# Patient Record
Sex: MALE | Race: BLACK OR AFRICAN AMERICAN | NOT HISPANIC OR LATINO | URBAN - METROPOLITAN AREA
[De-identification: names, ages, dates, MRNs, and addresses within clinical notes are randomized per-mention and may not be internally consistent; named-entity substitution may affect disease eponyms.]

---

## 2017-03-28 ENCOUNTER — ON CAMPUS - OUTPATIENT (AMBULATORY)
Dept: URBAN - METROPOLITAN AREA HOSPITAL 2 | Facility: HOSPITAL | Age: 57
End: 2017-03-28
Payer: COMMERCIAL

## 2017-03-28 ENCOUNTER — OFFICE (AMBULATORY)
Dept: URBAN - METROPOLITAN AREA CLINIC 64 | Facility: CLINIC | Age: 57
End: 2017-03-28

## 2017-03-28 VITALS
SYSTOLIC BLOOD PRESSURE: 98 MMHG | DIASTOLIC BLOOD PRESSURE: 65 MMHG | DIASTOLIC BLOOD PRESSURE: 90 MMHG | HEART RATE: 65 BPM | HEART RATE: 61 BPM | TEMPERATURE: 98 F | DIASTOLIC BLOOD PRESSURE: 75 MMHG | SYSTOLIC BLOOD PRESSURE: 114 MMHG | HEART RATE: 73 BPM | HEART RATE: 66 BPM | SYSTOLIC BLOOD PRESSURE: 91 MMHG | SYSTOLIC BLOOD PRESSURE: 117 MMHG | OXYGEN SATURATION: 95 % | HEART RATE: 74 BPM | DIASTOLIC BLOOD PRESSURE: 93 MMHG | SYSTOLIC BLOOD PRESSURE: 123 MMHG | HEART RATE: 67 BPM | DIASTOLIC BLOOD PRESSURE: 52 MMHG | OXYGEN SATURATION: 100 % | SYSTOLIC BLOOD PRESSURE: 96 MMHG | OXYGEN SATURATION: 97 % | DIASTOLIC BLOOD PRESSURE: 66 MMHG | SYSTOLIC BLOOD PRESSURE: 155 MMHG | DIASTOLIC BLOOD PRESSURE: 47 MMHG | DIASTOLIC BLOOD PRESSURE: 60 MMHG | RESPIRATION RATE: 18 BRPM | HEART RATE: 63 BPM | WEIGHT: 235 LBS | HEART RATE: 56 BPM | SYSTOLIC BLOOD PRESSURE: 102 MMHG | DIASTOLIC BLOOD PRESSURE: 55 MMHG | OXYGEN SATURATION: 96 % | DIASTOLIC BLOOD PRESSURE: 77 MMHG | SYSTOLIC BLOOD PRESSURE: 84 MMHG | SYSTOLIC BLOOD PRESSURE: 100 MMHG | HEART RATE: 62 BPM | RESPIRATION RATE: 15 BRPM | OXYGEN SATURATION: 98 % | HEIGHT: 73 IN | RESPIRATION RATE: 16 BRPM

## 2017-03-28 DIAGNOSIS — K57.30 DIVERTICULOSIS OF LARGE INTESTINE WITHOUT PERFORATION OR ABS: ICD-10-CM

## 2017-03-28 DIAGNOSIS — Z12.11 ENCOUNTER FOR SCREENING FOR MALIGNANT NEOPLASM OF COLON: ICD-10-CM

## 2017-03-28 DIAGNOSIS — K63.5 POLYP OF COLON: ICD-10-CM

## 2017-03-28 DIAGNOSIS — K64.8 OTHER HEMORRHOIDS: ICD-10-CM

## 2017-03-28 DIAGNOSIS — D12.2 BENIGN NEOPLASM OF ASCENDING COLON: ICD-10-CM

## 2017-03-28 DIAGNOSIS — K62.89 OTHER SPECIFIED DISEASES OF ANUS AND RECTUM: ICD-10-CM

## 2017-03-28 PROBLEM — D12.5 BENIGN NEOPLASM OF SIGMOID COLON: Status: ACTIVE | Noted: 2017-03-28

## 2017-03-28 LAB
GI HISTOLOGY: A. UNSPECIFIED: (no result)
GI HISTOLOGY: B. UNSPECIFIED: (no result)
GI HISTOLOGY: PDF REPORT: (no result)

## 2017-03-28 PROCEDURE — 45385 COLONOSCOPY W/LESION REMOVAL: CPT | Performed by: INTERNAL MEDICINE

## 2017-03-28 PROCEDURE — 88305 TISSUE EXAM BY PATHOLOGIST: CPT | Performed by: INTERNAL MEDICINE

## 2017-03-28 RX ADMIN — PROPOFOL: 10 INJECTION, EMULSION INTRAVENOUS at 10:06

## 2020-02-12 RX ORDER — LISINOPRIL 40 MG/1
TABLET ORAL
Qty: 90 TABLET | Refills: 3 | Status: SHIPPED | OUTPATIENT
Start: 2020-02-12

## 2020-05-13 RX ORDER — SILDENAFIL 100 MG/1
TABLET, FILM COATED ORAL
Qty: 10 TABLET | Refills: 11 | Status: SHIPPED | OUTPATIENT
Start: 2020-05-13

## 2020-07-14 RX ORDER — AMLODIPINE BESYLATE 2.5 MG/1
TABLET ORAL
Qty: 90 TABLET | Refills: 2 | Status: SHIPPED | OUTPATIENT
Start: 2020-07-14 | End: 2023-02-14

## 2020-08-05 ENCOUNTER — OFFICE (AMBULATORY)
Dept: URBAN - METROPOLITAN AREA PATHOLOGY 4 | Facility: PATHOLOGY | Age: 60
End: 2020-08-05
Payer: COMMERCIAL

## 2020-08-05 ENCOUNTER — ON CAMPUS - OUTPATIENT (AMBULATORY)
Dept: URBAN - METROPOLITAN AREA HOSPITAL 2 | Facility: HOSPITAL | Age: 60
End: 2020-08-05

## 2020-08-05 VITALS
HEART RATE: 54 BPM | OXYGEN SATURATION: 100 % | HEIGHT: 73 IN | DIASTOLIC BLOOD PRESSURE: 79 MMHG | WEIGHT: 238 LBS | HEART RATE: 60 BPM | DIASTOLIC BLOOD PRESSURE: 92 MMHG | DIASTOLIC BLOOD PRESSURE: 78 MMHG | SYSTOLIC BLOOD PRESSURE: 129 MMHG | DIASTOLIC BLOOD PRESSURE: 56 MMHG | OXYGEN SATURATION: 96 % | SYSTOLIC BLOOD PRESSURE: 131 MMHG | DIASTOLIC BLOOD PRESSURE: 64 MMHG | TEMPERATURE: 96.6 F | DIASTOLIC BLOOD PRESSURE: 61 MMHG | OXYGEN SATURATION: 98 % | RESPIRATION RATE: 18 BRPM | OXYGEN SATURATION: 99 % | SYSTOLIC BLOOD PRESSURE: 106 MMHG | SYSTOLIC BLOOD PRESSURE: 109 MMHG | SYSTOLIC BLOOD PRESSURE: 112 MMHG | SYSTOLIC BLOOD PRESSURE: 100 MMHG | DIASTOLIC BLOOD PRESSURE: 67 MMHG | HEART RATE: 63 BPM | SYSTOLIC BLOOD PRESSURE: 101 MMHG | DIASTOLIC BLOOD PRESSURE: 81 MMHG | DIASTOLIC BLOOD PRESSURE: 72 MMHG | HEART RATE: 67 BPM | RESPIRATION RATE: 16 BRPM | SYSTOLIC BLOOD PRESSURE: 132 MMHG | OXYGEN SATURATION: 97 % | HEART RATE: 64 BPM | SYSTOLIC BLOOD PRESSURE: 140 MMHG

## 2020-08-05 DIAGNOSIS — D12.3 BENIGN NEOPLASM OF TRANSVERSE COLON: ICD-10-CM

## 2020-08-05 DIAGNOSIS — K62.1 RECTAL POLYP: ICD-10-CM

## 2020-08-05 DIAGNOSIS — Z86.010 PERSONAL HISTORY OF COLONIC POLYPS: ICD-10-CM

## 2020-08-05 DIAGNOSIS — K57.30 DIVERTICULOSIS OF LARGE INTESTINE WITHOUT PERFORATION OR ABS: ICD-10-CM

## 2020-08-05 DIAGNOSIS — K64.8 OTHER HEMORRHOIDS: ICD-10-CM

## 2020-08-05 PROBLEM — K63.5 POLYP OF COLON: Status: ACTIVE | Noted: 2020-08-05

## 2020-08-05 PROCEDURE — 88305 TISSUE EXAM BY PATHOLOGIST: CPT | Mod: 26 | Performed by: INTERNAL MEDICINE

## 2020-08-05 PROCEDURE — 45385 COLONOSCOPY W/LESION REMOVAL: CPT | Mod: 33 | Performed by: INTERNAL MEDICINE

## 2022-07-14 ENCOUNTER — LAB REQUISITION (OUTPATIENT)
Dept: LAB | Facility: HOSPITAL | Age: 62
End: 2022-07-14

## 2022-07-14 DIAGNOSIS — Z00.00 ENCOUNTER FOR GENERAL ADULT MEDICAL EXAMINATION WITHOUT ABNORMAL FINDINGS: ICD-10-CM

## 2022-07-14 PROCEDURE — 88305 TISSUE EXAM BY PATHOLOGIST: CPT | Performed by: OTOLARYNGOLOGY

## 2022-07-18 LAB — REF LAB TEST METHOD: NORMAL

## 2022-07-22 LAB
LAB AP CASE REPORT: NORMAL
LAB AP DIAGNOSIS COMMENT: NORMAL
LAB AP FLOW CYTOMETRY SUMMARY: NORMAL
LAB AP INTEGRATED ONCOLOGY, ADDENDUM: NORMAL
PATH REPORT.FINAL DX SPEC: NORMAL
PATH REPORT.GROSS SPEC: NORMAL

## 2022-08-04 ENCOUNTER — TELEPHONE (OUTPATIENT)
Dept: ONCOLOGY | Facility: CLINIC | Age: 62
End: 2022-08-04

## 2022-08-08 ENCOUNTER — TELEPHONE (OUTPATIENT)
Dept: ONCOLOGY | Facility: CLINIC | Age: 62
End: 2022-08-08

## 2022-08-10 ENCOUNTER — TELEPHONE (OUTPATIENT)
Dept: ONCOLOGY | Facility: CLINIC | Age: 62
End: 2022-08-10

## 2023-02-08 ENCOUNTER — TELEPHONE (OUTPATIENT)
Dept: FAMILY MEDICINE CLINIC | Facility: CLINIC | Age: 63
End: 2023-02-08

## 2023-02-08 NOTE — TELEPHONE ENCOUNTER
PRACTICE MANAGER    Caller: VILMA    Relationship to patient: [facility] ENDOSCOPY MANAGER       Best call back number:[phone]    Chief complaint: CHRONIC CLL ,     Type of visit: NEW PATIENT     Requested date: NEXT AVAILABLE , EXCEPT 2/15/2023        Additional notes:    DR KEEN WOULD LIKE TO KNOW IF DR BELTRAN WILL ESTABLISH [facility], HE HAS BEEN UNABLE TO SEE CURRENT PROVIDER. HE HAS A LOT OF HEALTH ISSUES.       [phone] [facility] CONTACT INFORMATION

## 2023-02-08 NOTE — TELEPHONE ENCOUNTER
May schedule a new patient appointment as it is requested by his GI doctor.  However it has to be down the road due to a very busy schedule.  Thank you.

## 2023-02-10 ENCOUNTER — TELEPHONE (OUTPATIENT)
Dept: FAMILY MEDICINE CLINIC | Facility: CLINIC | Age: 63
End: 2023-02-10
Payer: COMMERCIAL

## 2023-02-10 NOTE — TELEPHONE ENCOUNTER
I called Dr. Barnett, please schedule a new patient appointment for the patient for next week.  Thank you

## 2023-02-10 NOTE — TELEPHONE ENCOUNTER
VILMA FROM DR MCCALL OFFICE CALLED. SHE STATES DR DOSHI WANTS TO TALK TO DR BELTRAN TO GET THE PATIENT SEEN SOONER. I ADVISED HER THAT DR BELTRAN HAD AGREED TO TAKE HIM ON AS A NEW PATIENT, EVEN WHEN SHE IS NOT ACCEPTING NEW PATIENTS, AND THAT WE GOT HIM SCHEDULED AT THE SOONEST AVAILABLE. VILMA INSISTED ON ASKING THAT DR BELTRAN CALL DR DOSHI TO DISCUSS THE PATIENT BEING SEEN SOONER.    PLEASE ADVISE    DR DOSHI: [phone]

## 2023-02-14 ENCOUNTER — OFFICE VISIT (OUTPATIENT)
Dept: FAMILY MEDICINE CLINIC | Facility: CLINIC | Age: 63
End: 2023-02-14
Payer: COMMERCIAL

## 2023-02-14 ENCOUNTER — LAB (OUTPATIENT)
Dept: FAMILY MEDICINE CLINIC | Facility: CLINIC | Age: 63
End: 2023-02-14
Payer: COMMERCIAL

## 2023-02-14 VITALS
TEMPERATURE: 98.4 F | SYSTOLIC BLOOD PRESSURE: 135 MMHG | RESPIRATION RATE: 16 BRPM | BODY MASS INDEX: 29.36 KG/M2 | DIASTOLIC BLOOD PRESSURE: 70 MMHG | WEIGHT: 228.8 LBS | OXYGEN SATURATION: 98 % | HEIGHT: 74 IN | HEART RATE: 61 BPM

## 2023-02-14 DIAGNOSIS — E11.42 TYPE 2 DIABETES MELLITUS WITH DIABETIC POLYNEUROPATHY, WITHOUT LONG-TERM CURRENT USE OF INSULIN: Primary | ICD-10-CM

## 2023-02-14 DIAGNOSIS — E78.2 MIXED HYPERLIPIDEMIA: ICD-10-CM

## 2023-02-14 DIAGNOSIS — Z11.59 NEED FOR HEPATITIS C SCREENING TEST: ICD-10-CM

## 2023-02-14 DIAGNOSIS — I10 PRIMARY HYPERTENSION: ICD-10-CM

## 2023-02-14 DIAGNOSIS — Z12.5 PROSTATE CANCER SCREENING: ICD-10-CM

## 2023-02-14 PROBLEM — C91.10 CLL (CHRONIC LYMPHOCYTIC LEUKEMIA): Status: ACTIVE | Noted: 2022-09-13

## 2023-02-14 PROBLEM — E11.9 TYPE 2 DIABETES MELLITUS (HCC): Status: ACTIVE | Noted: 2019-02-11

## 2023-02-14 PROBLEM — F52.21 MALE ERECTILE DISORDER (CODE): Status: ACTIVE | Noted: 2019-04-17

## 2023-02-14 PROBLEM — C91.10 CLL (CHRONIC LYMPHOCYTIC LEUKEMIA): Status: RESOLVED | Noted: 2022-09-13 | Resolved: 2023-02-14

## 2023-02-14 LAB
25(OH)D3 SERPL-MCNC: 20.5 NG/ML (ref 30–100)
ALBUMIN SERPL-MCNC: 4.4 G/DL (ref 3.5–5.2)
ALBUMIN UR-MCNC: 23.1 MG/DL
ALBUMIN/GLOB SERPL: 1.8 G/DL
ALP SERPL-CCNC: 60 U/L (ref 39–117)
ALT SERPL W P-5'-P-CCNC: 13 U/L (ref 1–41)
ANION GAP SERPL CALCULATED.3IONS-SCNC: 9.1 MMOL/L (ref 5–15)
AST SERPL-CCNC: 19 U/L (ref 1–40)
BILIRUB SERPL-MCNC: 0.5 MG/DL (ref 0–1.2)
BUN SERPL-MCNC: 15 MG/DL (ref 8–23)
BUN/CREAT SERPL: 22.7 (ref 7–25)
CALCIUM SPEC-SCNC: 9.7 MG/DL (ref 8.6–10.5)
CHLORIDE SERPL-SCNC: 104 MMOL/L (ref 98–107)
CHOLEST SERPL-MCNC: 191 MG/DL (ref 0–200)
CO2 SERPL-SCNC: 27.9 MMOL/L (ref 22–29)
CREAT SERPL-MCNC: 0.66 MG/DL (ref 0.76–1.27)
CREAT UR-MCNC: 102.7 MG/DL
EGFRCR SERPLBLD CKD-EPI 2021: 106 ML/MIN/1.73
GLOBULIN UR ELPH-MCNC: 2.5 GM/DL
GLUCOSE SERPL-MCNC: 172 MG/DL (ref 65–99)
HBA1C MFR BLD: 6.9 % (ref 3.5–5.6)
HCV AB SER DONR QL: NORMAL
HDLC SERPL-MCNC: 85 MG/DL (ref 40–60)
LDLC SERPL CALC-MCNC: 87 MG/DL (ref 0–100)
LDLC/HDLC SERPL: 0.99 {RATIO}
MICROALBUMIN/CREAT UR: 224.9 MG/G
POTASSIUM SERPL-SCNC: 4.2 MMOL/L (ref 3.5–5.2)
PROT SERPL-MCNC: 6.9 G/DL (ref 6–8.5)
PSA SERPL-MCNC: 4.4 NG/ML (ref 0–4)
SODIUM SERPL-SCNC: 141 MMOL/L (ref 136–145)
TRIGL SERPL-MCNC: 111 MG/DL (ref 0–150)
TSH SERPL DL<=0.05 MIU/L-ACNC: 2.41 UIU/ML (ref 0.27–4.2)
VIT B12 BLD-MCNC: 546 PG/ML (ref 211–946)
VLDLC SERPL-MCNC: 19 MG/DL (ref 5–40)

## 2023-02-14 PROCEDURE — 86803 HEPATITIS C AB TEST: CPT | Performed by: FAMILY MEDICINE

## 2023-02-14 PROCEDURE — 85007 BL SMEAR W/DIFF WBC COUNT: CPT | Performed by: FAMILY MEDICINE

## 2023-02-14 PROCEDURE — 82306 VITAMIN D 25 HYDROXY: CPT | Performed by: FAMILY MEDICINE

## 2023-02-14 PROCEDURE — 80050 GENERAL HEALTH PANEL: CPT | Performed by: FAMILY MEDICINE

## 2023-02-14 PROCEDURE — 82570 ASSAY OF URINE CREATININE: CPT | Performed by: FAMILY MEDICINE

## 2023-02-14 PROCEDURE — 82043 UR ALBUMIN QUANTITATIVE: CPT | Performed by: FAMILY MEDICINE

## 2023-02-14 PROCEDURE — 36415 COLL VENOUS BLD VENIPUNCTURE: CPT | Performed by: FAMILY MEDICINE

## 2023-02-14 PROCEDURE — 83036 HEMOGLOBIN GLYCOSYLATED A1C: CPT | Performed by: FAMILY MEDICINE

## 2023-02-14 PROCEDURE — 80061 LIPID PANEL: CPT | Performed by: FAMILY MEDICINE

## 2023-02-14 PROCEDURE — 99204 OFFICE O/P NEW MOD 45 MIN: CPT | Performed by: FAMILY MEDICINE

## 2023-02-14 PROCEDURE — G0103 PSA SCREENING: HCPCS | Performed by: FAMILY MEDICINE

## 2023-02-14 PROCEDURE — 82607 VITAMIN B-12: CPT | Performed by: FAMILY MEDICINE

## 2023-02-14 RX ORDER — LANCETS
1 EACH MISCELLANEOUS DAILY
Qty: 100 EACH | Refills: 0 | Status: SHIPPED | OUTPATIENT
Start: 2023-02-14

## 2023-02-14 RX ORDER — BLOOD-GLUCOSE METER
1 EACH MISCELLANEOUS DAILY
Qty: 1 KIT | Refills: 0 | Status: SHIPPED | OUTPATIENT
Start: 2023-02-14

## 2023-02-14 RX ORDER — AMLODIPINE BESYLATE 10 MG/1
1 TABLET ORAL DAILY
COMMUNITY
Start: 2023-01-30

## 2023-02-14 NOTE — PROGRESS NOTES
Subjective   Chief Complaint   Patient presents with   • Get Established   • Diabetes   • Hypertension   • Hyperlipidemia   • Med Refill     Bharathi Darnell is a 62 y.o. male.     Patient Care Team:  Mahnaz Caldera MD as PCP - General (Family Medicine)  Hever Barnett MD as Consulting Physician (Gastroenterology)  Patrick Fontaine MD as Consulting Physician (Hematology and Oncology)    History of Present Illness  He is coming in today to get established and address his medical problems and his medications.  Patient reports that he was diagnosed with diabetes about 10 years ago.  He is on metformin 500 mg 2 times a day.  He is to check his blood sugar periodically and typically was getting readings in mid 100s.  He has not been checking his blood sugar in a while due to not having functioning glucometer.  He was diagnosed with CLL about 8 months ago and he is followed by local oncologist, but he also sees a specialist in [place].  He is not currently being treated for it and just monitoring is being done.  He is currently also on blood pressure medicine including lisinopril and amlodipine.  He was previously advised that he should be on cholesterol medicine by his medical doctor, but he never really started taking it as he was not sure if this was really needed. Patient does not report any chest pain, shortness of breath, dizziness, nausea, vomiting, or diarrhea, visual issues, headaches, numbness or tingling. No urinary issues reported like urgency, frequency, or discomfort upon urination.  No significant weight changes reported.  No swelling reported.  No rashes or any other skin issues reported. No emotional issues or insomnia.       The following portions of the patient's history were reviewed and updated as appropriate: allergies, current medications, past family history, past medical history, past social history, past surgical history and problem list.  Past Medical History:   Diagnosis  "Date   • CLL (chronic lymphocytic leukemia) ([facility])     2022   • DM (diabetes mellitus), type 2 ([facility])    • Erectile dysfunction    • Hyperlipidemia    • Hypertension      Past Surgical History:   Procedure Laterality Date   • COLONOSCOPY      2019     The patient has a family history of  Family History   Problem Relation Age of Onset   • Diabetes Mother    • Hyperlipidemia Mother      Social History     Socioeconomic History   • Marital status:    Tobacco Use   • Smoking status: Never   • Smokeless tobacco: Never       Review of Systems   Constitutional: Negative for activity change, fatigue and fever.   Respiratory: Negative for shortness of breath and wheezing.    Cardiovascular: Negative for chest pain, palpitations and leg swelling.   Musculoskeletal: Negative for arthralgias and back pain.   Skin: Negative for rash.   Neurological: Negative for tremors and headache.     Visit Vitals  /70 (BP Location: Left arm, Patient Position: Sitting, Cuff Size: Adult)   Pulse 61   Temp 98.4 °F (36.9 °C) (Infrared)   Resp 16   Ht 188 cm (74\")   Wt 104 kg (228 lb 12.8 oz)   SpO2 98%   BMI 29.38 kg/m²       Current Outpatient Medications:   •  lisinopril (PRINIVIL,ZESTRIL) 40 MG tablet, TAKE 1 TABLET BY MOUTH EVERY DAY FOR BLOOD PRESSURE, Disp: 90 tablet, Rfl: 3  •  metFORMIN (GLUCOPHAGE) 500 MG tablet, Take 1 tablet by mouth 2 (Two) Times a Day., Disp: 180 tablet, Rfl: 0  •  sildenafil (VIAGRA) 100 MG tablet, TAKE 1 TABLET BY MOUTH PRE INTERCOURSE, Disp: 10 tablet, Rfl: 11  •  amLODIPine (NORVASC) 10 MG tablet, Take 1 tablet by mouth Daily., Disp: , Rfl:   •  Blood Glucose Monitoring Suppl (Accu-Chek Guide) w/Device kit, 1 Device Daily., Disp: 1 kit, Rfl: 0  •  glucose blood (Accu-Chek Guide) test strip, 1 each by Other route Daily., Disp: 100 each, Rfl: 0  •  Lancets (accu-chek multiclix) lancets, 1 each by Other route Daily., Disp: 100 each, Rfl: 0    Objective   Physical Exam  Vitals and nursing note reviewed. "   Constitutional:       General: He is not in acute distress.     Appearance: Normal appearance. He is well-developed. He is not ill-appearing.   HENT:      Head: Normocephalic and atraumatic.   Cardiovascular:      Rate and Rhythm: Normal rate and regular rhythm.      Heart sounds: Normal heart sounds. No murmur heard.    No gallop.   Pulmonary:      Effort: Pulmonary effort is normal. No respiratory distress.      Breath sounds: Normal breath sounds. No wheezing, rhonchi or rales.   Chest:      Chest wall: No tenderness.   Musculoskeletal:      Cervical back: Normal range of motion and neck supple.   Neurological:      General: No focal deficit present.      Mental Status: He is alert and oriented to person, place, and time. Mental status is at baseline.   Psychiatric:         Mood and Affect: Mood normal.         Assessment & Plan   Diagnoses and all orders for this visit:    1. Type 2 diabetes mellitus with diabetic polyneuropathy, without long-term current use of insulin (HCC) (Primary)  -     Comprehensive Metabolic Panel  -     CBC Auto Differential  -     Hemoglobin A1c  -     Microalbumin / Creatinine Urine Ratio - Urine, Clean Catch  -     Lipid Panel  -     TSH  -     Vitamin B12  -     Vitamin D,25-Hydroxy  -     metFORMIN (GLUCOPHAGE) 500 MG tablet; Take 1 tablet by mouth 2 (Two) Times a Day.  Dispense: 180 tablet; Refill: 0  -     glucose blood (Accu-Chek Guide) test strip; 1 each by Other route Daily.  Dispense: 100 each; Refill: 0  -     Blood Glucose Monitoring Suppl (Accu-Chek Guide) w/Device kit; 1 Device Daily.  Dispense: 1 kit; Refill: 0  -     Lancets (accu-chek multiclix) lancets; 1 each by Other route Daily.  Dispense: 100 each; Refill: 0    2. Primary hypertension  -     Comprehensive Metabolic Panel  -     CBC Auto Differential  -     Hemoglobin A1c  -     Lipid Panel  -     Vitamin B12    3. Mixed hyperlipidemia  -     Comprehensive Metabolic Panel  -     CBC Auto Differential  -      Hemoglobin A1c  -     Microalbumin / Creatinine Urine Ratio - Urine, Clean Catch  -     Lipid Panel  -     Vitamin B12    4. Need for hepatitis C screening test  -     Hepatitis C Antibody    5. Prostate cancer screening  -     PSA Screen      I reviewed his medical problems and his medications.  I will be getting fasting blood work today.  He was advised to continue his current medicines and refills were given.  I also gave him prescription for diabetic supplies so he can monitor his blood sugar at home.  Healthy lifestyle was reinforced.  He was advised to schedule his diabetic eye exams as he is not up to speed with that.  I will be getting a copy of his most recent colonoscopy report.  Patient is a diabetic, but he is not currently on statin.  I discussed with the patient importance of statin in the management of hyperlipidemia in patients with diabetes to decrease the cardiovascular risk.  I will advise further once the results of the blood work are available.        Return in about 3 months (around 2023) for Next scheduled follow up.    Requested Prescriptions     Signed Prescriptions Disp Refills   • metFORMIN (GLUCOPHAGE) 500 MG tablet 180 tablet 0     Sig: Take 1 tablet by mouth 2 (Two) Times a Day.   • glucose blood (Accu-Chek Guide) test strip 100 each 0     Si each by Other route Daily.   • Blood Glucose Monitoring Suppl (Accu-Chek Guide) w/Device kit 1 kit 0     Si Device Daily.   • Lancets (accu-chek multiclix) lancets 100 each 0     Si each by Other route Daily.

## 2023-02-15 DIAGNOSIS — R97.20 ELEVATED PSA: Primary | ICD-10-CM

## 2023-02-15 LAB
DEPRECATED RDW RBC AUTO: 44 FL (ref 37–54)
ERYTHROCYTE [DISTWIDTH] IN BLOOD BY AUTOMATED COUNT: 12.9 % (ref 12.3–15.4)
HCT VFR BLD AUTO: 37.7 % (ref 37.5–51)
HGB BLD-MCNC: 12.6 G/DL (ref 13–17.7)
LAB AP CASE REPORT: NORMAL
LYMPHOCYTES # BLD MANUAL: 51.16 10*3/MM3 (ref 0.7–3.1)
LYMPHOCYTES NFR BLD MANUAL: 21 % (ref 5–12)
MCH RBC QN AUTO: 32.1 PG (ref 26.6–33)
MCHC RBC AUTO-ENTMCNC: 33.4 G/DL (ref 31.5–35.7)
MCV RBC AUTO: 96.2 FL (ref 79–97)
MONOCYTES # BLD: 14.72 10*3/MM3 (ref 0.1–0.9)
NEUTROPHILS # BLD AUTO: 4.2 10*3/MM3 (ref 1.7–7)
NEUTROPHILS NFR BLD MANUAL: 6 % (ref 42.7–76)
PATH REPORT.FINAL DX SPEC: NORMAL
PATH REPORT.GROSS SPEC: NORMAL
PATHOLOGY REVIEW: YES
PLAT MORPH BLD: NORMAL
PLATELET # BLD AUTO: 179 10*3/MM3 (ref 140–450)
PMV BLD AUTO: 10.5 FL (ref 6–12)
RBC # BLD AUTO: 3.92 10*6/MM3 (ref 4.14–5.8)
RBC MORPH BLD: NORMAL
VARIANT LYMPHS NFR BLD MANUAL: 73 % (ref 19.6–45.3)
WBC MORPH BLD: NORMAL
WBC NRBC COR # BLD: 70.08 10*3/MM3 (ref 3.4–10.8)

## 2023-02-15 RX ORDER — ERGOCALCIFEROL 1.25 MG/1
50000 CAPSULE ORAL WEEKLY
Qty: 12 CAPSULE | Refills: 1 | Status: SHIPPED | OUTPATIENT
Start: 2023-02-15

## 2023-02-15 NOTE — PROGRESS NOTES
I called the patient and he was not sure of his baseline. He stated he sees Dr. Fontaine and today he sees Dr. Pino Ornelas @ [facility] for His CLL. I I am faxing labs to both Physicians. He is willing to see Urology.  His Pharmacy is [facility] in [place]

## 2023-04-03 ENCOUNTER — TELEPHONE (OUTPATIENT)
Dept: FAMILY MEDICINE CLINIC | Facility: CLINIC | Age: 63
End: 2023-04-03
Payer: COMMERCIAL

## 2023-04-03 NOTE — TELEPHONE ENCOUNTER
Caller: Bharathi Darnell    Relationship: Self    Best call back number: [phone]    What is the medical concern/diagnosis: RIGHT TOE SWOLLEN    What specialty or service is being requested: PODIATRY     What is the provider, practice or medical service name: WHO EVER IS RECOMENDED

## 2023-04-03 NOTE — TELEPHONE ENCOUNTER
Please call the patient to get more information.  Message says that his right toe is swollen and he wants a referral to podiatrist.  It typically takes a while for patient to be able to get an appointment with the podiatrist and I wonder if his issues are rather acute and need to be addressed promptly and he might need to come and see me first.  He is a fairly new patient for me and this might be possibly recurrent problem for him.  Please call the patient to get more information so I can advise and assist the patient.  Thank you.

## 2023-04-05 ENCOUNTER — OFFICE VISIT (OUTPATIENT)
Dept: FAMILY MEDICINE CLINIC | Facility: CLINIC | Age: 63
End: 2023-04-05
Payer: COMMERCIAL

## 2023-04-05 ENCOUNTER — HOSPITAL ENCOUNTER (OUTPATIENT)
Dept: GENERAL RADIOLOGY | Facility: HOSPITAL | Age: 63
Discharge: HOME | End: 2023-04-05
Admitting: FAMILY MEDICINE
Payer: COMMERCIAL

## 2023-04-05 VITALS
TEMPERATURE: 97.8 F | OXYGEN SATURATION: 95 % | SYSTOLIC BLOOD PRESSURE: 116 MMHG | HEIGHT: 74 IN | HEART RATE: 54 BPM | WEIGHT: 231.2 LBS | RESPIRATION RATE: 16 BRPM | DIASTOLIC BLOOD PRESSURE: 66 MMHG | BODY MASS INDEX: 29.67 KG/M2

## 2023-04-05 DIAGNOSIS — M79.671 RIGHT FOOT PAIN: ICD-10-CM

## 2023-04-05 DIAGNOSIS — M79.671 RIGHT FOOT PAIN: Primary | ICD-10-CM

## 2023-04-05 PROCEDURE — 99213 OFFICE O/P EST LOW 20 MIN: CPT | Performed by: FAMILY MEDICINE

## 2023-04-05 PROCEDURE — 73630 X-RAY EXAM OF FOOT: CPT

## 2023-04-05 NOTE — PROGRESS NOTES
"Subjective   Chief Complaint   Patient presents with   • Toe Pain     Swollen right toe     Bharathi Darnell is a 62 y.o. male.     Patient Care Team:  Mahnaz Caldera MD as PCP - General (Family Medicine)  Patrick Fontaine MD as Consulting Physician (Hematology and Oncology)    History of Present Illness  He is coming in today due to right foot problems.  He reports that for the last 2 months he started experiencing pain in the right foot which is located at the distal part of the foot.  It is still worse with the first few steps and then it gets some better.  He also noted some swelling over the right middle toe.  He is not aware of any injury.  He is still able to walk and do his daily activities.  His job does not involve a lot of walking.  He does not really report any numbness or tingling in his feet.  He is being treated for diabetes       The following portions of the patient's history were reviewed and updated as appropriate: allergies, current medications, past family history, past medical history, past social history, past surgical history and problem list.  Past Medical History:   Diagnosis Date   • CLL (chronic lymphocytic leukemia)     2022   • DM (diabetes mellitus), type 2    • Erectile dysfunction    • Hyperlipidemia    • Hypertension      Past Surgical History:   Procedure Laterality Date   • COLONOSCOPY      2019     The patient has a family history of  Family History   Problem Relation Age of Onset   • Diabetes Mother    • Hyperlipidemia Mother      Social History     Socioeconomic History   • Marital status:    Tobacco Use   • Smoking status: Never   • Smokeless tobacco: Never       Review of Systems   Constitutional: Negative for chills and fever.   Musculoskeletal: Positive for arthralgias and joint swelling.   Neurological: Negative for weakness and numbness.     Visit Vitals  /66   Pulse 54   Temp 97.8 °F (36.6 °C)   Resp 16   Ht 188 cm (74\")   Wt 105 kg (231 lb 3.2 oz) "   SpO2 95%   BMI 29.68 kg/m²       BMI is >= 25 and <30. (Overweight) The following options were offered after discussion;: exercise counseling/recommendations      Current Outpatient Medications:   •  amLODIPine (NORVASC) 10 MG tablet, Take 1 tablet by mouth Daily., Disp: , Rfl:   •  Blood Glucose Monitoring Suppl (Accu-Chek Guide) w/Device kit, 1 Device Daily., Disp: 1 kit, Rfl: 0  •  glucose blood (Accu-Chek Guide) test strip, 1 each by Other route Daily., Disp: 100 each, Rfl: 0  •  Lancets (accu-chek multiclix) lancets, 1 each by Other route Daily., Disp: 100 each, Rfl: 0  •  lisinopril (PRINIVIL,ZESTRIL) 40 MG tablet, TAKE 1 TABLET BY MOUTH EVERY DAY FOR BLOOD PRESSURE, Disp: 90 tablet, Rfl: 3  •  metFORMIN (GLUCOPHAGE) 500 MG tablet, Take 1 tablet by mouth 2 (Two) Times a Day., Disp: 180 tablet, Rfl: 0  •  sildenafil (VIAGRA) 100 MG tablet, TAKE 1 TABLET BY MOUTH PRE INTERCOURSE, Disp: 10 tablet, Rfl: 11  •  vitamin D (ERGOCALCIFEROL) 1.25 MG (14022 UT) capsule capsule, Take 1 capsule by mouth 1 (One) Time Per Week., Disp: 12 capsule, Rfl: 1    Objective   Physical Exam  Constitutional:       General: He is not in acute distress.     Appearance: Normal appearance. He is well-developed. He is not ill-appearing or diaphoretic.      Comments: Patient is in no distress, patient has normal voice and speech.  Normal respiratory effort.   HENT:      Head: Normocephalic and atraumatic.   Pulmonary:      Effort: Pulmonary effort is normal.   Musculoskeletal:      Cervical back: Normal range of motion and neck supple.      Comments: Right foot was examined, there is a swelling of the right middle toe noted which has chronic appearance.  No skin redness.  No rashes.  No petechiae.  There is also deformity noted at the interphalangeal joint of the great toe noted.  Strong dorsalis pedis pulse.   Neurological:      General: No focal deficit present.      Mental Status: He is alert and oriented to person, place, and time.  Mental status is at baseline.   Psychiatric:         Mood and Affect: Mood normal.       Most Recent A1C    HGBA1C Most Recent 2/14/23   Hemoglobin A1C 6.9 (A)   (A) Abnormal value              Assessment & Plan   Diagnoses and all orders for this visit:    1. Right foot pain (Primary)  -     XR Foot 3+ View Right; Future  -     Ambulatory Referral to Podiatry      I reviewed his symptoms and concerns.  I will be getting x-ray of the left foot.  I will be also referring him to see a podiatrist for further evaluation.  He may take over-the-counter Tylenol or ibuprofen as needed for pain.      Return if symptoms worsen or fail to improve, for Recheck.    Requested Prescriptions      No prescriptions requested or ordered in this encounter

## 2023-04-06 NOTE — PROGRESS NOTES
Patient was notified and verbally understood. He says he can wait till May/ but if it gets worse he will call us back

## 2023-04-27 DIAGNOSIS — I10 ESSENTIAL (PRIMARY) HYPERTENSION: ICD-10-CM

## 2023-04-27 RX ORDER — AMLODIPINE BESYLATE 10 MG/1
TABLET ORAL
Qty: 90 TABLET | Refills: 1 | Status: SHIPPED | OUTPATIENT
Start: 2023-04-27

## 2023-05-15 DIAGNOSIS — E11.42 TYPE 2 DIABETES MELLITUS WITH DIABETIC POLYNEUROPATHY, WITHOUT LONG-TERM CURRENT USE OF INSULIN: ICD-10-CM

## 2023-05-17 ENCOUNTER — TELEPHONE (OUTPATIENT)
Dept: FAMILY MEDICINE CLINIC | Facility: CLINIC | Age: 63
End: 2023-05-17

## 2023-05-17 ENCOUNTER — OFFICE VISIT (OUTPATIENT)
Dept: FAMILY MEDICINE CLINIC | Facility: CLINIC | Age: 63
End: 2023-05-17
Payer: COMMERCIAL

## 2023-05-17 ENCOUNTER — LAB (OUTPATIENT)
Dept: FAMILY MEDICINE CLINIC | Facility: CLINIC | Age: 63
End: 2023-05-17
Payer: COMMERCIAL

## 2023-05-17 VITALS
HEART RATE: 61 BPM | TEMPERATURE: 97.5 F | OXYGEN SATURATION: 96 % | BODY MASS INDEX: 30.13 KG/M2 | DIASTOLIC BLOOD PRESSURE: 72 MMHG | RESPIRATION RATE: 16 BRPM | SYSTOLIC BLOOD PRESSURE: 120 MMHG | WEIGHT: 234.8 LBS | HEIGHT: 74 IN

## 2023-05-17 DIAGNOSIS — N40.0 BENIGN PROSTATIC HYPERPLASIA WITHOUT LOWER URINARY TRACT SYMPTOMS: ICD-10-CM

## 2023-05-17 DIAGNOSIS — E11.42 TYPE 2 DIABETES MELLITUS WITH DIABETIC POLYNEUROPATHY, WITHOUT LONG-TERM CURRENT USE OF INSULIN: Primary | ICD-10-CM

## 2023-05-17 DIAGNOSIS — L03.116 CELLULITIS OF LEG, LEFT: ICD-10-CM

## 2023-05-17 DIAGNOSIS — E55.9 VITAMIN D DEFICIENCY: ICD-10-CM

## 2023-05-17 PROBLEM — Z11.59 NEED FOR HEPATITIS C SCREENING TEST: Status: RESOLVED | Noted: 2023-02-14 | Resolved: 2023-05-17

## 2023-05-17 LAB
25(OH)D3 SERPL-MCNC: 37.1 NG/ML (ref 30–100)
ALBUMIN SERPL-MCNC: 3.9 G/DL (ref 3.5–5.2)
ALBUMIN/GLOB SERPL: 1.6 G/DL
ALP SERPL-CCNC: 63 U/L (ref 39–117)
ALT SERPL W P-5'-P-CCNC: 17 U/L (ref 1–41)
ANION GAP SERPL CALCULATED.3IONS-SCNC: 8.7 MMOL/L (ref 5–15)
AST SERPL-CCNC: 16 U/L (ref 1–40)
BILIRUB SERPL-MCNC: 0.4 MG/DL (ref 0–1.2)
BUN SERPL-MCNC: 13 MG/DL (ref 8–23)
BUN/CREAT SERPL: 20.6 (ref 7–25)
CALCIUM SPEC-SCNC: 9.1 MG/DL (ref 8.6–10.5)
CHLORIDE SERPL-SCNC: 107 MMOL/L (ref 98–107)
CHOLEST SERPL-MCNC: 171 MG/DL (ref 0–200)
CO2 SERPL-SCNC: 24.3 MMOL/L (ref 22–29)
CREAT SERPL-MCNC: 0.63 MG/DL (ref 0.76–1.27)
EGFRCR SERPLBLD CKD-EPI 2021: 107.5 ML/MIN/1.73
GLOBULIN UR ELPH-MCNC: 2.4 GM/DL
GLUCOSE SERPL-MCNC: 197 MG/DL (ref 65–99)
HBA1C MFR BLD: 7.6 % (ref 4.8–5.6)
HDLC SERPL-MCNC: 62 MG/DL (ref 40–60)
LDLC SERPL CALC-MCNC: 90 MG/DL (ref 0–100)
LDLC/HDLC SERPL: 1.42 {RATIO}
POTASSIUM SERPL-SCNC: 4.2 MMOL/L (ref 3.5–5.2)
PROT SERPL-MCNC: 6.3 G/DL (ref 6–8.5)
PSA SERPL-MCNC: 4.1 NG/ML (ref 0–4)
SODIUM SERPL-SCNC: 140 MMOL/L (ref 136–145)
TRIGL SERPL-MCNC: 105 MG/DL (ref 0–150)
VLDLC SERPL-MCNC: 19 MG/DL (ref 5–40)

## 2023-05-17 PROCEDURE — 99214 OFFICE O/P EST MOD 30 MIN: CPT | Performed by: FAMILY MEDICINE

## 2023-05-17 PROCEDURE — 80053 COMPREHEN METABOLIC PANEL: CPT | Performed by: FAMILY MEDICINE

## 2023-05-17 PROCEDURE — 36415 COLL VENOUS BLD VENIPUNCTURE: CPT | Performed by: FAMILY MEDICINE

## 2023-05-17 PROCEDURE — 80061 LIPID PANEL: CPT | Performed by: FAMILY MEDICINE

## 2023-05-17 PROCEDURE — 82306 VITAMIN D 25 HYDROXY: CPT | Performed by: FAMILY MEDICINE

## 2023-05-17 PROCEDURE — 83036 HEMOGLOBIN GLYCOSYLATED A1C: CPT | Performed by: FAMILY MEDICINE

## 2023-05-17 PROCEDURE — 84153 ASSAY OF PSA TOTAL: CPT | Performed by: FAMILY MEDICINE

## 2023-05-17 RX ORDER — SULFAMETHOXAZOLE AND TRIMETHOPRIM 800; 160 MG/1; MG/1
1 TABLET ORAL 2 TIMES DAILY
Qty: 14 TABLET | Refills: 0 | Status: SHIPPED | OUTPATIENT
Start: 2023-05-17 | End: 2023-05-19

## 2023-05-17 RX ORDER — TRIAMCINOLONE ACETONIDE 1 MG/G
1 CREAM TOPICAL 2 TIMES DAILY
Qty: 28.4 G | Refills: 0 | Status: SHIPPED | OUTPATIENT
Start: 2023-05-17

## 2023-05-17 NOTE — TELEPHONE ENCOUNTER
Caller: Bharathi Darnell    Relationship: Self    Best call back number: [phone]    What medication are you requesting: SOMETHING FOR ITCHING    What are your current symptoms: ITCHING RASH    How long have you been experiencing symptoms: 5.16.23    Have you had these symptoms before:    [] Yes  [x] No    Have you been treated for these symptoms before:   [] Yes  [x] No    If a prescription is needed, what is your preferred pharmacy and phone number: [id number] - [address] [phone] [facility] [phone]      Additional notes:    FORGOT TO MENTION THE ITCHING AT THE APPOINTMENT TODAY AND WOULD LIKE TO IF THERE IS SOMETHING THAT CAN BE CALLED IN TO HELP.

## 2023-05-17 NOTE — PROGRESS NOTES
Subjective   Chief Complaint   Patient presents with   • Diabetes     3 months follow up   • Hyperlipidemia   • Hypertension   • Med Refill     Bharathi Darnell is a 62 y.o. male.     Patient Care Team:  Mahnaz Caldera MD as PCP - General (Family Medicine)  Patrick Fontaine MD as Consulting Physician (Hematology and Oncology)    History of Present Illness  He is coming in today to follow-up on his chronic medical problems and his medications including diabetes, hypertension, and CLL he takes his medicines as directed and denies any issues or concerns.  He is followed by oncologist locally and in [place] for his CLL.  He is scheduled for the CT in near future.  He reports that 2 days ago he got bitten by an insect in the back of his left thigh, the area is somehow sore to touch and he would like that to be checked out.  No fever, chills, or any other rashes are being reported       The following portions of the patient's history were reviewed and updated as appropriate: allergies, current medications, past family history, past medical history, past social history, past surgical history and problem list.  Past Medical History:   Diagnosis Date   • CLL (chronic lymphocytic leukemia)     2022   • DM (diabetes mellitus), type 2    • Erectile dysfunction    • Hyperlipidemia    • Hypertension      Past Surgical History:   Procedure Laterality Date   • COLONOSCOPY      2019     The patient has a family history of  Family History   Problem Relation Age of Onset   • Diabetes Mother    • Hyperlipidemia Mother      Social History     Socioeconomic History   • Marital status:    Tobacco Use   • Smoking status: Never   • Smokeless tobacco: Never       Review of Systems   Constitutional: Negative for activity change, fatigue and fever.   Respiratory: Negative for shortness of breath and wheezing.    Cardiovascular: Negative for chest pain, palpitations and leg swelling.   Musculoskeletal: Negative for  "arthralgias and back pain.   Skin: Positive for skin lesions. Negative for rash.   Neurological: Negative for tremors and headache.     Visit Vitals  /72   Pulse 61   Temp 97.5 °F (36.4 °C) (Infrared)   Resp 16   Ht 188 cm (74.02\")   Wt 107 kg (234 lb 12.8 oz)   SpO2 96%   BMI 30.13 kg/m²       BMI is >= 30 and <35. (Class 1 Obesity). The following options were offered after discussion;: exercise counseling/recommendations      Current Outpatient Medications:   •  amLODIPine (NORVASC) 10 MG tablet, TAKE 1 TABLET BY MOUTH EVERY DAY, Disp: 90 tablet, Rfl: 1  •  Blood Glucose Monitoring Suppl (Accu-Chek Guide) w/Device kit, 1 Device Daily., Disp: 1 kit, Rfl: 0  •  glucose blood (Accu-Chek Guide) test strip, 1 each by Other route Daily., Disp: 100 each, Rfl: 0  •  Lancets (accu-chek multiclix) lancets, 1 each by Other route Daily., Disp: 100 each, Rfl: 0  •  lisinopril (PRINIVIL,ZESTRIL) 40 MG tablet, TAKE 1 TABLET BY MOUTH EVERY DAY FOR BLOOD PRESSURE, Disp: 90 tablet, Rfl: 3  •  metFORMIN (GLUCOPHAGE) 500 MG tablet, Take 1 tablet by mouth 2 (Two) Times a Day., Disp: 180 tablet, Rfl: 0  •  sildenafil (VIAGRA) 100 MG tablet, TAKE 1 TABLET BY MOUTH PRE INTERCOURSE, Disp: 10 tablet, Rfl: 11  •  vitamin D (ERGOCALCIFEROL) 1.25 MG (82802 UT) capsule capsule, Take 1 capsule by mouth 1 (One) Time Per Week., Disp: 12 capsule, Rfl: 1  •  sulfamethoxazole-trimethoprim (Bactrim DS) 800-160 MG per tablet, Take 1 tablet by mouth 2 (Two) Times a Day for 7 days., Disp: 14 tablet, Rfl: 0    Objective   Physical Exam  Vitals and nursing note reviewed.   Constitutional:       General: He is not in acute distress.     Appearance: Normal appearance. He is well-developed. He is not ill-appearing.   HENT:      Head: Normocephalic and atraumatic.   Cardiovascular:      Rate and Rhythm: Normal rate and regular rhythm.      Heart sounds: Normal heart sounds. No murmur heard.    No gallop.   Pulmonary:      Effort: Pulmonary effort is " normal. No respiratory distress.      Breath sounds: Normal breath sounds. No wheezing, rhonchi or rales.   Chest:      Chest wall: No tenderness.   Musculoskeletal:      Cervical back: Normal range of motion and neck supple.   Skin:     Comments: There is an area of inflammation with some induration noted in the lower part of the posterior left thigh, it is about 2 cm in diameter.  No fluctuation.  No petechiae.   Neurological:      General: No focal deficit present.      Mental Status: He is alert and oriented to person, place, and time. Mental status is at baseline.   Psychiatric:         Mood and Affect: Mood normal.           FOLLOWING LABS WERE REVIEWED TODAY:  CMP        2/14/2023    09:49   CMP   Glucose 172     BUN 15     Creatinine 0.66     EGFR 106.0     Sodium 141     Potassium 4.2     Chloride 104     Calcium 9.7     Total Protein 6.9     Albumin 4.4     Globulin 2.5     Total Bilirubin 0.5     Alkaline Phosphatase 60     AST (SGOT) 19     ALT (SGPT) 13     Albumin/Globulin Ratio 1.8     BUN/Creatinine Ratio 22.7     Anion Gap 9.1       CBC        2/14/2023    09:49   CBC   WBC 70.08     RBC 3.92     Hemoglobin 12.6     Hematocrit 37.7     MCV 96.2     MCH 32.1     MCHC 33.4     RDW 12.9     Platelets 179       Lipid Panel        2/14/2023    09:49   Lipid Panel   Total Cholesterol 191     Triglycerides 111     HDL Cholesterol 85     VLDL Cholesterol 19     LDL Cholesterol  87     LDL/HDL Ratio 0.99       TSH        2/14/2023    09:49   TSH   TSH 2.410       Most Recent A1C        2/14/2023    09:49   HGBA1C Most Recent   Hemoglobin A1C 6.9           Assessment & Plan   Diagnoses and all orders for this visit:    1. Type 2 diabetes mellitus with diabetic polyneuropathy, without long-term current use of insulin (Primary)  -     Comprehensive Metabolic Panel  -     Hemoglobin A1c  -     metFORMIN (GLUCOPHAGE) 500 MG tablet; Take 1 tablet by mouth 2 (Two) Times a Day.  Dispense: 180 tablet; Refill: 0  -      Lipid Panel    2. Benign prostatic hyperplasia without lower urinary tract symptoms  -     PSA DIAGNOSTIC; Future    3. Vitamin D deficiency  -     Vitamin D,25-Hydroxy    4. Cellulitis of leg, left  -     sulfamethoxazole-trimethoprim (Bactrim DS) 800-160 MG per tablet; Take 1 tablet by mouth 2 (Two) Times a Day for 7 days.  Dispense: 14 tablet; Refill: 0        I reviewed his medical problems and his medications.  I also reviewed his fasting blood work results from 2/2023.  His fasting lipid panel overall looks very good, but considering that he is a diabetic statin is recommended and this was discussed with the patient today.  I will be repeating labs today.  I also addressed his skin issues due to insect bite which he sustained couple of days ago to the left thigh.  I will be starting him on Bactrim.  He was advised to closely monitor the symptoms and contact us back if no improvement.    Return in about 6 months (around 11/17/2023) for Next scheduled follow up.    Requested Prescriptions     Signed Prescriptions Disp Refills   • metFORMIN (GLUCOPHAGE) 500 MG tablet 180 tablet 0     Sig: Take 1 tablet by mouth 2 (Two) Times a Day.   • sulfamethoxazole-trimethoprim (Bactrim DS) 800-160 MG per tablet 14 tablet 0     Sig: Take 1 tablet by mouth 2 (Two) Times a Day for 7 days.

## 2023-05-18 DIAGNOSIS — E11.42 TYPE 2 DIABETES MELLITUS WITH DIABETIC POLYNEUROPATHY, WITHOUT LONG-TERM CURRENT USE OF INSULIN: ICD-10-CM

## 2023-05-18 DIAGNOSIS — R97.20 ELEVATED PSA: Primary | ICD-10-CM

## 2023-05-19 ENCOUNTER — OFFICE VISIT (OUTPATIENT)
Dept: PODIATRY | Facility: CLINIC | Age: 63
End: 2023-05-19
Payer: COMMERCIAL

## 2023-05-19 ENCOUNTER — TELEPHONE (OUTPATIENT)
Dept: FAMILY MEDICINE CLINIC | Facility: CLINIC | Age: 63
End: 2023-05-19

## 2023-05-19 ENCOUNTER — OFFICE VISIT (OUTPATIENT)
Dept: FAMILY MEDICINE CLINIC | Facility: CLINIC | Age: 63
End: 2023-05-19
Payer: COMMERCIAL

## 2023-05-19 ENCOUNTER — LAB (OUTPATIENT)
Dept: FAMILY MEDICINE CLINIC | Facility: CLINIC | Age: 63
End: 2023-05-19
Payer: COMMERCIAL

## 2023-05-19 VITALS
TEMPERATURE: 97.5 F | DIASTOLIC BLOOD PRESSURE: 73 MMHG | HEART RATE: 66 BPM | RESPIRATION RATE: 16 BRPM | HEIGHT: 74 IN | BODY MASS INDEX: 30.03 KG/M2 | WEIGHT: 234 LBS | OXYGEN SATURATION: 97 % | SYSTOLIC BLOOD PRESSURE: 135 MMHG

## 2023-05-19 VITALS — HEIGHT: 74 IN | WEIGHT: 234 LBS | RESPIRATION RATE: 20 BRPM | BODY MASS INDEX: 30.03 KG/M2

## 2023-05-19 DIAGNOSIS — M79.671 RIGHT FOOT PAIN: Primary | ICD-10-CM

## 2023-05-19 DIAGNOSIS — E11.65 TYPE 2 DIABETES MELLITUS WITH HYPERGLYCEMIA, WITHOUT LONG-TERM CURRENT USE OF INSULIN: ICD-10-CM

## 2023-05-19 DIAGNOSIS — L03.116 CELLULITIS OF LEG, LEFT: Primary | ICD-10-CM

## 2023-05-19 DIAGNOSIS — R21 RASH: ICD-10-CM

## 2023-05-19 DIAGNOSIS — M79.89 SWELLING OF TOE OF RIGHT FOOT: ICD-10-CM

## 2023-05-19 LAB
DEPRECATED RDW RBC AUTO: 43.3 FL (ref 37–54)
ERYTHROCYTE [DISTWIDTH] IN BLOOD BY AUTOMATED COUNT: 12.5 % (ref 12.3–15.4)
HCT VFR BLD AUTO: 37.5 % (ref 37.5–51)
HGB BLD-MCNC: 12 G/DL (ref 13–17.7)
LYMPHOCYTES # BLD MANUAL: 70.48 10*3/MM3 (ref 0.7–3.1)
LYMPHOCYTES NFR BLD MANUAL: 11 % (ref 5–12)
MCH RBC QN AUTO: 30.4 PG (ref 26.6–33)
MCHC RBC AUTO-ENTMCNC: 32 G/DL (ref 31.5–35.7)
MCV RBC AUTO: 94.9 FL (ref 79–97)
MONOCYTES # BLD: 9.81 10*3/MM3 (ref 0.1–0.9)
NEUTROPHILS # BLD AUTO: 8.92 10*3/MM3 (ref 1.7–7)
NEUTROPHILS NFR BLD MANUAL: 10 % (ref 42.7–76)
PLAT MORPH BLD: NORMAL
PLATELET # BLD AUTO: 185 10*3/MM3 (ref 140–450)
PMV BLD AUTO: 11 FL (ref 6–12)
RBC # BLD AUTO: 3.95 10*6/MM3 (ref 4.14–5.8)
RBC MORPH BLD: NORMAL
VARIANT LYMPHS NFR BLD MANUAL: 79 % (ref 19.6–45.3)
WBC MORPH BLD: NORMAL
WBC NRBC COR # BLD: 89.21 10*3/MM3 (ref 3.4–10.8)

## 2023-05-19 PROCEDURE — 85025 COMPLETE CBC W/AUTO DIFF WBC: CPT | Performed by: FAMILY MEDICINE

## 2023-05-19 PROCEDURE — 85007 BL SMEAR W/DIFF WBC COUNT: CPT | Performed by: FAMILY MEDICINE

## 2023-05-19 PROCEDURE — 99213 OFFICE O/P EST LOW 20 MIN: CPT | Performed by: FAMILY MEDICINE

## 2023-05-19 PROCEDURE — 36415 COLL VENOUS BLD VENIPUNCTURE: CPT | Performed by: FAMILY MEDICINE

## 2023-05-19 RX ORDER — CEFDINIR 300 MG/1
300 CAPSULE ORAL 2 TIMES DAILY
Qty: 20 CAPSULE | Refills: 0 | Status: SHIPPED | OUTPATIENT
Start: 2023-05-19

## 2023-05-19 RX ORDER — PREDNISONE 10 MG/1
TABLET ORAL
Qty: 21 TABLET | Refills: 0 | Status: SHIPPED | OUTPATIENT
Start: 2023-05-19

## 2023-05-19 NOTE — PROGRESS NOTES
05/19/2023  Foot and Ankle Surgery - New Patient   Provider: REYES Anglin   Location: [facility]    Subjective:  Bharathi Darnell is a 62 y.o. male.     Chief Complaint   Patient presents with   • Right Foot - Pain, Toe Pain, Edema   • Initial Evaluation     JAMEE Caldera md  5/17/2023       The patient complains of an onset of pain and swelling to his right 3rd toe approximately 3 months ago. He states he had difficulty ambulating secondary to the pain initially, but he is currently asymptomatic for pain. He reports his swelling has stayed the same since onset and denies any pyrexia, chills, or signs of infection. Additionally, the patient denies any prior injury or trauma to the toe. He does not have any history of a significant wound to the area. He notes he obtained x-rays of the right foot on 04/05/2023, that revealed arthritis.     The patient admits to a history of diabetes mellitus type 2. His last A1c was slightly elevated at 7.6 percent at last evaluation on Wednesday, 05/17/2023, which is an increase from 6.9 percent 3 months ago.     Allergies   Allergen Reactions   • Bactrim [Sulfamethoxazole-Trimethoprim] Rash       Past Medical History:   Diagnosis Date   • CLL (chronic lymphocytic leukemia)     2022   • DM (diabetes mellitus), type 2    • Erectile dysfunction    • Hyperlipidemia    • Hypertension        Past Surgical History:   Procedure Laterality Date   • COLONOSCOPY      2019       Family History   Problem Relation Age of Onset   • Diabetes Mother    • Hyperlipidemia Mother        Social History     Socioeconomic History   • Marital status:    Tobacco Use   • Smoking status: Never   • Smokeless tobacco: Never   Vaping Use   • Vaping Use: Never used   Substance and Sexual Activity   • Alcohol use: Not Currently   • Drug use: Never   • Sexual activity: Defer        Current Outpatient Medications on File Prior to Visit   Medication Sig Dispense Refill   • amLODIPine  (NORVASC) 10 MG tablet TAKE 1 TABLET BY MOUTH EVERY DAY 90 tablet 1   • Blood Glucose Monitoring Suppl (Accu-Chek Guide) w/Device kit 1 Device Daily. 1 kit 0   • glucose blood (Accu-Chek Guide) test strip 1 each by Other route Daily. 100 each 0   • Lancets (accu-chek multiclix) lancets 1 each by Other route Daily. 100 each 0   • lisinopril (PRINIVIL,ZESTRIL) 40 MG tablet TAKE 1 TABLET BY MOUTH EVERY DAY FOR BLOOD PRESSURE 90 tablet 3   • metFORMIN (GLUCOPHAGE) 500 MG tablet Take 2 tablets by mouth 2 (Two) Times a Day for 90 days. 360 tablet 0   • sildenafil (VIAGRA) 100 MG tablet TAKE 1 TABLET BY MOUTH PRE INTERCOURSE 10 tablet 11   • triamcinolone (KENALOG) 0.1 % cream Apply 1 application topically to the appropriate area as directed 2 (Two) Times a Day. 28.4 g 0   • vitamin D (ERGOCALCIFEROL) 1.25 MG (43603 UT) capsule capsule Take 1 capsule by mouth 1 (One) Time Per Week. 12 capsule 1   • [DISCONTINUED] sulfamethoxazole-trimethoprim (Bactrim DS) 800-160 MG per tablet Take 1 tablet by mouth 2 (Two) Times a Day for 7 days. 14 tablet 0     No current facility-administered medications on file prior to visit.       Review of Systems:  General: Denies fever, chills, fatigue, and weakness.  Eyes: Denies vision loss, blurry vision, and excessive redness.  ENT: Denies hearing issues and difficulty swallowing.  Cardiovascular: Denies palpitations, chest pain, or syncopal episodes.  Respiratory: Denies shortness of breath, wheezing, and coughing.  GI: Denies abdominal pain, nausea, and vomiting.   : Denies frequency, hematuria, and urgency.  Musculoskeletal: Denies muscle cramps, joint pains, and stiffness. Right 3rd toe pain and swelling.   Derm: Denies rash, open wounds, or suspicious lesions. Color changes and scarring noted to the plantar arch.  Neuro: Denies headaches, numbness, loss of coordination, and tremors.  Psych: Denies anxiety and depression.  Endocrine: Denies temperature intolerance and changes in  "appetite.  Heme: Denies bleeding disorders or abnormal bruising.     Objective   Resp 20   Ht 188 cm (74\")   Wt 106 kg (234 lb)   BMI 30.04 kg/m²     Foot/Ankle Exam    GENERAL  Appearance:  appears stated age  Orientation:  AAOx3  Affect:  appropriate  Gait:  unimpaired  Assistance:  independent  Right shoe gear: casual shoe and sock  Left shoe gear: casual shoe and sock    VASCULAR     Right Foot Vascularity   Normal vascular exam    Dorsalis pedis:  2+  Posterior tibial:  2+  Skin temperature:  warm  Edema grading:  None  CFT:  < 3 seconds  Pedal hair growth:  Present  Varicosities:  none     NEUROLOGIC     Right Foot Neurologic   Light touch sensation: normal  Protective Sensation using [place]ai Monofilament:   Sites intact: 2  Sites tested: 10  Achilles reflex:  2+    MUSCULOSKELETAL     Right Foot Musculoskeletal   Ecchymosis:  none  Tenderness:  none    Arch:  Normal    MUSCLE STRENGTH     Right Foot Muscle Strength   Normal strength    Foot dorsiflexion:  5  Foot plantar flexion:  5  Foot inversion:  5  Foot eversion:  5    DERMATOLOGIC      Right Foot Dermatologic   Skin  Positive for skin changes.     TESTS     Right Foot Tests   Anterior drawer: negative  Varus tilt: negative     Right foot additional comments: Swelling/nonpitting edema to right 3rd toe. Color changes and scarring noted to the plantar arch. No drainage, no signs of infection.        Assessment & Plan   Diagnoses and all orders for this visit:    1. Right foot pain (Primary)  -     XR Toe 2+ View Right    2. Type 2 diabetes mellitus with hyperglycemia, without long-term current use of insulin    3. Swelling of toe of right foot      Reviewed previous x-rays of the right foot obtained on 04/05/2023. It appears the 3rd toe joint may be subluxed from the metatarsal. Weightbearing x-rays of the right foot ordered to rule out bony infection and he will be informed of his results via a phone call. The patient was placed into a " postoperative shoe today. Recommended the patient follow up with Dr. Yañez next week.    Orders Placed This Encounter   Procedures   • XR Toe 2+ View Right     Order Specific Question:   Reason for Exam:     Answer:   3rd toe swelling and pain  room 15  wb  may leave after     Order Specific Question:   Does this patient have a diabetic monitoring/medication delivering device on?     Answer:   No     Order Specific Question:   Release to patient     Answer:   Routine Release        Transcribed from ambient dictation for REYES Pierson by Zuri Hurtado.  05/19/23   10:42 EDT    Patient or patient representative verbalized consent to the visit recording.  I have personally performed the services described in this document as transcribed by the above individual, and it is both accurate and complete.  REYES Pierson  5/19/2023  12:58 EDT

## 2023-05-19 NOTE — PROGRESS NOTES
Subjective   Chief Complaint   Patient presents with   • Insect Bite     Rash on legs     Bharathi Darnell is a 62 y.o. male.     Patient Care Team:  Mahnaz Caldera MD as PCP - General (Family Medicine)  Patrick Fontaine MD as Consulting Physician (Hematology and Oncology)    History of Present Illness  He is coming in today to follow-up on his skin problems.  Patient was seen in our office couple of days ago to follow-up on his medical issues and at that time we also addressed the insect bites with localized infection located in the back of the left thigh.  Patient was given prescription for Bactrim.  He tells me that he was able to start the antibiotic the very same day in the morning.  Later that day he started noticing some itchiness on his legs and the rash which was spreading on his legs.  He took altogether 4 doses of Bactrim, the last dose was last night.  He took some Benadryl over-the-counter for itchiness.  He denies any chest pains, shortness of breath, dizziness, or headaches       The following portions of the patient's history were reviewed and updated as appropriate: allergies, current medications, past family history, past medical history, past social history, past surgical history and problem list.  Past Medical History:   Diagnosis Date   • CLL (chronic lymphocytic leukemia)     2022   • DM (diabetes mellitus), type 2    • Erectile dysfunction    • Hyperlipidemia    • Hypertension      Past Surgical History:   Procedure Laterality Date   • COLONOSCOPY      2019     The patient has a family history of  Family History   Problem Relation Age of Onset   • Diabetes Mother    • Hyperlipidemia Mother      Social History     Socioeconomic History   • Marital status:    Tobacco Use   • Smoking status: Never   • Smokeless tobacco: Never   Vaping Use   • Vaping Use: Never used   Substance and Sexual Activity   • Alcohol use: Not Currently   • Drug use: Never   • Sexual activity: Defer  "      Review of Systems   Constitutional: Negative for chills and fever.   Respiratory: Negative for chest tightness and shortness of breath.    Skin: Positive for color change, rash and wound.     Visit Vitals  /73 (BP Location: Left arm, Patient Position: Sitting, Cuff Size: Adult)   Pulse 66   Temp 97.5 °F (36.4 °C) (Infrared)   Resp 16   Ht 188 cm (74.02\")   Wt 106 kg (234 lb)   SpO2 97%   BMI 30.03 kg/m²       BMI is >= 30 and <35. (Class 1 Obesity). The following options were offered after discussion;: exercise counseling/recommendations      Current Outpatient Medications:   •  amLODIPine (NORVASC) 10 MG tablet, TAKE 1 TABLET BY MOUTH EVERY DAY, Disp: 90 tablet, Rfl: 1  •  Blood Glucose Monitoring Suppl (Accu-Chek Guide) w/Device kit, 1 Device Daily., Disp: 1 kit, Rfl: 0  •  glucose blood (Accu-Chek Guide) test strip, 1 each by Other route Daily., Disp: 100 each, Rfl: 0  •  Lancets (accu-chek multiclix) lancets, 1 each by Other route Daily., Disp: 100 each, Rfl: 0  •  lisinopril (PRINIVIL,ZESTRIL) 40 MG tablet, TAKE 1 TABLET BY MOUTH EVERY DAY FOR BLOOD PRESSURE, Disp: 90 tablet, Rfl: 3  •  metFORMIN (GLUCOPHAGE) 500 MG tablet, Take 2 tablets by mouth 2 (Two) Times a Day for 90 days., Disp: 360 tablet, Rfl: 0  •  sildenafil (VIAGRA) 100 MG tablet, TAKE 1 TABLET BY MOUTH PRE INTERCOURSE, Disp: 10 tablet, Rfl: 11  •  triamcinolone (KENALOG) 0.1 % cream, Apply 1 application topically to the appropriate area as directed 2 (Two) Times a Day., Disp: 28.4 g, Rfl: 0  •  vitamin D (ERGOCALCIFEROL) 1.25 MG (87312 UT) capsule capsule, Take 1 capsule by mouth 1 (One) Time Per Week., Disp: 12 capsule, Rfl: 1  •  cefdinir (OMNICEF) 300 MG capsule, Take 1 capsule by mouth 2 (Two) Times a Day., Disp: 20 capsule, Rfl: 0  •  predniSONE (DELTASONE) 10 MG (21) dose pack, Use as directed on package, Disp: 21 tablet, Rfl: 0    Objective   Physical Exam  Constitutional:       General: He is not in acute distress.     " Appearance: Normal appearance. He is well-developed. He is not ill-appearing or diaphoretic.      Comments: Patient is in no distress, patient has normal voice and speech.  Normal respiratory effort.   HENT:      Head: Normocephalic and atraumatic.   Pulmonary:      Effort: Pulmonary effort is normal.   Musculoskeletal:      Cervical back: Normal range of motion and neck supple.   Skin:     Comments: There is a wide spread macular rash noted on both legs, there are few similar and milder looking spots on the forearm.  No petechiae.  There is a localized infection with some oozing noted in the middle of the posterior thigh.  No fluctuation.   Neurological:      General: No focal deficit present.      Mental Status: He is alert and oriented to person, place, and time. Mental status is at baseline.   Psychiatric:         Mood and Affect: Mood normal.         FOLLOWING LABS WERE REVIEWED TODAY:  CMP        2/14/2023    09:49 5/17/2023    09:04   CMP   Glucose 172   197     BUN 15   13     Creatinine 0.66   0.63     EGFR 106.0   107.5     Sodium 141   140     Potassium 4.2   4.2     Chloride 104   107     Calcium 9.7   9.1     Total Protein 6.9   6.3     Albumin 4.4   3.9     Globulin 2.5   2.4     Total Bilirubin 0.5   0.4     Alkaline Phosphatase 60   63     AST (SGOT) 19   16     ALT (SGPT) 13   17     Albumin/Globulin Ratio 1.8   1.6     BUN/Creatinine Ratio 22.7   20.6     Anion Gap 9.1   8.7             Assessment & Plan   Diagnoses and all orders for this visit:    1. Cellulitis of leg, left (Primary)  -     cefdinir (OMNICEF) 300 MG capsule; Take 1 capsule by mouth 2 (Two) Times a Day.  Dispense: 20 capsule; Refill: 0  -     CBC Auto Differential    2. Rash  -     predniSONE (DELTASONE) 10 MG (21) dose pack; Use as directed on package  Dispense: 21 tablet; Refill: 0  -     CBC Auto Differential      It looks like patient had a reaction to Bactrim as he developed a skin rash after starting the antibiotic.  His  last dose was last night and patient was advised to discontinue that antibiotic and not take it anymore.  I will be starting him on cefdinir.  I also gave him prescription for prednisone to address his skin rash.  Patient was advised to very closely monitor the symptoms and if no improvement or worsening he was advised to go to the ER.  Otherwise he will follow-up with me for reevaluation in 1 week.  He verbalized understanding.      Return in about 1 week (around 5/26/2023) for Next scheduled follow up.    Requested Prescriptions     Signed Prescriptions Disp Refills   • cefdinir (OMNICEF) 300 MG capsule 20 capsule 0     Sig: Take 1 capsule by mouth 2 (Two) Times a Day.   • predniSONE (DELTASONE) 10 MG (21) dose pack 21 tablet 0     Sig: Use as directed on package

## 2023-05-19 NOTE — TELEPHONE ENCOUNTER
I called the patient to check on him.  He reports that he was able to  the prescription for prednisone pack and cefdinir today in the morning and started taking it.  No new concerns.  He was again advised to closely monitor the symptoms and go to the ER if any worsening.

## 2023-05-22 NOTE — PROGRESS NOTES
I spoke with patient and he says he feels better and the rash is starting to clear up. He confirmed his appt for Friday.

## 2023-05-26 ENCOUNTER — TELEPHONE (OUTPATIENT)
Dept: FAMILY MEDICINE CLINIC | Facility: CLINIC | Age: 63
End: 2023-05-26

## 2023-05-26 ENCOUNTER — OFFICE VISIT (OUTPATIENT)
Dept: FAMILY MEDICINE CLINIC | Facility: CLINIC | Age: 63
End: 2023-05-26
Payer: COMMERCIAL

## 2023-05-26 VITALS
OXYGEN SATURATION: 96 % | TEMPERATURE: 98.2 F | HEIGHT: 74 IN | WEIGHT: 231 LBS | DIASTOLIC BLOOD PRESSURE: 61 MMHG | RESPIRATION RATE: 16 BRPM | SYSTOLIC BLOOD PRESSURE: 110 MMHG | BODY MASS INDEX: 29.65 KG/M2 | HEART RATE: 63 BPM

## 2023-05-26 DIAGNOSIS — L03.116 CELLULITIS OF LEG, LEFT: Primary | ICD-10-CM

## 2023-05-26 PROCEDURE — 99213 OFFICE O/P EST LOW 20 MIN: CPT | Performed by: FAMILY MEDICINE

## 2023-05-26 RX ORDER — MUPIROCIN CALCIUM 20 MG/G
1 CREAM TOPICAL 3 TIMES DAILY
Qty: 30 G | Refills: 0 | Status: SHIPPED | OUTPATIENT
Start: 2023-05-26

## 2023-05-26 NOTE — PROGRESS NOTES
Subjective   Chief Complaint   Patient presents with   • Follow-up     Cellulitis of leg, left     Bharathi Darnell is a 62 y.o. male.     Patient Care Team:  Mahnaz Caldera MD as PCP - General (Family Medicine)  Patrick Fontaine MD as Consulting Physician (Hematology and Oncology)    History of Present Illness  He is coming in today to follow-up on left thigh cellulitis.  He was originally started on Bactrim over a week ago and developed rash which covered significant portion of his legs and was starting to spread to his arms.  He was then seen in our office, Bactrim was discontinued, patient was switched to cefdinir and also given some prednisone.  He reports that the rash is completely gone and the area of cellulitis also feels much better.  He denies any fever or chills.  No drainage reported.       The following portions of the patient's history were reviewed and updated as appropriate: allergies, current medications, past family history, past medical history, past social history, past surgical history and problem list.  Past Medical History:   Diagnosis Date   • CLL (chronic lymphocytic leukemia)     2022   • DM (diabetes mellitus), type 2    • Erectile dysfunction    • Hyperlipidemia    • Hypertension      Past Surgical History:   Procedure Laterality Date   • COLONOSCOPY      2019     The patient has a family history of  Family History   Problem Relation Age of Onset   • Diabetes Mother    • Hyperlipidemia Mother      Social History     Socioeconomic History   • Marital status:    Tobacco Use   • Smoking status: Never   • Smokeless tobacco: Never   Vaping Use   • Vaping Use: Never used   Substance and Sexual Activity   • Alcohol use: Not Currently   • Drug use: Never   • Sexual activity: Defer       Review of Systems   Constitutional: Negative for chills and fever.   Skin: Positive for color change and wound. Negative for rash.     Visit Vitals  /61 (BP Location: Left arm, Patient  "Position: Sitting, Cuff Size: Adult)   Pulse 63   Temp 98.2 °F (36.8 °C) (Infrared)   Resp 16   Ht 188 cm (74\")   Wt 105 kg (231 lb)   SpO2 96%   BMI 29.66 kg/m²       BMI is >= 25 and <30. (Overweight) The following options were offered after discussion;: exercise counseling/recommendations      Current Outpatient Medications:   •  amLODIPine (NORVASC) 10 MG tablet, TAKE 1 TABLET BY MOUTH EVERY DAY, Disp: 90 tablet, Rfl: 1  •  Blood Glucose Monitoring Suppl (Accu-Chek Guide) w/Device kit, 1 Device Daily., Disp: 1 kit, Rfl: 0  •  cefdinir (OMNICEF) 300 MG capsule, Take 1 capsule by mouth 2 (Two) Times a Day., Disp: 20 capsule, Rfl: 0  •  glucose blood (Accu-Chek Guide) test strip, 1 each by Other route Daily., Disp: 100 each, Rfl: 0  •  Lancets (accu-chek multiclix) lancets, 1 each by Other route Daily., Disp: 100 each, Rfl: 0  •  lisinopril (PRINIVIL,ZESTRIL) 40 MG tablet, TAKE 1 TABLET BY MOUTH EVERY DAY FOR BLOOD PRESSURE, Disp: 90 tablet, Rfl: 3  •  metFORMIN (GLUCOPHAGE) 500 MG tablet, Take 2 tablets by mouth 2 (Two) Times a Day for 90 days., Disp: 360 tablet, Rfl: 0  •  sildenafil (VIAGRA) 100 MG tablet, TAKE 1 TABLET BY MOUTH PRE INTERCOURSE, Disp: 10 tablet, Rfl: 11  •  triamcinolone (KENALOG) 0.1 % cream, Apply 1 application topically to the appropriate area as directed 2 (Two) Times a Day., Disp: 28.4 g, Rfl: 0  •  vitamin D (ERGOCALCIFEROL) 1.25 MG (80378 UT) capsule capsule, Take 1 capsule by mouth 1 (One) Time Per Week., Disp: 12 capsule, Rfl: 1  •  mupirocin (BACTROBAN) 2 % cream, Apply 1 application topically to the appropriate area as directed 3 (Three) Times a Day., Disp: 30 g, Rfl: 0    Objective   Physical Exam  Constitutional:       General: He is not in acute distress.     Appearance: Normal appearance. He is well-developed. He is not ill-appearing or diaphoretic.      Comments: Patient is in no distress, patient has normal voice and speech.  Normal respiratory effort.   HENT:      Head: " Normocephalic and atraumatic.   Pulmonary:      Effort: Pulmonary effort is normal.   Musculoskeletal:      Cervical back: Normal range of motion and neck supple.   Skin:     Coloration: Skin is not jaundiced.      Findings: No bruising, erythema, lesion or rash.      Comments: Previously noted widespread rash has completely resolved.  Previously noted cellulitis has significantly improved.  There is a small scabbed area noted in the middle of the posterior left thigh.  No drainage.  No fluctuation.   Neurological:      General: No focal deficit present.      Mental Status: He is alert and oriented to person, place, and time. Mental status is at baseline.   Psychiatric:         Mood and Affect: Mood normal.         Assessment & Plan   Diagnoses and all orders for this visit:    1. Cellulitis of leg, left (Primary)  -     mupirocin (BACTROBAN) 2 % cream; Apply 1 application topically to the appropriate area as directed 3 (Three) Times a Day.  Dispense: 30 g; Refill: 0      Patient's symptoms has significantly improved.  The rash has resolved.  Cellulitis is healing.  He is to continue the course of cefdinir.  I also gave him prescription for mupirocin cream to use to the area as needed.      Return if symptoms worsen or fail to improve, for Recheck.    Requested Prescriptions     Signed Prescriptions Disp Refills   • mupirocin (BACTROBAN) 2 % cream 30 g 0     Sig: Apply 1 application topically to the appropriate area as directed 3 (Three) Times a Day.

## 2023-05-27 RX ORDER — TRIAMCINOLONE ACETONIDE 1 MG/G
CREAM TOPICAL
Qty: 30 G | OUTPATIENT
Start: 2023-05-27

## 2023-05-30 ENCOUNTER — TELEPHONE (OUTPATIENT)
Dept: FAMILY MEDICINE CLINIC | Facility: CLINIC | Age: 63
End: 2023-05-30

## 2023-05-30 NOTE — TELEPHONE ENCOUNTER
Caller: Bharathi Darnell    Relationship: Self    Best call back number: [phone]    What medication are you requesting: SUBSTITUTE FOR TRIAMCINOLONE TOPICAL CREAM. THIS WAS TOO EXPENSIVE    What are your current symptoms: SPIDER BITE, HELP TO CLEAR UP     How long have you been experiencing symptoms:     Have you had these symptoms before:    [x] Yes  [] No    Have you been treated for these symptoms before:   [x] Yes  [] No    If a prescription is needed, what is your preferred pharmacy and phone number: [id number] - [place], [place] - [address] [place] [address] [phone] [facility] [phone]      Additional notes:

## 2023-05-30 NOTE — TELEPHONE ENCOUNTER
Hub staff attempted to follow warm transfer process and was unsuccessful     Caller: Bharathi Darnell    Relationship to patient: Self    Best call back number: [phone]    Patient is needing: NEW PATIENT APPOINTMENT FOR UROLOGY RESCHEDULED. NEEDS TO TALK TO ANUPAMA

## 2023-06-01 ENCOUNTER — TELEPHONE (OUTPATIENT)
Dept: ORTHOPEDIC SURGERY | Facility: CLINIC | Age: 63
End: 2023-06-01

## 2023-06-01 ENCOUNTER — OFFICE VISIT (OUTPATIENT)
Dept: PODIATRY | Facility: CLINIC | Age: 63
End: 2023-06-01

## 2023-06-01 ENCOUNTER — LAB (OUTPATIENT)
Dept: LAB | Facility: HOSPITAL | Age: 63
End: 2023-06-01
Payer: COMMERCIAL

## 2023-06-01 VITALS — HEIGHT: 74 IN | RESPIRATION RATE: 20 BRPM | WEIGHT: 231 LBS | BODY MASS INDEX: 29.65 KG/M2

## 2023-06-01 DIAGNOSIS — M79.671 RIGHT FOOT PAIN: Primary | ICD-10-CM

## 2023-06-01 DIAGNOSIS — M79.89 SWELLING OF TOE OF RIGHT FOOT: ICD-10-CM

## 2023-06-01 DIAGNOSIS — E11.65 TYPE 2 DIABETES MELLITUS WITH HYPERGLYCEMIA, WITHOUT LONG-TERM CURRENT USE OF INSULIN: ICD-10-CM

## 2023-06-01 DIAGNOSIS — M79.671 RIGHT FOOT PAIN: ICD-10-CM

## 2023-06-01 LAB
ANION GAP SERPL CALCULATED.3IONS-SCNC: 7 MMOL/L (ref 5–15)
BLASTS NFR BLD MANUAL: 16 % (ref 0–0)
BUN SERPL-MCNC: 15 MG/DL (ref 8–23)
BUN/CREAT SERPL: 19.7 (ref 7–25)
CALCIUM SPEC-SCNC: 8.6 MG/DL (ref 8.6–10.5)
CHLORIDE SERPL-SCNC: 103 MMOL/L (ref 98–107)
CO2 SERPL-SCNC: 28 MMOL/L (ref 22–29)
CREAT SERPL-MCNC: 0.76 MG/DL (ref 0.76–1.27)
DEPRECATED RDW RBC AUTO: 47.7 FL (ref 37–54)
EGFRCR SERPLBLD CKD-EPI 2021: 101.6 ML/MIN/1.73
ERYTHROCYTE [DISTWIDTH] IN BLOOD BY AUTOMATED COUNT: 13.9 % (ref 12.3–15.4)
GLUCOSE SERPL-MCNC: 257 MG/DL (ref 65–99)
HCT VFR BLD AUTO: 36.4 % (ref 37.5–51)
HGB BLD-MCNC: 11.8 G/DL (ref 13–17.7)
LYMPHOCYTES # BLD MANUAL: 75.04 10*3/MM3 (ref 0.7–3.1)
MCH RBC QN AUTO: 30.8 PG (ref 26.6–33)
MCHC RBC AUTO-ENTMCNC: 32.3 G/DL (ref 31.5–35.7)
MCV RBC AUTO: 95.5 FL (ref 79–97)
NEUTROPHILS # BLD AUTO: 5.77 10*3/MM3 (ref 1.7–7)
NEUTROPHILS NFR BLD MANUAL: 6 % (ref 42.7–76)
PATHOLOGY REVIEW: YES
PLAT MORPH BLD: NORMAL
PLATELET # BLD AUTO: 176 10*3/MM3 (ref 140–450)
PMV BLD AUTO: 8.8 FL (ref 6–12)
POIKILOCYTOSIS BLD QL SMEAR: ABNORMAL
POTASSIUM SERPL-SCNC: 4.8 MMOL/L (ref 3.5–5.2)
RBC # BLD AUTO: 3.82 10*6/MM3 (ref 4.14–5.8)
SCAN SLIDE: NORMAL
SMUDGE CELLS BLD QL SMEAR: ABNORMAL
SODIUM SERPL-SCNC: 138 MMOL/L (ref 136–145)
URATE SERPL-MCNC: 4.8 MG/DL (ref 3.4–7)
VARIANT LYMPHS NFR BLD MANUAL: 78 % (ref 19.6–45.3)
WBC NRBC COR # BLD: 96.2 10*3/MM3 (ref 3.4–10.8)

## 2023-06-01 PROCEDURE — 84550 ASSAY OF BLOOD/URIC ACID: CPT

## 2023-06-01 PROCEDURE — 80048 BASIC METABOLIC PNL TOTAL CA: CPT

## 2023-06-01 PROCEDURE — 85007 BL SMEAR W/DIFF WBC COUNT: CPT

## 2023-06-01 PROCEDURE — 36415 COLL VENOUS BLD VENIPUNCTURE: CPT

## 2023-06-01 PROCEDURE — 85025 COMPLETE CBC W/AUTO DIFF WBC: CPT

## 2023-06-01 NOTE — PROGRESS NOTES
06/01/2023  Foot and Ankle Surgery - New Patient   Provider: Dr. Rodolfo Yañez DPM  Location: [facility]    Subjective:  Bharathi Darnell is a 62 y.o. male.     Chief Complaint   Patient presents with   • Right Foot - Pain   • Initial Evaluation     Wan hargrove 5/17/2023       HPI:   He was last seen by REYES Flores. He reports issues with his right 3rd toe for approximately 3 to 4 months. He denies any known injury. The patient states he had an x-ray obtained, which revealed arthritis. He notes Dr. Mansfield informed him there was an issue with his joints. He reports he sustained a skin graft to his bilateral feet when he was a baby. He states the skin grafts were removed from his thighs. He notes he noticed his toe became swollen when putting his shoe on. He reports he soaked his toe with applesauce to reduce the swelling. He denies any issues with his foot prior to 4 months ago. He reports he is a diabetic. He states his most recent A1c was 7.6 percent approximately 1 month ago. He notes his A1c has not been elevated. He reports he initially had significant pain in his toe; however, currently he is experiencing mild pain. He denies a history of gout. He denies being contacted to schedule an MRI.    Allergies   Allergen Reactions   • Bactrim [Sulfamethoxazole-Trimethoprim] Rash       Past Medical History:   Diagnosis Date   • CLL (chronic lymphocytic leukemia)     2022   • DM (diabetes mellitus), type 2    • Erectile dysfunction    • Hyperlipidemia    • Hypertension        Past Surgical History:   Procedure Laterality Date   • COLONOSCOPY      2019       Family History   Problem Relation Age of Onset   • Diabetes Mother    • Hyperlipidemia Mother        Social History     Socioeconomic History   • Marital status:    Tobacco Use   • Smoking status: Never   • Smokeless tobacco: Never   Vaping Use   • Vaping Use: Never used   Substance and Sexual Activity   • Alcohol use: Not  Currently   • Drug use: Never   • Sexual activity: Defer        Current Outpatient Medications on File Prior to Visit   Medication Sig Dispense Refill   • amLODIPine (NORVASC) 10 MG tablet TAKE 1 TABLET BY MOUTH EVERY DAY 90 tablet 1   • Blood Glucose Monitoring Suppl (Accu-Chek Guide) w/Device kit 1 Device Daily. 1 kit 0   • cefdinir (OMNICEF) 300 MG capsule Take 1 capsule by mouth 2 (Two) Times a Day. 20 capsule 0   • glucose blood (Accu-Chek Guide) test strip 1 each by Other route Daily. 100 each 0   • hydrocortisone 2.5 % cream Apply 1 application topically to the appropriate area as directed 2 (Two) Times a Day. 28 g 0   • Lancets (accu-chek multiclix) lancets 1 each by Other route Daily. 100 each 0   • lisinopril (PRINIVIL,ZESTRIL) 40 MG tablet TAKE 1 TABLET BY MOUTH EVERY DAY FOR BLOOD PRESSURE 90 tablet 3   • metFORMIN (GLUCOPHAGE) 500 MG tablet Take 2 tablets by mouth 2 (Two) Times a Day for 90 days. 360 tablet 0   • mupirocin (BACTROBAN) 2 % cream Apply 1 application topically to the appropriate area as directed 3 (Three) Times a Day. 30 g 0   • sildenafil (VIAGRA) 100 MG tablet TAKE 1 TABLET BY MOUTH PRE INTERCOURSE 10 tablet 11   • triamcinolone (KENALOG) 0.1 % cream Apply 1 application topically to the appropriate area as directed 2 (Two) Times a Day. 28.4 g 0   • vitamin D (ERGOCALCIFEROL) 1.25 MG (05191 UT) capsule capsule Take 1 capsule by mouth 1 (One) Time Per Week. 12 capsule 1     No current facility-administered medications on file prior to visit.       Review of Systems:  General: Denies fever, chills, fatigue, and weakness.  Eyes: Denies vision loss, blurry vision, and excessive redness.  ENT: Denies hearing issues and difficulty swallowing.  Cardiovascular: Denies palpitations, chest pain, or syncopal episodes.  Respiratory: Denies shortness of breath, wheezing, and coughing.  GI: Denies abdominal pain, nausea, and vomiting.   : Denies frequency, hematuria, and urgency.  Musculoskeletal:  "Denies muscle cramps, joint pains, and stiffness.  Derm: Denies rash, open wounds, or suspicious lesions.  Neuro: Denies headaches, numbness, loss of coordination, and tremors.  Psych: Denies anxiety and depression.  Endocrine: Denies temperature intolerance and changes in appetite.  Heme: Denies bleeding disorders or abnormal bruising.     Objective   Resp 20   Ht 188 cm (74\")   Wt 105 kg (231 lb)   BMI 29.66 kg/m²     Foot/Ankle Exam    GENERAL  Appearance:  appears stated age  Orientation:  AAOx3  Affect:  appropriate  Gait:  unimpaired  Assistance:  independent  Right shoe gear: casual shoe and sock  Left shoe gear: casual shoe and sock    VASCULAR     Right Foot Vascularity   Normal vascular exam    Dorsalis pedis:  2+  Posterior tibial:  2+  Skin temperature:  warm  Edema grading:  None  CFT:  < 3 seconds  Pedal hair growth:  Present  Varicosities:  none     NEUROLOGIC     Right Foot Neurologic   Light touch sensation: normal  Protective Sensation using [place]ai Monofilament:   Sites intact: 2  Sites tested: 10  Achilles reflex:  2+    MUSCULOSKELETAL     Right Foot Musculoskeletal   Ecchymosis:  none  Tenderness:  none    Arch:  Normal    MUSCLE STRENGTH     Right Foot Muscle Strength   Normal strength    Foot dorsiflexion:  5  Foot plantar flexion:  5  Foot inversion:  5  Foot eversion:  5    DERMATOLOGIC      Right Foot Dermatologic   Skin  Positive for skin changes.     TESTS     Right Foot Tests   Anterior drawer: negative  Varus tilt: negative     Right foot additional comments: Swelling/nonpitting edema to right 3rd toe. Color changes and scarring noted to the plantar arch. No drainage, no signs of infection.    06/01/2023:  No significant pain with palpation. Decreased sensation with monofilament testing and light touch to the right foot. Continued swelling involving the third digit.      Assessment & Plan   Diagnoses and all orders for this visit:    1. Right foot pain (Primary)  -     CBC " & Differential; Future  -     Basic Metabolic Panel; Future  -     Uric Acid; Future    2. Swelling of toe of right foot  -     CBC & Differential; Future  -     Basic Metabolic Panel; Future  -     Uric Acid; Future    3. Type 2 diabetes mellitus with hyperglycemia, without long-term current use of insulin      Patient is a 62-year-old male with known CLL that has been seen by nurse practitioner Becky for issues of swelling involving his right third toe.  He states that swelling gradually progressed over the last couple months.  He is unaware of any injury to the area.  Patient has not had any previous open wound or infection to this region.  Imaging was reviewed showing areas of erosive changes involving the proximal phalanx.  I discussed the findings with the patient and have recommended that we proceed with an MRI for further evaluation.  Patient will be receiving the MRI in the near future.  I have also recommended that we proceed with lab work for further evaluation to rule out gout.  Patient is to monitor the area closely and return in 2 weeks for reevaluation.  Greater than 20 minutes spent before, during, and after evaluation for patient care.    Orders Placed This Encounter   Procedures   • Basic Metabolic Panel     Standing Status:   Future     Number of Occurrences:   1     Standing Expiration Date:   6/1/2024     Order Specific Question:   Release to patient     Answer:   Routine Release   • Uric Acid     Standing Status:   Future     Number of Occurrences:   1     Standing Expiration Date:   6/1/2024     Order Specific Question:   Release to patient     Answer:   Routine Release   • CBC & Differential     Standing Status:   Future     Number of Occurrences:   1     Standing Expiration Date:   6/1/2024     Order Specific Question:   Manual Differential     Answer:   No        Note is dictated utilizing voice recognition software. Unfortunately this leads to occasional typographical errors. I apologize  in advance if the situation occurs. If questions occur please do not hesitate to call our office.    Transcribed from ambient dictation for ANTHONY Yañez DPM by Lolis Schaffer.  06/01/23   12:20 EDT    Patient or patient representative verbalized consent to the visit recording.  I have personally performed the services described in this document as transcribed by the above individual, and it is both accurate and complete.

## 2023-06-02 LAB
LAB AP CASE REPORT: NORMAL
PATH REPORT.FINAL DX SPEC: NORMAL

## 2023-07-19 PROBLEM — Z86.010 PERSONAL HISTORY OF COLONIC POLYPS: Status: ACTIVE | Noted: 2020-08-05

## 2023-07-24 DIAGNOSIS — I10 ESSENTIAL (PRIMARY) HYPERTENSION: ICD-10-CM

## 2023-07-24 RX ORDER — AMLODIPINE BESYLATE 10 MG/1
TABLET ORAL
Qty: 90 TABLET | Refills: 1 | Status: SHIPPED | OUTPATIENT
Start: 2023-07-24

## 2023-07-26 ENCOUNTER — OFFICE VISIT (OUTPATIENT)
Dept: PODIATRY | Facility: CLINIC | Age: 63
End: 2023-07-26
Payer: COMMERCIAL

## 2023-07-26 VITALS — BODY MASS INDEX: 29.65 KG/M2 | WEIGHT: 231 LBS | HEIGHT: 74 IN | RESPIRATION RATE: 20 BRPM

## 2023-07-26 DIAGNOSIS — C91.10 CLL (CHRONIC LYMPHOCYTIC LEUKEMIA): ICD-10-CM

## 2023-07-26 DIAGNOSIS — D48.1 TENOSYNOVIAL GIANT CELL TUMOR OF FOOT: ICD-10-CM

## 2023-07-26 DIAGNOSIS — E11.65 TYPE 2 DIABETES MELLITUS WITH HYPERGLYCEMIA, WITHOUT LONG-TERM CURRENT USE OF INSULIN: ICD-10-CM

## 2023-07-26 DIAGNOSIS — M79.89 SWELLING OF TOE OF RIGHT FOOT: ICD-10-CM

## 2023-07-26 DIAGNOSIS — M79.671 RIGHT FOOT PAIN: Primary | ICD-10-CM

## 2023-07-26 RX ORDER — ALLOPURINOL 300 MG/1
TABLET ORAL
COMMUNITY
Start: 2023-07-25

## 2023-07-26 NOTE — PROGRESS NOTES
MRI of the right foot reveals a 4 cm mass on the third toe located along the plantar aspect associated with the flexor tendon which is mostly in this area. Giant cell tumor of the tendon sheath is thought to be unlikely given the appearance.

## 2023-08-04 ENCOUNTER — OFFICE (AMBULATORY)
Dept: URBAN - METROPOLITAN AREA PATHOLOGY 4 | Facility: PATHOLOGY | Age: 63
End: 2023-08-04
Payer: COMMERCIAL

## 2023-08-04 ENCOUNTER — ON CAMPUS - OUTPATIENT (AMBULATORY)
Dept: URBAN - METROPOLITAN AREA HOSPITAL 2 | Facility: HOSPITAL | Age: 63
End: 2023-08-04
Payer: COMMERCIAL

## 2023-08-04 VITALS
RESPIRATION RATE: 15 BRPM | RESPIRATION RATE: 14 BRPM | SYSTOLIC BLOOD PRESSURE: 98 MMHG | RESPIRATION RATE: 16 BRPM | HEIGHT: 74 IN | DIASTOLIC BLOOD PRESSURE: 76 MMHG | DIASTOLIC BLOOD PRESSURE: 77 MMHG | WEIGHT: 230 LBS | SYSTOLIC BLOOD PRESSURE: 103 MMHG | SYSTOLIC BLOOD PRESSURE: 110 MMHG | HEART RATE: 60 BPM | SYSTOLIC BLOOD PRESSURE: 147 MMHG | HEART RATE: 53 BPM | SYSTOLIC BLOOD PRESSURE: 111 MMHG | RESPIRATION RATE: 18 BRPM | DIASTOLIC BLOOD PRESSURE: 61 MMHG | DIASTOLIC BLOOD PRESSURE: 75 MMHG | HEART RATE: 64 BPM | SYSTOLIC BLOOD PRESSURE: 101 MMHG | TEMPERATURE: 97.7 F | SYSTOLIC BLOOD PRESSURE: 109 MMHG | HEART RATE: 73 BPM | HEART RATE: 57 BPM | SYSTOLIC BLOOD PRESSURE: 124 MMHG | RESPIRATION RATE: 19 BRPM | OXYGEN SATURATION: 99 % | HEART RATE: 68 BPM | OXYGEN SATURATION: 98 % | DIASTOLIC BLOOD PRESSURE: 73 MMHG | HEART RATE: 59 BPM | OXYGEN SATURATION: 100 % | SYSTOLIC BLOOD PRESSURE: 96 MMHG | DIASTOLIC BLOOD PRESSURE: 54 MMHG | OXYGEN SATURATION: 97 % | RESPIRATION RATE: 17 BRPM | DIASTOLIC BLOOD PRESSURE: 65 MMHG | HEART RATE: 54 BPM

## 2023-08-04 DIAGNOSIS — D12.4 BENIGN NEOPLASM OF DESCENDING COLON: ICD-10-CM

## 2023-08-04 DIAGNOSIS — D12.0 BENIGN NEOPLASM OF CECUM: ICD-10-CM

## 2023-08-04 DIAGNOSIS — D12.3 BENIGN NEOPLASM OF TRANSVERSE COLON: ICD-10-CM

## 2023-08-04 DIAGNOSIS — Z86.010 PERSONAL HISTORY OF COLONIC POLYPS: ICD-10-CM

## 2023-08-04 DIAGNOSIS — K62.1 RECTAL POLYP: ICD-10-CM

## 2023-08-04 DIAGNOSIS — K57.30 DIVERTICULOSIS OF LARGE INTESTINE WITHOUT PERFORATION OR ABS: ICD-10-CM

## 2023-08-04 LAB
GI HISTOLOGY: A. UNSPECIFIED: (no result)
GI HISTOLOGY: B. UNSPECIFIED: (no result)
GI HISTOLOGY: C. UNSPECIFIED: (no result)
GI HISTOLOGY: D. UNSPECIFIED: (no result)
GI HISTOLOGY: PDF REPORT: (no result)

## 2023-08-04 PROCEDURE — 88305 TISSUE EXAM BY PATHOLOGIST: CPT | Performed by: INTERNAL MEDICINE

## 2023-08-04 PROCEDURE — 45385 COLONOSCOPY W/LESION REMOVAL: CPT | Mod: 33 | Performed by: INTERNAL MEDICINE

## 2023-08-09 ENCOUNTER — TRANSCRIBE ORDERS (OUTPATIENT)
Dept: ADMINISTRATIVE | Facility: HOSPITAL | Age: 63
End: 2023-08-09
Payer: COMMERCIAL

## 2023-08-09 ENCOUNTER — HOSPITAL ENCOUNTER (OUTPATIENT)
Dept: CARDIOLOGY | Facility: HOSPITAL | Age: 63
Discharge: HOME | End: 2023-08-09
Payer: COMMERCIAL

## 2023-08-09 DIAGNOSIS — Z01.89: Primary | ICD-10-CM

## 2023-08-09 PROCEDURE — 93005 ELECTROCARDIOGRAM TRACING: CPT

## 2023-08-25 LAB
QT INTERVAL: 424 MS
QTC INTERVAL: 412 MS

## 2023-09-26 ENCOUNTER — OFFICE VISIT (OUTPATIENT)
Dept: PODIATRY | Facility: CLINIC | Age: 63
End: 2023-09-26
Payer: COMMERCIAL

## 2023-09-26 VITALS — WEIGHT: 231 LBS | BODY MASS INDEX: 29.65 KG/M2 | HEIGHT: 74 IN | RESPIRATION RATE: 20 BRPM

## 2023-09-26 DIAGNOSIS — D48.19 TENOSYNOVIAL GIANT CELL TUMOR OF FOOT: ICD-10-CM

## 2023-09-26 DIAGNOSIS — C91.10 CLL (CHRONIC LYMPHOCYTIC LEUKEMIA): ICD-10-CM

## 2023-09-26 DIAGNOSIS — M79.671 RIGHT FOOT PAIN: Primary | ICD-10-CM

## 2023-09-26 DIAGNOSIS — M79.89 SWELLING OF TOE OF RIGHT FOOT: ICD-10-CM

## 2023-09-26 DIAGNOSIS — E11.65 TYPE 2 DIABETES MELLITUS WITH HYPERGLYCEMIA, WITHOUT LONG-TERM CURRENT USE OF INSULIN: ICD-10-CM

## 2023-09-26 RX ORDER — ALLOPURINOL 300 MG/1
1 TABLET ORAL DAILY
COMMUNITY

## 2023-09-26 NOTE — PROGRESS NOTES
09/26/2023  Foot and Ankle Surgery - Established Patient/Follow-up  Provider: Dr. Rodolfo Yañez DPM  Location: [facility]    Subjective:  Bharathi Darnell is a 63 y.o. male.     Chief Complaint   Patient presents with    Right Foot - Follow-up, Pain    Follow-up     JAMEE Caldera md  5/26/2023       HPI: The patient is a 63-year-old male who presents to the clinic for a follow-up regarding his right foot.    The patient states his toe is not painful. He is unsure if the mass has increased in size; however, he has been switching up to putting on regular shoes.    The patient states he is receiving treatment for his CLL. He takes chemotherapy pills and infusions once a week. He is a diabetic. His last A1c was 7.6 percent 4 months ago.    Allergies   Allergen Reactions    Bactrim [Sulfamethoxazole-Trimethoprim] Rash       Current Outpatient Medications on File Prior to Visit   Medication Sig Dispense Refill    allopurinol (ZYLOPRIM) 300 MG tablet       allopurinol (ZYLOPRIM) 300 MG tablet Take 1 tablet by mouth Daily.      amLODIPine (NORVASC) 10 MG tablet TAKE 1 TABLET BY MOUTH EVERY DAY 90 tablet 1    Blood Glucose Monitoring Suppl (Accu-Chek Guide) w/Device kit 1 Device Daily. 1 kit 0    glucose blood (Accu-Chek Guide) test strip 1 each by Other route Daily. 100 each 0    Lancets (accu-chek multiclix) lancets 1 each by Other route Daily. 100 each 0    lisinopril (PRINIVIL,ZESTRIL) 40 MG tablet TAKE 1 TABLET BY MOUTH EVERY DAY FOR BLOOD PRESSURE 90 tablet 3    mupirocin (BACTROBAN) 2 % cream Apply 1 application topically to the appropriate area as directed 3 (Three) Times a Day. 30 g 0    sildenafil (VIAGRA) 100 MG tablet TAKE 1 TABLET BY MOUTH PRE INTERCOURSE 10 tablet 11    vitamin D (ERGOCALCIFEROL) 1.25 MG (89278 UT) capsule capsule Take 1 capsule by mouth 1 (One) Time Per Week. 12 capsule 1    cefdinir (OMNICEF) 300 MG capsule Take 1 capsule by mouth 2 (Two) Times a Day. (Patient not taking: Reported  "on 9/26/2023) 20 capsule 0    hydrocortisone 2.5 % cream Apply 1 application topically to the appropriate area as directed 2 (Two) Times a Day. (Patient not taking: Reported on 9/26/2023) 28 g 0    metFORMIN (GLUCOPHAGE) 500 MG tablet Take 2 tablets by mouth 2 (Two) Times a Day for 90 days. 360 tablet 0    triamcinolone (KENALOG) 0.1 % cream Apply 1 application topically to the appropriate area as directed 2 (Two) Times a Day. (Patient not taking: Reported on 9/26/2023) 28.4 g 0     No current facility-administered medications on file prior to visit.       Objective   Resp 20   Ht 188 cm (74\")   Wt 105 kg (231 lb)   BMI 29.66 kg/m²     Foot/Ankle Exam  GENERAL  Appearance:  appears stated age  Orientation:  AAOx3  Affect:  appropriate  Gait:  unimpaired  Assistance:  independent  Right shoe gear: casual shoe and sock  Left shoe gear: casual shoe and sock    VASCULAR     Right Foot Vascularity   Normal vascular exam    Dorsalis pedis:  2+  Posterior tibial:  2+  Skin temperature:  warm  Edema grading:  None  CFT:  < 3 seconds  Pedal hair growth:  Present  Varicosities:  none     NEUROLOGIC     Right Foot Neurologic   Light touch sensation: normal  Protective Sensation using [place]-Tai Monofilament:   Sites intact: 2  Sites tested: 10  Achilles reflex:  2+    MUSCULOSKELETAL     Right Foot Musculoskeletal   Ecchymosis:  none  Tenderness:  none    Arch:  Normal    MUSCLE STRENGTH     Right Foot Muscle Strength   Normal strength    Foot dorsiflexion:  5  Foot plantar flexion:  5  Foot inversion:  5  Foot eversion:  5    DERMATOLOGIC      Right Foot Dermatologic   Skin  Positive for skin changes.     TESTS     Right Foot Tests   Anterior drawer: negative  Varus tilt: negative     Right foot additional comments: Swelling/nonpitting edema to right 3rd toe. Color changes and scarring noted to the plantar arch. No drainage, no signs of infection.    06/01/2023:  No significant pain with palpation. Decreased " sensation with monofilament testing and light touch to the right foot. Continued swelling involving the third digit.    07/26/2023: No significant changes involving the right third toe.    09/26/2023: Diffuse swelling involving the right third toe. No significant pain with palpation.    Assessment & Plan   Diagnoses and all orders for this visit:    1. 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 Swelling of toe of right foot    4. Type 2 diabetes mellitus with hyperglycemia, without long-term current use of insulin    5. CLL (chronic lymphocytic leukemia)    Other orders  -     Follow Anesthesia Guidelines / Protocol; Future  -     Follow Anesthesia Guidelines / Protocol; Standing  -     Verify / Perform Chlorhexidine Skin Prep; Standing  -     Verify / Perform Chlorhexidine Skin Prep if Indicated (If Not Already Completed); Standing  -     Obtain Informed Consent; Future  -     Provide NPO Instructions to Patient; Future  -     Chlorhexidine Skin Prep; Future      Patient returns to office for continued issues involving his right third toe.  He has not noticed any progressive pain but continues to have prominent swelling involving the third toe.  We did review previous imaging and further treatment plans.  I did discuss an incisional versus excisional biopsy.  We also reviewed the consideration for third toe amputation for definitive management.  Patient states that he does not want to consider amputation but will consider biopsy.  Patient understands that we may be able to remove the entire lesion or may be only a part of the lesion depending on location and extent of the lesion.  We did review the procedure, risk, goals, and recovery.  He  does understand that there is risk of recurrence despite best efforts with resection.  He would like to proceed in the near future.  All questions were answered to patient's satisfaction.  Greater than 30 minutes spent before, during, and after evaluation for patient care.    Orders Placed This Encounter   Procedures    XR Chest 2 View     Standing Status:   Future     Standing Expiration Date:   9/27/2024     Order Specific Question:   Reason for Exam:     Answer:   Preop     Order Specific Question:   Release to patient     Answer:   Routine Release [[id number]]    CBC (No Diff)     Standing Status:   Future     Standing Expiration Date:   9/27/2024     Order Specific Question:   Release to patient     Answer:   Routine Release [[id number]]    Basic Metabolic Panel     Standing Status:   Future     Standing Expiration Date:   9/27/2024     Order Specific Question:   Release to patient     Answer:   Routine Release [[id number]]    Obtain Informed Consent     Standing Status:   Future     Order Specific Question:   Informed Consent Given For     Answer:   Incisional versus excisional biopsy of soft tissue mass involving the right third digit    Provide NPO Instructions to Patient     Standing Status:   Future    Chlorhexidine Skin Prep     Chlorhexidine Skin Prep and Instructions For All Patients Having A Procedure Requiring an Outward Incision if Not Allergic. If Allergic, Give Antibacterial Skin Wipes and Instructions. Do Not Use For Facial Cases or on Any Mucus Membranes.     Standing Status:   Future    ECG 12 Lead     Standing Status:   Future     Standing Expiration Date:   9/27/2024     Order Specific Question:   Reason for Exam:     Answer:   Preop     Order Specific Question:   Release to patient     Answer:   Routine Release [[id number]]          Note is dictated utilizing voice recognition software. Unfortunately this leads to occasional typographical errors. I apologize in advance if the situation  occurs. If questions occur please do not hesitate to call our office.    Transcribed from ambient dictation for ANTHONY Yañez DPM by Leah Arteaga.  09/26/23   11:41 EDT    Patient or patient representative verbalized consent to the visit recording.  I have personally performed the services described in this document as transcribed by the above individual, and it is both accurate and complete.

## 2023-10-04 ENCOUNTER — TELEPHONE (OUTPATIENT)
Dept: FAMILY MEDICINE CLINIC | Facility: CLINIC | Age: 63
End: 2023-10-04
Payer: COMMERCIAL

## 2023-10-04 NOTE — TELEPHONE ENCOUNTER
Micki Mckeon (I think) called from the [facility] facility and Dr. Hdz would like to know if you can take on this patient?    His PCP is not providing adequate care for this patient.     He currently has chronic leukemia, diabetes, and foot issues.    He has started chemo treatment but the leukemia is coming on aggressive.    I explained to Micki that I had to get your approval on accepting this patient.    She can be reached at [phone]    [place]

## 2023-10-18 ENCOUNTER — OFFICE VISIT (OUTPATIENT)
Dept: FAMILY MEDICINE CLINIC | Facility: CLINIC | Age: 63
End: 2023-10-18
Payer: COMMERCIAL

## 2023-10-18 VITALS
BODY MASS INDEX: 30.07 KG/M2 | TEMPERATURE: 98.2 F | DIASTOLIC BLOOD PRESSURE: 66 MMHG | WEIGHT: 234.2 LBS | HEART RATE: 64 BPM | OXYGEN SATURATION: 98 % | SYSTOLIC BLOOD PRESSURE: 114 MMHG

## 2023-10-18 DIAGNOSIS — R97.20 ELEVATED PSA: ICD-10-CM

## 2023-10-18 DIAGNOSIS — C91.10 CLL (CHRONIC LYMPHOCYTIC LEUKEMIA): ICD-10-CM

## 2023-10-18 DIAGNOSIS — I10 ESSENTIAL (PRIMARY) HYPERTENSION: ICD-10-CM

## 2023-10-18 DIAGNOSIS — E11.42 TYPE 2 DIABETES MELLITUS WITH DIABETIC POLYNEUROPATHY, WITHOUT LONG-TERM CURRENT USE OF INSULIN: Primary | ICD-10-CM

## 2023-10-18 PROBLEM — M10.9 GOUT: Status: ACTIVE | Noted: 2023-10-18

## 2023-10-18 PROBLEM — K57.30 DIVERTICULOSIS OF LARGE INTESTINE WITHOUT PERFORATION OR ABSCESS WITHOUT BLEEDING: Status: ACTIVE | Noted: 2023-08-04

## 2023-10-18 PROCEDURE — 99214 OFFICE O/P EST MOD 30 MIN: CPT | Performed by: FAMILY MEDICINE

## 2023-10-18 RX ORDER — LISINOPRIL 40 MG/1
40 TABLET ORAL DAILY
Qty: 90 TABLET | Refills: 3 | Status: SHIPPED | OUTPATIENT
Start: 2023-10-18

## 2023-10-18 RX ORDER — ACALABRUTINIB 100 MG/1
100 TABLET, FILM COATED ORAL
COMMUNITY
Start: 2023-09-29

## 2023-10-18 NOTE — PROGRESS NOTES
Subjective   Bharathi Darnell is a 63 y.o. male.     History of Present Illness  Bharathi Darnell is in for the purpose of establishing as a new patient with me.  He is not new to the medical group however, just changing providers.  He has a history of type 2 diabetes currently treated with metformin.  He has a history of CLL currently treated by an outside oncologist, on both oral and infusion therapy.  The patient reports his last white count was significantly improved on treatment.  He also is current with our podiatry for the giant cell tumor on his right foot.  There has been some discussion of biopsy versus excision of the toe to resolve the tumor, and the patient seems a little confused today.  He is wanting to get the issue completely resolved but he was unclear whether amputation would do that.  There is no history of chest pain or dyspnea. There is no history of issue with bowel or bladder dysfunction, though he did have some blood in his stool recently.  He had a negative colonoscopy not long ago and is going to be sent for EGD soon. There is no history of dizziness or lightheadedness. There is no history of issue with sleep or mood. There is no history of issue with present medication.              /66 (BP Location: Left arm, Patient Position: Sitting, Cuff Size: Large Adult)   Pulse 64   Temp 98.2 °F (36.8 °C) (Temporal)   Wt 106 kg (234 lb 3.2 oz)   SpO2 98%   BMI 30.07 kg/m²       Chief Complaint   Patient presents with    [facility]     [facility] - records on your desk            Current Outpatient Medications:     allopurinol (ZYLOPRIM) 300 MG tablet, , Disp: , Rfl:     amLODIPine (NORVASC) 10 MG tablet, TAKE 1 TABLET BY MOUTH EVERY DAY, Disp: 90 tablet, Rfl: 1    Blood Glucose Monitoring Suppl (Accu-Chek Guide) w/Device kit, 1 Device Daily., Disp: 1 kit, Rfl: 0    Calquence 100 MG tablet, Take 1 tablet by mouth., Disp: , Rfl:     glucose blood (Accu-Chek Guide) test strip, 1  each by Other route Daily., Disp: 100 each, Rfl: 0    Lancets (accu-chek multiclix) lancets, 1 each by Other route Daily., Disp: 100 each, Rfl: 0    lisinopril (PRINIVIL,ZESTRIL) 40 MG tablet, Take 1 tablet by mouth Daily. for blood pressure., Disp: 90 tablet, Rfl: 3    mupirocin (BACTROBAN) 2 % cream, Apply 1 application topically to the appropriate area as directed 3 (Three) Times a Day., Disp: 30 g, Rfl: 0    sildenafil (VIAGRA) 100 MG tablet, TAKE 1 TABLET BY MOUTH PRE INTERCOURSE, Disp: 10 tablet, Rfl: 11    vitamin D (ERGOCALCIFEROL) 1.25 MG (48555 UT) capsule capsule, Take 1 capsule by mouth 1 (One) Time Per Week., Disp: 12 capsule, Rfl: 1    metFORMIN (GLUCOPHAGE) 500 MG tablet, Take 2 tablets by mouth 2 (Two) Times a Day for 90 days., Disp: 360 tablet, Rfl: 0    Lab Results   Component Value Date    HGBA1C 7.60 (H) 05/17/2023    HGBA1C 6.9 (H) 02/14/2023     Lab Results   Component Value Date    MICROALBUR 23.1 02/14/2023    CREATININE 0.76 06/01/2023         Wt Readings from Last 3 Encounters:   10/18/23 106 kg (234 lb 3.2 oz)   09/26/23 105 kg (231 lb)   07/26/23 105 kg (231 lb)       The following portions of the patient's history were reviewed and updated as appropriate: allergies, current medications, past family history, past medical history, past social history, past surgical history, and problem list.    Review of Systems   Constitutional:  Negative for activity change, fatigue and fever.   HENT:  Negative for congestion, sinus pressure, sinus pain, sore throat and trouble swallowing.    Eyes:  Negative for visual disturbance.   Respiratory:  Negative for chest tightness, shortness of breath and wheezing.    Cardiovascular:  Negative for chest pain.   Gastrointestinal:  Negative for abdominal distention, abdominal pain, constipation, diarrhea, nausea and vomiting.   Genitourinary:  Negative for difficulty urinating and dysuria.   Musculoskeletal:  Negative for back pain and neck pain.    Psychiatric/Behavioral:  Negative for agitation, hallucinations and suicidal ideas.        Objective   Physical Exam  Vitals and nursing note reviewed.   Constitutional:       Appearance: Normal appearance.   HENT:      Right Ear: Tympanic membrane and ear canal normal.      Left Ear: Tympanic membrane and ear canal normal.   Cardiovascular:      Rate and Rhythm: Normal rate and regular rhythm.      Heart sounds: Normal heart sounds. No murmur heard.  Pulmonary:      Effort: Pulmonary effort is normal.      Breath sounds: No wheezing or rales.   Abdominal:      General: Bowel sounds are normal.      Palpations: Abdomen is soft.      Tenderness: There is no abdominal tenderness. There is no guarding.   Musculoskeletal:      Cervical back: Neck supple.      Right lower leg: No edema.      Left lower leg: No edema.   Lymphadenopathy:      Cervical: No cervical adenopathy.   Neurological:      General: No focal deficit present.      Mental Status: He is alert and oriented to person, place, and time.   Psychiatric:         Mood and Affect: Mood normal.           Assessment & Plan   Problems Addressed this Visit          Endocrine and Metabolic    Type 2 diabetes mellitus - Primary    Relevant Orders    Comprehensive metabolic panel    Hemoglobin A1c    CBC w AUTO Differential    MicroAlbumin, Urine, Random - Urine, Clean Catch    Lipid panel    Vitamin D 25 hydroxy       Hematology and Neoplasia    CLL (chronic lymphocytic leukemia)    Relevant Medications    Calquence 100 MG tablet     Other Visit Diagnoses       Essential (primary) hypertension        Relevant Medications    lisinopril (PRINIVIL,ZESTRIL) 40 MG tablet    Elevated PSA              Diagnoses         Codes Comments    Type 2 diabetes mellitus with diabetic polyneuropathy, without long-term current use of insulin    -  Primary ICD-10-CM: E11.42  ICD-9-CM: 250.60, 357.2     Essential (primary) hypertension     ICD-10-CM: I10  ICD-9-CM: 401.9     Elevated  PSA     ICD-10-CM: R97.20  ICD-9-CM: 790.93     CLL (chronic lymphocytic leukemia)     ICD-10-CM: C91.10  ICD-9-CM: 204.10           I have ordered labs so I can see where we stand with his diabetic state at this time, so I can advise him on any possible medication changes that would be beneficial  I have reached out to his podiatrist to see if I can get clarification on whether biopsy or amputation surgery is in his best interest  I will let his oncologist know that the patient has changed over to this office  I would like to see the patient every 3 to 6 months depending on what we do for his diabetes  I have encouraged him to get an eye exam as soon as possible as he is overdue  I will follow-up on upcoming EGD and noted his colonoscopy results  We discussed the importance of him remaining active with exercise and working on portion control diet  I let him know he can call me with any new concerns or questions anytime

## 2023-10-19 ENCOUNTER — LAB (OUTPATIENT)
Dept: FAMILY MEDICINE CLINIC | Facility: CLINIC | Age: 63
End: 2023-10-19
Payer: COMMERCIAL

## 2023-10-19 LAB
25(OH)D3 SERPL-MCNC: 23 NG/ML (ref 30–100)
ALBUMIN SERPL-MCNC: 3.6 G/DL (ref 3.5–5.2)
ALBUMIN UR-MCNC: 5.5 MG/DL
ALBUMIN/GLOB SERPL: 1.6 G/DL
ALP SERPL-CCNC: 54 U/L (ref 39–117)
ALT SERPL W P-5'-P-CCNC: 25 U/L (ref 1–41)
ANION GAP SERPL CALCULATED.3IONS-SCNC: 7.8 MMOL/L (ref 5–15)
AST SERPL-CCNC: 18 U/L (ref 1–40)
BILIRUB SERPL-MCNC: 0.4 MG/DL (ref 0–1.2)
BUN SERPL-MCNC: 10 MG/DL (ref 8–23)
BUN/CREAT SERPL: 13.2 (ref 7–25)
CALCIUM SPEC-SCNC: 8.8 MG/DL (ref 8.6–10.5)
CHLORIDE SERPL-SCNC: 105 MMOL/L (ref 98–107)
CHOLEST SERPL-MCNC: 166 MG/DL (ref 0–200)
CO2 SERPL-SCNC: 25.2 MMOL/L (ref 22–29)
CREAT SERPL-MCNC: 0.76 MG/DL (ref 0.76–1.27)
DEPRECATED RDW RBC AUTO: 40.1 FL (ref 37–54)
EGFRCR SERPLBLD CKD-EPI 2021: 101 ML/MIN/1.73
ERYTHROCYTE [DISTWIDTH] IN BLOOD BY AUTOMATED COUNT: 11.7 % (ref 12.3–15.4)
GLOBULIN UR ELPH-MCNC: 2.2 GM/DL
GLUCOSE SERPL-MCNC: 220 MG/DL (ref 65–99)
HBA1C MFR BLD: 8.3 % (ref 4.8–5.6)
HCT VFR BLD AUTO: 37.5 % (ref 37.5–51)
HDLC SERPL-MCNC: 49 MG/DL (ref 40–60)
HGB BLD-MCNC: 12.3 G/DL (ref 13–17.7)
LDLC SERPL CALC-MCNC: 103 MG/DL (ref 0–100)
LDLC/HDLC SERPL: 2.08 {RATIO}
LYMPHOCYTES # BLD MANUAL: 6.43 10*3/MM3 (ref 0.7–3.1)
LYMPHOCYTES NFR BLD MANUAL: 7.1 % (ref 5–12)
MCH RBC QN AUTO: 30.8 PG (ref 26.6–33)
MCHC RBC AUTO-ENTMCNC: 32.8 G/DL (ref 31.5–35.7)
MCV RBC AUTO: 93.8 FL (ref 79–97)
MONOCYTES # BLD: 0.79 10*3/MM3 (ref 0.1–0.9)
NEUTROPHILS # BLD AUTO: 3.95 10*3/MM3 (ref 1.7–7)
NEUTROPHILS NFR BLD MANUAL: 35.4 % (ref 42.7–76)
PLAT MORPH BLD: NORMAL
PLATELET # BLD AUTO: 177 10*3/MM3 (ref 140–450)
PMV BLD AUTO: 10.9 FL (ref 6–12)
POTASSIUM SERPL-SCNC: 3.8 MMOL/L (ref 3.5–5.2)
PROT SERPL-MCNC: 5.8 G/DL (ref 6–8.5)
RBC # BLD AUTO: 4 10*6/MM3 (ref 4.14–5.8)
RBC MORPH BLD: NORMAL
SMUDGE CELLS BLD QL SMEAR: ABNORMAL
SODIUM SERPL-SCNC: 138 MMOL/L (ref 136–145)
TRIGL SERPL-MCNC: 75 MG/DL (ref 0–150)
VARIANT LYMPHS NFR BLD MANUAL: 57.6 % (ref 19.6–45.3)
VLDLC SERPL-MCNC: 14 MG/DL (ref 5–40)
WBC NRBC COR # BLD: 11.17 10*3/MM3 (ref 3.4–10.8)

## 2023-10-19 PROCEDURE — 85025 COMPLETE CBC W/AUTO DIFF WBC: CPT | Performed by: FAMILY MEDICINE

## 2023-10-19 PROCEDURE — 82306 VITAMIN D 25 HYDROXY: CPT | Performed by: FAMILY MEDICINE

## 2023-10-19 PROCEDURE — 80061 LIPID PANEL: CPT | Performed by: FAMILY MEDICINE

## 2023-10-19 PROCEDURE — 85007 BL SMEAR W/DIFF WBC COUNT: CPT | Performed by: FAMILY MEDICINE

## 2023-10-19 PROCEDURE — 83036 HEMOGLOBIN GLYCOSYLATED A1C: CPT | Performed by: FAMILY MEDICINE

## 2023-10-19 PROCEDURE — 36415 COLL VENOUS BLD VENIPUNCTURE: CPT | Performed by: FAMILY MEDICINE

## 2023-10-19 PROCEDURE — 80053 COMPREHEN METABOLIC PANEL: CPT | Performed by: FAMILY MEDICINE

## 2023-10-19 PROCEDURE — 82043 UR ALBUMIN QUANTITATIVE: CPT | Performed by: FAMILY MEDICINE

## 2023-10-20 ENCOUNTER — TELEPHONE (OUTPATIENT)
Dept: FAMILY MEDICINE CLINIC | Facility: CLINIC | Age: 63
End: 2023-10-20

## 2023-10-20 RX ORDER — ERGOCALCIFEROL 1.25 MG/1
50000 CAPSULE ORAL 2 TIMES WEEKLY
Qty: 8 CAPSULE | Refills: 5 | Status: SHIPPED | OUTPATIENT
Start: 2023-10-23

## 2023-10-20 RX ORDER — DULAGLUTIDE 0.75 MG/.5ML
0.75 INJECTION, SOLUTION SUBCUTANEOUS WEEKLY
Qty: 2 ML | Refills: 0 | Status: SHIPPED | OUTPATIENT
Start: 2023-10-20

## 2023-10-20 RX ORDER — DAPAGLIFLOZIN 10 MG/1
10 TABLET, FILM COATED ORAL DAILY
Qty: 30 TABLET | Refills: 5 | Status: SHIPPED | OUTPATIENT
Start: 2023-10-20

## 2023-10-20 NOTE — TELEPHONE ENCOUNTER
Caller: Bharathi Darnell    Relationship: Self    Best call back number: [phone]     What is the best time to reach you: ANY    Who are you requesting to speak with (clinical staff, provider,  specific staff member): CLINICAL        What was the call regarding:   PATIENT WANTED TO MAKE SURE THAT DR QUIROZ IS AWARE THAT STEROIDS ARE GIVEN WITH EACH INFUSION. HE HAS NOTICED THAT HIS SUGARS CONTINUE TO INCREASE SINCE SEPTEMBER WHEN INFUSIONS STARTED.

## 2023-10-24 ENCOUNTER — TELEPHONE (OUTPATIENT)
Dept: FAMILY MEDICINE CLINIC | Facility: CLINIC | Age: 63
End: 2023-10-24
Payer: COMMERCIAL

## 2023-10-24 ENCOUNTER — TELEPHONE (OUTPATIENT)
Dept: FAMILY MEDICINE CLINIC | Facility: CLINIC | Age: 63
End: 2023-10-24

## 2023-10-24 NOTE — TELEPHONE ENCOUNTER
Caller: Bharathi Darnell    Relationship to patient: Self    Best call back number: [phone]    Patient is needing:     PATIENT CALLED AND SAID HE IS HAVING AN UPPER GI SCOPE DONE THURSDAY, 10/26/23 AND THE DOCTOR HAS ASKED THAT HE NOT TAKE ANY OF HIS MEDICATIONS UNTIL AFTER THAT PROCEDURE EXCEPT HIS BLOOD PRESSURE MEDICATIONS    HE IS WANTING TO SEE IF THIS IS OK WITH DR. GAIL QUIROZ    REQUESTED CALLBACK

## 2023-10-24 NOTE — TELEPHONE ENCOUNTER
Caller: Bharathi Darnell    Relationship: Self    Best call back number: [phone]    Which medication are you concerned about: TRULICITY AND FARXIGA     Who prescribed you this medication: DR. GAIL QUIROZ     When did you start taking this medication: N/A    What are your concerns: PATIENT WANTS TO KNOW IF HE IS SUPPOSED TO CONTINUE METFORMIN WHILE TAKING THESE OR STOP THE METFORMIN     How long have you had these concerns: N/A

## 2023-10-25 ENCOUNTER — TRANSCRIBE ORDERS (OUTPATIENT)
Dept: ADMINISTRATIVE | Facility: HOSPITAL | Age: 63
End: 2023-10-25
Payer: COMMERCIAL

## 2023-10-25 ENCOUNTER — HOSPITAL ENCOUNTER (OUTPATIENT)
Dept: CARDIOLOGY | Facility: HOSPITAL | Age: 63
Discharge: HOME | End: 2023-10-25
Payer: COMMERCIAL

## 2023-10-25 DIAGNOSIS — Z51.81 ENCOUNTER FOR THERAPEUTIC DRUG MONITORING: ICD-10-CM

## 2023-10-25 DIAGNOSIS — Z51.81 ENCOUNTER FOR THERAPEUTIC DRUG MONITORING: Primary | ICD-10-CM

## 2023-10-25 LAB
QT INTERVAL: 434 MS
QTC INTERVAL: 436 MS

## 2023-10-25 PROCEDURE — 93005 ELECTROCARDIOGRAM TRACING: CPT | Performed by: INTERNAL MEDICINE

## 2023-10-26 ENCOUNTER — ON CAMPUS - OUTPATIENT (AMBULATORY)
Dept: URBAN - METROPOLITAN AREA HOSPITAL 2 | Facility: HOSPITAL | Age: 63
End: 2023-10-26
Payer: COMMERCIAL

## 2023-10-26 ENCOUNTER — OFFICE (AMBULATORY)
Dept: URBAN - METROPOLITAN AREA PATHOLOGY 4 | Facility: PATHOLOGY | Age: 63
End: 2023-10-26
Payer: COMMERCIAL

## 2023-10-26 VITALS
HEART RATE: 75 BPM | SYSTOLIC BLOOD PRESSURE: 130 MMHG | SYSTOLIC BLOOD PRESSURE: 151 MMHG | HEART RATE: 76 BPM | OXYGEN SATURATION: 91 % | SYSTOLIC BLOOD PRESSURE: 129 MMHG | DIASTOLIC BLOOD PRESSURE: 75 MMHG | SYSTOLIC BLOOD PRESSURE: 147 MMHG | OXYGEN SATURATION: 98 % | OXYGEN SATURATION: 99 % | DIASTOLIC BLOOD PRESSURE: 80 MMHG | WEIGHT: 230 LBS | DIASTOLIC BLOOD PRESSURE: 72 MMHG | HEIGHT: 74 IN | DIASTOLIC BLOOD PRESSURE: 101 MMHG | HEART RATE: 80 BPM | SYSTOLIC BLOOD PRESSURE: 119 MMHG | OXYGEN SATURATION: 97 % | DIASTOLIC BLOOD PRESSURE: 78 MMHG | RESPIRATION RATE: 16 BRPM | SYSTOLIC BLOOD PRESSURE: 135 MMHG | TEMPERATURE: 97.8 F | HEART RATE: 71 BPM | DIASTOLIC BLOOD PRESSURE: 71 MMHG | RESPIRATION RATE: 17 BRPM | DIASTOLIC BLOOD PRESSURE: 87 MMHG | OXYGEN SATURATION: 95 % | SYSTOLIC BLOOD PRESSURE: 118 MMHG | HEART RATE: 91 BPM | HEART RATE: 58 BPM | HEART RATE: 77 BPM | DIASTOLIC BLOOD PRESSURE: 88 MMHG

## 2023-10-26 DIAGNOSIS — K25.9 GASTRIC ULCER, UNSPECIFIED AS ACUTE OR CHRONIC, WITHOUT HEMO: ICD-10-CM

## 2023-10-26 DIAGNOSIS — D50.0 IRON DEFICIENCY ANEMIA SECONDARY TO BLOOD LOSS (CHRONIC): ICD-10-CM

## 2023-10-26 DIAGNOSIS — K29.50 UNSPECIFIED CHRONIC GASTRITIS WITHOUT BLEEDING: ICD-10-CM

## 2023-10-26 DIAGNOSIS — K31.A15 GASTRIC INTESTINAL METAPLASIA WITHOUT DYSPLASIA, INVOLVING M: ICD-10-CM

## 2023-10-26 DIAGNOSIS — K44.9 DIAPHRAGMATIC HERNIA WITHOUT OBSTRUCTION OR GANGRENE: ICD-10-CM

## 2023-10-26 PROBLEM — K92.2 GASTROINTESTINAL HEMORRHAGE, UNSPECIFIED: Status: ACTIVE | Noted: 2023-10-26

## 2023-10-26 PROBLEM — D37.1 NEOPLASM OF UNCERTAIN BEHAVIOR OF STOMACH: Status: ACTIVE | Noted: 2023-10-26

## 2023-10-26 LAB
GI HISTOLOGY: A. SELECT: (no result)
GI HISTOLOGY: B. SELECT: (no result)
GI HISTOLOGY: C. UNSPECIFIED: (no result)
GI HISTOLOGY: D. UNSPECIFIED: (no result)
GI HISTOLOGY: PDF REPORT: (no result)

## 2023-10-26 PROCEDURE — 88305 TISSUE EXAM BY PATHOLOGIST: CPT | Performed by: INTERNAL MEDICINE

## 2023-10-26 PROCEDURE — 43239 EGD BIOPSY SINGLE/MULTIPLE: CPT | Performed by: INTERNAL MEDICINE

## 2023-10-26 RX ORDER — OMEPRAZOLE 40 MG/1
40 CAPSULE, DELAYED RELEASE ORAL
Qty: 30 | Refills: 11 | Status: ACTIVE
Start: 2023-10-26

## 2023-10-26 NOTE — SERVICEHPINOTES
PRATIBHA CAVANAUGH  is a  63  male   who presents today for a  EGD   for   the indications listed below. The updated Patient Profile was reviewed prior to the procedure, in conjunction with the Physical Exam, including medical conditions, surgical procedures, medications, allergies, family history and social history. See Physical Exam time stamp below for date and time of HPI completion.Pre-operatively, I reviewed the indication(s) for the procedure, the risks of the procedure [including but not limited to: unexpected bleeding possibly requiring hospitalization and/or unplanned repeat procedures, perforation possibly requiring surgical treatment, missed lesions and complications of sedation/general anesthesia (also explained by anesthesia staff)]. I have evaluated the patient for risks associated with the planned anesthesia and the procedure to be performed and find the patient an acceptable candidate for IV sedation.Multiple opportunities were provided for any questions or concerns, and all questions were answered satisfactorily before any anesthesia was administered. We will proceed with the planned procedure.br

## 2023-11-06 ENCOUNTER — TELEPHONE (OUTPATIENT)
Dept: FAMILY MEDICINE CLINIC | Facility: CLINIC | Age: 63
End: 2023-11-06
Payer: COMMERCIAL

## 2023-11-06 DIAGNOSIS — E11.42 TYPE 2 DIABETES MELLITUS WITH DIABETIC POLYNEUROPATHY, WITHOUT LONG-TERM CURRENT USE OF INSULIN: Primary | ICD-10-CM

## 2023-11-06 DIAGNOSIS — M79.671 RIGHT FOOT PAIN: ICD-10-CM

## 2023-11-06 NOTE — TELEPHONE ENCOUNTER
Caller: Mann Bharathi S    Relationship: Self    Best call back number: [phone]    What was the call regarding: PATIENT CALLED AND WANTED TO KNOW IF DR. QUIROZ HAS REACHED OUT TO DR. MEDINA ABOUT HIS FOOT ISSUE. PATIENT WOULD LIKE TO KNOW WHAT DR. MEDINA SUGGESTIONS ON WHAT HE SHOULD DO ARE. PLEASE ADVISE PATIENT ASAP.

## 2023-11-08 ENCOUNTER — TELEPHONE (OUTPATIENT)
Dept: ORTHOPEDIC SURGERY | Facility: CLINIC | Age: 63
End: 2023-11-08
Payer: COMMERCIAL

## 2023-11-08 PROBLEM — D48.19: Status: ACTIVE | Noted: 2023-09-26

## 2023-11-08 NOTE — TELEPHONE ENCOUNTER
CALLED PATIENT ON 9/29 AND LEFT MESSAGE TO SCHEDULE SURGERY. WILL CALL PATIENT AND SETUP SURGERY.

## 2023-11-09 ENCOUNTER — TELEPHONE (OUTPATIENT)
Dept: FAMILY MEDICINE CLINIC | Facility: CLINIC | Age: 63
End: 2023-11-09
Payer: COMMERCIAL

## 2023-11-09 RX ORDER — HYDROXYZINE HYDROCHLORIDE 25 MG/1
25 TABLET, FILM COATED ORAL 3 TIMES DAILY PRN
Qty: 60 TABLET | Refills: 1 | Status: SHIPPED | OUTPATIENT
Start: 2023-11-09

## 2023-11-09 NOTE — TELEPHONE ENCOUNTER
Caller: Bharathi Darnell    Relationship: Self    Best call back number:     [phone]        What medication are you requesting: SOMETHING FOR THE SYMPTOMS BELOW    What are your current symptoms: RED AND ITCHY INSECT BITES ON BUTT    How long have you been experiencing symptoms:     Have you had these symptoms before:    [] Yes  [] No    Have you been treated for these symptoms before:   [] Yes  [] No    If a prescription is needed, what is your preferred pharmacy and phone number: [id number] - [address] [phone] [facility] [phone]      Additional notes:

## 2023-11-17 ENCOUNTER — TELEPHONE (OUTPATIENT)
Dept: FAMILY MEDICINE CLINIC | Facility: CLINIC | Age: 63
End: 2023-11-17
Payer: COMMERCIAL

## 2023-11-17 NOTE — TELEPHONE ENCOUNTER
Caller: Bharathi Darnell    Relationship: Self    Best call back number: [phone]    What is the best time to reach you: ANYTIME    Who are you requesting to speak with (clinical staff, provider,  specific staff member): CLINICAL STAFF     What was the call regarding: PATIENT IS REQUESTING A CALL BACK TO DISCUSS THE CONSTIPATION HE HAS BEEN EXPERIENCING.     PATIENT STATES THAT HE HAS NOT HAD A BOWEL MOVEMENT SINCE 11/14/23, AND HE WOULD LIKE TO KNOW WHAT THE CLINICAL STAFF WOULD ADVISE.

## 2023-11-28 RX ORDER — ERGOCALCIFEROL 1.25 MG/1
50000 CAPSULE ORAL 2 TIMES WEEKLY
Qty: 8 CAPSULE | Refills: 5 | Status: SHIPPED | OUTPATIENT
Start: 2023-11-30

## 2023-12-04 ENCOUNTER — LAB (OUTPATIENT)
Dept: LAB | Facility: HOSPITAL | Age: 63
End: 2023-12-04
Payer: COMMERCIAL

## 2023-12-04 ENCOUNTER — TRANSCRIBE ORDERS (OUTPATIENT)
Dept: LAB | Facility: HOSPITAL | Age: 63
End: 2023-12-04
Payer: COMMERCIAL

## 2023-12-04 DIAGNOSIS — R11.0 NAUSEA: ICD-10-CM

## 2023-12-04 DIAGNOSIS — R10.13 ABDOMINAL PAIN, EPIGASTRIC: ICD-10-CM

## 2023-12-04 DIAGNOSIS — R10.13 ABDOMINAL PAIN, EPIGASTRIC: Primary | ICD-10-CM

## 2023-12-04 LAB
ALBUMIN SERPL-MCNC: 3.9 G/DL (ref 3.5–5.2)
ALBUMIN/GLOB SERPL: 1.4 G/DL
ALP SERPL-CCNC: 51 U/L (ref 39–117)
ALT SERPL W P-5'-P-CCNC: 19 U/L (ref 1–41)
AMYLASE SERPL-CCNC: 62 U/L (ref 28–100)
ANION GAP SERPL CALCULATED.3IONS-SCNC: 18.1 MMOL/L (ref 5–15)
AST SERPL-CCNC: 15 U/L (ref 1–40)
BILIRUB SERPL-MCNC: 0.6 MG/DL (ref 0–1.2)
BUN SERPL-MCNC: 15 MG/DL (ref 8–23)
BUN/CREAT SERPL: 16.3 (ref 7–25)
BURR CELLS BLD QL SMEAR: ABNORMAL
CALCIUM SPEC-SCNC: 9.4 MG/DL (ref 8.6–10.5)
CHLORIDE SERPL-SCNC: 102 MMOL/L (ref 98–107)
CO2 SERPL-SCNC: 21.9 MMOL/L (ref 22–29)
CREAT SERPL-MCNC: 0.92 MG/DL (ref 0.76–1.27)
CRP SERPL-MCNC: 6.21 MG/DL (ref 0–0.5)
DEPRECATED RDW RBC AUTO: 43.1 FL (ref 37–54)
EGFRCR SERPLBLD CKD-EPI 2021: 93.5 ML/MIN/1.73
EOSINOPHIL # BLD MANUAL: 0.06 10*3/MM3 (ref 0–0.4)
EOSINOPHIL NFR BLD MANUAL: 1.1 % (ref 0.3–6.2)
ERYTHROCYTE [DISTWIDTH] IN BLOOD BY AUTOMATED COUNT: 12.4 % (ref 12.3–15.4)
GGT SERPL-CCNC: 18 U/L (ref 8–61)
GLOBULIN UR ELPH-MCNC: 2.8 GM/DL
GLUCOSE SERPL-MCNC: 146 MG/DL (ref 65–99)
HCT VFR BLD AUTO: 47.9 % (ref 37.5–51)
HGB BLD-MCNC: 15.8 G/DL (ref 13–17.7)
LIPASE SERPL-CCNC: 16 U/L (ref 13–60)
LYMPHOCYTES # BLD MANUAL: 5.18 10*3/MM3 (ref 0.7–3.1)
LYMPHOCYTES NFR BLD MANUAL: 6.7 % (ref 5–12)
MCH RBC QN AUTO: 30.9 PG (ref 26.6–33)
MCHC RBC AUTO-ENTMCNC: 33 G/DL (ref 31.5–35.7)
MCV RBC AUTO: 93.6 FL (ref 79–97)
MONOCYTES # BLD: 0.39 10*3/MM3 (ref 0.1–0.9)
NEUTROPHILS # BLD AUTO: 0.2 10*3/MM3 (ref 1.7–7)
NEUTROPHILS NFR BLD MANUAL: 3.4 % (ref 42.7–76)
NRBC BLD AUTO-RTO: 0.2 /100 WBC (ref 0–0.2)
PLAT MORPH BLD: NORMAL
PLATELET # BLD AUTO: 207 10*3/MM3 (ref 140–450)
PMV BLD AUTO: 11 FL (ref 6–12)
POIKILOCYTOSIS BLD QL SMEAR: ABNORMAL
POTASSIUM SERPL-SCNC: 3.8 MMOL/L (ref 3.5–5.2)
PROT SERPL-MCNC: 6.7 G/DL (ref 6–8.5)
RBC # BLD AUTO: 5.12 10*6/MM3 (ref 4.14–5.8)
SMUDGE CELLS BLD QL SMEAR: ABNORMAL
SODIUM SERPL-SCNC: 142 MMOL/L (ref 136–145)
VARIANT LYMPHS NFR BLD MANUAL: 88.8 % (ref 19.6–45.3)
WBC NRBC COR # BLD AUTO: 5.83 10*3/MM3 (ref 3.4–10.8)

## 2023-12-04 PROCEDURE — 82150 ASSAY OF AMYLASE: CPT

## 2023-12-04 PROCEDURE — 36415 COLL VENOUS BLD VENIPUNCTURE: CPT

## 2023-12-04 PROCEDURE — 80053 COMPREHEN METABOLIC PANEL: CPT

## 2023-12-04 PROCEDURE — 82977 ASSAY OF GGT: CPT

## 2023-12-04 PROCEDURE — 83690 ASSAY OF LIPASE: CPT

## 2023-12-04 PROCEDURE — 86140 C-REACTIVE PROTEIN: CPT

## 2023-12-04 PROCEDURE — 85025 COMPLETE CBC W/AUTO DIFF WBC: CPT

## 2023-12-04 PROCEDURE — 85007 BL SMEAR W/DIFF WBC COUNT: CPT

## 2023-12-08 ENCOUNTER — APPOINTMENT (OUTPATIENT)
Dept: GENERAL RADIOLOGY | Facility: HOSPITAL | Age: 63
End: 2023-12-08
Payer: COMMERCIAL

## 2023-12-08 ENCOUNTER — APPOINTMENT (OUTPATIENT)
Dept: CT IMAGING | Facility: HOSPITAL | Age: 63
End: 2023-12-08
Payer: COMMERCIAL

## 2023-12-08 ENCOUNTER — INPATIENT HOSPITAL (AMBULATORY)
Dept: URBAN - METROPOLITAN AREA HOSPITAL 84 | Facility: HOSPITAL | Age: 63
End: 2023-12-08
Payer: COMMERCIAL

## 2023-12-08 ENCOUNTER — HOSPITAL ENCOUNTER (INPATIENT)
Facility: HOSPITAL | Age: 63
LOS: 5 days | Discharge: HOME | End: 2023-12-13
Attending: EMERGENCY MEDICINE | Admitting: INTERNAL MEDICINE
Payer: COMMERCIAL

## 2023-12-08 DIAGNOSIS — R11.15 PERSISTENT VOMITING: ICD-10-CM

## 2023-12-08 DIAGNOSIS — K56.699 OTHER INTESTINAL OBSTRUCTION UNSPECIFIED AS TO PARTIAL VERSU: ICD-10-CM

## 2023-12-08 DIAGNOSIS — K56.609 SMALL BOWEL OBSTRUCTION: ICD-10-CM

## 2023-12-08 DIAGNOSIS — R11.2 NAUSEA WITH VOMITING, UNSPECIFIED: ICD-10-CM

## 2023-12-08 DIAGNOSIS — E86.0 DEHYDRATION: ICD-10-CM

## 2023-12-08 DIAGNOSIS — N17.9 ACUTE KIDNEY INJURY: ICD-10-CM

## 2023-12-08 DIAGNOSIS — K29.60 OTHER GASTRITIS WITHOUT BLEEDING: ICD-10-CM

## 2023-12-08 DIAGNOSIS — I95.9 HYPOTENSION, UNSPECIFIED HYPOTENSION TYPE: ICD-10-CM

## 2023-12-08 DIAGNOSIS — R10.84 GENERALIZED ABDOMINAL PAIN: ICD-10-CM

## 2023-12-08 DIAGNOSIS — R53.1 WEAKNESS: Primary | ICD-10-CM

## 2023-12-08 DIAGNOSIS — K56.609 INTESTINAL OBSTRUCTION, UNSPECIFIED CAUSE, UNSPECIFIED WHETHER PARTIAL OR COMPLETE: ICD-10-CM

## 2023-12-08 DIAGNOSIS — A41.9 SEPSIS, DUE TO UNSPECIFIED ORGANISM, UNSPECIFIED WHETHER ACUTE ORGAN DYSFUNCTION PRESENT: ICD-10-CM

## 2023-12-08 PROBLEM — D50.0 IRON DEFICIENCY ANEMIA DUE TO CHRONIC BLOOD LOSS: Status: ACTIVE | Noted: 2023-10-26

## 2023-12-08 PROBLEM — D37.1 NEOPLASM OF UNCERTAIN BEHAVIOR OF STOMACH: Status: ACTIVE | Noted: 2023-10-26

## 2023-12-08 PROBLEM — K44.9 DIAPHRAGMATIC HERNIA WITHOUT OBSTRUCTION OR GANGRENE: Status: ACTIVE | Noted: 2023-10-26

## 2023-12-08 PROBLEM — R10.13 EPIGASTRIC PAIN: Status: ACTIVE | Noted: 2023-12-08

## 2023-12-08 PROBLEM — K92.2 GASTROINTESTINAL HEMORRHAGE, UNSPECIFIED: Status: ACTIVE | Noted: 2023-10-26

## 2023-12-08 PROBLEM — R11.0 NAUSEA: Status: ACTIVE | Noted: 2023-12-08

## 2023-12-08 LAB
ACANTHOCYTES BLD QL SMEAR: ABNORMAL
ALBUMIN SERPL-MCNC: 2.7 G/DL (ref 3.5–5.2)
ALBUMIN/GLOB SERPL: 0.9 G/DL
ALP SERPL-CCNC: 49 U/L (ref 39–117)
ALT SERPL W P-5'-P-CCNC: 17 U/L (ref 1–41)
ANION GAP SERPL CALCULATED.3IONS-SCNC: 20 MMOL/L (ref 5–15)
ANION GAP SERPL CALCULATED.3IONS-SCNC: 23 MMOL/L (ref 5–15)
APTT PPP: 34.6 SECONDS (ref 24–31)
AST SERPL-CCNC: 15 U/L (ref 1–40)
BILIRUB SERPL-MCNC: 0.5 MG/DL (ref 0–1.2)
BILIRUB UR QL STRIP: NEGATIVE
BUN SERPL-MCNC: 70 MG/DL (ref 8–23)
BUN SERPL-MCNC: 82 MG/DL (ref 8–23)
BUN/CREAT SERPL: 46.1 (ref 7–25)
BUN/CREAT SERPL: 53.8 (ref 7–25)
BURR CELLS BLD QL SMEAR: ABNORMAL
C3 FRG RBC-MCNC: ABNORMAL
CALCIUM SPEC-SCNC: 8 MG/DL (ref 8.6–10.5)
CALCIUM SPEC-SCNC: 8.5 MG/DL (ref 8.6–10.5)
CHLORIDE SERPL-SCNC: 92 MMOL/L (ref 98–107)
CHLORIDE SERPL-SCNC: 98 MMOL/L (ref 98–107)
CLARITY UR: CLEAR
CO2 SERPL-SCNC: 14 MMOL/L (ref 22–29)
CO2 SERPL-SCNC: 19 MMOL/L (ref 22–29)
COLOR UR: YELLOW
CREAT SERPL-MCNC: 1.3 MG/DL (ref 0.76–1.27)
CREAT SERPL-MCNC: 1.78 MG/DL (ref 0.76–1.27)
D-LACTATE SERPL-SCNC: 1.2 MMOL/L (ref 0.5–2)
D-LACTATE SERPL-SCNC: 1.5 MMOL/L (ref 0.3–2)
D-LACTATE SERPL-SCNC: 1.7 MMOL/L (ref 0.5–2)
DACRYOCYTES BLD QL SMEAR: ABNORMAL
DEPRECATED RDW RBC AUTO: 46.8 FL (ref 37–54)
DEPRECATED RDW RBC AUTO: 47.7 FL (ref 37–54)
EGFRCR SERPLBLD CKD-EPI 2021: 42.3 ML/MIN/1.73
EGFRCR SERPLBLD CKD-EPI 2021: 61.7 ML/MIN/1.73
ERYTHROCYTE [DISTWIDTH] IN BLOOD BY AUTOMATED COUNT: 14.6 % (ref 12.3–15.4)
ERYTHROCYTE [DISTWIDTH] IN BLOOD BY AUTOMATED COUNT: 14.6 % (ref 12.3–15.4)
FLUAV SUBTYP SPEC NAA+PROBE: NOT DETECTED
FLUBV RNA ISLT QL NAA+PROBE: NOT DETECTED
GEN 5 2HR TROPONIN T REFLEX: 12 NG/L
GIANT PLATELETS: ABNORMAL
GLOBULIN UR ELPH-MCNC: 3.1 GM/DL
GLUCOSE BLDC GLUCOMTR-MCNC: 139 MG/DL (ref 70–105)
GLUCOSE BLDC GLUCOMTR-MCNC: 142 MG/DL (ref 70–105)
GLUCOSE BLDC GLUCOMTR-MCNC: 146 MG/DL (ref 70–105)
GLUCOSE BLDC GLUCOMTR-MCNC: 169 MG/DL (ref 70–105)
GLUCOSE SERPL-MCNC: 138 MG/DL (ref 65–99)
GLUCOSE SERPL-MCNC: 163 MG/DL (ref 65–99)
GLUCOSE UR STRIP-MCNC: ABNORMAL MG/DL
HCT VFR BLD AUTO: 38 % (ref 37.5–51)
HCT VFR BLD AUTO: 39.9 % (ref 37.5–51)
HGB BLD-MCNC: 12.7 G/DL (ref 13–17.7)
HGB BLD-MCNC: 13.3 G/DL (ref 13–17.7)
HGB UR QL STRIP.AUTO: NEGATIVE
HOLD SPECIMEN: NORMAL
HOLD SPECIMEN: NORMAL
INR PPP: 1.01 (ref 0.93–1.1)
KETONES UR QL STRIP: ABNORMAL
LARGE PLATELETS: ABNORMAL
LEUKOCYTE ESTERASE UR QL STRIP.AUTO: NEGATIVE
LIPASE SERPL-CCNC: 20 U/L (ref 13–60)
LYMPHOCYTES # BLD MANUAL: 5.41 10*3/MM3 (ref 0.7–3.1)
LYMPHOCYTES NFR BLD MANUAL: 32 % (ref 5–12)
MCH RBC QN AUTO: 29.7 PG (ref 26.6–33)
MCH RBC QN AUTO: 30.2 PG (ref 26.6–33)
MCHC RBC AUTO-ENTMCNC: 33.2 G/DL (ref 31.5–35.7)
MCHC RBC AUTO-ENTMCNC: 33.5 G/DL (ref 31.5–35.7)
MCV RBC AUTO: 89.5 FL (ref 79–97)
MCV RBC AUTO: 90 FL (ref 79–97)
METAMYELOCYTES NFR BLD MANUAL: 3 % (ref 0–0)
MONOCYTES # BLD: 3.33 10*3/MM3 (ref 0.1–0.9)
MRSA DNA SPEC QL NAA+PROBE: NORMAL
NEUTROPHILS # BLD AUTO: 1.35 10*3/MM3 (ref 1.7–7)
NEUTROPHILS NFR BLD MANUAL: 2 % (ref 42.7–76)
NEUTS BAND NFR BLD MANUAL: 11 % (ref 0–5)
NITRITE UR QL STRIP: NEGATIVE
NRBC SPEC MANUAL: ABNORMAL
PH UR STRIP.AUTO: <=5 [PH] (ref 5–8)
PLATELET # BLD AUTO: 165 10*3/MM3 (ref 140–450)
PLATELET # BLD AUTO: 201 10*3/MM3 (ref 140–450)
PMV BLD AUTO: 8.9 FL (ref 6–12)
PMV BLD AUTO: 9.5 FL (ref 6–12)
POIKILOCYTOSIS BLD QL SMEAR: ABNORMAL
POTASSIUM SERPL-SCNC: 3.1 MMOL/L (ref 3.5–5.2)
POTASSIUM SERPL-SCNC: 3.3 MMOL/L (ref 3.5–5.2)
PROCALCITONIN SERPL-MCNC: 27.83 NG/ML (ref 0–0.25)
PROT SERPL-MCNC: 5.8 G/DL (ref 6–8.5)
PROT UR QL STRIP: ABNORMAL
PROTHROMBIN TIME: 11 SECONDS (ref 9.6–11.7)
QT INTERVAL: 331 MS
QTC INTERVAL: 482 MS
RBC # BLD AUTO: 4.22 10*6/MM3 (ref 4.14–5.8)
RBC # BLD AUTO: 4.46 10*6/MM3 (ref 4.14–5.8)
SARS-COV-2 RNA RESP QL NAA+PROBE: NOT DETECTED
SCAN SLIDE: NORMAL
SMALL PLATELETS BLD QL SMEAR: ADEQUATE
SODIUM SERPL-SCNC: 131 MMOL/L (ref 136–145)
SODIUM SERPL-SCNC: 135 MMOL/L (ref 136–145)
SP GR UR STRIP: 1.01 (ref 1–1.03)
TARGETS BLD QL SMEAR: ABNORMAL
TROPONIN T DELTA: -9 NG/L
TROPONIN T SERPL HS-MCNC: 21 NG/L
UROBILINOGEN UR QL STRIP: ABNORMAL
VARIANT LYMPHS NFR BLD MANUAL: 2 % (ref 0–5)
VARIANT LYMPHS NFR BLD MANUAL: 50 % (ref 19.6–45.3)
WBC MORPH BLD: NORMAL
WBC NRBC COR # BLD AUTO: 10.4 10*3/MM3 (ref 3.4–10.8)
WBC NRBC COR # BLD AUTO: 20.1 10*3/MM3 (ref 3.4–10.8)
WHOLE BLOOD HOLD COAG: NORMAL
WHOLE BLOOD HOLD SPECIMEN: NORMAL
WHOLE BLOOD HOLD SPECIMEN: NORMAL

## 2023-12-08 PROCEDURE — 93005 ELECTROCARDIOGRAM TRACING: CPT

## 2023-12-08 PROCEDURE — 82948 REAGENT STRIP/BLOOD GLUCOSE: CPT

## 2023-12-08 PROCEDURE — 81003 URINALYSIS AUTO W/O SCOPE: CPT | Performed by: EMERGENCY MEDICINE

## 2023-12-08 PROCEDURE — 85610 PROTHROMBIN TIME: CPT | Performed by: EMERGENCY MEDICINE

## 2023-12-08 PROCEDURE — 80053 COMPREHEN METABOLIC PANEL: CPT | Performed by: EMERGENCY MEDICINE

## 2023-12-08 PROCEDURE — C1751 CATH, INF, PER/CENT/MIDLINE: HCPCS

## 2023-12-08 PROCEDURE — 87641 MR-STAPH DNA AMP PROBE: CPT

## 2023-12-08 PROCEDURE — 83690 ASSAY OF LIPASE: CPT | Performed by: EMERGENCY MEDICINE

## 2023-12-08 PROCEDURE — 25010000002 MORPHINE PER 10 MG

## 2023-12-08 PROCEDURE — P9612 CATHETERIZE FOR URINE SPEC: HCPCS

## 2023-12-08 PROCEDURE — 74018 RADEX ABDOMEN 1 VIEW: CPT

## 2023-12-08 PROCEDURE — 25810000003 LACTATED RINGERS SOLUTION: Performed by: SURGERY

## 2023-12-08 PROCEDURE — 36415 COLL VENOUS BLD VENIPUNCTURE: CPT | Performed by: EMERGENCY MEDICINE

## 2023-12-08 PROCEDURE — 83605 ASSAY OF LACTIC ACID: CPT

## 2023-12-08 PROCEDURE — 25810000003 LACTATED RINGERS SOLUTION: Performed by: INTERNAL MEDICINE

## 2023-12-08 PROCEDURE — 87040 BLOOD CULTURE FOR BACTERIA: CPT | Performed by: EMERGENCY MEDICINE

## 2023-12-08 PROCEDURE — 25810000003 SEPSIS FLUID NS 0.9 % SOLUTION: Performed by: EMERGENCY MEDICINE

## 2023-12-08 PROCEDURE — 84484 ASSAY OF TROPONIN QUANT: CPT | Performed by: EMERGENCY MEDICINE

## 2023-12-08 PROCEDURE — 84145 PROCALCITONIN (PCT): CPT | Performed by: EMERGENCY MEDICINE

## 2023-12-08 PROCEDURE — 85730 THROMBOPLASTIN TIME PARTIAL: CPT | Performed by: EMERGENCY MEDICINE

## 2023-12-08 PROCEDURE — 25010000002 AMPICILLIN-SULBACTAM PER 1.5 G: Performed by: INTERNAL MEDICINE

## 2023-12-08 PROCEDURE — 02HV33Z INSERTION OF INFUSION DEVICE INTO SUPERIOR VENA CAVA, PERCUTANEOUS APPROACH: ICD-10-PCS | Performed by: INTERNAL MEDICINE

## 2023-12-08 PROCEDURE — 83605 ASSAY OF LACTIC ACID: CPT | Performed by: EMERGENCY MEDICINE

## 2023-12-08 PROCEDURE — 25810000003 LACTATED RINGERS SOLUTION: Performed by: EMERGENCY MEDICINE

## 2023-12-08 PROCEDURE — 25810000003 SODIUM CHLORIDE 0.9 % SOLUTION: Performed by: NURSE PRACTITIONER

## 2023-12-08 PROCEDURE — 99285 EMERGENCY DEPT VISIT HI MDM: CPT

## 2023-12-08 PROCEDURE — 99222 1ST HOSP IP/OBS MODERATE 55: CPT | Performed by: SURGERY

## 2023-12-08 PROCEDURE — 85007 BL SMEAR W/DIFF WBC COUNT: CPT | Performed by: EMERGENCY MEDICINE

## 2023-12-08 PROCEDURE — 71045 X-RAY EXAM CHEST 1 VIEW: CPT

## 2023-12-08 PROCEDURE — 99222 1ST HOSP IP/OBS MODERATE 55: CPT

## 2023-12-08 PROCEDURE — 25010000002 METRONIDAZOLE 500 MG/100ML SOLUTION: Performed by: EMERGENCY MEDICINE

## 2023-12-08 PROCEDURE — 25010000002 CEFEPIME PER 500 MG: Performed by: EMERGENCY MEDICINE

## 2023-12-08 PROCEDURE — 74176 CT ABD & PELVIS W/O CONTRAST: CPT

## 2023-12-08 PROCEDURE — 87636 SARSCOV2 & INF A&B AMP PRB: CPT | Performed by: EMERGENCY MEDICINE

## 2023-12-08 PROCEDURE — 85025 COMPLETE CBC W/AUTO DIFF WBC: CPT | Performed by: EMERGENCY MEDICINE

## 2023-12-08 PROCEDURE — 85027 COMPLETE CBC AUTOMATED: CPT | Performed by: INTERNAL MEDICINE

## 2023-12-08 RX ORDER — INSULIN LISPRO 100 [IU]/ML
2-7 INJECTION, SOLUTION INTRAVENOUS; SUBCUTANEOUS EVERY 6 HOURS
Status: DISCONTINUED | OUTPATIENT
Start: 2023-12-08 | End: 2023-12-13 | Stop reason: HOSPADM

## 2023-12-08 RX ORDER — SODIUM CHLORIDE 0.9 % (FLUSH) 0.9 %
10 SYRINGE (ML) INJECTION AS NEEDED
Status: DISCONTINUED | OUTPATIENT
Start: 2023-12-08 | End: 2023-12-13 | Stop reason: HOSPADM

## 2023-12-08 RX ORDER — MORPHINE SULFATE 2 MG/ML
2 INJECTION, SOLUTION INTRAMUSCULAR; INTRAVENOUS EVERY 4 HOURS PRN
Status: DISCONTINUED | OUTPATIENT
Start: 2023-12-08 | End: 2023-12-13 | Stop reason: HOSPADM

## 2023-12-08 RX ORDER — POLYETHYLENE GLYCOL 3350 17 G/17G
17 POWDER, FOR SOLUTION ORAL DAILY PRN
Status: DISCONTINUED | OUTPATIENT
Start: 2023-12-08 | End: 2023-12-08

## 2023-12-08 RX ORDER — ONDANSETRON 4 MG/1
4 TABLET, FILM COATED ORAL EVERY 6 HOURS PRN
Status: DISCONTINUED | OUTPATIENT
Start: 2023-12-08 | End: 2023-12-13 | Stop reason: HOSPADM

## 2023-12-08 RX ORDER — ACETAMINOPHEN 325 MG/1
650 TABLET ORAL EVERY 4 HOURS PRN
Status: DISCONTINUED | OUTPATIENT
Start: 2023-12-08 | End: 2023-12-13 | Stop reason: HOSPADM

## 2023-12-08 RX ORDER — IBUPROFEN 600 MG/1
1 TABLET ORAL
Status: DISCONTINUED | OUTPATIENT
Start: 2023-12-08 | End: 2023-12-13 | Stop reason: HOSPADM

## 2023-12-08 RX ORDER — BISACODYL 5 MG/1
5 TABLET, DELAYED RELEASE ORAL DAILY PRN
Status: DISCONTINUED | OUTPATIENT
Start: 2023-12-08 | End: 2023-12-08

## 2023-12-08 RX ORDER — SODIUM CHLORIDE 9 MG/ML
125 INJECTION, SOLUTION INTRAVENOUS CONTINUOUS
Status: DISCONTINUED | OUTPATIENT
Start: 2023-12-08 | End: 2023-12-09

## 2023-12-08 RX ORDER — NOREPINEPHRINE BITARTRATE 0.03 MG/ML
.02-.3 INJECTION, SOLUTION INTRAVENOUS
Status: DISCONTINUED | OUTPATIENT
Start: 2023-12-08 | End: 2023-12-10

## 2023-12-08 RX ORDER — SODIUM CHLORIDE 0.9 % (FLUSH) 0.9 %
10 SYRINGE (ML) INJECTION EVERY 12 HOURS SCHEDULED
Status: DISCONTINUED | OUTPATIENT
Start: 2023-12-08 | End: 2023-12-13 | Stop reason: HOSPADM

## 2023-12-08 RX ORDER — ACETAMINOPHEN 650 MG/1
650 SUPPOSITORY RECTAL EVERY 4 HOURS PRN
Status: DISCONTINUED | OUTPATIENT
Start: 2023-12-08 | End: 2023-12-13 | Stop reason: HOSPADM

## 2023-12-08 RX ORDER — ONDANSETRON 4 MG/1
4 TABLET, FILM COATED ORAL EVERY 8 HOURS PRN
COMMUNITY

## 2023-12-08 RX ORDER — METRONIDAZOLE 500 MG/100ML
500 INJECTION, SOLUTION INTRAVENOUS ONCE
Qty: 100 ML | Refills: 0 | Status: COMPLETED | OUTPATIENT
Start: 2023-12-08 | End: 2023-12-08

## 2023-12-08 RX ORDER — NITROGLYCERIN 0.4 MG/1
0.4 TABLET SUBLINGUAL
Status: DISCONTINUED | OUTPATIENT
Start: 2023-12-08 | End: 2023-12-13 | Stop reason: HOSPADM

## 2023-12-08 RX ORDER — OMEPRAZOLE 40 MG/1
40 CAPSULE, DELAYED RELEASE ORAL DAILY
COMMUNITY

## 2023-12-08 RX ORDER — AMOXICILLIN 250 MG
2 CAPSULE ORAL 2 TIMES DAILY
Status: DISCONTINUED | OUTPATIENT
Start: 2023-12-08 | End: 2023-12-08

## 2023-12-08 RX ORDER — DEXTROSE MONOHYDRATE 25 G/50ML
25 INJECTION, SOLUTION INTRAVENOUS
Status: DISCONTINUED | OUTPATIENT
Start: 2023-12-08 | End: 2023-12-13 | Stop reason: HOSPADM

## 2023-12-08 RX ORDER — SODIUM CHLORIDE 9 MG/ML
40 INJECTION, SOLUTION INTRAVENOUS AS NEEDED
Status: DISCONTINUED | OUTPATIENT
Start: 2023-12-08 | End: 2023-12-13 | Stop reason: HOSPADM

## 2023-12-08 RX ORDER — ONDANSETRON 2 MG/ML
4 INJECTION INTRAMUSCULAR; INTRAVENOUS EVERY 6 HOURS PRN
Status: DISCONTINUED | OUTPATIENT
Start: 2023-12-08 | End: 2023-12-13 | Stop reason: HOSPADM

## 2023-12-08 RX ORDER — NICOTINE POLACRILEX 4 MG
15 LOZENGE BUCCAL
Status: DISCONTINUED | OUTPATIENT
Start: 2023-12-08 | End: 2023-12-13 | Stop reason: HOSPADM

## 2023-12-08 RX ORDER — CHOLECALCIFEROL (VITAMIN D3) 1250 MCG
50000 CAPSULE ORAL
COMMUNITY

## 2023-12-08 RX ORDER — BISACODYL 10 MG
10 SUPPOSITORY, RECTAL RECTAL DAILY PRN
Status: DISCONTINUED | OUTPATIENT
Start: 2023-12-08 | End: 2023-12-08

## 2023-12-08 RX ADMIN — SODIUM CHLORIDE, POTASSIUM CHLORIDE, SODIUM LACTATE AND CALCIUM CHLORIDE 1000 ML: 600; 310; 30; 20 INJECTION, SOLUTION INTRAVENOUS at 08:25

## 2023-12-08 RX ADMIN — SODIUM CHLORIDE, POTASSIUM CHLORIDE, SODIUM LACTATE AND CALCIUM CHLORIDE 1000 ML: 600; 310; 30; 20 INJECTION, SOLUTION INTRAVENOUS at 15:07

## 2023-12-08 RX ADMIN — AMPICILLIN SODIUM AND SULBACTAM SODIUM 3 G: 2; 1 INJECTION, POWDER, FOR SOLUTION INTRAMUSCULAR; INTRAVENOUS at 20:45

## 2023-12-08 RX ADMIN — Medication 10 ML: at 08:30

## 2023-12-08 RX ADMIN — Medication 0.2 MCG/KG/MIN: at 21:11

## 2023-12-08 RX ADMIN — CEFEPIME 2000 MG: 2 INJECTION, POWDER, FOR SOLUTION INTRAVENOUS at 03:55

## 2023-12-08 RX ADMIN — SODIUM CHLORIDE 2925 ML: 9 INJECTION, SOLUTION INTRAVENOUS at 03:01

## 2023-12-08 RX ADMIN — SODIUM CHLORIDE, POTASSIUM CHLORIDE, SODIUM LACTATE AND CALCIUM CHLORIDE 1000 ML: 600; 310; 30; 20 INJECTION, SOLUTION INTRAVENOUS at 18:17

## 2023-12-08 RX ADMIN — AMPICILLIN SODIUM AND SULBACTAM SODIUM 3 G: 2; 1 INJECTION, POWDER, FOR SOLUTION INTRAMUSCULAR; INTRAVENOUS at 15:07

## 2023-12-08 RX ADMIN — Medication 0.02 MCG/KG/MIN: at 05:46

## 2023-12-08 RX ADMIN — MORPHINE SULFATE 2 MG: 2 INJECTION, SOLUTION INTRAMUSCULAR; INTRAVENOUS at 20:45

## 2023-12-08 RX ADMIN — METRONIDAZOLE 500 MG: 500 INJECTION, SOLUTION INTRAVENOUS at 06:51

## 2023-12-08 RX ADMIN — Medication 10 ML: at 20:46

## 2023-12-08 RX ADMIN — SODIUM CHLORIDE, POTASSIUM CHLORIDE, SODIUM LACTATE AND CALCIUM CHLORIDE 1000 ML: 600; 310; 30; 20 INJECTION, SOLUTION INTRAVENOUS at 06:13

## 2023-12-08 RX ADMIN — SODIUM CHLORIDE 125 ML/HR: 9 INJECTION, SOLUTION INTRAVENOUS at 09:56

## 2023-12-08 NOTE — CONSULTS
General Surgery Consult Note      Name: Bharathi Darnell ADMIT: 2023   : 1960  PCP: Jamar Pham MD    MRN: [id number] LOS: 0 days   AGE/SEX: 63 y.o. male  ROOM: [address] [place] [address]      Patient Care Team:  Jamar Pham MD as PCP - General (Family Medicine)  Patrick Fontaine MD as Consulting Physician (Hematology and Oncology)  Chief Complaint   Patient presents with    Abdominal Pain    Weakness - Generalized       HPI  63 y.o. male who presented to the emergency department with abdominal pain, nausea, vomiting, loose stools, weakness.  He has a history of CLL and is on chemotherapy.  He reports he takes a pill twice daily and also receives infusions, last 1 was last week.  He reports the symptoms have been present for the last several days.  Denies any fevers or chills.  Has had minimal p.o. intake.  On presentation to the emergency department he was noted to be hypotensive.  CT scan of the abdomen/pelvis was concerning for possible small bowel obstruction.  The patient denies any history of abdominal surgeries.    Past Medical History:   Diagnosis Date    CLL (chronic lymphocytic leukemia)         DM (diabetes mellitus), type 2     Erectile dysfunction     Hyperlipidemia     Hypertension      Past Surgical History:   Procedure Laterality Date    COLONOSCOPY           Family History   Problem Relation Age of Onset    Diabetes Mother     Hyperlipidemia Mother      Social History     Tobacco Use    Smoking status: Never     Passive exposure: Never    Smokeless tobacco: Never   Vaping Use    Vaping Use: Never used   Substance Use Topics    Alcohol use: Not Currently    Drug use: Never     Medications Prior to Admission   Medication Sig Dispense Refill Last Dose    amLODIPine (NORVASC) 10 MG tablet TAKE 1 TABLET BY MOUTH EVERY DAY 90 tablet 1     Blood Glucose Monitoring Suppl (Accu-Chek Guide) w/Device kit 1 Device Daily. 1 kit 0     Calquence 100 MG tablet Take 1  tablet by mouth 2 (Two) Times a Day.       dapagliflozin Propanediol (Farxiga) 10 MG tablet Take 10 mg by mouth Daily. 30 tablet 5     glucose blood (Accu-Chek Guide) test strip 1 each by Other route Daily. 100 each 0     Lancets (accu-chek multiclix) lancets 1 each by Other route Daily. 100 each 0     lisinopril (PRINIVIL,ZESTRIL) 40 MG tablet Take 1 tablet by mouth Daily. for blood pressure. 90 tablet 3     omeprazole (priLOSEC) 20 MG capsule Take 1 capsule by mouth Daily.       sildenafil (VIAGRA) 100 MG tablet TAKE 1 TABLET BY MOUTH PRE INTERCOURSE 10 tablet 11     vitamin D (ERGOCALCIFEROL) 1.25 MG (18735 UT) capsule capsule Take 1 capsule by mouth 2 (Two) Times a Week. 8 capsule 5      cefepime, 2,000 mg, Intravenous, Q12H  insulin lispro, 2-7 Units, Subcutaneous, Q6H  sodium chloride, 10 mL, Intravenous, Q12H      norepinephrine, 0.02-0.3 mcg/kg/min, Last Rate: 0.05 mcg/kg/min (12/08/23 0830)  Pharmacy to Dose Cefepime,   sodium chloride, 125 mL/hr        acetaminophen **OR** acetaminophen    dextrose    dextrose    glucagon (human recombinant)    nitroglycerin    ondansetron **OR** ondansetron    Pharmacy to Dose Cefepime    sodium chloride    sodium chloride    sodium chloride  Bactrim [sulfamethoxazole-trimethoprim]    Review of Systems:       Vitals:  Temp:  [97.1 °F (36.2 °C)-98.7 °F (37.1 °C)] 98.2 °F (36.8 °C)  Heart Rate:  [] 124  Resp:  [16] 16  BP: (67-94)/(30-57) 85/57     Physical Exam:   Alert but appears tired  Nonlabored respirations  Abdomen soft, minimally distended, minimally tender to palpation diffusely, no guarding  Extremities warm with no gross deformities    Labs:  Results from last 7 days   Lab Units 12/08/23  0256 12/04/23  1105   WBC 10*3/mm3 10.40 5.83   HEMOGLOBIN g/dL 13.3 15.8   HEMATOCRIT % 39.9 47.9   PLATELETS 10*3/mm3 165 207   MONOCYTES % % 32.0* 6.7   EOSINOPHIL % %  --  1.1     Results from last 7 days   Lab Units 12/08/23  0716 12/08/23  0311 12/04/23  1105    SODIUM mmol/L 135* 131* 142   POTASSIUM mmol/L 3.1* 3.3* 3.8   CHLORIDE mmol/L 98 92* 102   CO2 mmol/L 14.0* 19.0* 21.9*   BUN mg/dL 70* 82* 15   CREATININE mg/dL 1.30* 1.78* 0.92   CALCIUM mg/dL 8.0* 8.5* 9.4   BILIRUBIN mg/dL  --  0.5 0.6   ALK PHOS U/L  --  49 51   ALT (SGPT) U/L  --  17 19   AST (SGOT) U/L  --  15 15   GLUCOSE mg/dL 138* 163* 146*     Results from last 7 days   Lab Units 12/08/23  0256   INR  1.01   APTT seconds 34.6*     Imaging:  CT A/P 12/8/23  Impression:  Findings are consistent with small bowel obstruction with dilated and fluid-filled loops of predominantly jejunum and proximal to mid ileum    Assessment and Plan:  63 y.o. male with history of CLL and several day history of abdominal pain, nausea, vomiting, loose stools.    - Clinically the patient does not appear to have a bowel obstruction as he reports he was passing flatus and having loose stools prior to presentation to the emergency department  - Given nausea and vomiting can continue NG tube until nausea has resolved  - No indication for any surgical intervention  - Supportive care with bowel rest and hydration  - Wean pressors as tolerated; suspect hypotension is secondary to profound dehydration    This note was created using Dragon Voice Recognition software.    Jennifer Foreman MD  12/08/23  09:27 EST

## 2023-12-08 NOTE — ED NOTES
Pt arrived via Pv with family. Family states pt has been c/o abd pain and n/v since Monday and had increased generalized weakness and lethargy. Pt was seen by PCP and was told they would be directly admitted tomorrow but family states she does not think he can wait that long

## 2023-12-08 NOTE — CONSULTS
PICC Line Insertion Procedure Note    Procedure: Insertion of #5 FR/16G PICC    Indications:  Levophed    Active Time Out:  Correct patient: Yes  Correct procedure: Yes  Correct site: Yes  Verified with: JOSEPHINE Johnson    Procedure Details:  Informed consent was obtained for the procedure.  Risk include, but are not limited to infection, air embolism, catheter tip moving, catheter blockage and phlebitis.     Maximum sterile technique was used including antiseptics, cap, gloves, gown, hand hygiene, mask, and sheet.    Ultrasound Guidance: Yes    #5 FR/16G PICC inserted to the L Brachial vein per hospital protocol by JOSEPHINE Lozoya.   Non-pulsatile blood return: yes    Lot #: [id number]  Expiration date: 01-    Complications:  none    Findings:  Catheter inserted to 49 cm, with 0 cm exposed.   Mid upper arm circumference is 31 cm.   Catheter was flushed with 30 cc NS and sterile dressing applied.  Patient tolerated procedure well.  PICC tip verified by:       [x] Sapiens 3cg       [] Chest X-ray    Recommendations:  Verbal and/or written Care/Maintenance instructions provided to patient.   Primary nurse notified.    Martha Lozoya RN  12/08/23  14:27 EST

## 2023-12-08 NOTE — H&P
Critical Care History and Physical     Bharathi Darnell : 1960 MRN:[id number] LOS:0 ROOM: [facility]0/1     Reason for admission: Bowel obstruction     Assessment / Plan     Septic Shock: ( WBC>12 or <4 or >10% bands, HR>90, and MAP<65 or SBP<90 )  Likely due to intra-abdominal.  Procalcitonin 27.83  CT abdomen pelvis findings consistent with small bowel obstruction.  Patient has a history of diaphragmatic hernia and diverticulosis of large intestine.  Will do blood / urine / sputum cultures.   Started on cefepime.  Patient received a one-time dose of Flagyl.  Patient received 2925 mL per sepsis bolus.  Patient received 2 additional liters of fluid persistent hypotension in spite of adequate fluid resuscitation.   Maintenance IV fluid normal saline at 125.  Avoid fluid overload.   Patient currently requiring Levophed. Titrate vasopressors for a target MAP of 65.    Findings consistent with small bowel obstruction on CT  General surgery consulted by ER  Nasogastric tube placed    Acute Kidney injury likely 2/2 to dehydration from GI loss, decrease perfusion  Repeat BMP pending  Monitor electrolytes  Continue to support blood pressure  Monitor I's and O's    New onset atrial fibrillation  Currently in normal sinus  EKG with rhythm changes    Chronic:  Type 2 diabetes mellitus --last A1c 10/19/2023 8.3, Low dose insulin sliding scale  Hypertension --patient currently hypotensive, holding meds  Chronic lymphocytic leukemia        Code Status (Patient has no pulse and is not breathing): CPR (Attempt to Resuscitate)  Medical Interventions (Patient has pulse or is breathing): Full Support       Nutrition:   NPO Diet NPO Type: Strict NPO     DVT prophylaxis:  Mechanical DVT prophylaxis orders are present.     History of Present illness     Bharathi Darnell is a 63 y.o. male with PMH of CLL, diaphragmatic hernia, diverticulosis of large intestine, hypertension, hyperlipidemia, type 2 diabetes mellitus, presented to the  hospital for 1 week of nausea, vomiting, and general weakness, and was admitted with a principal diagnosis of Bowel obstruction.     In ER patient was given 30/kg sepsis bolus cefepime and Flagyl. CT abdomen and pelvis showed possible small bowel obstruction. ER MD spoke with general surgery. No immediate need for surgery. Patient remained hypotensive following fluids resuscitation and required to be started on Levophed. Patient admitted to ICU for further monitoring and management.     ACP: CPR, Full Intervention. No ACP docs on file. Patient spouse is the NOK to make decisions for him in the event he is unable.     Patient was seen and examined on 12/08/23 at 08:00 EST .    Subjective / Review of systems     Review of Systems   Constitutional:  Negative for chills and fever.   Respiratory:  Negative for chest tightness and shortness of breath.    Cardiovascular:  Negative for chest pain.   Gastrointestinal:  Negative for abdominal pain, nausea and vomiting.   Genitourinary:  Negative for difficulty urinating.   Musculoskeletal:  Negative for arthralgias and myalgias.   Neurological:  Negative for dizziness and light-headedness.        Past Medical/Surgical/Social/Family History & Allergies     Past Medical History:   Diagnosis Date    CLL (chronic lymphocytic leukemia)     2022    DM (diabetes mellitus), type 2     Erectile dysfunction     Hyperlipidemia     Hypertension       Past Surgical History:   Procedure Laterality Date    COLONOSCOPY      2019      Social History     Socioeconomic History    Marital status:    Tobacco Use    Smoking status: Never    Smokeless tobacco: Never   Vaping Use    Vaping Use: Never used   Substance and Sexual Activity    Alcohol use: Not Currently    Drug use: Never    Sexual activity: Yes     Partners: Female     Birth control/protection: None      Family History   Problem Relation Age of Onset    Diabetes Mother     Hyperlipidemia Mother       Allergies   Allergen  Reactions    Bactrim [Sulfamethoxazole-Trimethoprim] Rash      Social Determinants of Health     Tobacco Use: Low Risk  (10/18/2023)    Patient History     Smoking Tobacco Use: Never     Smokeless Tobacco Use: Never     Passive Exposure: Not on file   Alcohol Use: Not on file   Financial Resource Strain: Not on file   Food Insecurity: Not on file   Transportation Needs: Not on file   Physical Activity: Not on file   Stress: Not on file   Social Connections: Unknown (10/12/2023)    Family and Community Support     Help with Day-to-Day Activities: Not on file     Lonely or Isolated: Not on file   Interpersonal Safety: Unknown (12/8/2023)    Abuse Screen     Unsafe at Home or Work/School: unable to answer (comment required)     Feels Threatened by Someone?: unable to answer (comment required)     Does Anyone Keep You from Contacting Others or Doint Things Outside the Home?: unable to answer (comment required)     Physical Sign of Abuse Present: no   Depression: Not at risk (10/18/2023)    PHQ-2     PHQ-2 Score: 1   Housing Stability: Unknown (10/12/2023)    Housing Stability     Current Living Arrangements: Not on file     Potentially Unsafe Housing Conditions: Not on file   Utilities: Not on file   Health Literacy: Unknown (10/12/2023)    Education     Help with school or training?: Not on file     Preferred Language: Not on file   Employment: Unknown (10/12/2023)    Employment     Do you want help finding or keeping work or a job?: Not on file   Disabilities: Unknown (10/12/2023)    Disabilities     Concentrating, Remembering, or Making Decisions Difficulty: Not on file     Doing Errands Independently Difficulty: Not on file        Home Medications     Prior to Admission medications    Medication Sig Start Date End Date Taking? Authorizing Provider   allopurinol (ZYLOPRIM) 300 MG tablet  7/25/23   ProviderMart MD   amLODIPine (NORVASC) 10 MG tablet TAKE 1 TABLET BY MOUTH EVERY DAY 7/24/23   Mahnaz Caldera  MD   Blood Glucose Monitoring Suppl (Accu-Chek Guide) w/Device kit 1 Device Daily. 2/14/23   Mahnaz Caldera MD   Calquence 100 MG tablet Take 1 tablet by mouth. 9/29/23   ProviderMart MD   dapagliflozin Propanediol (Farxiga) 10 MG tablet Take 10 mg by mouth Daily. 10/20/23   Jamar Pham MD   Dulaglutide (Trulicity) 0.75 MG/0.5ML solution pen-injector Inject 0.75 mg under the skin into the appropriate area as directed 1 (One) Time Per Week. 10/20/23   Jamar Pham MD   glucose blood (Accu-Chek Guide) test strip 1 each by Other route Daily. 2/14/23   Mahnaz Caldera MD   hydrOXYzine (ATARAX) 25 MG tablet Take 1 tablet by mouth 3 (Three) Times a Day As Needed for Itching. 11/9/23   Jamar Pham MD   Lancets (accu-chek multiclix) lancets 1 each by Other route Daily. 2/14/23   Mahnaz Caldera MD   lisinopril (PRINIVIL,ZESTRIL) 40 MG tablet Take 1 tablet by mouth Daily. for blood pressure. 10/18/23   Jamar Pham MD   metFORMIN (GLUCOPHAGE) 500 MG tablet Take 2 tablets by mouth 2 (Two) Times a Day for 90 days. 5/18/23 8/16/23  Mahnaz Caldera MD   mupirocin (BACTROBAN) 2 % cream Apply 1 application topically to the appropriate area as directed 3 (Three) Times a Day. 5/26/23   Mahnaz Caldera MD   sildenafil (VIAGRA) 100 MG tablet TAKE 1 TABLET BY MOUTH PRE INTERCOURSE 5/13/20   Steven Fuller MD   vitamin D (ERGOCALCIFEROL) 1.25 MG (06899 UT) capsule capsule Take 1 capsule by mouth 2 (Two) Times a Week. 11/30/23   Jamar Pham MD        Objective / Physical Exam     Vital signs:  Temp: 98.7 °F (37.1 °C)  BP: (!) 85/57  Heart Rate: (!) 124  Resp: 16  SpO2: 95 %  Weight: 98.8 kg (217 lb 13 oz)    Admission Weight: Weight: 97.5 kg (215 lb)    Physical Exam  Vitals and nursing note reviewed.   Constitutional:       General: He is not in acute distress.     Appearance: He is not ill-appearing.   HENT:      Head: Normocephalic.       Mouth/Throat:      Mouth: Mucous membranes are moist.      Pharynx: Oropharynx is clear.   Eyes:      Extraocular Movements: Extraocular movements intact.      Conjunctiva/sclera: Conjunctivae normal.      Pupils: Pupils are equal, round, and reactive to light.   Cardiovascular:      Rate and Rhythm: Normal rate. Rhythm irregular.      Pulses: Normal pulses.      Heart sounds: Normal heart sounds.   Pulmonary:      Effort: Pulmonary effort is normal.      Breath sounds: Normal breath sounds.   Abdominal:      General: Bowel sounds are normal.      Palpations: Abdomen is soft.   Musculoskeletal:      Right lower leg: No edema.      Left lower leg: No edema.   Skin:     General: Skin is warm and dry.      Findings: No rash.   Neurological:      Mental Status: He is alert and oriented to person, place, and time.   Psychiatric:         Mood and Affect: Mood normal.         Behavior: Behavior normal.        Labs     Results from last 7 days   Lab Units 12/08/23  0256 12/04/23  1105   WBC 10*3/mm3 10.40 5.83   HEMATOCRIT % 39.9 47.9   PLATELETS 10*3/mm3 165 207      Results from last 7 days   Lab Units 12/08/23  0311 12/04/23  1105   SODIUM mmol/L 131* 142   POTASSIUM mmol/L 3.3* 3.8   CHLORIDE mmol/L 92* 102   CO2 mmol/L 19.0* 21.9*   BUN mg/dL 82* 15   CREATININE mg/dL 1.78* 0.92        Imaging     Chest X ray: My independent assessment showed no infiltrates or effusions    EKG: My independent evaluation showed atrial fibrillation no ST -T changes    Current Medications     Scheduled Meds:  cefepime, 2,000 mg, Intravenous, Q12H  senna-docusate sodium, 2 tablet, Oral, BID  sodium chloride, 10 mL, Intravenous, Q12H         Continuous Infusions:  norepinephrine, 0.02-0.3 mcg/kg/min, Last Rate: 0.07 mcg/kg/min (12/08/23 0615)  Pharmacy to Dose Cefepime,   sodium chloride, 125 mL/hr           REYES Nieves   Critical Care  12/08/23   08:00 EST

## 2023-12-08 NOTE — SIGNIFICANT NOTE
Patient seen by this APRN for admission to ICU.  Patient is alert and oriented x 4.  He is lethargic but arousable.  IVFs and Levophed infusing, PICC ordered.  Awaiting call from general surgery for possible surgical intervention for bowel obstruction versus ileus.  Distant bowel sounds heard.  Per patient wife patient has been vomiting x 1 week.  Patient states his last solid bowel movement was on 12/2/2023.  Full H&P to follow.

## 2023-12-08 NOTE — CASE MANAGEMENT/SOCIAL WORK
Continued Stay Note  CASSIA Nieto     Patient Name: Bharathi Darnell  MRN: [id number]  Today's Date: 12/8/2023    Admit Date: 12/8/2023    Plan: home with spouse.   Discharge Plan       Row Name 12/08/23 1553       Plan    Plan home with spouse.    Patient/Family in Agreement with Plan yes    Plan Comments pt lives at home with spouse.  no home dme. I with adls, denies financial and trasnportation concerns at this time.  pc pand pharm verified.    Dc barrier: PiCC, levo, gen sx consult, NGT, IV abx.                 Micki Sun RN

## 2023-12-08 NOTE — CONSULTS
GI CONSULT  NOTE:    Referring Provider:  Dr. Hooks    Chief complaint: abd pain    Subjective .     History of present illness: Bharathi Darnell is a 63 y.o. male with history of CLL, recently diagnosed lymphocytic gastritis, diabetes admitted to the hospital with 1 week of onset nausea, vomiting, abdominal pain.  He had EGD recently with lymphocytic gastritis and was started on budesonide.  Patient reports his pain is mainly in his epigastric area, but can be generalized.  He has not passed gas.  When his symptoms began, he was started MiraLAX which provoked loose bowel movements.  Denies rectal blood per rectum or melena.  He has not vomited since Tuesday.  NG is in place with about 1000cc yellow contents in suction canister.  He denies a history of heart rhythm abnormalities.  Has been getting frequent EKG since beginning chemo medication, Calquence.       Endo History:  10/2023 EGD ([facility])-nodules in the GE junction, stomach body nodules, antral nodules, negative biopsy for H. pylori, chronic active gastritis with microabscesses, focal squamous metaplasia  8/2023 colonoscopy (overt)-polyps (TA, SSA. HP), moderate diverticulosis    Past Medical History:  Past Medical History:   Diagnosis Date    CLL (chronic lymphocytic leukemia)     2022    DM (diabetes mellitus), type 2     Erectile dysfunction     Hyperlipidemia     Hypertension        Past Surgical History:  Past Surgical History:   Procedure Laterality Date    COLONOSCOPY      2019       Social History:  Social History     Tobacco Use    Smoking status: Never     Passive exposure: Never    Smokeless tobacco: Never   Vaping Use    Vaping Use: Never used   Substance Use Topics    Alcohol use: Not Currently    Drug use: Never       Family History:  Family History   Problem Relation Age of Onset    Diabetes Mother     Hyperlipidemia Mother        Medications:  Medications Prior to Admission   Medication Sig Dispense Refill Last Dose    amLODIPine  (NORVASC) 10 MG tablet TAKE 1 TABLET BY MOUTH EVERY DAY 90 tablet 1     Calquence 100 MG tablet Take 1 tablet by mouth 2 (Two) Times a Day.       Cholecalciferol (Vitamin D3) 1.25 MG (43184 UT) capsule Take 1 capsule by mouth. Tuesday's and Thursday's.       dapagliflozin Propanediol (Farxiga) 10 MG tablet Take 10 mg by mouth Daily. 30 tablet 5     lisinopril (PRINIVIL,ZESTRIL) 40 MG tablet Take 1 tablet by mouth Daily. for blood pressure. 90 tablet 3     ondansetron (ZOFRAN) 4 MG tablet Take 1 tablet by mouth Every 8 (Eight) Hours As Needed for Nausea or Vomiting.       omeprazole (priLOSEC) 40 MG capsule Take 1 capsule by mouth Daily.          Scheduled Meds:ampicillin-sulbactam, 3 g, Intravenous, Q6H  insulin lispro, 2-7 Units, Subcutaneous, Q6H  sodium chloride, 10 mL, Intravenous, Q12H      Continuous Infusions:norepinephrine, 0.02-0.3 mcg/kg/min, Last Rate: 0.05 mcg/kg/min (12/08/23 1405)  Pharmacy to Dose Cefepime,   sodium chloride, 125 mL/hr, Last Rate: 125 mL/hr (12/08/23 0956)      PRN Meds:.  acetaminophen **OR** acetaminophen    dextrose    dextrose    glucagon (human recombinant)    nitroglycerin    ondansetron **OR** ondansetron    Pharmacy to Dose Cefepime    sodium chloride    sodium chloride    sodium chloride    ALLERGIES:  Bactrim [sulfamethoxazole-trimethoprim]    ROS:  The following systems were reviewed and negative;   Constitution:  No fevers, chills, no unintentional weight loss  Skin: no rash, no jaundice  Eyes:  No blurry vision, no eye pain  HENT:  No change in hearing or smell  Resp:  No dyspnea or cough  CV:  No chest pain or palpitations  :  No dysuria, hematuria  Musculoskeletal:  No leg cramps or arthralgias  Neuro:  No tremor, no numbness  Psych:  No depression or confusion    Objective     Vital Signs:   Vitals:    12/08/23 1330 12/08/23 1400 12/08/23 1430 12/08/23 1500   BP: 101/51 100/49 101/49 108/55   Pulse: 89 89 91 89   Resp:       Temp:       TempSrc:       SpO2: 94% 96%  95% 94%   Weight:       Height:           Physical Exam:       General Appearance:    Awake and alert, in no acute distress   Head:    Normocephalic, without obvious abnormality, atraumatic; NG in place with 1000 cc yellow output   Throat:   No oral lesions, no thrush, oral mucosa moist   Lungs:     Respirations regular, even and unlabored   Chest Wall:    No abnormalities observed   Abdomen:   Distended, generalized tenderness, no rebound or guarding, non-distended, no hepatosplenomegaly   Rectal:     Deferred   Extremities:   Moves all extremities, no edema, no cyanosis   Pulses:   Pulses palpable and equal bilaterally   Skin:   No rash, no jaundice, normal palpation       Neurologic:   Cranial nerves 2 - 12 grossly intact, no asterixis       Results Review:   I reviewed the patient's labs and imaging.  CBC    Results from last 7 days   Lab Units 12/08/23  0256 12/04/23  1105   WBC 10*3/mm3 10.40 5.83   HEMOGLOBIN g/dL 13.3 15.8   PLATELETS 10*3/mm3 165 207     CMP   Results from last 7 days   Lab Units 12/08/23  0716 12/08/23  0311 12/04/23  1105   SODIUM mmol/L 135* 131* 142   POTASSIUM mmol/L 3.1* 3.3* 3.8   CHLORIDE mmol/L 98 92* 102   CO2 mmol/L 14.0* 19.0* 21.9*   BUN mg/dL 70* 82* 15   CREATININE mg/dL 1.30* 1.78* 0.92   GLUCOSE mg/dL 138* 163* 146*   ALBUMIN g/dL  --  2.7* 3.9   BILIRUBIN mg/dL  --  0.5 0.6   ALK PHOS U/L  --  49 51   AST (SGOT) U/L  --  15 15   ALT (SGPT) U/L  --  17 19   AMYLASE U/L  --   --  62   LIPASE U/L  --  20 16     Cr Clearance Estimated Creatinine Clearance: 72 mL/min (A) (by C-G formula based on SCr of 1.3 mg/dL (H)).  Coag   Results from last 7 days   Lab Units 12/08/23  0256   INR  1.01   APTT seconds 34.6*     HbA1C   Lab Results   Component Value Date    HGBA1C 8.30 (H) 10/19/2023    HGBA1C 7.60 (H) 05/17/2023    HGBA1C 6.9 (H) 02/14/2023     Blood Glucose   Glucose   Date/Time Value Ref Range Status   12/08/2023 1123 142 (H) 70 - 105 mg/dL Final     Comment:     Serial  Number: [id number]:  [id number]   12/08/2023 0851 146 (H) 70 - 105 mg/dL Final     Comment:     Serial Number: [id number]:  [id number]     Infection   Results from last 7 days   Lab Units 12/08/23  0311   PROCALCITONIN ng/mL 27.83*     UA    Results from last 7 days   Lab Units 12/08/23  0450   NITRITE UA  Negative     Radiology(recent) XR Abdomen KUB    Result Date: 12/8/2023  Impression: Increased small bowel dilatation with wall thickening. This may represent early or partial obstruction versus enteritis. Continued follow-up recommended. Electronically Signed: Frannie Barnes MD  12/8/2023 12:26 PM EST  Workstation ID: [id number]    XR Abdomen KUB    Result Date: 12/8/2023  Impression: Esophagogastric tube is in the stomach. Electronically Signed: Ga Farfan MD  12/8/2023 6:29 AM EST  Workstation ID: [id number]    CT Abdomen Pelvis Without Contrast    Result Date: 12/8/2023  Impression: Findings are consistent with small bowel obstruction with dilated and fluid-filled loops of predominantly jejunum and proximal to mid ileum Electronically Signed: Ga Farfan MD  12/8/2023 5:03 AM EST  Workstation ID: [id number]    XR Chest 1 View    Result Date: 12/8/2023  Impression: No active disease Electronically Signed: Ga Farfan MD  12/8/2023 3:18 AM EST  Workstation ID: [id number]        ASSESSMENT AND PLAN:    63-year-old male with history of CLL, lymphocytic gastritis presented to the hospital with nausea, vomiting, abdominal pain.  CT consistent with small bowel obstruction.  Also with new onset A-fib.     -Small bowel obstruction  -Abdominal pain  -Nausea, vomiting  -Lymphocytic gastritis  -CLL on Calquence  -A-fib  -Hypotension    Plan  CT with small bowel obstruction.  His abdomen is distended and he is not passing gas.  NG in place to low wall suction which we will continue. He does have bowel sounds.    Heart rhythm has been abnormal with new onset A-fib.  This is a common side effect of his chemotherapy  medication (Calquence).  With A-fib and hypotension, there is concern for ischemia.  Lactic this morning was normal.  Will follow-up on this with another check.   General surgery is following.  We recommended cardiology evaluation.  Dr. Hdz discussed with intensivist.  Continue antibiotics.  N.p.o. status.  Continue NG to low wall suction.  Repeat KUB in the morning.  Continue supportive care.    I discussed the patients findings and my recommendations with the patient.    We appreciate the referral    Electronically signed by REYES Campbell, 12/08/23, 3:52 PM EST.

## 2023-12-08 NOTE — PLAN OF CARE
"Goal Outcome Evaluation:    I stopped patient's levo gtt for a couple hours then had to turn it back on. He's currently getting a PICC line placed now. Wife has been at bedside all day. NG hooked to suction. Dr. Hooks said, \"He can have ice chips.\" Will continue to monitor.   "

## 2023-12-08 NOTE — Clinical Note
Level of Care: Critical Care [6]   Admitting Physician: ELLE MAZARIEGOS [[id number]]   Attending Physician: ELLE MAZARIEGOS [[id number]]

## 2023-12-08 NOTE — ED PROVIDER NOTES
Subjective   History of Present Illness  Chief complaint weakness and vomiting    History of present illness a 63-year-old male he has history of lymphoma who is on oral agents for chemotherapy and received IV agent last week who complains of a 1 week history of nausea vomiting and cannot hold anything down and general weakness.  No fever chills no ill exposures or foreign travels no antibiotic use or recent hospitalization or injury no one in the home with similar illness no recent long car ride plane or immobilization or vaccinations.  Denies any history of frequency but decreased urine output some bowel movements but no diarrhea.  No bleeding.  He denies any significant cough congestion or shortness of breath headaches vision change speech difficulty or paralysis.  No rashes.  Nothing really seems to trigger or make it better or worse severe 1 week      Review of Systems   Constitutional:  Negative for chills and fever.   HENT:  Negative for congestion.    Respiratory:  Negative for chest tightness and shortness of breath.    Cardiovascular:  Negative for chest pain and palpitations.   Gastrointestinal:  Positive for abdominal pain and vomiting. Negative for blood in stool.   Genitourinary:  Positive for testicular pain. Negative for decreased urine volume, difficulty urinating and dysuria.   Musculoskeletal:  Negative for back pain and neck pain.   Skin:  Negative for rash.   Neurological:  Negative for facial asymmetry and speech difficulty.   Psychiatric/Behavioral:  Negative for confusion.        Past Medical History:   Diagnosis Date    CLL (chronic lymphocytic leukemia)     2022    DM (diabetes mellitus), type 2     Erectile dysfunction     Hyperlipidemia     Hypertension        Allergies   Allergen Reactions    Bactrim [Sulfamethoxazole-Trimethoprim] Rash       Past Surgical History:   Procedure Laterality Date    COLONOSCOPY      2019       Family History   Problem Relation Age of Onset    Diabetes Mother      Hyperlipidemia Mother        Social History     Socioeconomic History    Marital status:    Tobacco Use    Smoking status: Never    Smokeless tobacco: Never   Vaping Use    Vaping Use: Never used   Substance and Sexual Activity    Alcohol use: Not Currently    Drug use: Never    Sexual activity: Yes     Partners: Female     Birth control/protection: None     Prior to Admission medications    Medication Sig Start Date End Date Taking? Authorizing Provider   allopurinol (ZYLOPRIM) 300 MG tablet  7/25/23   Mart Plaza MD   amLODIPine (NORVASC) 10 MG tablet TAKE 1 TABLET BY MOUTH EVERY DAY 7/24/23   Mahnaz Caldera MD   Blood Glucose Monitoring Suppl (Accu-Chek Guide) w/Device kit 1 Device Daily. 2/14/23   Mahnaz Caldera MD   Calquence 100 MG tablet Take 1 tablet by mouth. 9/29/23   Mart Plaza MD   dapagliflozin Propanediol (Farxiga) 10 MG tablet Take 10 mg by mouth Daily. 10/20/23   Jamar Pham MD   Dulaglutide (Trulicity) 0.75 MG/0.5ML solution pen-injector Inject 0.75 mg under the skin into the appropriate area as directed 1 (One) Time Per Week. 10/20/23   Jamar Pham MD   glucose blood (Accu-Chek Guide) test strip 1 each by Other route Daily. 2/14/23   Mahnaz Caldera MD   hydrOXYzine (ATARAX) 25 MG tablet Take 1 tablet by mouth 3 (Three) Times a Day As Needed for Itching. 11/9/23   Jamar Pham MD   Lancets (accu-chek multiclix) lancets 1 each by Other route Daily. 2/14/23   Mahnaz Caldera MD   lisinopril (PRINIVIL,ZESTRIL) 40 MG tablet Take 1 tablet by mouth Daily. for blood pressure. 10/18/23   Jamar Pham MD   metFORMIN (GLUCOPHAGE) 500 MG tablet Take 2 tablets by mouth 2 (Two) Times a Day for 90 days. 5/18/23 8/16/23  Mahnaz Caldera MD   mupirocin (BACTROBAN) 2 % cream Apply 1 application topically to the appropriate area as directed 3 (Three) Times a Day. 5/26/23   Mahnaz Caldera MD   sildenafil (VIAGRA) 100 MG  tablet TAKE 1 TABLET BY MOUTH PRE INTERCOURSE 5/13/20   Steven Fuller MD   vitamin D (ERGOCALCIFEROL) 1.25 MG (36049 UT) capsule capsule Take 1 capsule by mouth 2 (Two) Times a Week. 11/30/23   Jamar Pham MD          Objective   Physical Exam  Constitutional this is a 63-year-old gentleman awake alert triage vital signs reviewed initial blood pressure was in the 60s.  HEENT extraocular muscles are intact pupils equal round reactive.  Mouth clear but dry.  Neck supple no adenopathy no JV no bruits no meningeal signs.  Lungs clear no retractions heart irregular without murmur abdomen soft with some mild diffuse tenderness but no rebound no guarding good bowel sounds no peritoneal findings or pulsatile masses extremities pulses equal upper and lower extremities no edema no cords no Homans' sign no evidence of DVT skin is warm and dry without rashes or cellulitic changes.  Neurologic awake alert follows commands no facial asymmetry speech normal without focal weakness  Procedures           ED Course      Results for orders placed or performed during the hospital encounter of 12/08/23   COVID-19 and FLU A/B PCR, 1 HR TAT - Swab, Nasopharynx    Specimen: Nasopharynx; Swab   Result Value Ref Range    COVID19 Not Detected Not Detected - Ref. Range    Influenza A PCR Not Detected Not Detected    Influenza B PCR Not Detected Not Detected   Comprehensive Metabolic Panel    Specimen: Arm, Right; Blood   Result Value Ref Range    Glucose 163 (H) 65 - 99 mg/dL    BUN 82 (H) 8 - 23 mg/dL    Creatinine 1.78 (H) 0.76 - 1.27 mg/dL    Sodium 131 (L) 136 - 145 mmol/L    Potassium 3.3 (L) 3.5 - 5.2 mmol/L    Chloride 92 (L) 98 - 107 mmol/L    CO2 19.0 (L) 22.0 - 29.0 mmol/L    Calcium 8.5 (L) 8.6 - 10.5 mg/dL    Total Protein 5.8 (L) 6.0 - 8.5 g/dL    Albumin 2.7 (L) 3.5 - 5.2 g/dL    ALT (SGPT) 17 1 - 41 U/L    AST (SGOT) 15 1 - 40 U/L    Alkaline Phosphatase 49 39 - 117 U/L    Total Bilirubin 0.5 0.0 - 1.2 mg/dL     Globulin 3.1 gm/dL    A/G Ratio 0.9 g/dL    BUN/Creatinine Ratio 46.1 (H) 7.0 - 25.0    Anion Gap 20.0 (H) 5.0 - 15.0 mmol/L    eGFR 42.3 (L) >60.0 mL/min/1.73   Protime-INR    Specimen: Blood   Result Value Ref Range    Protime 11.0 9.6 - 11.7 Seconds    INR 1.01 0.93 - 1.10   aPTT    Specimen: Blood   Result Value Ref Range    PTT 34.6 (H) 24.0 - 31.0 seconds   Procalcitonin    Specimen: Arm, Right; Blood   Result Value Ref Range    Procalcitonin 27.83 (H) 0.00 - 0.25 ng/mL   High Sensitivity Troponin T    Specimen: Arm, Right; Blood   Result Value Ref Range    HS Troponin T 21 <22 ng/L   Urinalysis With Microscopic If Indicated (No Culture) - Urine, Catheter    Specimen: Urine, Catheter   Result Value Ref Range    Color, UA Yellow Yellow, Straw    Appearance, UA Clear Clear    pH, UA <=5.0 5.0 - 8.0    Specific Gravity, UA 1.015 1.005 - 1.030    Glucose, UA >=1000 mg/dL (3+) (A) Negative    Ketones, UA 15 mg/dL (1+) (A) Negative    Bilirubin, UA Negative Negative    Blood, UA Negative Negative    Protein, UA Trace (A) Negative    Leuk Esterase, UA Negative Negative    Nitrite, UA Negative Negative    Urobilinogen, UA 0.2 E.U./dL 0.2 - 1.0 E.U./dL   CBC Auto Differential    Specimen: Blood   Result Value Ref Range    WBC 10.40 3.40 - 10.80 10*3/mm3    RBC 4.46 4.14 - 5.80 10*6/mm3    Hemoglobin 13.3 13.0 - 17.7 g/dL    Hematocrit 39.9 37.5 - 51.0 %    MCV 89.5 79.0 - 97.0 fL    MCH 29.7 26.6 - 33.0 pg    MCHC 33.2 31.5 - 35.7 g/dL    RDW 14.6 12.3 - 15.4 %    RDW-SD 46.8 37.0 - 54.0 fl    MPV 9.5 6.0 - 12.0 fL    Platelets 165 140 - 450 10*3/mm3   Lipase    Specimen: Arm, Right; Blood   Result Value Ref Range    Lipase 20 13 - 60 U/L   Scan Slide    Specimen: Blood   Result Value Ref Range    Scan Slide     Manual Differential    Specimen: Blood   Result Value Ref Range    Neutrophil % 61.0 42.7 - 76.0 %    Lymphocyte % 25.0 19.6 - 45.3 %    Monocyte % 8.0 5.0 - 12.0 %    Eosinophil % 3.0 0.3 - 6.2 %    Basophil %  1.0 0.0 - 1.5 %    Atypical Lymphocyte % 2.0 0.0 - 5.0 %    Neutrophils Absolute 6.34 1.70 - 7.00 10*3/mm3    Lymphocytes Absolute 2.81 0.70 - 3.10 10*3/mm3    Monocytes Absolute 0.83 0.10 - 0.90 10*3/mm3    Eosinophils Absolute 0.31 0.00 - 0.40 10*3/mm3    Basophils Absolute 0.10 0.00 - 0.20 10*3/mm3    nRBC 2.0 (H) 0.0 - 0.2 /100 WBC    Acanthocytes Slight/1+ None Seen    Dacrocytes Slight/1+ None Seen    Poikilocytes Slight/1+ None Seen    RBC Fragments Slight/1+ None Seen    Target Cells Slight/1+ None Seen    WBC Morphology Normal Normal    Platelet Estimate Adequate Normal    Large Platelets Slight/1+ None Seen    Giant Platelets Slight/1+ None Seen   POC Lactate    Specimen: Blood   Result Value Ref Range    Lactate 1.5 0.3 - 2.0 mmol/L   ECG 12 Lead Altered Mental Status   Result Value Ref Range    QT Interval 331 ms    QTC Interval 482 ms   Green Top (Gel)   Result Value Ref Range    Extra Tube Hold for add-ons.    Lavender Top   Result Value Ref Range    Extra Tube hold for add-on    Gold Top - SST   Result Value Ref Range    Extra Tube Hold for add-ons.    Light Blue Top   Result Value Ref Range    Extra Tube Hold for add-ons.    Lavender Top   Result Value Ref Range    Extra Tube hold for add-on      CT Abdomen Pelvis Without Contrast    Result Date: 12/8/2023  Impression: Findings are consistent with small bowel obstruction with dilated and fluid-filled loops of predominantly jejunum and proximal to mid ileum Electronically Signed: Ga Farfan MD  12/8/2023 5:03 AM EST  Workstation ID: [id number]    XR Chest 1 View    Result Date: 12/8/2023  Impression: No active disease Electronically Signed: Ga Farfan MD  12/8/2023 3:18 AM EST  Workstation ID: [id number]   Medications   sodium chloride 0.9 % flush 10 mL (has no administration in time range)   lactated ringers bolus 1,000 mL (has no administration in time range)   metroNIDAZOLE (FLAGYL) IVPB 500 mg (has no administration in time range)   norepinephrine  (LEVOPHED) 8 mg in 250 mL NS infusion (premix) (has no administration in time range)   sepsis fluid NS 0.9 % bolus 2,925 mL (2,925 mL Intravenous New Bag 12/8/23 0301)   cefepime 2 gm IVPB in 100 ml NS (MBP) (2,000 mg Intravenous New Bag 12/8/23 0355)       SEPTIC SHOCK FOCUSED EXAM ATTESTATION    I attest that I have reassessed tissue perfusion after the fluid bolus given.    Tony Bhardwaj MD  12/08/23  05:25 EST                                 EKG my interpretation atrial fibrillation rate of 127 normal axis no hypertrophy QTc of 482 abnormal EKG unchanged from previous normal sinus rhythm  10/25/2023       Medical Decision Making  Medical decision making.  IV established monitor placement obvious atrial fibrillation with a rate of 120 blood pressure in the 60s.  Sepsis protocol triggered and severe sepsis orders placed.  Patient labs including cultures obtained and started on IV normal saline 30 mill per kilo antibiotics cefepime provided.  Labs obtained independent reviewed by me COVID-19 and flu negative.  Comprehensive metabolic profile blood sugar 163 BUN of 82 creatinine 1.7 sodium 131 potassium is 3.3 CO2 of 19 liver enzymes unremarkable Promin 21.  The patient CBC normal lactate was 1.5 cultures pending lipase 20.  Chest x-ray my dependent review no pneumonia pneumothorax or acute findings radiology unremarkable.  The patient repeat examination and 3:50 AM blood pressure is up to 94/43 heart rate now down to about 90 on the monitor.  He is awake and alert he follows commands lungs were clear heart was regular without murmur abdomen was soft mild diffuse tenderness good cap refill good skin turgor he is awake alert mentating well without focal weakness.  Patient's blood pressure came up to the 94 range but then fell back into the 89/44 with a MAP of 50 heart rate about 120 still in atrial fibrillation which is new onset.  The patient has what looks like bilateral radiology report on the CT scan of small  bowel obstruction my dependent review I do not see free air perforation diverticulitis or pancreatitis but consider bowel obstruction versus a large ileus.  The patient will be started on the third liter lactated Ringer's and Levophed to get his pressure up.  I talked to the intensivist nurse practitioner Case discussed patient made aware of the findings he will have a nasogastric tube placed as well I talked to the surgeon Dr. Foreman on-call she will see the patient and evaluate further.  Patient is significantly dehydrated and I suspect that the etiology of his hypotension lactate is okay Pro-Benito is elevated he has been given cefepime and Flagyl as well.  He will be admitted to ICU for further care.  Critical care of 30 minutes.  Patient made aware of the findings as well as the family.  He is significantly ill.  He will need several specialty evaluations with surgery and cardiology and GI.  Patient started on Levophed nasogastric tube placed blood pressure with a MAP of 65 after starting Levophed.  He is mentating well.  Good cap refill lungs clear heart irregular tachycardic in about the 110 range.  His skin turgor.  He is awake alert and follows commands carrying on a conversation.    Problems Addressed:  Acute kidney injury: complicated acute illness or injury  Dehydration: complicated acute illness or injury  Hypotension, unspecified hypotension type: complicated acute illness or injury  Persistent vomiting: complicated acute illness or injury  Sepsis, due to unspecified organism, unspecified whether acute organ dysfunction present: complicated acute illness or injury  Small bowel obstruction: complicated acute illness or injury  Weakness: complicated acute illness or injury    Amount and/or Complexity of Data Reviewed  Labs: ordered. Decision-making details documented in ED Course.  Radiology: ordered and independent interpretation performed. Decision-making details documented in ED Course.  ECG/medicine  tests: ordered and independent interpretation performed. Decision-making details documented in ED Course.    Risk  Prescription drug management.  Decision regarding hospitalization.        Final diagnoses:   Weakness   Persistent vomiting   Acute kidney injury   Dehydration   Small bowel obstruction   Hypotension, unspecified hypotension type   Sepsis, due to unspecified organism, unspecified whether acute organ dysfunction present       ED Disposition  ED Disposition       ED Disposition   Decision to Admit    Condition   --    Comment   Level of Care: Critical Care [6]   Admitting Physician: ELLE BHARDWAJ [[id number]]   Attending Physician: ELLE BHARDWAJ [[id number]]                 No follow-up provider specified.       Medication List      No changes were made to your prescriptions during this visit.            Tony Bhardwaj MD  12/08/23 0537       Tony Bhardwaj MD  12/08/23 0624

## 2023-12-09 ENCOUNTER — INPATIENT HOSPITAL (AMBULATORY)
Dept: URBAN - METROPOLITAN AREA HOSPITAL 84 | Facility: HOSPITAL | Age: 63
End: 2023-12-09
Payer: COMMERCIAL

## 2023-12-09 ENCOUNTER — APPOINTMENT (OUTPATIENT)
Dept: GENERAL RADIOLOGY | Facility: HOSPITAL | Age: 63
End: 2023-12-09
Payer: COMMERCIAL

## 2023-12-09 ENCOUNTER — APPOINTMENT (OUTPATIENT)
Dept: CARDIOLOGY | Facility: HOSPITAL | Age: 63
End: 2023-12-09
Payer: COMMERCIAL

## 2023-12-09 DIAGNOSIS — K56.699 OTHER INTESTINAL OBSTRUCTION UNSPECIFIED AS TO PARTIAL VERSU: ICD-10-CM

## 2023-12-09 DIAGNOSIS — K29.60 OTHER GASTRITIS WITHOUT BLEEDING: ICD-10-CM

## 2023-12-09 PROBLEM — A41.9 SEPTIC SHOCK: Status: ACTIVE | Noted: 2023-12-09

## 2023-12-09 PROBLEM — R65.21 SEPTIC SHOCK: Status: ACTIVE | Noted: 2023-12-09

## 2023-12-09 LAB
ALBUMIN SERPL-MCNC: 2.5 G/DL (ref 3.5–5.2)
ALBUMIN/GLOB SERPL: 0.8 G/DL
ALP SERPL-CCNC: 57 U/L (ref 39–117)
ALT SERPL W P-5'-P-CCNC: 15 U/L (ref 1–41)
ANION GAP SERPL CALCULATED.3IONS-SCNC: 26 MMOL/L (ref 5–15)
ANISOCYTOSIS BLD QL: ABNORMAL
AST SERPL-CCNC: 9 U/L (ref 1–40)
BH CV ECHO MEAS - ACS: 2.3 CM
BH CV ECHO MEAS - AO MAX PG: 14.3 MMHG
BH CV ECHO MEAS - AO MEAN PG: 8 MMHG
BH CV ECHO MEAS - AO ROOT DIAM: 3.6 CM
BH CV ECHO MEAS - AO V2 MAX: 189 CM/SEC
BH CV ECHO MEAS - AO V2 VTI: 30.2 CM
BH CV ECHO MEAS - AVA(I,D): 3.4 CM2
BH CV ECHO MEAS - EDV(CUBED): 117.6 ML
BH CV ECHO MEAS - EDV(MOD-SP4): 128 ML
BH CV ECHO MEAS - EF(MOD-BP): 61.3 %
BH CV ECHO MEAS - EF(MOD-SP4): 61.3 %
BH CV ECHO MEAS - ESV(CUBED): 13.8 ML
BH CV ECHO MEAS - ESV(MOD-SP4): 49.6 ML
BH CV ECHO MEAS - FS: 51 %
BH CV ECHO MEAS - IVS/LVPW: 1.1 CM
BH CV ECHO MEAS - IVSD: 1.1 CM
BH CV ECHO MEAS - LA DIMENSION: 3.7 CM
BH CV ECHO MEAS - LAT PEAK E' VEL: 20.6 CM/SEC
BH CV ECHO MEAS - LV DIASTOLIC VOL/BSA (35-75): 57.5 CM2
BH CV ECHO MEAS - LV MASS(C)D: 188.1 GRAMS
BH CV ECHO MEAS - LV MAX PG: 9.9 MMHG
BH CV ECHO MEAS - LV MEAN PG: 6 MMHG
BH CV ECHO MEAS - LV SYSTOLIC VOL/BSA (12-30): 22.3 CM2
BH CV ECHO MEAS - LV V1 MAX: 157 CM/SEC
BH CV ECHO MEAS - LV V1 VTI: 27.2 CM
BH CV ECHO MEAS - LVIDD: 4.9 CM
BH CV ECHO MEAS - LVIDS: 2.4 CM
BH CV ECHO MEAS - LVOT AREA: 3.8 CM2
BH CV ECHO MEAS - LVOT DIAM: 2.2 CM
BH CV ECHO MEAS - LVPWD: 1 CM
BH CV ECHO MEAS - MED PEAK E' VEL: 19.8 CM/SEC
BH CV ECHO MEAS - MV A MAX VEL: 51.9 CM/SEC
BH CV ECHO MEAS - MV DEC SLOPE: 625 CM/SEC2
BH CV ECHO MEAS - MV DEC TIME: 0.14 SEC
BH CV ECHO MEAS - MV E MAX VEL: 113 CM/SEC
BH CV ECHO MEAS - MV E/A: 2.18
BH CV ECHO MEAS - MV MAX PG: 5.7 MMHG
BH CV ECHO MEAS - MV MEAN PG: 3 MMHG
BH CV ECHO MEAS - MV P1/2T: 49.2 MSEC
BH CV ECHO MEAS - MV V2 VTI: 18.5 CM
BH CV ECHO MEAS - MVA(P1/2T): 4.5 CM2
BH CV ECHO MEAS - MVA(VTI): 5.6 CM2
BH CV ECHO MEAS - PA ACC TIME: 0.12 SEC
BH CV ECHO MEAS - PA V2 MAX: 121.5 CM/SEC
BH CV ECHO MEAS - PULM DIAS VEL: 57.3 CM/SEC
BH CV ECHO MEAS - PULM S/D: 0.81
BH CV ECHO MEAS - PULM SYS VEL: 46.2 CM/SEC
BH CV ECHO MEAS - RV MAX PG: 4.7 MMHG
BH CV ECHO MEAS - RV V1 MAX: 108 CM/SEC
BH CV ECHO MEAS - RV V1 VTI: 16.1 CM
BH CV ECHO MEAS - RVDD: 3.5 CM
BH CV ECHO MEAS - SI(MOD-SP4): 35.2 ML/M2
BH CV ECHO MEAS - SV(LVOT): 103.4 ML
BH CV ECHO MEAS - SV(MOD-SP4): 78.4 ML
BH CV ECHO MEAS - TAPSE (>1.6): 2.9 CM
BH CV ECHO MEAS - TR MAX PG: 32.5 MMHG
BH CV ECHO MEAS - TR MAX VEL: 285 CM/SEC
BH CV ECHO MEASUREMENTS AVERAGE E/E' RATIO: 5.59
BH CV XLRA - RV BASE: 3.9 CM
BH CV XLRA - RV LENGTH: 6.6 CM
BH CV XLRA - RV MID: 3.2 CM
BH CV XLRA - TDI S': 17.1 CM/SEC
BILIRUB SERPL-MCNC: <0.2 MG/DL (ref 0–1.2)
BUN SERPL-MCNC: 27 MG/DL (ref 8–23)
BUN/CREAT SERPL: 27 (ref 7–25)
BURR CELLS BLD QL SMEAR: ABNORMAL
CALCIUM SPEC-SCNC: 8.7 MG/DL (ref 8.6–10.5)
CHLORIDE SERPL-SCNC: 112 MMOL/L (ref 98–107)
CO2 SERPL-SCNC: 10 MMOL/L (ref 22–29)
CREAT SERPL-MCNC: 1 MG/DL (ref 0.76–1.27)
D-LACTATE SERPL-SCNC: 1.3 MMOL/L (ref 0.5–2)
DEPRECATED RDW RBC AUTO: 52.1 FL (ref 37–54)
EGFRCR SERPLBLD CKD-EPI 2021: 84.6 ML/MIN/1.73
ERYTHROCYTE [DISTWIDTH] IN BLOOD BY AUTOMATED COUNT: 16 % (ref 12.3–15.4)
GLOBULIN UR ELPH-MCNC: 3.1 GM/DL
GLUCOSE BLDC GLUCOMTR-MCNC: 180 MG/DL (ref 70–105)
GLUCOSE BLDC GLUCOMTR-MCNC: 202 MG/DL (ref 70–105)
GLUCOSE BLDC GLUCOMTR-MCNC: 202 MG/DL (ref 70–105)
GLUCOSE BLDC GLUCOMTR-MCNC: 219 MG/DL (ref 70–105)
GLUCOSE SERPL-MCNC: 183 MG/DL (ref 65–99)
HCT VFR BLD AUTO: 43 % (ref 37.5–51)
HGB BLD-MCNC: 13.9 G/DL (ref 13–17.7)
LARGE PLATELETS: ABNORMAL
LEFT ATRIUM VOLUME INDEX: 25.4 ML/M2
LYMPHOCYTES # BLD MANUAL: 11.42 10*3/MM3 (ref 0.7–3.1)
LYMPHOCYTES NFR BLD MANUAL: 22 % (ref 5–12)
MAGNESIUM SERPL-MCNC: 2.5 MG/DL (ref 1.6–2.4)
MCH RBC QN AUTO: 30.4 PG (ref 26.6–33)
MCHC RBC AUTO-ENTMCNC: 32.4 G/DL (ref 31.5–35.7)
MCV RBC AUTO: 93.8 FL (ref 79–97)
MONOCYTES # BLD: 4.93 10*3/MM3 (ref 0.1–0.9)
MYELOCYTES NFR BLD MANUAL: 1 % (ref 0–0)
NEUTROPHILS # BLD AUTO: 5.82 10*3/MM3 (ref 1.7–7)
NEUTROPHILS NFR BLD MANUAL: 21 % (ref 42.7–76)
NEUTS BAND NFR BLD MANUAL: 5 % (ref 0–5)
PATHOLOGY REVIEW: YES
PHOSPHATE SERPL-MCNC: 3.3 MG/DL (ref 2.5–4.5)
PLATELET # BLD AUTO: 237 10*3/MM3 (ref 140–450)
PMV BLD AUTO: 9.6 FL (ref 6–12)
POIKILOCYTOSIS BLD QL SMEAR: ABNORMAL
POTASSIUM SERPL-SCNC: 3.7 MMOL/L (ref 3.5–5.2)
PROT SERPL-MCNC: 5.6 G/DL (ref 6–8.5)
RBC # BLD AUTO: 4.59 10*6/MM3 (ref 4.14–5.8)
SCAN SLIDE: NORMAL
SODIUM SERPL-SCNC: 148 MMOL/L (ref 136–145)
STJ: 2.7 CM
TOXIC GRANULATION: ABNORMAL
TSH SERPL DL<=0.05 MIU/L-ACNC: 0.76 UIU/ML (ref 0.27–4.2)
VARIANT LYMPHS NFR BLD MANUAL: 51 % (ref 19.6–45.3)
WBC NRBC COR # BLD AUTO: 22.4 10*3/MM3 (ref 3.4–10.8)

## 2023-12-09 PROCEDURE — 83735 ASSAY OF MAGNESIUM: CPT | Performed by: NURSE PRACTITIONER

## 2023-12-09 PROCEDURE — 99232 SBSQ HOSP IP/OBS MODERATE 35: CPT | Performed by: NURSE PRACTITIONER

## 2023-12-09 PROCEDURE — 74018 RADEX ABDOMEN 1 VIEW: CPT

## 2023-12-09 PROCEDURE — 99233 SBSQ HOSP IP/OBS HIGH 50: CPT | Performed by: SURGERY

## 2023-12-09 PROCEDURE — 0 DEXTROSE 5 % SOLUTION 1,000 ML FLEX CONT: Performed by: INTERNAL MEDICINE

## 2023-12-09 PROCEDURE — 36415 COLL VENOUS BLD VENIPUNCTURE: CPT | Performed by: NURSE PRACTITIONER

## 2023-12-09 PROCEDURE — 63710000001 INSULIN GLARGINE PER 5 UNITS: Performed by: INTERNAL MEDICINE

## 2023-12-09 PROCEDURE — 84100 ASSAY OF PHOSPHORUS: CPT | Performed by: NURSE PRACTITIONER

## 2023-12-09 PROCEDURE — 25010000002 HEPARIN (PORCINE) PER 1000 UNITS: Performed by: INTERNAL MEDICINE

## 2023-12-09 PROCEDURE — 25810000003 SODIUM CHLORIDE 0.9 % SOLUTION: Performed by: NURSE PRACTITIONER

## 2023-12-09 PROCEDURE — 85007 BL SMEAR W/DIFF WBC COUNT: CPT | Performed by: NURSE PRACTITIONER

## 2023-12-09 PROCEDURE — 93306 TTE W/DOPPLER COMPLETE: CPT | Performed by: INTERNAL MEDICINE

## 2023-12-09 PROCEDURE — 25010000002 DIGOXIN PER 500 MCG: Performed by: INTERNAL MEDICINE

## 2023-12-09 PROCEDURE — 25010000002 CEFTRIAXONE PER 250 MG: Performed by: INTERNAL MEDICINE

## 2023-12-09 PROCEDURE — 25010000002 AMPICILLIN-SULBACTAM PER 1.5 G: Performed by: INTERNAL MEDICINE

## 2023-12-09 PROCEDURE — 25010000002 MORPHINE PER 10 MG

## 2023-12-09 PROCEDURE — 93306 TTE W/DOPPLER COMPLETE: CPT

## 2023-12-09 PROCEDURE — 25810000003 LACTATED RINGERS SOLUTION

## 2023-12-09 PROCEDURE — 80050 GENERAL HEALTH PANEL: CPT | Performed by: NURSE PRACTITIONER

## 2023-12-09 PROCEDURE — 83605 ASSAY OF LACTIC ACID: CPT | Performed by: INTERNAL MEDICINE

## 2023-12-09 PROCEDURE — 82948 REAGENT STRIP/BLOOD GLUCOSE: CPT

## 2023-12-09 PROCEDURE — 25010000002 METRONIDAZOLE 500 MG/100ML SOLUTION: Performed by: INTERNAL MEDICINE

## 2023-12-09 PROCEDURE — 63710000001 INSULIN LISPRO (HUMAN) PER 5 UNITS

## 2023-12-09 PROCEDURE — 25010000002 HYDROCORTISONE SOD SUC (PF) 100 MG RECONSTITUTED SOLUTION: Performed by: INTERNAL MEDICINE

## 2023-12-09 RX ORDER — SODIUM CHLORIDE 450 MG/100ML
75 INJECTION, SOLUTION INTRAVENOUS CONTINUOUS
Status: DISCONTINUED | OUTPATIENT
Start: 2023-12-09 | End: 2023-12-09

## 2023-12-09 RX ORDER — METRONIDAZOLE 500 MG/100ML
500 INJECTION, SOLUTION INTRAVENOUS EVERY 8 HOURS
Status: DISCONTINUED | OUTPATIENT
Start: 2023-12-09 | End: 2023-12-13 | Stop reason: HOSPADM

## 2023-12-09 RX ORDER — HEPARIN SODIUM 5000 [USP'U]/ML
5000 INJECTION, SOLUTION INTRAVENOUS; SUBCUTANEOUS EVERY 8 HOURS SCHEDULED
Status: DISCONTINUED | OUTPATIENT
Start: 2023-12-09 | End: 2023-12-10

## 2023-12-09 RX ORDER — DIGOXIN 0.25 MG/ML
500 INJECTION INTRAMUSCULAR; INTRAVENOUS ONCE
Status: COMPLETED | OUTPATIENT
Start: 2023-12-09 | End: 2023-12-09

## 2023-12-09 RX ADMIN — INSULIN LISPRO 3 UNITS: 100 INJECTION, SOLUTION INTRAVENOUS; SUBCUTANEOUS at 18:27

## 2023-12-09 RX ADMIN — AMPICILLIN SODIUM AND SULBACTAM SODIUM 3 G: 2; 1 INJECTION, POWDER, FOR SOLUTION INTRAMUSCULAR; INTRAVENOUS at 08:52

## 2023-12-09 RX ADMIN — HYDROCORTISONE SODIUM SUCCINATE 50 MG: 100 INJECTION, POWDER, FOR SOLUTION INTRAMUSCULAR; INTRAVENOUS at 16:26

## 2023-12-09 RX ADMIN — METRONIDAZOLE 500 MG: 500 INJECTION, SOLUTION INTRAVENOUS at 09:39

## 2023-12-09 RX ADMIN — METRONIDAZOLE 500 MG: 500 INJECTION, SOLUTION INTRAVENOUS at 18:28

## 2023-12-09 RX ADMIN — HEPARIN SODIUM 5000 UNITS: 5000 INJECTION INTRAVENOUS; SUBCUTANEOUS at 21:05

## 2023-12-09 RX ADMIN — DIGOXIN 500 MCG: 250 INJECTION, SOLUTION INTRAMUSCULAR; INTRAVENOUS at 14:24

## 2023-12-09 RX ADMIN — Medication 10 ML: at 21:05

## 2023-12-09 RX ADMIN — Medication 0.13 MCG/KG/MIN: at 11:21

## 2023-12-09 RX ADMIN — SODIUM BICARBONATE 150 MEQ: 84 INJECTION INTRAVENOUS at 09:08

## 2023-12-09 RX ADMIN — Medication 0.18 MCG/KG/MIN: at 04:59

## 2023-12-09 RX ADMIN — SODIUM CHLORIDE 125 ML/HR: 9 INJECTION, SOLUTION INTRAVENOUS at 05:00

## 2023-12-09 RX ADMIN — SODIUM CHLORIDE, POTASSIUM CHLORIDE, SODIUM LACTATE AND CALCIUM CHLORIDE 1000 ML: 600; 310; 30; 20 INJECTION, SOLUTION INTRAVENOUS at 10:16

## 2023-12-09 RX ADMIN — HEPARIN SODIUM 5000 UNITS: 5000 INJECTION INTRAVENOUS; SUBCUTANEOUS at 14:24

## 2023-12-09 RX ADMIN — INSULIN LISPRO 3 UNITS: 100 INJECTION, SOLUTION INTRAVENOUS; SUBCUTANEOUS at 23:33

## 2023-12-09 RX ADMIN — SODIUM BICARBONATE 150 MEQ: 84 INJECTION INTRAVENOUS at 22:01

## 2023-12-09 RX ADMIN — Medication 10 ML: at 08:55

## 2023-12-09 RX ADMIN — INSULIN GLARGINE 8 UNITS: 100 INJECTION, SOLUTION SUBCUTANEOUS at 12:11

## 2023-12-09 RX ADMIN — MORPHINE SULFATE 2 MG: 2 INJECTION, SOLUTION INTRAMUSCULAR; INTRAVENOUS at 02:29

## 2023-12-09 RX ADMIN — MORPHINE SULFATE 2 MG: 2 INJECTION, SOLUTION INTRAMUSCULAR; INTRAVENOUS at 16:46

## 2023-12-09 RX ADMIN — HYDROCORTISONE SODIUM SUCCINATE 50 MG: 100 INJECTION, POWDER, FOR SOLUTION INTRAMUSCULAR; INTRAVENOUS at 09:39

## 2023-12-09 RX ADMIN — AMPICILLIN SODIUM AND SULBACTAM SODIUM 3 G: 2; 1 INJECTION, POWDER, FOR SOLUTION INTRAMUSCULAR; INTRAVENOUS at 02:30

## 2023-12-09 RX ADMIN — CEFTRIAXONE 1000 MG: 1 INJECTION, POWDER, FOR SOLUTION INTRAMUSCULAR; INTRAVENOUS at 09:40

## 2023-12-09 RX ADMIN — Medication 1 SPRAY: at 11:19

## 2023-12-09 RX ADMIN — INSULIN LISPRO 3 UNITS: 100 INJECTION, SOLUTION INTRAVENOUS; SUBCUTANEOUS at 12:10

## 2023-12-09 RX ADMIN — HYDROCORTISONE SODIUM SUCCINATE 50 MG: 100 INJECTION, POWDER, FOR SOLUTION INTRAMUSCULAR; INTRAVENOUS at 21:15

## 2023-12-09 NOTE — PROGRESS NOTES
"General Surgery Progress Note    Name: Bharathi Darnell ADMIT: 2023   : 1960  PCP: Jamar Pham MD    MRN: [id number] LOS: 1 days   AGE/SEX: 63 y.o. male  ROOM: [address] [place] [address]    Chief Complaint   Patient presents with    Abdominal Pain    Weakness - Generalized     Subjective     Patient seen and examined.  BP soft, tachycardic at 107 bpm, afebrile.  Patient remains on 0.18 of Levophed this morning.  Not feeling well this morning, does not complain of pain at time of exam.  Tolerating ice chips for comfort, denies nausea and vomiting.  NGT tube in place with 1225 mL output recorded x 24 hours.  Has not had a bowel movement since admission.  UOP 3125 mL x 24 hours. Leukocytosis worsening, 22.4 (20.1) this morning    Objective     Scheduled Medications:   cefTRIAXone, 1,000 mg, Intravenous, Q24H  heparin (porcine), 5,000 Units, Subcutaneous, Q8H  hydrocortisone sodium succinate, 50 mg, Intravenous, Q6H  insulin lispro, 2-7 Units, Subcutaneous, Q6H  metroNIDAZOLE, 500 mg, Intravenous, Q8H  sodium chloride, 10 mL, Intravenous, Q12H        Active Infusions:  norepinephrine, 0.02-0.3 mcg/kg/min, Last Rate: 0.18 mcg/kg/min (23 0459)  sodium bicarbonate 8.4 % 150 mEq in dextrose (D5W) 5 % 1,000 mL infusion (greater than 75 mEq), 150 mEq, Last Rate: 150 mEq (23 0908)        As Needed Medications:    acetaminophen **OR** acetaminophen    dextrose    dextrose    glucagon (human recombinant)    Morphine    nitroglycerin    ondansetron **OR** ondansetron    sodium chloride    sodium chloride    sodium chloride    Vital Signs  Vital Signs Patient Vitals for the past 24 hrs:   BP Temp Temp src Pulse SpO2 Height Weight   23 0700 -- 97.7 °F (36.5 °C) Oral -- -- -- --   23 0600 109/70 -- -- 107 96 % 185.4 cm (73\") 98.4 kg (217 lb)   23 0500 119/63 -- -- 85 95 % -- --   23 112/64 -- -- 81 95 % -- --   230 103/59 -- -- 115 91 % -- --   23 " 110/65 -- -- 114 92 % -- --   12/09/23 0100 111/62 -- -- (!) 121 94 % -- --   12/09/23 0000 123/67 -- -- 88 94 % -- --   12/08/23 2355 -- 98.2 °F (36.8 °C) Oral -- -- -- --   12/08/23 2300 130/56 -- -- 86 94 % -- --   12/08/23 2200 107/59 -- -- 112 94 % -- --   12/08/23 2100 114/65 -- -- 108 93 % -- --   12/08/23 2000 109/74 -- -- 116 93 % -- --   12/08/23 1935 -- 98.2 °F (36.8 °C) Oral -- -- -- --   12/08/23 1900 126/66 -- -- 118 95 % -- --   12/08/23 1800 116/64 -- -- (!) 138 95 % -- --   12/08/23 1730 107/63 -- -- (!) 136 95 % -- --   12/08/23 1700 107/62 -- -- (!) 139 93 % -- --   12/08/23 1645 (!) 77/53 -- -- (!) 137 93 % -- --   12/08/23 1630 (!) 86/41 -- -- (!) 137 94 % -- --   12/08/23 1615 107/60 -- -- (!) 136 91 % -- --   12/08/23 1607 91/49 -- -- (!) 138 96 % -- --   12/08/23 1600 -- 98.6 °F (37 °C) Oral -- -- -- --   12/08/23 1545 (!) 96/36 -- -- (!) 135 96 % -- --   12/08/23 1530 102/53 -- -- 90 96 % -- --   12/08/23 1500 108/55 -- -- 89 94 % -- --   12/08/23 1430 101/49 -- -- 91 95 % -- --   12/08/23 1400 100/49 -- -- 89 96 % -- --   12/08/23 1330 101/51 -- -- 89 94 % -- --   12/08/23 1300 (!) 88/44 -- -- 90 95 % -- --   12/08/23 1230 94/44 -- -- 94 95 % -- --   12/08/23 1200 -- 98.7 °F (37.1 °C) Oral -- -- -- --   12/08/23 1130 103/49 -- -- 83 93 % -- --   12/08/23 1100 107/58 -- -- 80 96 % -- --   12/08/23 1030 101/53 -- -- 90 95 % -- --   12/08/23 1000 100/50 -- -- 94 95 % -- --     I/O:  I/O last 3 completed shifts:  In: 5067 [I.V.:5067]  Out: 4350 [Urine:3125; Emesis/NG output:1225]    Physical Exam:  Physical Exam  Constitutional:       Appearance: He is ill-appearing.   Cardiovascular:      Rate and Rhythm: Tachycardia present.   Pulmonary:      Effort: Pulmonary effort is normal. No respiratory distress.   Abdominal:      General: There is no distension.      Palpations: Abdomen is soft.      Tenderness: There is no abdominal tenderness. There is no guarding.      Comments: NG tube in place  with thin, brown output.   Neurological:      Mental Status: He is alert and oriented to person, place, and time.   Psychiatric:         Mood and Affect: Mood normal.         Results Review:     CBC    Results from last 7 days   Lab Units 12/09/23  0517 12/08/23  1909 12/08/23  0256 12/04/23  1105   WBC 10*3/mm3 22.40* 20.10* 10.40 5.83   HEMOGLOBIN g/dL 13.9 12.7* 13.3 15.8   PLATELETS 10*3/mm3 237 201 165 207     BMP   Results from last 7 days   Lab Units 12/09/23  0517 12/08/23  0716 12/08/23  0311 12/04/23  1105   SODIUM mmol/L 148* 135* 131* 142   POTASSIUM mmol/L 3.7 3.1* 3.3* 3.8   CHLORIDE mmol/L 112* 98 92* 102   CO2 mmol/L 10.0* 14.0* 19.0* 21.9*   BUN mg/dL 27* 70* 82* 15   CREATININE mg/dL 1.00 1.30* 1.78* 0.92   GLUCOSE mg/dL 183* 138* 163* 146*   MAGNESIUM mg/dL 2.5*  --   --   --    PHOSPHORUS mg/dL 3.3  --   --   --      Radiology(recent) XR Abdomen KUB    Result Date: 12/9/2023  Impression: Persistent small bowel obstruction, similar bowel gas pattern compared to yesterday's radiograph. Electronically Signed: Will Rao MD  12/9/2023 8:04 AM EST  Workstation ID: [id number]    XR Abdomen KUB    Result Date: 12/8/2023  Impression: Increased small bowel dilatation with wall thickening. This may represent early or partial obstruction versus enteritis. Continued follow-up recommended. Electronically Signed: Frannie Barnes MD  12/8/2023 12:26 PM EST  Workstation ID: [id number]    XR Abdomen KUB    Result Date: 12/8/2023  Impression: Esophagogastric tube is in the stomach. Electronically Signed: Ga Farfan MD  12/8/2023 6:29 AM EST  Workstation ID: [id number]    CT Abdomen Pelvis Without Contrast    Result Date: 12/8/2023  Impression: Findings are consistent with small bowel obstruction with dilated and fluid-filled loops of predominantly jejunum and proximal to mid ileum Electronically Signed: Ga Farfan MD  12/8/2023 5:03 AM EST  Workstation ID: [id number]    XR Chest 1 View    Result Date:  12/8/2023  Impression: No active disease Electronically Signed: Ga Farfan MD  12/8/2023 3:18 AM EST  Workstation ID: [id number]     I reviewed the patient's new clinical results.    Assessment & Plan       Septic shock due to enteritis    CLL (chronic lymphocytic leukemia)    Bowel obstruction      63 y.o. male, with a history of CLL, who presents with a history of abdominal pain, nausea, vomiting, loose stools  Continue NG tube to low wall suction, continue to monitor output  Ordered throat spray for comfort.  Okay to have ice chips for comfort  Ordered 1L LR bolus  Wean pressors as tolerated  Will continue to monitor closely  Further input from Dr. Stearns to follow        This note was created using Dragon Voice Recognition software.    JOSE Freitas  12/09/23  09:40 EST

## 2023-12-09 NOTE — PROGRESS NOTES
Critical Care Progress Note   Bharathi Darnell : 1960 MRN:[id number] LOS:1     Principal Problem: Septic shock     Reason for follow up: All the medical problems listed below    Summary     A 63 y.o. male with PMH of CLL, diabetes, hypertension, diverticulosis presented to the hospital for nausea and vomiting and was admitted with a principal diagnosis of Septic shock.  CT abdomen and pelvis showed findings suspicious for small bowel obstruction.  Remained hypotensive in spite of adequate fluid resuscitation and needed vasopressors.  Also started on ceftriaxone and Flagyl for possible enteritis.  GI and general surgery was consulted and recommended conservative management.    Also found to have new onset A-fib.    Significant events     23 : Will give 1 dose of digoxin for rate control.  Follow echocardiogram results.  Changed Unasyn to ceftriaxone and Flagyl for better E. coli coverage.  Add Lantus.    Assessment / Plan     Septic Shock due to enteritis  CT abdomen pelvis suspicious for small bowel obstruction versus enteritis.    Procalcitonin 27.83.  Currently on ceftriaxone and Flagyl.  Adequately fluid resuscitated.  Continue with maintenance fluids while NPO.  Avoid fluid overload.  Titrate vasopressors for MAP target of 65.  Add stress dose steroids.  Echo images reviewed, normal LVEF.    Small bowel obstruction versus enteritis  General surgery following.  Currently n.p.o. with NG tube suction.     Acute Kidney Injury / Metabolic acidosis / Hypernatremia:   Remains nonoliguric.   Likely prerenal. Possible ATN from septic shock.  Changed IV fluids to bicarb drip.  Monitor Input/Output very closely.   Net IO Since Admission: 467 mL [23 1305]      Paroxysmal atrial fibrillation, new diagnosis  Likely sepsis induced. Normal TSH.    Not on any rate control agents due to hypotension.  Will give 1 dose of digoxin for rate control.  [id number] Score of atleast 2.  Will eventually discharge on  Eliquis, no emergency to start at this time.  Echo images reviewed, normal LVEF, normal atrial size.  No obvious left atrial thrombus.    Diabetes mellitus Type 2, not insulin-dependent : well controlled.   Hold home Farxiga.  Accu checks every 6 hours and c/w humalog coverage as needed.   Last A1c of 8.3.    Essential Hypertension: Currently in shock  Hold home Norvasc and lisinopril.  Restart medications as needed.    Chronic lymphocytic leukemia: Oncology consulted.    Code status:   Code Status (Patient has no pulse and is not breathing): CPR (Attempt to Resuscitate)  Medical Interventions (Patient has pulse or is breathing): Full Support       Nutrition: NPO Diet NPO Type: Strict NPO   Patient isn't on Tube Feeding    DVT prophylaxis:  Medical and mechanical DVT prophylaxis orders are present.     Subjective / Review of systems     Review of Systems   Feels okay, denies any abdominal pain.  Not passing any flatus.  No nausea or vomiting.  No chest pain.  No shortness of breath.  Objective / Physical Exam   Vital signs:  Temp: 97.7 °F (36.5 °C)  BP: 130/62  Heart Rate: 107  Resp: 16  SpO2: 96 %  Weight: 98.4 kg (217 lb)    Admission Weight: Weight: 97.5 kg (215 lb)  Current Weight: Weight: 98.4 kg (217 lb)    Input/Output in last 24 hours:    Intake/Output Summary (Last 24 hours) at 12/9/2023 1305  Last data filed at 12/9/2023 0852  Gross per 24 hour   Intake 5067 ml   Output 3000 ml   Net 2067 ml      Physical Exam  Vitals and nursing note reviewed.   Constitutional:       General: He is not in acute distress.     Appearance: Normal appearance.   HENT:      Mouth/Throat:      Mouth: Mucous membranes are moist.      Pharynx: Oropharynx is clear.   Eyes:      General: No scleral icterus.     Extraocular Movements: Extraocular movements intact.      Conjunctiva/sclera: Conjunctivae normal.   Cardiovascular:      Rate and Rhythm: Normal rate.      Heart sounds: No murmur heard.     No gallop.   Pulmonary:       Breath sounds: Normal breath sounds. No wheezing or rales.   Abdominal:      General: Bowel sounds are normal. There is distension.      Palpations: Abdomen is soft.      Tenderness: There is no abdominal tenderness.   Musculoskeletal:         General: No tenderness.      Right lower leg: No edema.      Left lower leg: No edema.   Neurological:      General: No focal deficit present.      Mental Status: He is alert and oriented to person, place, and time.        Radiology and Labs     Results from last 7 days   Lab Units 12/09/23  0517 12/08/23  1909 12/08/23  0256 12/04/23  1105   WBC 10*3/mm3 22.40* 20.10* 10.40 5.83   HEMATOCRIT % 43.0 38.0 39.9 47.9   PLATELETS 10*3/mm3 237 201 165 207      Results from last 7 days   Lab Units 12/09/23  0517 12/08/23  0716 12/08/23  0311 12/04/23  1105   SODIUM mmol/L 148* 135* 131* 142   POTASSIUM mmol/L 3.7 3.1* 3.3* 3.8   CHLORIDE mmol/L 112* 98 92* 102   CO2 mmol/L 10.0* 14.0* 19.0* 21.9*   BUN mg/dL 27* 70* 82* 15   CREATININE mg/dL 1.00 1.30* 1.78* 0.92      Current medications   Scheduled Meds: cefTRIAXone, 1,000 mg, Intravenous, Q24H  digoxin, 500 mcg, Intravenous, Once  heparin (porcine), 5,000 Units, Subcutaneous, Q8H  hydrocortisone sodium succinate, 50 mg, Intravenous, Q6H  insulin glargine, 8 Units, Subcutaneous, Daily  insulin lispro, 2-7 Units, Subcutaneous, Q6H  metroNIDAZOLE, 500 mg, Intravenous, Q8H  sodium chloride, 10 mL, Intravenous, Q12H      Continuous Infusions: norepinephrine, 0.02-0.3 mcg/kg/min, Last Rate: 0.13 mcg/kg/min (12/09/23 1121)  sodium bicarbonate 8.4 % 150 mEq in dextrose (D5W) 5 % 1,000 mL infusion (greater than 75 mEq), 150 mEq, Last Rate: 150 mEq (12/09/23 0908)        Plan discussed with RN. Reviewed all other data in the last 24 hours, including but not limited to vitals, labs, microbiology, imaging and pertinent notes from other providers. High complexity decision making and high risk of deterioration.    Oh Hooks MD    Critical Care  12/09/23   13:05 EST

## 2023-12-09 NOTE — CONSULTS
Hematology/Oncology Inpatient Consultation    Patient name: Bharathi Darnell  : 1960  MRN: [id number]  Referring Provider: Jessee Moon MD   Reason for Consultation: Septic shock due to enteritis     Chief complaint: Abdominal pain    History of present illness:    63 y.o. male presented to [facility] on 2023 with complaints of abdominal pain. He started having abdominal pain on 12/3/2023. The pain became worse and he called his gastroenterologist, Dr. Hardeep Hdz. He was treated for constipation and then ended up with diarrhea. He started on pain medications which improved his pain about 50%. On 2023 he was vomiting. He ultimately came to the ED because of increased abdominal pain and he could not eat or drink well.  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  Labs in the ED were as follows: WBC 10.4 with 2% neutrophils, 50% lymphocytes, 32% monocytes, 11% bands, 3% metamyelocytes, 2% atypical lymphocytes, hemoglobin 13.3, platelets 165,000, CMP significant for creatinine 1.78, sodium 131, potassium 3.3, calcium 8.5, blood cultures no growth to date, PTT 34.6, PT 11.0, INR 1.01, Covid-19 negative. CXR showed no active disease.  CT scan of abdomen and pelvis without contrast showed findings were consistent with small bowel obstruction with dilated and fluid-filled loops of predominantly jejunum and proximal to mid ileum. EKG showed atrial fibrillation  with significant change in rhythm, previously sinus. The patient was hypotensive in the ED and was possibly septic. He was treated with IV fluid rehydration, a Levophed drip was initiated, and an NG tube was placed. He was admitted to the ICU for further evaluation and treatment. GI was consulted. Per GI the cause of the  enteritis was thought to be  infectious or inflammatory etiology, and a small bowel obstruction was in the differential as well. GI recommended consultation by Cardiology for new onset atrial fibrillation. There was concern for small bowel ischemia given atrial fibrillation. He was started on IV Unasyn and then switched to IV Rocephin and Flagyl. Repeat KUB on 12/9/2023 showed a persistent small bowel obstruction, similar bowel gas pattern compared to previous day's radiograph. General surgery was consulted and there was no indication for surgical intervention.    12/09/23  Hematology/Oncology was consulted by Jessee Moon MD on this established patient for septic shock due to enteritis. 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  He was diagnosed with CLL, IgVH unmutated, stage I in August of 2022. He had a  right lower lobe lung nodule diagnosed August of 2022.  He was seen by Dr. Marmolejo and underwent evaluation with biopsy performed 07/14/2022 of the midline submental neck mass with cytology revealing chronic lymphocytic leukemia/small lymphocytic lymphoma with 90% of B-cells revealing a CD19 positive/dim CD20 positive/CD52 positive/CD23 positive/FMC7 negative/ positive and lambda-restricted immunophenotype.  On 08/12/2022 bone marrow biopsy with aspiration showed chronic lymphocytic leukemia.  Pattern of involvement was mixed (nodular and interstitial progressing to diffuse).  The extent of the involvement was 70%.  The phenotype was CD20+ (moderate), lambda+, CD5+, CD23+, CD38-.  There was no morphologic evidence of large cell transformation (Sorensen's).  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  The flow cytometry findings were consistent with CLL.  Phenotype of clonal cells:   Lambda+, CD5+, CD23+, CD20+ (moderate), CD81-, +, and CD38-.  Immunohistochemistry showed CLL with CD20 positivity.  Cytogenetics revealed abnormal male karyotype positive for 8p deletion and monosomy 17.  These current cytogenetic results were compatible with lymphoma and supported concurrent morphologic and flow cytometric findings of CLL. IgVH mutation analysis showed unmutated CLL.  Lymphoid neoplasm NGS report showed Tier III variants of unknown significance:  CHRISTIANE p. ([id number]) and BIRC3 p. ([id number]).  Tumor mutation burden was low and microsatellite instability was negative.  This was an [employer] chronic lymphoid neoplasm NGS report.  FISH study revealed monoallelic deletion of [id number] (13q14.3) locus detected.  These deletions are found in approximately 50% of CLL patients and as a sole aberration associated with more favorable clinical outcome, however, patients with more than 70% of these deletions, as in this case, experience shorter time to treatment.  FISH studies were also positive for p53 (17p13) deletion. PET/[facility] scan at [facility] on 8/17/2022 showed no abnormal uptake on this study.  There was an 8 mm noncalcified nodule in the right lower lobe of the lung.  There were numerous bilateral cervical lymph nodes, one of the largest measures 17 x 10 mm.  There was bilateral common femoral adenopathy greater on the right side than the left side with the largest node on the right side measuring 2.7 x 0.8 cm.  On 06/30/23  a CT chest with contrast revealed 1.1 cm irregular shaped nodule in the right middle lobe and 0.9 cm well-circumscribed nodule in the right lower lobe.  Hilar, axillary, and upper abdominal adenopathy.  Bilateral adrenal masses and emphysema.  Compared to December 2022, the enlarged axillary and hilar lymph nodes were stable.  The right middle and right lower lobe nodules were stable.  Rounded atelectasis in the posterior left lower lobe was present  previously.  The enlarged portacaval node and adrenal nodules were present previously with no significant change in the interval.  WBC increased from 35,000 in August 2022 to 98,000 in July 2023.  On 08/09/23 patient started acalabrutinib. On 09/06/23 he was started on cycle 1 of obinutuzumab.and received cycle 3 obinutuzumab most recently on 11/3/2023. He was last seen in the office on 11/20/2023 and was due for follow up in 1 month.     PCP: Jamar Pham MD    History:  Past Medical History:   Diagnosis Date    CLL (chronic lymphocytic leukemia)     2022    DM (diabetes mellitus), type 2     Erectile dysfunction     Hyperlipidemia     Hypertension    ,   Past Surgical History:   Procedure Laterality Date    COLONOSCOPY      2019   ,   Family History   Problem Relation Age of Onset    Diabetes Mother     Hyperlipidemia Mother    ,   Social History     Tobacco Use    Smoking status: Never     Passive exposure: Never    Smokeless tobacco: Never   Vaping Use    Vaping Use: Never used   Substance Use Topics    Alcohol use: Not Currently    Drug use: Never   ,   Medications Prior to Admission   Medication Sig Dispense Refill Last Dose    amLODIPine (NORVASC) 10 MG tablet TAKE 1 TABLET BY MOUTH EVERY DAY 90 tablet 1     Calquence 100 MG tablet Take 1 tablet by mouth 2 (Two) Times a Day.       Cholecalciferol (Vitamin D3) 1.25 MG (06516 UT) capsule Take 1 capsule by mouth. Tuesday's and Thursday's.       dapagliflozin Propanediol (Farxiga) 10 MG tablet Take 10 mg by mouth Daily. 30 tablet 5     lisinopril (PRINIVIL,ZESTRIL) 40 MG tablet Take 1 tablet by mouth Daily. for blood pressure. 90 tablet 3     ondansetron (ZOFRAN) 4 MG tablet Take 1 tablet by mouth Every 8 (Eight) Hours As Needed for Nausea or Vomiting.       omeprazole (priLOSEC) 40 MG capsule Take 1 capsule by mouth Daily.      , Scheduled Meds:  cefTRIAXone, 1,000 mg, Intravenous, Q24H  heparin (porcine), 5,000 Units, Subcutaneous,  Q8H  hydrocortisone sodium succinate, 50 mg, Intravenous, Q6H  insulin glargine, 8 Units, Subcutaneous, Daily  insulin lispro, 2-7 Units, Subcutaneous, Q6H  metroNIDAZOLE, 500 mg, Intravenous, Q8H  sodium chloride, 10 mL, Intravenous, Q12H    , Continuous Infusions:  norepinephrine, 0.02-0.3 mcg/kg/min, Last Rate: 0.13 mcg/kg/min (12/09/23 1121)  sodium bicarbonate 8.4 % 150 mEq in dextrose (D5W) 5 % 1,000 mL infusion (greater than 75 mEq), 150 mEq, Last Rate: 150 mEq (12/09/23 0908)    , PRN Meds:    acetaminophen **OR** acetaminophen    dextrose    dextrose    glucagon (human recombinant)    Morphine    nitroglycerin    ondansetron **OR** ondansetron    phenol    sodium chloride    sodium chloride    sodium chloride   Allergies:  Bactrim [sulfamethoxazole-trimethoprim]    ROS:  Review of Systems   Constitutional:  Positive for activity change and appetite change. Negative for chills, fatigue, fever and unexpected weight change.   HENT:  Negative for congestion, dental problem, hearing loss, mouth sores, nosebleeds, sore throat and trouble swallowing.    Eyes:  Negative for photophobia and visual disturbance.   Respiratory:  Negative for cough, chest tightness and shortness of breath.    Cardiovascular:  Negative for chest pain, palpitations and leg swelling.   Gastrointestinal:  Positive for abdominal pain. Negative for abdominal distention, blood in stool, constipation, diarrhea, nausea and vomiting.   Endocrine: Negative for cold intolerance and heat intolerance.   Genitourinary:  Negative for decreased urine volume, difficulty urinating, frequency, hematuria and urgency.   Musculoskeletal:  Negative for arthralgias and gait problem.   Skin:  Negative for rash and wound.   Neurological:  Negative for dizziness, tremors, seizures, weakness, light-headedness, numbness and headaches.   Hematological:  Negative for adenopathy. Does not bruise/bleed easily.   Psychiatric/Behavioral:  Negative for confusion and  "hallucinations. The patient is not nervous/anxious.    All other systems reviewed and are negative.    Objective     Vital Signs:   /62   Pulse 107   Temp 97.7 °F (36.5 °C) (Oral)   Resp 16   Ht 185.4 cm (73\")   Wt 98.4 kg (217 lb)   SpO2 96%   BMI 28.63 kg/m²     Physical Exam:  Physical Exam  Vitals reviewed.   HENT:      Head: Normocephalic.      Nose: No rhinorrhea.      Comments: NG tube.      Mouth/Throat:      Mouth: Mucous membranes are moist.      Comments: Dental fillings.   Eyes:      General: No scleral icterus.     Extraocular Movements: Extraocular movements intact.      Conjunctiva/sclera: Conjunctivae normal.      Pupils: Pupils are equal, round, and reactive to light.   Cardiovascular:      Rate and Rhythm: Regular rhythm. Tachycardia present.      Heart sounds: Normal heart sounds. No murmur heard.     Comments: Cardiac monitor leads.   Pulmonary:      Effort: Pulmonary effort is normal. No respiratory distress.      Breath sounds: Normal breath sounds. No wheezing or rales.   Abdominal:      General: There is no distension.      Palpations: Abdomen is soft. There is no mass.      Tenderness: There is no abdominal tenderness. There is no guarding.      Comments: Decreased bowel sounds.   Abdomen soft.    Musculoskeletal:         General: No swelling, tenderness or deformity. Normal range of motion.      Cervical back: Normal range of motion and neck supple.      Right lower leg: No edema.      Left lower leg: No edema.      Comments: Left hand oxygen saturation monitor.  SCDs.   Bilateral upper extremity peripheral IVs.   Left upper extremity PICC.    Lymphadenopathy:      Cervical: No cervical adenopathy.      Upper Body:      Right upper body: No supraclavicular adenopathy.      Left upper body: No supraclavicular adenopathy.   Skin:     General: Skin is warm and dry.      Coloration: Skin is not pale.      Findings: No bruising, erythema or rash.   Neurological:      Mental Status: " He is alert and oriented to person, place, and time.      Coordination: Coordination normal.   Psychiatric:         Mood and Affect: Mood and affect normal.     Results Review:  Lab Results (last 48 hours)       Procedure Component Value Units Date/Time    POC Glucose Once [[id number]]  (Abnormal) Collected: 12/09/23 1140    Specimen: Blood Updated: 12/09/23 1142     Glucose 219 mg/dL      Comment: Serial Number: [id number]:  [id number]       TSH [[id number]]  (Normal) Collected: 12/09/23 0517    Specimen: Blood Updated: 12/09/23 0915     TSH 0.760 uIU/mL     Path Consult Reflex [[id number]] Collected: 12/09/23 0517    Specimen: Blood Updated: 12/09/23 0745     Pathology Review Yes    CBC & Differential [[id number]]  (Abnormal) Collected: 12/09/23 0517    Specimen: Blood Updated: 12/09/23 0745    Narrative:      The following orders were created for panel order CBC & Differential.  Procedure                               Abnormality         Status                     ---------                               -----------         ------                     CBC Auto Differential[[id number]]        Abnormal            Final result               Scan Slide[[id number]]                                       Final result                 Please view results for these tests on the individual orders.    CBC Auto Differential [[id number]]  (Abnormal) Collected: 12/09/23 0517    Specimen: Blood Updated: 12/09/23 0745     WBC 22.40 10*3/mm3      RBC 4.59 10*6/mm3      Hemoglobin 13.9 g/dL      Hematocrit 43.0 %      MCV 93.8 fL      MCH 30.4 pg      MCHC 32.4 g/dL      RDW 16.0 %      RDW-SD 52.1 fl      MPV 9.6 fL      Platelets 237 10*3/mm3     Narrative:      The previously reported component NRBC is no longer being reported. Previous result was 0.1 /100 WBC (Reference Range: 0.0-0.2 /100 WBC) on 12/9/2023 at 0544 EST.    Scan Slide [[id number]] Collected: 12/09/23 0517    Specimen: Blood Updated: 12/09/23 0745     Scan Slide --      Comment: See Manual Differential Results       Manual Differential [[id number]]  (Abnormal) Collected: 12/09/23 0517    Specimen: Blood Updated: 12/09/23 0745     Neutrophil % 21.0 %      Lymphocyte % 51.0 %      Monocyte % 22.0 %      Bands %  5.0 %      Myelocyte % 1.0 %      Neutrophils Absolute 5.82 10*3/mm3      Lymphocytes Absolute 11.42 10*3/mm3      Monocytes Absolute 4.93 10*3/mm3      Anisocytosis Slight/1+     Josiah Cells Slight/1+     Poikilocytes Slight/1+     Toxic Granulation Slight/1+     Large Platelets Slight/1+    Magnesium [[id number]]  (Abnormal) Collected: 12/09/23 0517    Specimen: Blood Updated: 12/09/23 0625     Magnesium 2.5 mg/dL     Comprehensive Metabolic Panel [[id number]]  (Abnormal) Collected: 12/09/23 0517    Specimen: Blood Updated: 12/09/23 0625     Glucose 183 mg/dL      BUN 27 mg/dL      Creatinine 1.00 mg/dL      Sodium 148 mmol/L      Potassium 3.7 mmol/L      Chloride 112 mmol/L      CO2 10.0 mmol/L      Calcium 8.7 mg/dL      Total Protein 5.6 g/dL      Albumin 2.5 g/dL      ALT (SGPT) 15 U/L      AST (SGOT) 9 U/L      Alkaline Phosphatase 57 U/L      Total Bilirubin <0.2 mg/dL      Globulin 3.1 gm/dL      A/G Ratio 0.8 g/dL      BUN/Creatinine Ratio 27.0     Anion Gap 26.0 mmol/L      eGFR 84.6 mL/min/1.73     Narrative:      GFR Normal >60  Chronic Kidney Disease <60  Kidney Failure <15      Lactic Acid, Plasma [[id number]]  (Normal) Collected: 12/09/23 0517    Specimen: Blood Updated: 12/09/23 0611     Lactate 1.3 mmol/L     Phosphorus [[id number]]  (Normal) Collected: 12/09/23 0517    Specimen: Blood Updated: 12/09/23 0605     Phosphorus 3.3 mg/dL     POC Glucose Once [[id number]]  (Abnormal) Collected: 12/09/23 0513    Specimen: Blood Updated: 12/09/23 0515     Glucose 180 mg/dL      Comment: Serial Number: [id number]:  [id number]       Blood Culture - Blood, Arm, Right [[id number]]  (Normal) Collected: 12/08/23 0311    Specimen: Blood from Arm, Right Updated: 12/09/23  0330     Blood Culture No growth at 24 hours    Narrative:      Less than seven (7) mL's of blood was collected.  Insufficient quantity may yield false negative results.    Blood Culture - Blood, Arm, Left [[id number]]  (Normal) Collected: 12/08/23 0256    Specimen: Blood from Arm, Left Updated: 12/09/23 0300     Blood Culture No growth at 24 hours    POC Glucose Once [[id number]]  (Abnormal) Collected: 12/08/23 2351    Specimen: Blood Updated: 12/08/23 2353     Glucose 169 mg/dL      Comment: Serial Number: [id number]:  [id number]       CBC (No Diff) [[id number]]  (Abnormal) Collected: 12/08/23 1909    Specimen: Blood Updated: 12/08/23 1924     WBC 20.10 10*3/mm3      RBC 4.22 10*6/mm3      Hemoglobin 12.7 g/dL      Hematocrit 38.0 %      MCV 90.0 fL      MCH 30.2 pg      MCHC 33.5 g/dL      RDW 14.6 %      RDW-SD 47.7 fl      MPV 8.9 fL      Platelets 201 10*3/mm3     Lactic Acid, Plasma [[id number]]  (Normal) Collected: 12/08/23 1724    Specimen: Blood Updated: 12/08/23 1751     Lactate 1.2 mmol/L     POC Glucose Once [[id number]]  (Abnormal) Collected: 12/08/23 1718    Specimen: Blood Updated: 12/08/23 1720     Glucose 139 mg/dL      Comment: Serial Number: [id number]:  [id number]       POC Glucose Once [[id number]]  (Abnormal) Collected: 12/08/23 1123    Specimen: Blood Updated: 12/08/23 1125     Glucose 142 mg/dL      Comment: Serial Number: [id number]:  [id number]       MRSA Screen, PCR (Inpatient) - Swab, Nares [[id number]]  (Normal) Collected: 12/08/23 0859    Specimen: Swab from Nares Updated: 12/08/23 1036     MRSA PCR No MRSA Detected    Narrative:      The negative predictive value of this diagnostic test is high and should only be used to consider de-escalating anti-MRSA therapy. A positive result may indicate colonization with MRSA and must be correlated clinically.    Basic Metabolic Panel [[id number]]  (Abnormal) Collected: 12/08/23 0716    Specimen: Blood Updated: 12/08/23 0902      Glucose 138 mg/dL      BUN 70 mg/dL      Creatinine 1.30 mg/dL      Sodium 135 mmol/L      Potassium 3.1 mmol/L      Chloride 98 mmol/L      CO2 14.0 mmol/L      Calcium 8.0 mg/dL      BUN/Creatinine Ratio 53.8     Anion Gap 23.0 mmol/L      eGFR 61.7 mL/min/1.73     Narrative:      GFR Normal >60  Chronic Kidney Disease <60  Kidney Failure <15      POC Glucose Once [[id number]]  (Abnormal) Collected: 12/08/23 0851    Specimen: Blood Updated: 12/08/23 0853     Glucose 146 mg/dL      Comment: Serial Number: [id number]:  [id number]       Lactic Acid, Plasma [[id number]]  (Normal) Collected: 12/08/23 0716    Specimen: Blood Updated: 12/08/23 0758     Lactate 1.7 mmol/L     High Sensitivity Troponin T 2Hr [[id number]]  (Abnormal) Collected: 12/08/23 0716    Specimen: Blood Updated: 12/08/23 0754     HS Troponin T 12 ng/L      Troponin T Delta -9 ng/L     Narrative:      High Sensitive Troponin T Reference Range:  <14.0 ng/L- Negative Female for AMI  <22.0 ng/L- Negative Male for AMI  >=14 - Abnormal Female indicating possible myocardial injury.  >=22 - Abnormal Male indicating possible myocardial injury.   Clinicians would have to utilize clinical acumen, EKG, Troponin, and serial changes to determine if it is an Acute Myocardial Infarction or myocardial injury due to an underlying chronic condition.         Manual Differential [[id number]]  (Abnormal) Collected: 12/08/23 0256    Specimen: Blood Updated: 12/08/23 0550     Neutrophil % 2.0 %      Comment: Corrected result. Previous result was 61.0 % on 12/8/2023 at 0341 EST.        Lymphocyte % 50.0 %      Comment: Corrected result. Previous result was 25.0 % on 12/8/2023 at 0341 EST.        Monocyte % 32.0 %      Comment: Corrected result. Previous result was 8.0 % on 12/8/2023 at 0341 EST.        Bands %  11.0 %      Comment: Appended report. These results have been appended to a previously final verified report.        Metamyelocyte % 3.0 %      Comment: Appended  report. These results have been appended to a previously final verified report.        Atypical Lymphocyte % 2.0 %      Neutrophils Absolute 1.35 10*3/mm3      Comment: Corrected result. Previous result was 6.34 10*3/mm3 on 12/8/2023 at 0341 EST.        Lymphocytes Absolute 5.41 10*3/mm3      Comment: Corrected result. Previous result was 2.81 10*3/mm3 on 12/8/2023 at 0341 EST.        Monocytes Absolute 3.33 10*3/mm3      Comment: Corrected result. Previous result was 0.83 10*3/mm3 on 12/8/2023 at 0341 EST.        nRBC --     Comment: Corrected result. Previous result was 2.0 /100 WBC on 12/8/2023 at 0341 EST.        Acanthocytes Slight/1+     Petal Cells Slight/1+     Comment: Appended report. These results have been appended to a previously final verified report.        Dacrocytes Slight/1+     Poikilocytes Slight/1+     RBC Fragments Slight/1+     Target Cells --     Comment: Corrected result. Previous result was Slight/1+ on 12/8/2023 at 0341 EST.        WBC Morphology Normal     Platelet Estimate Adequate     Large Platelets Slight/1+     Giant Platelets --     Comment: Corrected result. Previous result was Slight/1+ on 12/8/2023 at 0341 EST.       Narrative:        Ofe FRANKLIN 12/8/23 0547 jlsThe previously reported component Eosinophils % is no longer being reported. Previous result was 3.0 % (Reference Range: 0.3-6.2 %) on 12/8/2023 at 0341 EST.  The previously reported component Basophils % is no longer being reported. Previous result was 1.0 % (Reference Range: 0.0-1.5 %) on 12/8/2023 at 0341 EST.  The previously reported component Absolute Eosinophils is no longer being reported. Previous result was 0.31 10*3/mm3 (Reference Range: 0.00-0.40 10*3/mm3) on 12/8/2023 at 0341 EST.  The previously reported component Absolute Basophils is no longer being reported. Previous result was 0.10 10*3/mm3 (Reference Range: 0.00-0.20 10*3/mm3) on 12/8/2023 at 0341 EST.    Urinalysis With Microscopic If Indicated (No Culture) -  Urine, Catheter [[id number]]  (Abnormal) Collected: 12/08/23 0450    Specimen: Urine, Catheter Updated: 12/08/23 0516     Color, UA Yellow     Appearance, UA Clear     pH, UA <=5.0     Specific Gravity, UA 1.015     Glucose, UA >=1000 mg/dL (3+)     Ketones, UA 15 mg/dL (1+)     Bilirubin, UA Negative     Blood, UA Negative     Protein, UA Trace     Leuk Esterase, UA Negative     Nitrite, UA Negative     Urobilinogen, UA 0.2 E.U./dL    Narrative:      Urine microscopic not indicated.    [place] Draw [[id number]] Collected: 12/08/23 0256    Specimen: Blood Updated: 12/08/23 0415    Narrative:      The following orders were created for panel order [place] Draw.  Procedure                               Abnormality         Status                     ---------                               -----------         ------                     Green Top (Gel)[[id number]]                                  Final result               Lavender Top[[id number]]                                     Final result               Gold Top - SST[[id number]]                                   Final result               Light Blue Top[[id number]]                                   Final result                 Please view results for these tests on the individual orders.    Green Top (Gel) [[id number]] Collected: 12/08/23 0311    Specimen: Blood from Arm, Right Updated: 12/08/23 0415     Extra Tube Hold for add-ons.     Comment: Auto resulted.       Gold Top - SST [[id number]] Collected: 12/08/23 0311    Specimen: Blood from Arm, Right Updated: 12/08/23 0415     Extra Tube Hold for add-ons.     Comment: Auto resulted.       Extra Tubes [[id number]] Collected: 12/08/23 0311    Specimen: Blood from Arm, Right Updated: 12/08/23 0415    Narrative:      The following orders were created for panel order Extra Tubes.  Procedure                               Abnormality         Status                     ---------                               -----------          "------                     Lavender Top[[id number]]                                     Final result                 Please view results for these tests on the individual orders.    Lavender Top [[id number]] Collected: 12/08/23 0311    Specimen: Blood from Arm, Right Updated: 12/08/23 0415     Extra Tube hold for add-on     Comment: Auto resulted       Lavender Top [[id number]] Collected: 12/08/23 0256    Specimen: Blood Updated: 12/08/23 0400     Extra Tube hold for add-on     Comment: Auto resulted       Light Blue Top [[id number]] Collected: 12/08/23 0256    Specimen: Blood Updated: 12/08/23 0400     Extra Tube Hold for add-ons.     Comment: Auto resulted       Procalcitonin [[id number]]  (Abnormal) Collected: 12/08/23 0311    Specimen: Blood from Arm, Right Updated: 12/08/23 0352     Procalcitonin 27.83 ng/mL     Narrative:      As a Marker for Sepsis (Non-Neonates):    1. <0.5 ng/mL represents a low risk of severe sepsis and/or septic shock.  2. >2 ng/mL represents a high risk of severe sepsis and/or septic shock.    As a Marker for Lower Respiratory Tract Infections that require antibiotic therapy:    PCT on Admission    Antibiotic Therapy       6-12 Hrs later    >0.5                Strongly Recommended  >0.25 - <0.5        Recommended   0.1 - 0.25          Discouraged              Remeasure/reassess PCT  <0.1                Strongly Discouraged     Remeasure/reassess PCT    As 28 day mortality risk marker: \"Change in Procalcitonin Result\" (>80% or <=80%) if Day 0 (or Day 1) and Day 4 values are available. Refer to http://[email or web address]-pct-calculator.com    Change in PCT <=80%  A decrease of PCT levels below or equal to 80% defines a positive change in PCT test result representing a higher risk for 28-day all-cause mortality of patients diagnosed with severe sepsis for septic shock.    Change in PCT >80%  A decrease of PCT levels of more than 80% defines a negative change in PCT result representing a lower risk for " 28-day all-cause mortality of patients diagnosed with severe sepsis or septic shock.       Comprehensive Metabolic Panel [[id number]]  (Abnormal) Collected: 12/08/23 0311    Specimen: Blood from Arm, Right Updated: 12/08/23 0348     Glucose 163 mg/dL      BUN 82 mg/dL      Creatinine 1.78 mg/dL      Sodium 131 mmol/L      Potassium 3.3 mmol/L      Comment: Slight hemolysis detected by analyzer. Result may be falsely elevated.        Chloride 92 mmol/L      CO2 19.0 mmol/L      Calcium 8.5 mg/dL      Total Protein 5.8 g/dL      Albumin 2.7 g/dL      ALT (SGPT) 17 U/L      AST (SGOT) 15 U/L      Comment: Slight hemolysis detected by analyzer. Result may be falsely elevated.        Alkaline Phosphatase 49 U/L      Total Bilirubin 0.5 mg/dL      Globulin 3.1 gm/dL      A/G Ratio 0.9 g/dL      BUN/Creatinine Ratio 46.1     Anion Gap 20.0 mmol/L      eGFR 42.3 mL/min/1.73     Narrative:      GFR Normal >60  Chronic Kidney Disease <60  Kidney Failure <15      High Sensitivity Troponin T [[id number]]  (Normal) Collected: 12/08/23 0311    Specimen: Blood from Arm, Right Updated: 12/08/23 0346     HS Troponin T 21 ng/L     Narrative:      High Sensitive Troponin T Reference Range:  <14.0 ng/L- Negative Female for AMI  <22.0 ng/L- Negative Male for AMI  >=14 - Abnormal Female indicating possible myocardial injury.  >=22 - Abnormal Male indicating possible myocardial injury.   Clinicians would have to utilize clinical acumen, EKG, Troponin, and serial changes to determine if it is an Acute Myocardial Infarction or myocardial injury due to an underlying chronic condition.         Lipase [[id number]]  (Normal) Collected: 12/08/23 0311    Specimen: Blood from Arm, Right Updated: 12/08/23 0346     Lipase 20 U/L     COVID-19 and FLU A/B PCR, 1 HR TAT - Swab, Nasopharynx [[id number]]  (Normal) Collected: 12/08/23 0306    Specimen: Swab from Nasopharynx Updated: 12/08/23 0345     COVID19 Not Detected     Influenza A PCR Not Detected      Influenza B PCR Not Detected    Narrative:      Fact sheet for providers: https://www.fda.gov/media/120424/download    Fact sheet for patients: https://www.fda.gov/media/938913/download    Test performed by PCR.    CBC & Differential [[id number]] Collected: 12/08/23 0256    Specimen: Blood Updated: 12/08/23 0341    Narrative:      The following orders were created for panel order CBC & Differential.  Procedure                               Abnormality         Status                     ---------                               -----------         ------                     CBC Auto Differential[[id number]]        Normal              Final result               Scan Slide[[id number]]                                       Final result                 Please view results for these tests on the individual orders.    CBC Auto Differential [[id number]]  (Normal) Collected: 12/08/23 0256    Specimen: Blood Updated: 12/08/23 0341     WBC 10.40 10*3/mm3      RBC 4.46 10*6/mm3      Hemoglobin 13.3 g/dL      Hematocrit 39.9 %      MCV 89.5 fL      MCH 29.7 pg      MCHC 33.2 g/dL      RDW 14.6 %      RDW-SD 46.8 fl      MPV 9.5 fL      Platelets 165 10*3/mm3     Narrative:      The previously reported component NRBC is no longer being reported. Previous result was 0.1 /100 WBC (Reference Range: 0.0-0.2 /100 WBC) on 12/8/2023 at 0316 EST.    Scan Slide [[id number]] Collected: 12/08/23 0256    Specimen: Blood Updated: 12/08/23 0341     Scan Slide --     Comment: See Manual Differential Results       Protime-INR [[id number]]  (Normal) Collected: 12/08/23 0256    Specimen: Blood Updated: 12/08/23 0316     Protime 11.0 Seconds      INR 1.01    aPTT [[id number]]  (Abnormal) Collected: 12/08/23 0256    Specimen: Blood Updated: 12/08/23 0316     PTT 34.6 seconds     POC Lactate [[id number]]  (Normal) Collected: 12/08/23 0300    Specimen: Blood Updated: 12/08/23 0301     Lactate 1.5 mmol/L      Comment: Serial Number: [id number]:  [id number]               Pending Results: 2D echocardiogram.     Imaging Reviewed:   XR Abdomen KUB    Result Date: 12/9/2023  Impression: Persistent small bowel obstruction, similar bowel gas pattern compared to yesterday's radiograph. Electronically Signed: Will Rao MD  12/9/2023 8:04 AM EST  Workstation ID: [id number]    XR Abdomen KUB    Result Date: 12/8/2023  Impression: Increased small bowel dilatation with wall thickening. This may represent early or partial obstruction versus enteritis. Continued follow-up recommended. Electronically Signed: Frannie Barnes MD  12/8/2023 12:26 PM EST  Workstation ID: [id number]    XR Abdomen KUB    Result Date: 12/8/2023  Impression: Esophagogastric tube is in the stomach. Electronically Signed: Ga Farfan MD  12/8/2023 6:29 AM EST  Workstation ID: [id number]    CT Abdomen Pelvis Without Contrast    Result Date: 12/8/2023  Impression: Findings are consistent with small bowel obstruction with dilated and fluid-filled loops of predominantly jejunum and proximal to mid ileum Electronically Signed: Ga Farfan MD  12/8/2023 5:03 AM EST  Workstation ID: [id number]    XR Chest 1 View    Result Date: 12/8/2023  Impression: No active disease Electronically Signed: Ga Farfan MD  12/8/2023 3:18 AM EST  Workstation ID: [id number]     I have reviewed the patient's labs, imaging, reports, and other clinician documentation.       Assessment & Plan   ASSESSMENT  Stage I CLL: Started acalabrutinib in 8/2023. S/p 3 cycles obinutuzumab.  GI side effects of Acalabrutinib include: Abdominal pain (15%), constipation (15%), diarrhea (18% to 35%; grade ?3: ?3%), nausea (19% to 22%; grade ?3: <1%), vomiting (13%; grade ?3: 2%) with less than 5% incidence of atrial fibrillation and GI side effects of obinutuzumab include:Constipation (8% to 32%) decreased appetite (14%), diarrhea (monotherapy: ?10%; combination therapy: 10% to 30%; grades 3/4: 2% to 3%).  Holding acalabrutinib and obinutuzumab.   Right lung  nodules:  These will be followed on repeat imaging.  Iron-deficiency anemia with heme-positive stool:  He was on ferrous sulfate 325 mg by mouth daily and Pepcid as an outpatient.   Small bowel obstruction versus enteritis, abdominal pain: Gastroenterology was consulted. NG tube placed and is to low wall suction. General surgery was consulted with no plan for surgical intervention. Received Unasyn and was then started on IV Rocephin and Flagyl.   Suspected septic shock due to enteritis/Hypotension: On Levophed drip.   Paroxysmal atrial fibrillation: New onset. GI recommended Cardiology evaluation but they were not yet consulted. 2D echocardiogram ordered. He is on prophylactic heparin. Will eventually discharge patient on Eliquis.   BETTIE, metabolic acidosis, hypernatremia: Management per Primary team     PLAN  Cardiology evaluation.   2D echocardiogram ordered.   Hold acalabrutinib and obinutuzumab.     Electronically signed by REYES Lira, 12/09/23, 1:20 PM EST.      On 12/9/2023, I have personally performed a face-to-face diagnostic evaluation on this patient. I have performed a complete history and physical examination, reviewed laboratory studies, and radiographic examinations.  I have completed the majority and substantive portion of the medical decision making.  I have formulated the assessment on this patient and the plan of action as noted above. I have discussed the case with Génesis Reina NP, have edited/reviewed the note, and agree with the care plan.  The patient was hospitalized with abdominal pains.  On examination his abdomen is soft but with decreased bowel sounds.  CT abdomen and pelvis is consistent with small bowel obstruction with dilated and fluid-filled loops of predominantly jejunum and proximal to mid ileum.  WBC has responded to his outpatient chemotherapy for CLL.  Doubt the enteritis is associated with current CLL therapy.  Will hold acalabrutinib and obinutuzumab till complete  recovery.      On 12/9/2023, I discussed the patient's findings and my recommendations with patient and family.    Thank you for this consult.  We will be happy to follow along in the care of this patient.     Part of this note may be an electronic transcription/translation of spoken language to printed text using the Dragon Dictation System.     Electronically signed by Patrick Fontaine MD, 12/09/23, 1:53 PM EST.

## 2023-12-09 NOTE — PROGRESS NOTES
" LOS: 1 day   Patient Care Team:  Jamar Pham MD as PCP - General (Family Medicine)  Patrick Fontaine MD as Consulting Physician (Hematology and Oncology)      Subjective   \" I just feel exhausted\"    Interval History:   Labs: Sodium 148, potassium 3.7, creatinine 1.  LFTs normal.  WBCs 22.4 up from 20 yesterday, hemoglobin 13.9, platelets 237.  5 bands.    KUB persistent small bowel obstruction similar bowel gas pattern compared to yesterday's radiograph.  NG tube lies left upper quadrant.  Bilious drainage in NG tube.  Passing gas but no bowel movements.    ROS:   No chest pain, shortness of breath, or cough.         Medication Review:     Current Facility-Administered Medications:     acetaminophen (TYLENOL) tablet 650 mg, 650 mg, Oral, Q4H PRN **OR** acetaminophen (TYLENOL) suppository 650 mg, 650 mg, Rectal, Q4H PRN, Berenice Oneil APRN    cefTRIAXone (ROCEPHIN) 1,000 mg in sodium chloride 0.9 % 100 mL IVPB, 1,000 mg, Intravenous, Q24H, Oh Hooks MD, Last Rate: 200 mL/hr at 12/09/23 0940, 1,000 mg at 12/09/23 0940    dextrose (D50W) (25 g/50 mL) IV injection 25 g, 25 g, Intravenous, Q15 Min PRN, Matilde Garcia APRN    dextrose (GLUTOSE) oral gel 15 g, 15 g, Oral, Q15 Min PRN, Matilde Garcia APRN    glucagon (GLUCAGEN) injection 1 mg, 1 mg, Intramuscular, Q15 Min PRN, Matilde Garcia APRN    heparin (porcine) 5000 UNIT/ML injection 5,000 Units, 5,000 Units, Subcutaneous, Q8H, Oh Hooks MD, 5,000 Units at 12/09/23 1424    Hydrocortisone Sod Suc (PF) (Solu-CORTEF) injection 50 mg, 50 mg, Intravenous, Q6H, Oh Hooks MD, 50 mg at 12/09/23 0939    insulin glargine (LANTUS, SEMGLEE) injection 8 Units, 8 Units, Subcutaneous, Daily, Oh Hooks MD, 8 Units at 12/09/23 1211    insulin lispro (HUMALOG/ADMELOG) injection 2-7 Units, 2-7 Units, Subcutaneous, Q6H, Matilde Garcia APRN, 3 Units at 12/09/23 1210    metroNIDAZOLE (FLAGYL) IVPB 500 " mg, 500 mg, Intravenous, Q8H, Oh Hooks MD, Last Rate: 100 mL/hr at 12/09/23 0939, 500 mg at 12/09/23 0939    morphine injection 2 mg, 2 mg, Intravenous, Q4H PRN, Matilde Garcia APRN, 2 mg at 12/09/23 0229    nitroglycerin (NITROSTAT) SL tablet 0.4 mg, 0.4 mg, Sublingual, Q5 Min PRN, Berenice Oneil APRN    norepinephrine (LEVOPHED) 8 mg in 250 mL NS infusion (premix), 0.02-0.3 mcg/kg/min, Intravenous, Titrated, Tony Bhardwaj MD, Last Rate: 23.8 mL/hr at 12/09/23 1121, 0.13 mcg/kg/min at 12/09/23 1121    ondansetron (ZOFRAN) tablet 4 mg, 4 mg, Oral, Q6H PRN **OR** ondansetron (ZOFRAN) injection 4 mg, 4 mg, Intravenous, Q6H PRN, Berenice Oneil APRN    phenol (CHLORASEPTIC) 1.4 % liquid 1 spray, 1 spray, Mouth/Throat, Q2H PRN, Pushpa Monte PA, 1 spray at 12/09/23 1119    sodium bicarbonate 8.4 % 150 mEq in dextrose (D5W) 5 % 1,000 mL infusion (greater than 75 mEq), 150 mEq, Intravenous, Continuous, Oh Hooks MD, Last Rate: 75 mL/hr at 12/09/23 0908, 150 mEq at 12/09/23 0908    sodium chloride 0.9 % flush 10 mL, 10 mL, Intravenous, PRN, Tony Bhardwaj MD    sodium chloride 0.9 % flush 10 mL, 10 mL, Intravenous, Q12H, Berenice Oneil APRN, 10 mL at 12/09/23 0855    sodium chloride 0.9 % flush 10 mL, 10 mL, Intravenous, PRN, Berenice Oneil APRN    sodium chloride 0.9 % infusion 40 mL, 40 mL, Intravenous, PRN, Berenice Oneil APRN      Objective resting in the hospital bed.  NAD.  Family at bedside.    Vital Signs  Temp:  [97.5 °F (36.4 °C)-98.6 °F (37 °C)] 97.5 °F (36.4 °C)  Heart Rate:  [] 77  BP: ()/(36-83) 121/67  Physical Exam:    General Appearance:    Awake and alert, in no acute distress   Head:    Normocephalic, without obvious abnormality   Eyes:          Conjunctivae normal, anicteric sclerae   Ears:    Hearing intact   Throat:   No oral lesions, no thrush, oral mucosa moist   Neck:   No adenopathy, supple, no JVD   Lungs:        respirations regular, even and unlabored        Abdomen:     soft, non-tender, no rebound or guarding, non-distended, no hepatosplenomegaly   Rectal:     Deferred   Extremities:   No edema, no cyanosis, no redness   Skin:   No bleeding, bruising or rash, no jaundice   Neurologic:   Cranial nerves 2 - 12 grossly intact, no asterixis, sensation   intact        Results Review:    CBC    Results from last 7 days   Lab Units 12/09/23  0517 12/08/23  1909 12/08/23  0256 12/04/23  1105   WBC 10*3/mm3 22.40* 20.10* 10.40 5.83   HEMOGLOBIN g/dL 13.9 12.7* 13.3 15.8   PLATELETS 10*3/mm3 237 201 165 207     CMP   Results from last 7 days   Lab Units 12/09/23  0517 12/08/23  0716 12/08/23  0311 12/04/23  1105   SODIUM mmol/L 148* 135* 131* 142   POTASSIUM mmol/L 3.7 3.1* 3.3* 3.8   CHLORIDE mmol/L 112* 98 92* 102   CO2 mmol/L 10.0* 14.0* 19.0* 21.9*   BUN mg/dL 27* 70* 82* 15   CREATININE mg/dL 1.00 1.30* 1.78* 0.92   GLUCOSE mg/dL 183* 138* 163* 146*   ALBUMIN g/dL 2.5*  --  2.7* 3.9   BILIRUBIN mg/dL <0.2  --  0.5 0.6   ALK PHOS U/L 57  --  49 51   AST (SGOT) U/L 9  --  15 15   ALT (SGPT) U/L 15  --  17 19   MAGNESIUM mg/dL 2.5*  --   --   --    PHOSPHORUS mg/dL 3.3  --   --   --    AMYLASE U/L  --   --   --  62   LIPASE U/L  --   --  20 16     Cr Clearance Estimated Creatinine Clearance: 93.4 mL/min (by C-G formula based on SCr of 1 mg/dL).  Coag   Results from last 7 days   Lab Units 12/08/23  0256   INR  1.01   APTT seconds 34.6*     HbA1C   Lab Results   Component Value Date    HGBA1C 8.30 (H) 10/19/2023    HGBA1C 7.60 (H) 05/17/2023    HGBA1C 6.9 (H) 02/14/2023     Blood Glucose   Glucose   Date/Time Value Ref Range Status   12/09/2023 1140 219 (H) 70 - 105 mg/dL Final     Comment:     Serial Number: [id number]:  [id number]   12/09/2023 0513 180 (H) 70 - 105 mg/dL Final     Comment:     Serial Number: [id number]:  [id number]   12/08/2023 2351 169 (H) 70 - 105 mg/dL Final     Comment:     Serial Number:  [id number]:  [id number]   12/08/2023 1718 139 (H) 70 - 105 mg/dL Final     Comment:     Serial Number: [id number]:  [id number]   12/08/2023 1123 142 (H) 70 - 105 mg/dL Final     Comment:     Serial Number: [id number]:  [id number]   12/08/2023 0851 146 (H) 70 - 105 mg/dL Final     Comment:     Serial Number: [id number]:  [id number]     Infection   Results from last 7 days   Lab Units 12/08/23  0311 12/08/23  0256   BLOODCX  No growth at 24 hours No growth at 24 hours   PROCALCITONIN ng/mL 27.83*  --      UA    Results from last 7 days   Lab Units 12/08/23  0450   NITRITE UA  Negative     Radiology(recent) XR Abdomen KUB    Result Date: 12/9/2023  Impression: Persistent small bowel obstruction, similar bowel gas pattern compared to yesterday's radiograph. Electronically Signed: Will Rao MD  12/9/2023 8:04 AM EST  Workstation ID: [id number]    XR Abdomen KUB    Result Date: 12/8/2023  Impression: Increased small bowel dilatation with wall thickening. This may represent early or partial obstruction versus enteritis. Continued follow-up recommended. Electronically Signed: Frannie Barnes MD  12/8/2023 12:26 PM EST  Workstation ID: [id number]    XR Abdomen KUB    Result Date: 12/8/2023  Impression: Esophagogastric tube is in the stomach. Electronically Signed: Ga Farfan MD  12/8/2023 6:29 AM EST  Workstation ID: [id number]    CT Abdomen Pelvis Without Contrast    Result Date: 12/8/2023  Impression: Findings are consistent with small bowel obstruction with dilated and fluid-filled loops of predominantly jejunum and proximal to mid ileum Electronically Signed: Ga Farfan MD  12/8/2023 5:03 AM EST  Workstation ID: [id number]    XR Chest 1 View    Result Date: 12/8/2023  Impression: No active disease Electronically Signed: Ga Farfan MD  12/8/2023 3:18 AM EST  Workstation ID: [id number]           Assessment & Plan   63-year-old male with history of CLL, lymphocytic gastritis presented to the hospital  with nausea, vomiting, abdominal pain.  CT consistent with small bowel obstruction.  Also with new onset A-fib.      -Small bowel obstruction  -Abdominal pain  -Nausea, vomiting  -Lymphocytic gastritis  -CLL on Calquence  -A-fib-new onset  -Hypotension     Plan  NG tube placed yesterday.  KUB shows very little changes.  Having bilious drainage.  Patient is passing gas but not having any bowel movements yet.    Repeat KUB in the morning.  Patient/family are concerned about CLL and are requesting oncology see him inpatient.  Consult has been placed.  General surgery and cardiology following.  Patient is on Rocephin and Flagyl.  N.p.o. status.  Consider small bowel follow-through once KUB improves.  Continue NG to low wall suction.  Repeat KUB in the morning.  Continue supportive care.     I discussed the patients findings and my recommendations with the patient.      Gila Landon, APRN  12/09/23  15:03 EST

## 2023-12-09 NOTE — PLAN OF CARE
"Goal Outcome Evaluation:         Pt remains on levophed gtt. Pt is Q6 BG check. Checked at 2351 and 0513, both per protocol needed 2 units of humalog and patient refused both times. Nurse educated patient of importance of insulin and why there is an order placed. Pt stated he \"didn't want his body to become dependent on insulin when he manages fine on his PO home medications\" pt still refused and stated he \"needed more time so that he could speak to his doctor about it to make sure it was right\"                 "

## 2023-12-09 NOTE — PLAN OF CARE
"Goal Outcome Evaluation:     Dr. Stearns rounded on pt this morning and said he wanted to consult Dr. Fontaine to look at pt incase his issue is related to the chemo drugs. Dr. Hooks said, \"Yes, go ahead and consult oncology.\" Patient has been resting on and off with wife and daughter at bedside throughout the day. I've been able to titrate the levo gtt down a little. Will continue to monitor.      "

## 2023-12-10 ENCOUNTER — APPOINTMENT (OUTPATIENT)
Dept: GENERAL RADIOLOGY | Facility: HOSPITAL | Age: 63
End: 2023-12-10
Payer: COMMERCIAL

## 2023-12-10 ENCOUNTER — INPATIENT HOSPITAL (AMBULATORY)
Dept: URBAN - METROPOLITAN AREA HOSPITAL 84 | Facility: HOSPITAL | Age: 63
End: 2023-12-10
Payer: COMMERCIAL

## 2023-12-10 ENCOUNTER — APPOINTMENT (OUTPATIENT)
Dept: CT IMAGING | Facility: HOSPITAL | Age: 63
End: 2023-12-10
Payer: COMMERCIAL

## 2023-12-10 DIAGNOSIS — K29.60 OTHER GASTRITIS WITHOUT BLEEDING: ICD-10-CM

## 2023-12-10 DIAGNOSIS — R11.2 NAUSEA WITH VOMITING, UNSPECIFIED: ICD-10-CM

## 2023-12-10 DIAGNOSIS — R10.9 UNSPECIFIED ABDOMINAL PAIN: ICD-10-CM

## 2023-12-10 DIAGNOSIS — K56.699 OTHER INTESTINAL OBSTRUCTION UNSPECIFIED AS TO PARTIAL VERSU: ICD-10-CM

## 2023-12-10 LAB
ALBUMIN SERPL-MCNC: 2.3 G/DL (ref 3.5–5.2)
ALBUMIN/GLOB SERPL: 0.8 G/DL
ALP SERPL-CCNC: 53 U/L (ref 39–117)
ALT SERPL W P-5'-P-CCNC: 13 U/L (ref 1–41)
ANION GAP SERPL CALCULATED.3IONS-SCNC: 16 MMOL/L (ref 5–15)
ANISOCYTOSIS BLD QL: ABNORMAL
AST SERPL-CCNC: <5 U/L (ref 1–40)
BILIRUB SERPL-MCNC: <0.2 MG/DL (ref 0–1.2)
BLASTS NFR BLD MANUAL: 1 % (ref 0–0)
BUN SERPL-MCNC: 18 MG/DL (ref 8–23)
BUN/CREAT SERPL: 24.3 (ref 7–25)
BURR CELLS BLD QL SMEAR: ABNORMAL
CALCIUM SPEC-SCNC: 9 MG/DL (ref 8.6–10.5)
CHLORIDE SERPL-SCNC: 120 MMOL/L (ref 98–107)
CO2 SERPL-SCNC: 20 MMOL/L (ref 22–29)
CREAT SERPL-MCNC: 0.74 MG/DL (ref 0.76–1.27)
DEPRECATED RDW RBC AUTO: 47.7 FL (ref 37–54)
EGFRCR SERPLBLD CKD-EPI 2021: 101.8 ML/MIN/1.73
ERYTHROCYTE [DISTWIDTH] IN BLOOD BY AUTOMATED COUNT: 15.4 % (ref 12.3–15.4)
GLOBULIN UR ELPH-MCNC: 2.9 GM/DL
GLUCOSE BLDC GLUCOMTR-MCNC: 199 MG/DL (ref 70–105)
GLUCOSE BLDC GLUCOMTR-MCNC: 211 MG/DL (ref 70–105)
GLUCOSE BLDC GLUCOMTR-MCNC: 235 MG/DL (ref 70–105)
GLUCOSE BLDC GLUCOMTR-MCNC: 242 MG/DL (ref 70–105)
GLUCOSE BLDC GLUCOMTR-MCNC: 268 MG/DL (ref 70–105)
GLUCOSE SERPL-MCNC: 219 MG/DL (ref 65–99)
HCT VFR BLD AUTO: 39.1 % (ref 37.5–51)
HGB BLD-MCNC: 13 G/DL (ref 13–17.7)
LYMPHOCYTES # BLD MANUAL: 9.21 10*3/MM3 (ref 0.7–3.1)
LYMPHOCYTES NFR BLD MANUAL: 2 % (ref 5–12)
MAGNESIUM SERPL-MCNC: 2.3 MG/DL (ref 1.6–2.4)
MCH RBC QN AUTO: 30 PG (ref 26.6–33)
MCHC RBC AUTO-ENTMCNC: 33.3 G/DL (ref 31.5–35.7)
MCV RBC AUTO: 90.1 FL (ref 79–97)
METAMYELOCYTES NFR BLD MANUAL: 4 % (ref 0–0)
MONOCYTES # BLD: 0.3 10*3/MM3 (ref 0.1–0.9)
MYELOCYTES NFR BLD MANUAL: 6 % (ref 0–0)
NEUTROPHILS # BLD AUTO: 3.93 10*3/MM3 (ref 1.7–7)
NEUTROPHILS NFR BLD MANUAL: 25 % (ref 42.7–76)
NEUTS BAND NFR BLD MANUAL: 1 % (ref 0–5)
NRBC SPEC MANUAL: 1 /100 WBC (ref 0–0.2)
PATHOLOGY REVIEW: YES
PHOSPHATE SERPL-MCNC: 2 MG/DL (ref 2.5–4.5)
PHOSPHATE SERPL-MCNC: 2.1 MG/DL (ref 2.5–4.5)
PLAT MORPH BLD: NORMAL
PLATELET # BLD AUTO: 208 10*3/MM3 (ref 140–450)
PMV BLD AUTO: 8.8 FL (ref 6–12)
POIKILOCYTOSIS BLD QL SMEAR: ABNORMAL
POTASSIUM SERPL-SCNC: 3.4 MMOL/L (ref 3.5–5.2)
POTASSIUM SERPL-SCNC: 3.6 MMOL/L (ref 3.5–5.2)
PROT SERPL-MCNC: 5.2 G/DL (ref 6–8.5)
RBC # BLD AUTO: 4.34 10*6/MM3 (ref 4.14–5.8)
SCAN SLIDE: NORMAL
SODIUM SERPL-SCNC: 156 MMOL/L (ref 136–145)
TARGETS BLD QL SMEAR: ABNORMAL
TOXIC GRANULATION: ABNORMAL
VARIANT LYMPHS NFR BLD MANUAL: 2 % (ref 0–5)
VARIANT LYMPHS NFR BLD MANUAL: 59 % (ref 19.6–45.3)
WBC NRBC COR # BLD AUTO: 15.1 10*3/MM3 (ref 3.4–10.8)

## 2023-12-10 PROCEDURE — 80053 COMPREHEN METABOLIC PANEL: CPT | Performed by: NURSE PRACTITIONER

## 2023-12-10 PROCEDURE — 25010000002 ENOXAPARIN PER 10 MG: Performed by: INTERNAL MEDICINE

## 2023-12-10 PROCEDURE — 74177 CT ABD & PELVIS W/CONTRAST: CPT

## 2023-12-10 PROCEDURE — 82948 REAGENT STRIP/BLOOD GLUCOSE: CPT

## 2023-12-10 PROCEDURE — 85025 COMPLETE CBC W/AUTO DIFF WBC: CPT | Performed by: NURSE PRACTITIONER

## 2023-12-10 PROCEDURE — 83735 ASSAY OF MAGNESIUM: CPT | Performed by: NURSE PRACTITIONER

## 2023-12-10 PROCEDURE — 63710000001 INSULIN GLARGINE PER 5 UNITS: Performed by: INTERNAL MEDICINE

## 2023-12-10 PROCEDURE — 74018 RADEX ABDOMEN 1 VIEW: CPT

## 2023-12-10 PROCEDURE — 25010000002 METRONIDAZOLE 500 MG/100ML SOLUTION: Performed by: INTERNAL MEDICINE

## 2023-12-10 PROCEDURE — 85007 BL SMEAR W/DIFF WBC COUNT: CPT | Performed by: NURSE PRACTITIONER

## 2023-12-10 PROCEDURE — 25010000002 CEFTRIAXONE PER 250 MG: Performed by: INTERNAL MEDICINE

## 2023-12-10 PROCEDURE — 0 DEXTROSE 5 % SOLUTION: Performed by: INTERNAL MEDICINE

## 2023-12-10 PROCEDURE — 25010000002 METOCLOPRAMIDE PER 10 MG: Performed by: NURSE PRACTITIONER

## 2023-12-10 PROCEDURE — 63710000001 INSULIN LISPRO (HUMAN) PER 5 UNITS

## 2023-12-10 PROCEDURE — 99233 SBSQ HOSP IP/OBS HIGH 50: CPT | Performed by: SURGERY

## 2023-12-10 PROCEDURE — 84132 ASSAY OF SERUM POTASSIUM: CPT | Performed by: INTERNAL MEDICINE

## 2023-12-10 PROCEDURE — 25010000002 HEPARIN (PORCINE) PER 1000 UNITS: Performed by: INTERNAL MEDICINE

## 2023-12-10 PROCEDURE — 25510000001 IOPAMIDOL PER 1 ML: Performed by: INTERNAL MEDICINE

## 2023-12-10 PROCEDURE — 84100 ASSAY OF PHOSPHORUS: CPT | Performed by: INTERNAL MEDICINE

## 2023-12-10 PROCEDURE — 99232 SBSQ HOSP IP/OBS MODERATE 35: CPT | Performed by: NURSE PRACTITIONER

## 2023-12-10 PROCEDURE — 25010000002 HYDROCORTISONE SOD SUC (PF) 100 MG RECONSTITUTED SOLUTION: Performed by: INTERNAL MEDICINE

## 2023-12-10 PROCEDURE — 25010000002 POTASSIUM CHLORIDE PER 2 MEQ: Performed by: INTERNAL MEDICINE

## 2023-12-10 PROCEDURE — 84100 ASSAY OF PHOSPHORUS: CPT | Performed by: NURSE PRACTITIONER

## 2023-12-10 RX ORDER — METOCLOPRAMIDE HYDROCHLORIDE 5 MG/ML
10 INJECTION INTRAMUSCULAR; INTRAVENOUS EVERY 6 HOURS
Status: DISCONTINUED | OUTPATIENT
Start: 2023-12-10 | End: 2023-12-13 | Stop reason: HOSPADM

## 2023-12-10 RX ORDER — ENOXAPARIN SODIUM 100 MG/ML
40 INJECTION SUBCUTANEOUS DAILY
Status: DISCONTINUED | OUTPATIENT
Start: 2023-12-10 | End: 2023-12-13 | Stop reason: HOSPADM

## 2023-12-10 RX ORDER — FENTANYL/ROPIVACAINE/NS/PF 2-625MCG/1
15 PLASTIC BAG, INJECTION (ML) EPIDURAL ONCE
Status: COMPLETED | OUTPATIENT
Start: 2023-12-10 | End: 2023-12-10

## 2023-12-10 RX ORDER — POTASSIUM CHLORIDE 29.8 MG/ML
20 INJECTION INTRAVENOUS
Status: COMPLETED | OUTPATIENT
Start: 2023-12-10 | End: 2023-12-10

## 2023-12-10 RX ORDER — DEXTROSE MONOHYDRATE 50 MG/ML
75 INJECTION, SOLUTION INTRAVENOUS CONTINUOUS
Status: DISPENSED | OUTPATIENT
Start: 2023-12-10 | End: 2023-12-11

## 2023-12-10 RX ADMIN — METOCLOPRAMIDE 10 MG: 5 INJECTION, SOLUTION INTRAMUSCULAR; INTRAVENOUS at 21:57

## 2023-12-10 RX ADMIN — POTASSIUM PHOSPHATE, MONOBASIC POTASSIUM PHOSPHATE, DIBASIC 15 MMOL: 224; 236 INJECTION, SOLUTION, CONCENTRATE INTRAVENOUS at 12:08

## 2023-12-10 RX ADMIN — METOCLOPRAMIDE 10 MG: 5 INJECTION, SOLUTION INTRAMUSCULAR; INTRAVENOUS at 10:05

## 2023-12-10 RX ADMIN — METRONIDAZOLE 500 MG: 500 INJECTION, SOLUTION INTRAVENOUS at 10:05

## 2023-12-10 RX ADMIN — INSULIN GLARGINE 8 UNITS: 100 INJECTION, SOLUTION SUBCUTANEOUS at 08:27

## 2023-12-10 RX ADMIN — CEFTRIAXONE 1000 MG: 1 INJECTION, POWDER, FOR SOLUTION INTRAMUSCULAR; INTRAVENOUS at 10:05

## 2023-12-10 RX ADMIN — Medication 10 ML: at 21:57

## 2023-12-10 RX ADMIN — INSULIN LISPRO 3 UNITS: 100 INJECTION, SOLUTION INTRAVENOUS; SUBCUTANEOUS at 13:39

## 2023-12-10 RX ADMIN — DEXTROSE MONOHYDRATE 75 ML/HR: 50 INJECTION, SOLUTION INTRAVENOUS at 10:06

## 2023-12-10 RX ADMIN — HEPARIN SODIUM 5000 UNITS: 5000 INJECTION INTRAVENOUS; SUBCUTANEOUS at 05:25

## 2023-12-10 RX ADMIN — IOPAMIDOL 100 ML: 755 INJECTION, SOLUTION INTRAVENOUS at 13:06

## 2023-12-10 RX ADMIN — INSULIN LISPRO 2 UNITS: 100 INJECTION, SOLUTION INTRAVENOUS; SUBCUTANEOUS at 05:25

## 2023-12-10 RX ADMIN — POTASSIUM CHLORIDE 20 MEQ: 29.8 INJECTION, SOLUTION INTRAVENOUS at 10:05

## 2023-12-10 RX ADMIN — METRONIDAZOLE 500 MG: 500 INJECTION, SOLUTION INTRAVENOUS at 17:33

## 2023-12-10 RX ADMIN — Medication 10 ML: at 10:06

## 2023-12-10 RX ADMIN — METOCLOPRAMIDE 10 MG: 5 INJECTION, SOLUTION INTRAMUSCULAR; INTRAVENOUS at 15:58

## 2023-12-10 RX ADMIN — HYDROCORTISONE SODIUM SUCCINATE 50 MG: 100 INJECTION, POWDER, FOR SOLUTION INTRAMUSCULAR; INTRAVENOUS at 05:25

## 2023-12-10 RX ADMIN — POTASSIUM CHLORIDE 20 MEQ: 29.8 INJECTION, SOLUTION INTRAVENOUS at 12:08

## 2023-12-10 RX ADMIN — METRONIDAZOLE 500 MG: 500 INJECTION, SOLUTION INTRAVENOUS at 01:44

## 2023-12-10 RX ADMIN — INSULIN LISPRO 4 UNITS: 100 INJECTION, SOLUTION INTRAVENOUS; SUBCUTANEOUS at 17:33

## 2023-12-10 RX ADMIN — INSULIN LISPRO 3 UNITS: 100 INJECTION, SOLUTION INTRAVENOUS; SUBCUTANEOUS at 23:25

## 2023-12-10 RX ADMIN — ENOXAPARIN SODIUM 40 MG: 100 INJECTION SUBCUTANEOUS at 15:58

## 2023-12-10 NOTE — PROGRESS NOTES
Hematology/Oncology Inpatient Progress Note    PATIENT NAME: Bharathi Darnell  : 1960  MRN: [id number]    CHIEF COMPLAINT: Stage I CLL and right lung nodules     HISTORY OF PRESENT ILLNESS:    63 y.o. male presented to [facility] on 2023 with complaints of abdominal pain. He started having abdominal pain on 12/3/2023. The pain became worse and he called his gastroenterologist, Dr. Hardeep Hdz. He was treated for constipation and then ended up with diarrhea. He started on pain medications which improved his pain about 50%. On 2023 he was vomiting. He ultimately came to the ED because of increased abdominal pain and he could not eat or drink well.  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  Labs in the ED were as follows: WBC 10.4 with 2% neutrophils, 50% lymphocytes, 32% monocytes, 11% bands, 3% metamyelocytes, 2% atypical lymphocytes, hemoglobin 13.3, platelets 165,000, CMP significant for creatinine 1.78, sodium 131, potassium 3.3, calcium 8.5, blood cultures no growth to date, PTT 34.6, PT 11.0, INR 1.01, Covid-19 negative. CXR showed no active disease.  CT scan of abdomen and pelvis without contrast showed findings were consistent with small bowel obstruction with dilated and fluid-filled loops of predominantly jejunum and proximal to mid ileum. EKG showed atrial fibrillation  with significant change in rhythm, previously sinus. The patient was hypotensive in the ED and was possibly septic. He was treated with IV fluid rehydration, a Levophed drip was initiated, and an NG tube was placed. He was admitted to the ICU for further evaluation and treatment. GI was consulted. Per GI the cause of the  enteritis was thought to be infectious or inflammatory etiology, and a small bowel obstruction was in the  differential as well. GI recommended consultation by Cardiology for new onset atrial fibrillation. There was concern for small bowel ischemia given atrial fibrillation. He was started on IV Unasyn and then switched to IV Rocephin and Flagyl. Repeat KUB on 12/9/2023 showed a persistent small bowel obstruction, similar bowel gas pattern compared to previous day's radiograph. General surgery was consulted and there was no indication for surgical intervention.     12/09/23  Hematology/Oncology was consulted by Jessee Moon MD on this established patient for septic shock due to enteritis. 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  He was diagnosed with CLL, IgVH unmutated, stage I in August of 2022. He had a  right lower lobe lung nodule diagnosed August of 2022.  He was seen by Dr. Marmolejo and underwent evaluation with biopsy performed 07/14/2022 of the midline submental neck mass with cytology revealing chronic lymphocytic leukemia/small lymphocytic lymphoma with 90% of B-cells revealing a CD19 positive/dim CD20 positive/CD52 positive/CD23 positive/FMC7 negative/ positive and lambda-restricted immunophenotype.  On 08/12/2022 bone marrow biopsy with aspiration showed chronic lymphocytic leukemia.  Pattern of involvement was mixed (nodular and interstitial progressing to diffuse).  The extent of the involvement was 70%.  The phenotype was CD20+ (moderate), lambda+, CD5+, CD23+, CD38-.  There was no morphologic evidence of large cell transformation (Sorensen's).  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  The flow cytometry findings were consistent with CLL.  Phenotype of clonal cells:  Lambda+, CD5+, CD23+, CD20+ (moderate), CD81-, +, and CD38-.   Immunohistochemistry showed CLL with CD20 positivity.  Cytogenetics revealed abnormal male karyotype positive for 8p deletion and monosomy 17.  These current cytogenetic results were compatible with lymphoma and supported concurrent morphologic and flow cytometric findings of CLL. IgVH mutation analysis showed unmutated CLL.  Lymphoid neoplasm NGS report showed Tier III variants of unknown significance:  CHRISTIANE p. ([id number]) and BIRC3 p. ([id number]).  Tumor mutation burden was low and microsatellite instability was negative.  This was an [employer] chronic lymphoid neoplasm NGS report.  FISH study revealed monoallelic deletion of [id number] (13q14.3) locus detected.  These deletions are found in approximately 50% of CLL patients and as a sole aberration associated with more favorable clinical outcome, however, patients with more than 70% of these deletions, as in this case, experience shorter time to treatment.  FISH studies were also positive for p53 (17p13) deletion. PET/[facility] on 8/17/2022 showed no abnormal uptake on this study.  There was an 8 mm noncalcified nodule in the right lower lobe of the lung.  There were numerous bilateral cervical lymph nodes, one of the largest measures 17 x 10 mm.  There was bilateral common femoral adenopathy greater on the right side than the left side with the largest node on the right side measuring 2.7 x 0.8 cm.  On 06/30/23  a CT chest with contrast revealed 1.1 cm irregular shaped nodule in the right middle lobe and 0.9 cm well-circumscribed nodule in the right lower lobe.  Hilar, axillary, and upper abdominal adenopathy.  Bilateral adrenal masses and emphysema.  Compared to December 2022, the enlarged axillary and hilar lymph nodes were stable.  The right middle and right lower lobe nodules were stable.  Rounded atelectasis in the posterior left lower lobe was present previously.  The enlarged portacaval node and adrenal nodules were present  previously with no significant change in the interval.  WBC increased from 35,000 in August 2022 to 98,000 in July 2023.  On 08/09/23 patient started acalabrutinib. On 09/06/23 he was started on cycle 1 of obinutuzumab.and received cycle 3 obinutuzumab most recently on 11/3/2023. He was last seen in the office on 11/20/2023 and was due for follow up in 1 month.      PCP: Jamar Pham MD    INTERVAL HISTORY:  12/10/2023 - white blood count 15.10 with 59% lymphocytes.  2D echocardiogram on 12/9/2023 showed left ventricular systolic function was normal. Calculated left ventricular EF = 61.3%. Left ventricular wall thickness was consistent with mild concentric hypertrophy. The left atrial cavity was mildly to moderately dilated. The estimated right ventricular systolic pressure from tricuspid regurgitation was normal. KUB showed persistent small bowel obstruction. Nasogastric tube tip and sidehole was overlying the stomach    History of present illness reviewed since last visit and changes noted on 12/10/23.    Subjective   Has a cough and dyspnea on exertion.     ROS:  Review of Systems   Constitutional:  Negative for activity change, chills, fatigue, fever and unexpected weight change.   HENT:  Negative for congestion, dental problem, hearing loss, mouth sores, nosebleeds, sore throat and trouble swallowing.    Eyes:  Negative for photophobia and visual disturbance.   Respiratory:  Positive for cough. Negative for chest tightness and shortness of breath.         Dyspnea on exertion.    Cardiovascular:  Negative for chest pain, palpitations and leg swelling.   Gastrointestinal:  Negative for abdominal distention, abdominal pain, blood in stool, constipation, diarrhea, nausea and vomiting.   Endocrine: Negative for cold intolerance and heat intolerance.   Genitourinary:  Negative for decreased urine volume, difficulty urinating, frequency, hematuria and urgency.   Musculoskeletal:  Negative for arthralgias  "and gait problem.   Skin:  Negative for rash and wound.   Neurological:  Negative for dizziness, tremors, speech difficulty, weakness, light-headedness, numbness and headaches.   Hematological:  Negative for adenopathy. Does not bruise/bleed easily.   Psychiatric/Behavioral:  Negative for confusion and hallucinations. The patient is not nervous/anxious.    All other systems reviewed and are negative.    MEDICATIONS:    Scheduled Meds:  cefTRIAXone, 1,000 mg, Intravenous, Q24H  enoxaparin, 40 mg, Subcutaneous, Daily  insulin glargine, 8 Units, Subcutaneous, Daily  insulin lispro, 2-7 Units, Subcutaneous, Q6H  metoclopramide, 10 mg, Intravenous, Q6H  metroNIDAZOLE, 500 mg, Intravenous, Q8H  potassium chloride, 20 mEq, Intravenous, Q1H  potassium phosphate, 15 mmol, Intravenous, Once  sodium chloride, 10 mL, Intravenous, Q12H    Continuous Infusions:  dextrose, 75 mL/hr, Last Rate: 75 mL/hr (12/10/23 1006)    PRN Meds:    acetaminophen **OR** acetaminophen    dextrose    dextrose    glucagon (human recombinant)    Magnesium Standard Dose Replacement - Follow Nurse / BPA Driven Protocol    Morphine    nitroglycerin    ondansetron **OR** ondansetron    phenol    Phosphorus Replacement - Follow Nurse / BPA Driven Protocol    Potassium Replacement - Follow Nurse / BPA Driven Protocol    sodium chloride    sodium chloride    sodium chloride     ALLERGIES:    Allergies   Allergen Reactions    Bactrim [Sulfamethoxazole-Trimethoprim] Rash     Objective    VITALS:   /75   Pulse 74   Temp 97.4 °F (36.3 °C) (Oral)   Resp 16   Ht 185.4 cm (73\")   Wt 98.4 kg (217 lb)   SpO2 97%   BMI 28.63 kg/m²     PHYSICAL EXAM:  Physical Exam  Vitals and nursing note reviewed.   Constitutional:       General: He is not in acute distress.     Appearance: Normal appearance. He is well-developed. He is not diaphoretic.   HENT:      Head: Normocephalic and atraumatic.      Nose: Nose normal.      Comments: NG tube.      Mouth/Throat:    "   Mouth: Mucous membranes are moist.      Pharynx: Oropharynx is clear. No oropharyngeal exudate or posterior oropharyngeal erythema.      Comments: Dental fillings.  Eyes:      General: No scleral icterus.     Extraocular Movements: Extraocular movements intact.      Conjunctiva/sclera: Conjunctivae normal.      Pupils: Pupils are equal, round, and reactive to light.   Cardiovascular:      Rate and Rhythm: Normal rate and regular rhythm.      Heart sounds: Normal heart sounds. No murmur heard.     Comments: Cardiac monitor leads.   Pulmonary:      Effort: Pulmonary effort is normal. No respiratory distress.      Breath sounds: Normal breath sounds. No stridor. No wheezing or rales.   Abdominal:      General: Bowel sounds are normal. There is no distension.      Palpations: Abdomen is soft. There is no mass.      Tenderness: There is no abdominal tenderness. There is no guarding.      Comments: Decreased bowel sounds.    Genitourinary:     Comments: Deferred   Musculoskeletal:         General: No swelling, tenderness or deformity. Normal range of motion.      Cervical back: Normal range of motion and neck supple.      Right lower leg: No edema.      Left lower leg: No edema.      Comments: Left hand oxygen saturation monitor.  SCDs.   Bilateral upper extremity peripheral IVs.   Left upper extremity PICC.   Lymphadenopathy:      Cervical: No cervical adenopathy.      Upper Body:      Right upper body: No supraclavicular adenopathy.      Left upper body: No supraclavicular adenopathy.   Skin:     General: Skin is warm and dry.      Coloration: Skin is not pale.      Findings: No bruising, erythema or rash.   Neurological:      General: No focal deficit present.      Mental Status: He is alert and oriented to person, place, and time.      Coordination: Coordination normal.   Psychiatric:         Mood and Affect: Mood normal.         Behavior: Behavior normal.         Thought Content: Thought content normal.     RECENT  LABS:  Lab Results (last 24 hours)       Procedure Component Value Units Date/Time    CBC & Differential [[id number]]  (Abnormal) Collected: 12/10/23 0434    Specimen: Blood Updated: 12/10/23 0626    Narrative:      The following orders were created for panel order CBC & Differential.  Procedure                               Abnormality         Status                     ---------                               -----------         ------                     CBC Auto Differential[[id number]]        Abnormal            Final result               Scan Slide[[id number]]                                       Final result                 Please view results for these tests on the individual orders.    Scan Slide [[id number]] Collected: 12/10/23 0434    Specimen: Blood Updated: 12/10/23 0626     Scan Slide --     Comment: See Manual Differential Results       Manual Differential [[id number]]  (Abnormal) Collected: 12/10/23 0434    Specimen: Blood Updated: 12/10/23 0626     Neutrophil % 25.0 %      Lymphocyte % 59.0 %      Monocyte % 2.0 %      Bands %  1.0 %      Metamyelocyte % 4.0 %      Myelocyte % 6.0 %      Atypical Lymphocyte % 2.0 %      Blasts % 1.0 %      Neutrophils Absolute 3.93 10*3/mm3      Lymphocytes Absolute 9.21 10*3/mm3      Monocytes Absolute 0.30 10*3/mm3      nRBC 1.0 /100 WBC      Anisocytosis Slight/1+     Jbphh Cells Slight/1+     Poikilocytes Slight/1+     Target Cells Slight/1+     Toxic Granulation Slight/1+     Platelet Morphology Normal    Path Consult Reflex [[id number]] Collected: 12/10/23 0434    Specimen: Blood Updated: 12/10/23 0626     Pathology Review Yes    CBC Auto Differential [[id number]]  (Abnormal) Collected: 12/10/23 0434    Specimen: Blood Updated: 12/10/23 0626     WBC 15.10 10*3/mm3      RBC 4.34 10*6/mm3      Hemoglobin 13.0 g/dL      Hematocrit 39.1 %      MCV 90.1 fL      MCH 30.0 pg      MCHC 33.3 g/dL      RDW 15.4 %      RDW-SD 47.7 fl      MPV 8.8 fL      Platelets 208 10*3/mm3      Narrative:      The previously reported component NRBC is no longer being reported. Previous result was 0.1 /100 WBC (Reference Range: 0.0-0.2 /100 WBC) on 12/10/2023 at 0445 EST.    Phosphorus [[id number]]  (Abnormal) Collected: 12/10/23 0434    Specimen: Blood Updated: 12/10/23 0525     Phosphorus 2.0 mg/dL     Magnesium [[id number]]  (Normal) Collected: 12/10/23 0434    Specimen: Blood Updated: 12/10/23 0515     Magnesium 2.3 mg/dL     Comprehensive Metabolic Panel [[id number]]  (Abnormal) Collected: 12/10/23 0434    Specimen: Blood Updated: 12/10/23 0515     Glucose 219 mg/dL      BUN 18 mg/dL      Creatinine 0.74 mg/dL      Sodium 156 mmol/L      Potassium 3.4 mmol/L      Comment: Slight hemolysis detected by analyzer. Result may be falsely elevated.        Chloride 120 mmol/L      CO2 20.0 mmol/L      Calcium 9.0 mg/dL      Total Protein 5.2 g/dL      Albumin 2.3 g/dL      ALT (SGPT) 13 U/L      AST (SGOT) <5 U/L      Alkaline Phosphatase 53 U/L      Total Bilirubin <0.2 mg/dL      Globulin 2.9 gm/dL      A/G Ratio 0.8 g/dL      BUN/Creatinine Ratio 24.3     Anion Gap 16.0 mmol/L      eGFR 101.8 mL/min/1.73     Narrative:      GFR Normal >60  Chronic Kidney Disease <60  Kidney Failure <15      POC Glucose Once [[id number]]  (Abnormal) Collected: 12/10/23 0435    Specimen: Blood Updated: 12/10/23 0437     Glucose 199 mg/dL      Comment: Serial Number: [id number]:  [id number]       Blood Culture - Blood, Arm, Right [[id number]]  (Normal) Collected: 12/08/23 0311    Specimen: Blood from Arm, Right Updated: 12/10/23 0330     Blood Culture No growth at 2 days    Narrative:      Less than seven (7) mL's of blood was collected.  Insufficient quantity may yield false negative results.    Blood Culture - Blood, Arm, Left [[id number]]  (Normal) Collected: 12/08/23 0256    Specimen: Blood from Arm, Left Updated: 12/10/23 0300     Blood Culture No growth at 2 days    POC Glucose Once [[id number]]  (Abnormal) Collected:  12/09/23 2329    Specimen: Blood Updated: 12/09/23 2331     Glucose 202 mg/dL      Comment: Serial Number: [id number]:  [id number]       POC Glucose Once [[id number]]  (Abnormal) Collected: 12/09/23 1804    Specimen: Blood Updated: 12/09/23 1806     Glucose 202 mg/dL      Comment: Serial Number: [id number]:  [id number]       POC Glucose Once [[id number]]  (Abnormal) Collected: 12/09/23 1140    Specimen: Blood Updated: 12/09/23 1142     Glucose 219 mg/dL      Comment: Serial Number: [id number]:  [id number]             PENDING RESULTS: Blood cultures (final).     IMAGING REVIEWED:  XR Abdomen KUB    Result Date: 12/10/2023  Impression: Persistent small bowel obstruction. Nasogastric tube tip and sidehole overlie the stomach. Electronically Signed: Will Rao MD  12/10/2023 8:58 AM EST  Workstation ID: [id number]    XR Abdomen KUB    Result Date: 12/9/2023  Impression: Persistent small bowel obstruction, similar bowel gas pattern compared to yesterday's radiograph. Electronically Signed: Will Rao MD  12/9/2023 8:04 AM EST  Workstation ID: [id number]    XR Abdomen KUB    Result Date: 12/8/2023  Impression: Increased small bowel dilatation with wall thickening. This may represent early or partial obstruction versus enteritis. Continued follow-up recommended. Electronically Signed: Frannie Barnes MD  12/8/2023 12:26 PM EST  Workstation ID: [id number]     I have reviewed the patient's labs, imaging, reports, and other clinician documentation.    Assessment & Plan   ASSESSMENT  Stage I CLL: Started acalabrutinib in 8/2023. S/p 3 cycles obinutuzumab with WBC decreasing well.  GI side effects of acalabrutinib include: Abdominal pain (15%), constipation (15%), diarrhea (18% to 35%; grade ?3: ?3%), nausea (19% to 22%; grade ?3: <1%), vomiting (13%; grade ?3: 2%) with less than 5% incidence of atrial fibrillation and GI side effects of obinutuzumab include: Constipation (8% to 32%) decreased appetite (14%),  diarrhea (monotherapy: ?10%; combination therapy: 10% to 30%; grades 3/4: 2% to 3%).  Holding acalabrutinib and obinutuzumab.   Right lung nodules:  These will be followed on repeat imaging.  Iron-deficiency anemia with heme-positive stool:  He was on ferrous sulfate 325 mg by mouth daily and Pepcid as an outpatient. This was not continued as an inpatient.   Small bowel obstruction versus enteritis, abdominal pain: Gastroenterology was consulted. NG tube placed and is to low wall suction. General surgery was consulted with no plan for surgical intervention. Received Unasyn and was then switched to IV Rocephin and Flagyl. KUB on 12/10/2023 showed persistent small bowel obstruction.  Doubt SBO related to current CLL therapy.  Suspected septic shock due to enteritis/Hypotension: Off Levophed drip. Blood cultures show no growth to date. Leukocytosis improved.   Paroxysmal atrial fibrillation: New onset, likely sepsis-induced.  GI recommended Cardiology evaluation but they were not yet consulted. 2D echocardiogram showed LVEF 61 - 65%. He is on SQ heparin.    BETTIE, metabolic acidosis, and hypernatremia: Management per Primary team. On bicarbonate drip.      PLAN   KUB today.   Continue IV antibiotics per Primary team.   Hold oral iron replacement.  Hold acalabrutinib and obinutuzumab.     Electronically signed by REYES Lira, 12/10/23, 10:42 AM EST.      On 12/10/2023, I have personally performed a face-to-face diagnostic evaluation on this patient. I have performed a complete history and physical examination, reviewed laboratory studies, and radiographic examinations.  I have completed the majority and substantive portion of the medical decision making.  I have formulated the assessment on this patient and the plan of action as noted above. I have discussed the case with Génesis Reina NP, have edited/reviewed the note, and agree with the care plan.  He has some cough and dyspnea on exertion.  On examination,  NG tube is present and he has decreased bowel sounds with soft abdomen.  WBC is down to 15.1 with 59% lymphocytes.  Doubt his current SBO is related to CLL or its treatment.  Will hold his CLL treatment and follow.      On 12/10/2023, I discussed the patient's findings and my recommendations with patient, family and nursing.    Part of this note may be an electronic transcription/translation of spoken language to printed text using the Dragon Dictation System.     Electronically signed by Patrick Fontaine MD, 12/10/23, 11:29 AM EST.

## 2023-12-10 NOTE — PROGRESS NOTES
" LOS: 2 days   Patient Care Team:  Jamar Pham MD as PCP - General (Family Medicine)  Patrick Fontaine MD as Consulting Physician (Hematology and Oncology)      Subjective   \"Passing gas but no bowel movement\"     Interval History:    KUB 12/10/2023 persistent small bowel obstruction.  NG tube in place  Labs: WBCs down to 15.1, hemoglobin 13, platelets 208.  Sodium 156, potassium 3.4, creatinine 0.74.  LFTs normal.    ROS:   No chest pain, shortness of breath, or cough.         Medication Review:     Current Facility-Administered Medications:     acetaminophen (TYLENOL) tablet 650 mg, 650 mg, Oral, Q4H PRN **OR** acetaminophen (TYLENOL) suppository 650 mg, 650 mg, Rectal, Q4H PRN, Berenice Oneil APRN    cefTRIAXone (ROCEPHIN) 1,000 mg in sodium chloride 0.9 % 100 mL IVPB, 1,000 mg, Intravenous, Q24H, hO Hooks MD, Last Rate: 200 mL/hr at 12/09/23 0940, 1,000 mg at 12/09/23 0940    dextrose (D50W) (25 g/50 mL) IV injection 25 g, 25 g, Intravenous, Q15 Min PRN, Matilde Garcia APRN    dextrose (D5W) 5 % infusion, 75 mL/hr, Intravenous, Continuous, Oh Hooks MD    dextrose (GLUTOSE) oral gel 15 g, 15 g, Oral, Q15 Min PRN, Matilde Garcia APRN    Enoxaparin Sodium (LOVENOX) syringe 40 mg, 40 mg, Subcutaneous, Daily, Oh Hooks MD    glucagon (GLUCAGEN) injection 1 mg, 1 mg, Intramuscular, Q15 Min PRN, Matilde Garcia APRN    insulin glargine (LANTUS, SEMGLEE) injection 8 Units, 8 Units, Subcutaneous, Daily, Oh Hooks MD, 8 Units at 12/10/23 0827    insulin lispro (HUMALOG/ADMELOG) injection 2-7 Units, 2-7 Units, Subcutaneous, Q6H, Matilde Garcia APRN, 2 Units at 12/10/23 0525    Magnesium Standard Dose Replacement - Follow Nurse / BPA Driven Protocol, , Does not apply, PRN, Oh Hooks MD    metoclopramide (REGLAN) injection 10 mg, 10 mg, Intravenous, Q6H, Gila Landon APRN    metroNIDAZOLE (FLAGYL) IVPB 500 mg, 500 mg, " Intravenous, Q8H, Oh Hooks MD, Last Rate: 100 mL/hr at 12/10/23 0144, 500 mg at 12/10/23 0144    morphine injection 2 mg, 2 mg, Intravenous, Q4H PRN, Matilde Garcia APRN, 2 mg at 12/09/23 1646    nitroglycerin (NITROSTAT) SL tablet 0.4 mg, 0.4 mg, Sublingual, Q5 Min PRN, Berenice Oneil APRN    ondansetron (ZOFRAN) tablet 4 mg, 4 mg, Oral, Q6H PRN **OR** ondansetron (ZOFRAN) injection 4 mg, 4 mg, Intravenous, Q6H PRN, Berenice Oneil APRN    phenol (CHLORASEPTIC) 1.4 % liquid 1 spray, 1 spray, Mouth/Throat, Q2H PRN, Pushpa Monte PA, 1 spray at 12/09/23 1119    Phosphorus Replacement - Follow Nurse / BPA Driven Protocol, , Does not apply, Jessee MIN Satish R, MD    potassium chloride 20 mEq in 50 mL IVPB, 20 mEq, Intravenous, Q1H, Oh Hooks MD    potassium phosphate 15 mmol in 0.9% sodium chloride 100 mL IVPB, 15 mmol, Intravenous, Once, Oh Hooks MD    Potassium Replacement - Follow Nurse / BPA Driven Protocol, , Does not apply, Jessee MIN Satish R, MD    sodium chloride 0.9 % flush 10 mL, 10 mL, Intravenous, PRN, Tony Bhardwaj MD    sodium chloride 0.9 % flush 10 mL, 10 mL, Intravenous, Q12H, Berenice Oneil APRN, 10 mL at 12/09/23 2105    sodium chloride 0.9 % flush 10 mL, 10 mL, Intravenous, PRN, Berenice Oneil APRN    sodium chloride 0.9 % infusion 40 mL, 40 mL, Intravenous, PRN, Berenice Oneil APRN      Objective  Sitting up in chair. NAD.  Family at bedside. NGT in place.     Vital Signs  Temp:  [97.4 °F (36.3 °C)-98.1 °F (36.7 °C)] 97.4 °F (36.3 °C)  Heart Rate:  [] 74  BP: (108-142)/(46-83) 136/75  Physical Exam:    General Appearance:    Awake and alert, in no acute distress   Head:    Normocephalic, without obvious abnormality   Eyes:          Conjunctivae normal, anicteric sclerae   Ears:    Hearing intact   Throat:   No oral lesions, no thrush, oral mucosa moist   Neck:   No adenopathy, supple, no JVD   Lungs:        respirations regular, even and unlabored        Abdomen:     soft, non-tender, no rebound or guarding, non-distended, no hepatosplenomegaly   Rectal:     Deferred   Extremities:   No edema, no cyanosis, no redness   Skin:   No bleeding, bruising or rash, no jaundice   Neurologic:   Cranial nerves 2 - 12 grossly intact, no asterixis, sensation   intact        Results Review:    CBC    Results from last 7 days   Lab Units 12/10/23  0434 12/09/23  0517 12/08/23  1909 12/08/23  0256 12/04/23  1105   WBC 10*3/mm3 15.10* 22.40* 20.10* 10.40 5.83   HEMOGLOBIN g/dL 13.0 13.9 12.7* 13.3 15.8   PLATELETS 10*3/mm3 208 237 201 165 207     CMP   Results from last 7 days   Lab Units 12/10/23  0434 12/09/23  0517 12/08/23  0716 12/08/23  0311 12/04/23  1105   SODIUM mmol/L 156* 148* 135* 131* 142   POTASSIUM mmol/L 3.4* 3.7 3.1* 3.3* 3.8   CHLORIDE mmol/L 120* 112* 98 92* 102   CO2 mmol/L 20.0* 10.0* 14.0* 19.0* 21.9*   BUN mg/dL 18 27* 70* 82* 15   CREATININE mg/dL 0.74* 1.00 1.30* 1.78* 0.92   GLUCOSE mg/dL 219* 183* 138* 163* 146*   ALBUMIN g/dL 2.3* 2.5*  --  2.7* 3.9   BILIRUBIN mg/dL <0.2 <0.2  --  0.5 0.6   ALK PHOS U/L 53 57  --  49 51   AST (SGOT) U/L <5 9  --  15 15   ALT (SGPT) U/L 13 15  --  17 19   MAGNESIUM mg/dL 2.3 2.5*  --   --   --    PHOSPHORUS mg/dL 2.0* 3.3  --   --   --    AMYLASE U/L  --   --   --   --  62   LIPASE U/L  --   --   --  20 16     Cr Clearance Estimated Creatinine Clearance: 126.2 mL/min (A) (by C-G formula based on SCr of 0.74 mg/dL (L)).  Coag   Results from last 7 days   Lab Units 12/08/23  0256   INR  1.01   APTT seconds 34.6*     HbA1C   Lab Results   Component Value Date    HGBA1C 8.30 (H) 10/19/2023    HGBA1C 7.60 (H) 05/17/2023    HGBA1C 6.9 (H) 02/14/2023     Blood Glucose   Glucose   Date/Time Value Ref Range Status   12/10/2023 0435 199 (H) 70 - 105 mg/dL Final     Comment:     Serial Number: [id number]:  [id number]   12/09/2023 2329 202 (H) 70 - 105 mg/dL Final      Comment:     Serial Number: [id number]:  [id number]   12/09/2023 1804 202 (H) 70 - 105 mg/dL Final     Comment:     Serial Number: [id number]:  [id number]   12/09/2023 1140 219 (H) 70 - 105 mg/dL Final     Comment:     Serial Number: [id number]:  [id number]   12/09/2023 0513 180 (H) 70 - 105 mg/dL Final     Comment:     Serial Number: [id number]:  [id number]   12/08/2023 2351 169 (H) 70 - 105 mg/dL Final     Comment:     Serial Number: [id number]:  [id number]   12/08/2023 1718 139 (H) 70 - 105 mg/dL Final     Comment:     Serial Number: [id number]:  [id number]   12/08/2023 1123 142 (H) 70 - 105 mg/dL Final     Comment:     Serial Number: [id number]:  [id number]     Infection   Results from last 7 days   Lab Units 12/08/23  0311 12/08/23  0256   BLOODCX  No growth at 2 days No growth at 2 days   PROCALCITONIN ng/mL 27.83*  --      UA    Results from last 7 days   Lab Units 12/08/23  0450   NITRITE UA  Negative     Radiology(recent) XR Abdomen KUB    Result Date: 12/10/2023  Impression: Persistent small bowel obstruction. Nasogastric tube tip and sidehole overlie the stomach. Electronically Signed: Will Rao MD  12/10/2023 8:58 AM EST  Workstation ID: [id number]    XR Abdomen KUB    Result Date: 12/9/2023  Impression: Persistent small bowel obstruction, similar bowel gas pattern compared to yesterday's radiograph. Electronically Signed: Will Rao MD  12/9/2023 8:04 AM EST  Workstation ID: [id number]    XR Abdomen KUB    Result Date: 12/8/2023  Impression: Increased small bowel dilatation with wall thickening. This may represent early or partial obstruction versus enteritis. Continued follow-up recommended. Electronically Signed: Frannie Barens MD  12/8/2023 12:26 PM EST  Workstation ID: [id number]           Assessment & Plan   63-year-old male with history of CLL, lymphocytic gastritis presented to the hospital with nausea, vomiting, abdominal pain.  CT consistent with  small bowel obstruction.  Also with new onset A-fib.      -Small bowel obstruction  -Abdominal pain  -Nausea, vomiting  -Lymphocytic gastritis  -CLL on [place]  -A-fib-new onset  -Hypotension     Plan  Plan for CT with oral contrast today as he is not progressing.   Continues to have persistent bilious drainage from NGT. Start Reglan. KUB unchanged.   Patient is on Rocephin and Flagyl.  N.p.o.  Continue NG to low wall suction.  Repeat KUB in the morning.  Continue supportive care.     I discussed the patients findings and my recommendations with the patient.      Gila Landon, APRN  12/10/23  09:51 EST

## 2023-12-10 NOTE — PLAN OF CARE
Goal Outcome Evaluation:  Plan of Care Reviewed With: patient        Progress: improving  Outcome Evaluation: Levophed gtt off this AM by previous RN. Patient's BP holding. SIPS-monitor. Repeat CT abd/pelvis with contrast. See results. Patient up to chair majority of shift with no issue. No bowel movement this shift. NG output decreased. Bicarb gtt changed to D5W.

## 2023-12-10 NOTE — PROGRESS NOTES
Critical Care Progress Note   Bharathi Darnell : 1960 MRN:[id number] LOS:2     Principal Problem: Septic shock     Reason for follow up: All the medical problems listed below    Summary     A 63 y.o. male with PMH of CLL, diabetes, hypertension, diverticulosis presented to the hospital for nausea and vomiting and was admitted with a principal diagnosis of Septic shock.  CT abdomen and pelvis showed findings suspicious for small bowel obstruction.  Remained hypotensive in spite of adequate fluid resuscitation and needed vasopressors.  Also started on ceftriaxone and Flagyl for possible enteritis.  GI and general surgery was consulted and recommended conservative management.    Also found to have new onset A-fib.    Significant events     12/10/23 : The patient reports feeling much better today.  He still has occasional, transient low abdominal pain but it is improving.  He denies nausea or vomiting today.  Passing gas but no BM yet.  No shortness of air on room air.  Hemodynamically stable off pressor support.  His only complaint today is the NG tube which is very uncomfortable.  Plan to get him out of bed today and mobilize now that he is off pressor support. DC PICC      Assessment / Plan     Septic Shock due to enteritis  CT abdomen pelvis suspicious for small bowel obstruction versus enteritis.    Procalcitonin 27.83.  Currently on ceftriaxone and Flagyl.  Adequately fluid resuscitated.  Continue with maintenance fluids while NPO.  Avoid fluid overload.  Titrate vasopressors if needed for MAP target of 65.  DC steroids  Echo images reviewed, normal LVEF.    Small bowel obstruction versus enteritis  General surgery following.  Currently no surgery indicated, showing improvement.  Remains n.p.o. with NG tube suction.  GI following, plan for CT with oral contrast today due to lack of progression/improvement     Acute Kidney Injury--resolved/ Metabolic acidosis / Hypernatremia:   Remains nonoliguric.    Likely prerenal. Possible ATN from septic shock.  Discontinue bicarb drip. Add D5W drip x 24 hours secondary to acute hypernatremia  Monitor Input/Output very closely.   Net IO Since Admission: 196.65 mL [12/10/23 1319]      Paroxysmal atrial fibrillation, new diagnosis  Likely sepsis induced. Normal TSH.    Not on any chronic rate control agents due to marginal hypotension.  1 dose of digoxin for improved rate control 12/9.  [id number] Score of at least 2.  Will eventually discharge on Eliquis, no emergency to start at this time, especially if there is a chance of any procedure being performed.  Echo images reviewed, normal LVEF, normal atrial size.  No obvious left atrial thrombus.    Diabetes mellitus Type 2, not insulin-dependent : Not well controlled.   Hold home Farxiga.  Accu checks every 6 hours and c/w humalog coverage as needed.   Last A1c of 8.3.    Essential Hypertension:   Hold home Norvasc and lisinopril due to marginal blood pressure and n.p.o. status.  Restart medications as needed.    Chronic lymphocytic leukemia: Oncology consulted.    Code status:   Code Status (Patient has no pulse and is not breathing): CPR (Attempt to Resuscitate)  Medical Interventions (Patient has pulse or is breathing): Full Support       Nutrition: NPO Diet NPO Type: Strict NPO   Patient isn't on Tube Feeding    DVT prophylaxis:  Medical and mechanical DVT prophylaxis orders are present.     Subjective / Review of systems     Review of Systems   Feels better today.  Reports occasional, low transverse abdominal pain which is sharp and transient. No nausea or vomiting.  Passing some flatus however no stool yet.  Complaining about NG tube and really wants to drink water  Objective / Physical Exam   Vital signs:  Temp: 98 °F (36.7 °C)  BP: 136/75  Heart Rate: 74  Resp: 16  SpO2: 97 %  Weight: 98.4 kg (217 lb)    Admission Weight: Weight: 97.5 kg (215 lb)  Current Weight: Weight: 98.4 kg (217 lb)    Input/Output in last 24  hours:    Intake/Output Summary (Last 24 hours) at 12/10/2023 1319  Last data filed at 12/10/2023 0800  Gross per 24 hour   Intake 3304.65 ml   Output 2875 ml   Net 429.65 ml      Physical Exam  Vitals and nursing note reviewed.   Constitutional:       General: He is not in acute distress.     Appearance: Normal appearance.   HENT:      Head: Normocephalic and atraumatic.      Nose:      Comments: NG tube secured     Mouth/Throat:      Mouth: Mucous membranes are dry.   Eyes:      General: No scleral icterus.     Conjunctiva/sclera: Conjunctivae normal.   Neck:      Comments: Trachea midline, no JVD  Cardiovascular:      Rate and Rhythm: Normal rate.      Heart sounds: No murmur heard.     No gallop.      Comments: Sinus rhythm  Pulmonary:      Breath sounds: Normal breath sounds. No wheezing or rales.   Abdominal:      General: There is no distension.      Palpations: Abdomen is soft.      Tenderness: There is abdominal tenderness. There is no guarding.      Comments: Very hypoactive bowel sounds   Musculoskeletal:      Cervical back: Neck supple.      Right lower leg: No edema.      Left lower leg: No edema.   Skin:     General: Skin is warm and dry.   Neurological:      General: No focal deficit present.      Mental Status: He is alert and oriented to person, place, and time.        Radiology and Labs     Results from last 7 days   Lab Units 12/10/23  0434 12/09/23  0517 12/08/23  1909 12/08/23  0256 12/04/23  1105   WBC 10*3/mm3 15.10* 22.40* 20.10* 10.40 5.83   HEMATOCRIT % 39.1 43.0 38.0 39.9 47.9   PLATELETS 10*3/mm3 208 237 201 165 207      Results from last 7 days   Lab Units 12/10/23  0434 12/09/23  0517 12/08/23  0716 12/08/23  0311 12/04/23  1105   SODIUM mmol/L 156* 148* 135* 131* 142   POTASSIUM mmol/L 3.4* 3.7 3.1* 3.3* 3.8   CHLORIDE mmol/L 120* 112* 98 92* 102   CO2 mmol/L 20.0* 10.0* 14.0* 19.0* 21.9*   BUN mg/dL 18 27* 70* 82* 15   CREATININE mg/dL 0.74* 1.00 1.30* 1.78* 0.92      Current  medications   Scheduled Meds: cefTRIAXone, 1,000 mg, Intravenous, Q24H  enoxaparin, 40 mg, Subcutaneous, Daily  insulin glargine, 8 Units, Subcutaneous, Daily  insulin lispro, 2-7 Units, Subcutaneous, Q6H  metoclopramide, 10 mg, Intravenous, Q6H  metroNIDAZOLE, 500 mg, Intravenous, Q8H  sodium chloride, 10 mL, Intravenous, Q12H      Continuous Infusions: dextrose, 75 mL/hr, Last Rate: 75 mL/hr (12/10/23 1006)        Plan discussed with RN. Reviewed all other data in the last 24 hours, including but not limited to vitals, labs, microbiology, imaging and pertinent notes from other providers. High complexity decision making and high risk of deterioration.    Tammy Dempsey APRN   Critical Care  12/10/23   13:19 EST

## 2023-12-10 NOTE — CONSULTS
[facility]   Consult Note    Patient Name: Bharathi Darnell  : 1960  MRN: [id number]  Primary Care Physician:  Jamar Pham MD  Date of admission: 2023  Service Date and time: 12/10/23 15:19 EST  Subjective   Subjective     Chief Complaint: Nausea vomiting and weakness    HPI:    Bharathi Darnell is a 63 y.o. male with past medical history significant for CLL, diaphragmatic hernia, diverticulosis, hypertension, hyperlipidemia, who presented on 2023 with 1 week history of nausea vomiting and generalized weakness.  Initially was admitted for bowel obstruction diagnosis and septic shock was started on IV fluids, cefepime, Flagyl, received vasopressors, patient was downgraded, he is off vasopressors, NG tube is placed and still draining    Review of Systems   All systems were reviewed and negative except for: HPI    Personal History     Past Medical History:   Diagnosis Date    CLL (chronic lymphocytic leukemia)         DM (diabetes mellitus), type 2     Erectile dysfunction     Hyperlipidemia     Hypertension        Past Surgical History:   Procedure Laterality Date    COLONOSCOPY             Family History: family history includes Diabetes in his mother; Hyperlipidemia in his mother. Otherwise pertinent FHx was reviewed and not pertinent to current issue.    Social History:  reports that he has never smoked. He has never been exposed to tobacco smoke. He has never used smokeless tobacco. He reports that he does not currently use alcohol. He reports that he does not use drugs.    Home Medications:  Acalabrutinib Maleate, Vitamin D3, amLODIPine, dapagliflozin Propanediol, lisinopril, omeprazole, and ondansetron      Allergies:  Allergies   Allergen Reactions    Bactrim [Sulfamethoxazole-Trimethoprim] Rash       Objective   Objective     Vitals:   Temp:  [97.4 °F (36.3 °C)-98.1 °F (36.7 °C)] 98 °F (36.7 °C)  Heart Rate:  [67-86] 74  BP: (108-142)/(46-75) 136/75  Physical  Exam    Constitutional: Awake, alert in no distress   Eyes: PERRLA, sclerae anicteric, no conjunctival injection   HENT: NCAT, mucous membranes moist NG tube is in place    Neck: Supple, no thyromegaly, no lymphadenopathy, trachea midline   Respiratory: Clear to auscultation bilaterally, nonlabored respirations    Cardiovascular: RRR, no murmurs, rubs, or gallops, palpable pedal pulses bilaterally   Gastrointestinal: Positive bowel sounds, soft, nontender, nondistended   Musculoskeletal: No bilateral ankle edema, no clubbing or cyanosis to extremities   Psychiatric: Appropriate affect, cooperative   Neurologic: Oriented x 3, strength symmetric in all extremities, Cranial Nerves grossly intact to confrontation, speech clear   Skin: No rashes     Result Review    Result Review:  I have personally reviewed the results from the time of this admission to 12/10/2023 15:19 EST and agree with these findings:  [x]  Laboratory list / accordion  []  Microbiology  [x]  Radiology  []  EKG/Telemetry   []  Cardiology/Vascular   []  Pathology  []  Old records  []  Other:  Most notable findings include:    The abdomen KUB today showed Persistent small bowel obstruction.     Nasogastric tube tip and sidehole overlie the stomach.     Phosphorus 2.0, magnesium 2.3, glucose 219, BUN 18, creatinine 0.74, sodium 156, potassium 3.4, chloride 120, CO2 20, calcium 9.0, total protein 5.2, albumin 2.3  WBC count 15.1      Assessment & Plan   Assessment / Plan       Active Hospital Problems:  Active Hospital Problems    Diagnosis     **Septic shock due to enteritis     Bowel obstruction     CLL (chronic lymphocytic leukemia)      Plan:   Septic Shock due to enteritis  .  IV antibiotics  Off IV vasopressors     Small bowel obstruction versus enteritis  General surgery following.  Currently n.p.o. with NG tube suction.  CT scan abdomen was done and pending at this time     Acute Kidney Injury / Metabolic acidosis / Hypernatremia:   Remains  nonoliguric.   Likely prerenal. Possible ATN from septic shock.  Changed IV fluids to bicarb drip.  Monitor Input/Output very closely.   Net IO Since Admission: 467 mL [12/09/23 1305]      Paroxysmal atrial fibrillation, new diagnosis  Likely sepsis induced. Normal TSH.    Not on any rate control agents due to hypotension.  Will give 1 dose of digoxin for rate control.  [id number] Score of atleast 2.  Will eventually discharge on Eliquis, no emergency to start at this time.  Echo images reviewed, normal LVEF, normal atrial size.  No obvious left atrial thrombus.     Diabetes mellitus Type 2, not insulin-dependent : well controlled.   Hold home Farxiga.  Accu checks every 6 hours and c/w humalog coverage as needed.   Last A1c of 8.3.     Essential Hypertension: Currently in shock  Hold home Norvasc and lisinopril.  Restart medications as needed.    DVT prophylaxis:  Medical and mechanical DVT prophylaxis orders are present.    CODE STATUS:    Code Status (Patient has no pulse and is not breathing): CPR (Attempt to Resuscitate)  Medical Interventions (Patient has pulse or is breathing): Full Support    Admission Status:  I believe this patient meets inpatient status.    Nicholas Luther MD

## 2023-12-10 NOTE — PROGRESS NOTES
General Surgery Progress Note    Name: Bharathi Darnell ADMIT: 2023   : 1960  PCP: Jamar Pham MD    MRN: [id number] LOS: 2 days   AGE/SEX: 63 y.o. male  ROOM: [address] [place] [address]    Chief Complaint   Patient presents with    Abdominal Pain    Weakness - Generalized     Subjective     Patient seen and examined.  Vital signs stable, afebrile.  Patient has been weaned off of Levophed.  Patient overall looks better than previous days.  Reports feeling slightly better, having minimal pain.  Continues to tolerate ice chips for comfort.  Denies nausea and vomiting.  NG tube is in place with 1250 mL of output recorded over the last 24 hours.  No bowel movement still.  Leukocytosis improving at 15.1 this morning.    Objective     Scheduled Medications:   cefTRIAXone, 1,000 mg, Intravenous, Q24H  enoxaparin, 40 mg, Subcutaneous, Daily  insulin glargine, 8 Units, Subcutaneous, Daily  insulin lispro, 2-7 Units, Subcutaneous, Q6H  metoclopramide, 10 mg, Intravenous, Q6H  metroNIDAZOLE, 500 mg, Intravenous, Q8H  potassium chloride, 20 mEq, Intravenous, Q1H  potassium phosphate, 15 mmol, Intravenous, Once  sodium chloride, 10 mL, Intravenous, Q12H        Active Infusions:  dextrose, 75 mL/hr        As Needed Medications:    acetaminophen **OR** acetaminophen    dextrose    dextrose    glucagon (human recombinant)    Magnesium Standard Dose Replacement - Follow Nurse / BPA Driven Protocol    Morphine    nitroglycerin    ondansetron **OR** ondansetron    phenol    Phosphorus Replacement - Follow Nurse / BPA Driven Protocol    Potassium Replacement - Follow Nurse / BPA Driven Protocol    sodium chloride    sodium chloride    sodium chloride    Vital Signs  Vital Signs Patient Vitals for the past 24 hrs:   BP Temp Temp src Pulse SpO2   12/10/23 0808 -- 97.4 °F (36.3 °C) Oral -- --   12/10/23 08 136/75 -- -- 74 97 %   12/10/23 0700 122/67 -- -- 73 96 %   12/10/23 0600 133/73 -- -- 86 94 %   12/10/23 0500  131/67 -- -- 71 96 %   12/10/23 0400 139/73 -- -- 80 97 %   12/10/23 0300 134/64 -- -- 78 95 %   12/10/23 0200 142/73 -- -- 77 97 %   12/10/23 0100 135/74 -- -- 75 94 %   12/10/23 0000 118/71 -- -- 67 93 %   12/09/23 2352 -- 98 °F (36.7 °C) Oral -- --   12/09/23 2300 131/66 -- -- 73 95 %   12/09/23 2200 137/71 -- -- 83 96 %   12/09/23 2100 129/69 -- -- 70 95 %   12/09/23 2002 -- 98.1 °F (36.7 °C) Oral -- --   12/09/23 2000 127/72 -- -- 73 96 %   12/09/23 1900 132/66 -- -- 80 95 %   12/09/23 1834 131/66 -- -- -- --   12/09/23 1800 115/55 -- -- 79 96 %   12/09/23 1730 136/63 -- -- 78 96 %   12/09/23 1700 114/46 -- -- 76 96 %   12/09/23 1630 108/47 -- -- 74 96 %   12/09/23 1600 127/64 -- -- 73 95 %   12/09/23 1530 129/65 -- -- 75 96 %   12/09/23 1500 109/61 -- -- 69 93 %   12/09/23 1400 122/72 -- -- (!) 141 96 %   12/09/23 1300 121/67 -- -- 77 95 %   12/09/23 1200 124/70 97.5 °F (36.4 °C) Oral (!) 122 100 %   12/09/23 1119 130/62 -- -- -- --   12/09/23 1100 130/62 -- -- 77 99 %   12/09/23 1000 142/83 -- -- 91 98 %     I/O:  I/O last 3 completed shifts:  In: 5665.7 [I.V.:5665.7]  Out: 5250 [Urine:3600; Emesis/NG output:1650]    Physical Exam:  Physical Exam  Constitutional:       General: He is not in acute distress.  Cardiovascular:      Rate and Rhythm: Normal rate.   Pulmonary:      Effort: Pulmonary effort is normal. No respiratory distress.   Abdominal:      General: There is no distension.      Palpations: Abdomen is soft.      Tenderness: There is no abdominal tenderness. There is no guarding.      Comments: NG tube in place   Neurological:      Mental Status: He is alert and oriented to person, place, and time.   Psychiatric:         Mood and Affect: Mood normal.         Results Review:     CBC    Results from last 7 days   Lab Units 12/10/23  0434 12/09/23  0517 12/08/23  1909 12/08/23  0256 12/04/23  1105   WBC 10*3/mm3 15.10* 22.40* 20.10* 10.40 5.83   HEMOGLOBIN g/dL 13.0 13.9 12.7* 13.3 15.8   PLATELETS  10*3/mm3 208 237 201 165 207     BMP   Results from last 7 days   Lab Units 12/10/23  0434 12/09/23  0517 12/08/23  0716 12/08/23  0311 12/04/23  1105   SODIUM mmol/L 156* 148* 135* 131* 142   POTASSIUM mmol/L 3.4* 3.7 3.1* 3.3* 3.8   CHLORIDE mmol/L 120* 112* 98 92* 102   CO2 mmol/L 20.0* 10.0* 14.0* 19.0* 21.9*   BUN mg/dL 18 27* 70* 82* 15   CREATININE mg/dL 0.74* 1.00 1.30* 1.78* 0.92   GLUCOSE mg/dL 219* 183* 138* 163* 146*   MAGNESIUM mg/dL 2.3 2.5*  --   --   --    PHOSPHORUS mg/dL 2.0* 3.3  --   --   --      Radiology(recent) XR Abdomen KUB    Result Date: 12/10/2023  Impression: Persistent small bowel obstruction. Nasogastric tube tip and sidehole overlie the stomach. Electronically Signed: Will Rao MD  12/10/2023 8:58 AM EST  Workstation ID: [id number]    XR Abdomen KUB    Result Date: 12/9/2023  Impression: Persistent small bowel obstruction, similar bowel gas pattern compared to yesterday's radiograph. Electronically Signed: Will Rao MD  12/9/2023 8:04 AM EST  Workstation ID: [id number]    XR Abdomen KUB    Result Date: 12/8/2023  Impression: Increased small bowel dilatation with wall thickening. This may represent early or partial obstruction versus enteritis. Continued follow-up recommended. Electronically Signed: Frannie Barnes MD  12/8/2023 12:26 PM EST  Workstation ID: [id number]     I reviewed the patient's new clinical results.    Assessment & Plan       Septic shock due to enteritis    CLL (chronic lymphocytic leukemia)    Bowel obstruction      63 y.o. male, with a history of CLL, who presents with a history of abdominal pain, nausea, vomiting, loose stools  Repeat KUB this morning shows persistent small bowel obstruction, continue NG tube to low wall suction, continue to monitor output  Okay to continue ice chips for comfort, throat spray as needed  GI cardiology following, will appreciate recommendations  Will continue to follow  Further input from Dr. Stearns to follow      This note was  created using Dragon Voice Recognition software.    JOSE Freitas  12/10/23  09:53 EST

## 2023-12-11 ENCOUNTER — APPOINTMENT (OUTPATIENT)
Dept: GENERAL RADIOLOGY | Facility: HOSPITAL | Age: 63
End: 2023-12-11
Payer: COMMERCIAL

## 2023-12-11 ENCOUNTER — INPATIENT HOSPITAL (AMBULATORY)
Dept: URBAN - METROPOLITAN AREA HOSPITAL 84 | Facility: HOSPITAL | Age: 63
End: 2023-12-11
Payer: COMMERCIAL

## 2023-12-11 DIAGNOSIS — K56.699 OTHER INTESTINAL OBSTRUCTION UNSPECIFIED AS TO PARTIAL VERSU: ICD-10-CM

## 2023-12-11 LAB
ALBUMIN SERPL-MCNC: 2.3 G/DL (ref 3.5–5.2)
ALBUMIN/GLOB SERPL: 0.9 G/DL
ALP SERPL-CCNC: 56 U/L (ref 39–117)
ALT SERPL W P-5'-P-CCNC: 12 U/L (ref 1–41)
ANION GAP SERPL CALCULATED.3IONS-SCNC: 11 MMOL/L (ref 5–15)
AST SERPL-CCNC: 9 U/L (ref 1–40)
BILIRUB SERPL-MCNC: 0.2 MG/DL (ref 0–1.2)
BUN SERPL-MCNC: 15 MG/DL (ref 8–23)
BUN/CREAT SERPL: 27.8 (ref 7–25)
C3 FRG RBC-MCNC: ABNORMAL
CALCIUM SPEC-SCNC: 8.8 MG/DL (ref 8.6–10.5)
CHLORIDE SERPL-SCNC: 118 MMOL/L (ref 98–107)
CO2 SERPL-SCNC: 26 MMOL/L (ref 22–29)
CREAT SERPL-MCNC: 0.54 MG/DL (ref 0.76–1.27)
DEPRECATED RDW RBC AUTO: 49 FL (ref 37–54)
EGFRCR SERPLBLD CKD-EPI 2021: 112 ML/MIN/1.73
ERYTHROCYTE [DISTWIDTH] IN BLOOD BY AUTOMATED COUNT: 15.2 % (ref 12.3–15.4)
GIANT PLATELETS: ABNORMAL
GLOBULIN UR ELPH-MCNC: 2.6 GM/DL
GLUCOSE BLDC GLUCOMTR-MCNC: 154 MG/DL (ref 70–105)
GLUCOSE BLDC GLUCOMTR-MCNC: 207 MG/DL (ref 70–105)
GLUCOSE BLDC GLUCOMTR-MCNC: 213 MG/DL (ref 70–105)
GLUCOSE BLDC GLUCOMTR-MCNC: 253 MG/DL (ref 70–105)
GLUCOSE SERPL-MCNC: 239 MG/DL (ref 65–99)
HCT VFR BLD AUTO: 37.5 % (ref 37.5–51)
HGB BLD-MCNC: 12.5 G/DL (ref 13–17.7)
LAB AP CASE REPORT: NORMAL
LYMPHOCYTES # BLD MANUAL: 5.2 10*3/MM3 (ref 0.7–3.1)
LYMPHOCYTES NFR BLD MANUAL: 1 % (ref 5–12)
MAGNESIUM SERPL-MCNC: 2 MG/DL (ref 1.6–2.4)
MCH RBC QN AUTO: 29.8 PG (ref 26.6–33)
MCHC RBC AUTO-ENTMCNC: 33.4 G/DL (ref 31.5–35.7)
MCV RBC AUTO: 89.2 FL (ref 79–97)
METAMYELOCYTES NFR BLD MANUAL: 2 % (ref 0–0)
MONOCYTES # BLD: 0.1 10*3/MM3 (ref 0.1–0.9)
MYELOCYTES NFR BLD MANUAL: 1 % (ref 0–0)
NEUTROPHILS # BLD AUTO: 4.78 10*3/MM3 (ref 1.7–7)
NEUTROPHILS NFR BLD MANUAL: 38 % (ref 42.7–76)
NEUTS BAND NFR BLD MANUAL: 8 % (ref 0–5)
PATH REPORT.FINAL DX SPEC: NORMAL
PHOSPHATE SERPL-MCNC: 2.2 MG/DL (ref 2.5–4.5)
PHOSPHATE SERPL-MCNC: 3.3 MG/DL (ref 2.5–4.5)
PLATELET # BLD AUTO: 168 10*3/MM3 (ref 140–450)
PMV BLD AUTO: 8.4 FL (ref 6–12)
POIKILOCYTOSIS BLD QL SMEAR: ABNORMAL
POTASSIUM SERPL-SCNC: 3.3 MMOL/L (ref 3.5–5.2)
POTASSIUM SERPL-SCNC: 3.4 MMOL/L (ref 3.5–5.2)
PROT SERPL-MCNC: 4.9 G/DL (ref 6–8.5)
RBC # BLD AUTO: 4.2 10*6/MM3 (ref 4.14–5.8)
SCAN SLIDE: NORMAL
SMUDGE CELLS BLD QL SMEAR: ABNORMAL
SODIUM SERPL-SCNC: 155 MMOL/L (ref 136–145)
TARGETS BLD QL SMEAR: ABNORMAL
VARIANT LYMPHS NFR BLD MANUAL: 50 % (ref 19.6–45.3)
WBC NRBC COR # BLD AUTO: 10.4 10*3/MM3 (ref 3.4–10.8)

## 2023-12-11 PROCEDURE — 85025 COMPLETE CBC W/AUTO DIFF WBC: CPT | Performed by: NURSE PRACTITIONER

## 2023-12-11 PROCEDURE — 25010000002 CEFTRIAXONE PER 250 MG: Performed by: INTERNAL MEDICINE

## 2023-12-11 PROCEDURE — 36415 COLL VENOUS BLD VENIPUNCTURE: CPT | Performed by: NURSE PRACTITIONER

## 2023-12-11 PROCEDURE — 80053 COMPREHEN METABOLIC PANEL: CPT | Performed by: NURSE PRACTITIONER

## 2023-12-11 PROCEDURE — 25010000002 METOCLOPRAMIDE PER 10 MG: Performed by: NURSE PRACTITIONER

## 2023-12-11 PROCEDURE — 25010000002 POTASSIUM CHLORIDE 10 MEQ/100ML SOLUTION: Performed by: INTERNAL MEDICINE

## 2023-12-11 PROCEDURE — 25010000002 METRONIDAZOLE 500 MG/100ML SOLUTION: Performed by: INTERNAL MEDICINE

## 2023-12-11 PROCEDURE — 74018 RADEX ABDOMEN 1 VIEW: CPT

## 2023-12-11 PROCEDURE — 84100 ASSAY OF PHOSPHORUS: CPT | Performed by: NURSE PRACTITIONER

## 2023-12-11 PROCEDURE — 25810000003 SODIUM CHLORIDE 0.9 % SOLUTION: Performed by: INTERNAL MEDICINE

## 2023-12-11 PROCEDURE — 25010000002 ENOXAPARIN PER 10 MG: Performed by: INTERNAL MEDICINE

## 2023-12-11 PROCEDURE — 99231 SBSQ HOSP IP/OBS SF/LOW 25: CPT | Performed by: SURGERY

## 2023-12-11 PROCEDURE — 63710000001 INSULIN LISPRO (HUMAN) PER 5 UNITS

## 2023-12-11 PROCEDURE — 82948 REAGENT STRIP/BLOOD GLUCOSE: CPT

## 2023-12-11 PROCEDURE — 71046 X-RAY EXAM CHEST 2 VIEWS: CPT

## 2023-12-11 PROCEDURE — 84100 ASSAY OF PHOSPHORUS: CPT | Performed by: INTERNAL MEDICINE

## 2023-12-11 PROCEDURE — 99232 SBSQ HOSP IP/OBS MODERATE 35: CPT | Performed by: NURSE PRACTITIONER

## 2023-12-11 PROCEDURE — 84132 ASSAY OF SERUM POTASSIUM: CPT | Performed by: INTERNAL MEDICINE

## 2023-12-11 PROCEDURE — 85007 BL SMEAR W/DIFF WBC COUNT: CPT | Performed by: NURSE PRACTITIONER

## 2023-12-11 PROCEDURE — 83735 ASSAY OF MAGNESIUM: CPT | Performed by: NURSE PRACTITIONER

## 2023-12-11 RX ORDER — POTASSIUM CHLORIDE 20 MEQ/1
40 TABLET, EXTENDED RELEASE ORAL EVERY 4 HOURS
Status: DISCONTINUED | OUTPATIENT
Start: 2023-12-11 | End: 2023-12-12

## 2023-12-11 RX ORDER — FENTANYL/ROPIVACAINE/NS/PF 2-625MCG/1
15 PLASTIC BAG, INJECTION (ML) EPIDURAL ONCE
Status: COMPLETED | OUTPATIENT
Start: 2023-12-11 | End: 2023-12-11

## 2023-12-11 RX ORDER — POTASSIUM CHLORIDE 7.45 MG/ML
10 INJECTION INTRAVENOUS
Status: COMPLETED | OUTPATIENT
Start: 2023-12-11 | End: 2023-12-11

## 2023-12-11 RX ADMIN — CEFTRIAXONE 1000 MG: 1 INJECTION, POWDER, FOR SOLUTION INTRAMUSCULAR; INTRAVENOUS at 10:43

## 2023-12-11 RX ADMIN — ENOXAPARIN SODIUM 40 MG: 100 INJECTION SUBCUTANEOUS at 16:07

## 2023-12-11 RX ADMIN — POTASSIUM CHLORIDE 10 MEQ: 7.46 INJECTION, SOLUTION INTRAVENOUS at 20:31

## 2023-12-11 RX ADMIN — METOCLOPRAMIDE 10 MG: 5 INJECTION, SOLUTION INTRAMUSCULAR; INTRAVENOUS at 10:43

## 2023-12-11 RX ADMIN — Medication 10 ML: at 08:20

## 2023-12-11 RX ADMIN — POTASSIUM CHLORIDE 10 MEQ: 7.46 INJECTION, SOLUTION INTRAVENOUS at 22:36

## 2023-12-11 RX ADMIN — Medication 10 ML: at 20:31

## 2023-12-11 RX ADMIN — INSULIN LISPRO 2 UNITS: 100 INJECTION, SOLUTION INTRAVENOUS; SUBCUTANEOUS at 23:49

## 2023-12-11 RX ADMIN — POTASSIUM CHLORIDE 10 MEQ: 7.46 INJECTION, SOLUTION INTRAVENOUS at 21:37

## 2023-12-11 RX ADMIN — POTASSIUM PHOSPHATE, MONOBASIC POTASSIUM PHOSPHATE, DIBASIC 15 MMOL: 224; 236 INJECTION, SOLUTION, CONCENTRATE INTRAVENOUS at 10:44

## 2023-12-11 RX ADMIN — POTASSIUM CHLORIDE 10 MEQ: 7.46 INJECTION, SOLUTION INTRAVENOUS at 19:22

## 2023-12-11 RX ADMIN — METRONIDAZOLE 500 MG: 500 INJECTION, SOLUTION INTRAVENOUS at 18:04

## 2023-12-11 RX ADMIN — METRONIDAZOLE 500 MG: 500 INJECTION, SOLUTION INTRAVENOUS at 01:48

## 2023-12-11 RX ADMIN — METRONIDAZOLE 500 MG: 500 INJECTION, SOLUTION INTRAVENOUS at 10:44

## 2023-12-11 RX ADMIN — INSULIN LISPRO 3 UNITS: 100 INJECTION, SOLUTION INTRAVENOUS; SUBCUTANEOUS at 18:22

## 2023-12-11 RX ADMIN — METOCLOPRAMIDE 10 MG: 5 INJECTION, SOLUTION INTRAMUSCULAR; INTRAVENOUS at 04:47

## 2023-12-11 RX ADMIN — METOCLOPRAMIDE 10 MG: 5 INJECTION, SOLUTION INTRAMUSCULAR; INTRAVENOUS at 16:07

## 2023-12-11 RX ADMIN — METOCLOPRAMIDE 10 MG: 5 INJECTION, SOLUTION INTRAMUSCULAR; INTRAVENOUS at 22:36

## 2023-12-11 RX ADMIN — EMPAGLIFLOZIN 10 MG: 10 TABLET, FILM COATED ORAL at 13:58

## 2023-12-11 NOTE — PROGRESS NOTES
Hematology/Oncology Inpatient Progress Note    PATIENT NAME: Bharathi Darnell  : 1960  MRN: [id number]    CHIEF COMPLAINT: Stage I CLL and right lung nodules     HISTORY OF PRESENT ILLNESS:    63 y.o. male presented to [facility] on 2023 with complaints of abdominal pain. He started having abdominal pain on 12/3/2023. The pain became worse and he called his gastroenterologist, Dr. Hardeep Hdz. He was treated for constipation and then ended up with diarrhea. He started on pain medications which improved his pain about 50%. On 2023 he was vomiting. He ultimately came to the ED because of increased abdominal pain and he could not eat or drink well.  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  Labs in the ED were as follows: WBC 10.4 with 2% neutrophils, 50% lymphocytes, 32% monocytes, 11% bands, 3% metamyelocytes, 2% atypical lymphocytes, hemoglobin 13.3, platelets 165,000, CMP significant for creatinine 1.78, sodium 131, potassium 3.3, calcium 8.5, blood cultures no growth to date, PTT 34.6, PT 11.0, INR 1.01, Covid-19 negative. CXR showed no active disease.  CT scan of abdomen and pelvis without contrast showed findings were consistent with small bowel obstruction with dilated and fluid-filled loops of predominantly jejunum and proximal to mid ileum. EKG showed atrial fibrillation  with significant change in rhythm, previously sinus. The patient was hypotensive in the ED and was possibly septic. He was treated with IV fluid rehydration, a Levophed drip was initiated, and an NG tube was placed. He was admitted to the ICU for further evaluation and treatment. GI was consulted. Per GI the cause of the  enteritis was thought to be infectious or inflammatory etiology, and a small bowel obstruction was in the  differential as well. GI recommended consultation by Cardiology for new onset atrial fibrillation. There was concern for small bowel ischemia given atrial fibrillation. He was started on IV Unasyn and then switched to IV Rocephin and Flagyl. Repeat KUB on 12/9/2023 showed a persistent small bowel obstruction, similar bowel gas pattern compared to previous day's radiograph. General surgery was consulted and there was no indication for surgical intervention.     12/09/23  Hematology/Oncology was consulted by Jessee Moon MD on this established patient for septic shock due to enteritis. 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  He was diagnosed with CLL, IgVH unmutated, stage I in August of 2022. He had a  right lower lobe lung nodule diagnosed August of 2022.  He was seen by Dr. Marmolejo and underwent evaluation with biopsy performed 07/14/2022 of the midline submental neck mass with cytology revealing chronic lymphocytic leukemia/small lymphocytic lymphoma with 90% of B-cells revealing a CD19 positive/dim CD20 positive/CD52 positive/CD23 positive/FMC7 negative/ positive and lambda-restricted immunophenotype.  On 08/12/2022 bone marrow biopsy with aspiration showed chronic lymphocytic leukemia.  Pattern of involvement was mixed (nodular and interstitial progressing to diffuse).  The extent of the involvement was 70%.  The phenotype was CD20+ (moderate), lambda+, CD5+, CD23+, CD38-.  There was no morphologic evidence of large cell transformation (Sorensen's).  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  The flow cytometry findings were consistent with CLL.  Phenotype of clonal cells:  Lambda+, CD5+, CD23+, CD20+ (moderate), CD81-, +, and CD38-.   Immunohistochemistry showed CLL with CD20 positivity.  Cytogenetics revealed abnormal male karyotype positive for 8p deletion and monosomy 17.  These current cytogenetic results were compatible with lymphoma and supported concurrent morphologic and flow cytometric findings of CLL. IgVH mutation analysis showed unmutated CLL.  Lymphoid neoplasm NGS report showed Tier III variants of unknown significance:  CHRISTIANE p. ([id number]) and BIRC3 p. ([id number]).  Tumor mutation burden was low and microsatellite instability was negative.  This was an [employer] chronic lymphoid neoplasm NGS report.  FISH study revealed monoallelic deletion of [id number] (13q14.3) locus detected.  These deletions are found in approximately 50% of CLL patients and as a sole aberration associated with more favorable clinical outcome, however, patients with more than 70% of these deletions, as in this case, experience shorter time to treatment.  FISH studies were also positive for p53 (17p13) deletion. [facility] on 8/17/2022 showed no abnormal uptake on this study.  There was an 8 mm noncalcified nodule in the right lower lobe of the lung.  There were numerous bilateral cervical lymph nodes, one of the largest measures 17 x 10 mm.  There was bilateral common femoral adenopathy greater on the right side than the left side with the largest node on the right side measuring 2.7 x 0.8 cm.  On 06/30/23  a CT chest with contrast revealed 1.1 cm irregular shaped nodule in the right middle lobe and 0.9 cm well-circumscribed nodule in the right lower lobe.  Hilar, axillary, and upper abdominal adenopathy.  Bilateral adrenal masses and emphysema.  Compared to December 2022, the enlarged axillary and hilar lymph nodes were stable.  The right middle and right lower lobe nodules were stable.  Rounded atelectasis in the posterior left lower lobe was present previously.  The enlarged portacaval node and adrenal nodules were present  previously with no significant change in the interval.  WBC increased from 35,000 in August 2022 to 98,000 in July 2023.  On 08/09/23 patient started acalabrutinib. On 09/06/23 he was started on cycle 1 of obinutuzumab.and received cycle 3 obinutuzumab most recently on 11/3/2023. He was last seen in the office on 11/20/2023 and was due for follow up in 1 month.      PCP: Jamar Pham MD    INTERVAL HISTORY:  12/10/2023 - white blood count 15.10 with 59% lymphocytes.  2D echocardiogram on 12/9/2023 showed left ventricular systolic function was normal. Calculated left ventricular EF = 61.3%. Left ventricular wall thickness was consistent with mild concentric hypertrophy. The left atrial cavity was mildly to moderately dilated. The estimated right ventricular systolic pressure from tricuspid regurgitation was normal. KUB showed persistent small bowel obstruction. Nasogastric tube tip and sidehole was overlying the stomach.   12/11/2023 - white blood count 12.5 with 50% lymphocytes. CT A/P with contrast on 12/10/2023 identified persistent findings of small bowel obstruction, overall small bowel dilatation decreased compared to prior. There was mild diffuse wall thickening of the distal colon and rectum, new trace left pleural effusion and mild dependent bilateral lower lobe consolidation, and right lower lobe may have increased since 12/2022 although motion limited quantification. Recommend follow-up chest CT for additional evaluation. Stable right adrenal lesion and indeterminate left adrenal nodule.    History of present illness reviewed since last visit and changes noted on 12/11/23.    Subjective   Had two bowel movements yesterday and one bowel movement today. He feels better.      ROS:  Review of Systems   Constitutional:  Negative for activity change, chills, fatigue, fever and unexpected weight change.   HENT:  Negative for congestion, dental problem, hearing loss, mouth sores, nosebleeds, sore  throat and trouble swallowing.    Eyes:  Negative for photophobia and visual disturbance.   Respiratory:  Negative for cough, chest tightness and shortness of breath.    Cardiovascular:  Negative for chest pain, palpitations and leg swelling.   Gastrointestinal:  Negative for abdominal distention, abdominal pain, blood in stool, constipation, diarrhea, nausea and vomiting.   Endocrine: Negative for cold intolerance and heat intolerance.   Genitourinary:  Negative for decreased urine volume, difficulty urinating, frequency, hematuria and urgency.   Musculoskeletal:  Negative for arthralgias and gait problem.   Skin:  Negative for rash and wound.   Neurological:  Negative for dizziness, tremors, seizures, speech difficulty, weakness, light-headedness, numbness and headaches.   Hematological:  Negative for adenopathy. Does not bruise/bleed easily.   Psychiatric/Behavioral:  Negative for confusion and hallucinations. The patient is not nervous/anxious.    All other systems reviewed and are negative.    MEDICATIONS:    Scheduled Meds:  cefTRIAXone, 1,000 mg, Intravenous, Q24H  enoxaparin, 40 mg, Subcutaneous, Daily  insulin glargine, 8 Units, Subcutaneous, Daily  insulin lispro, 2-7 Units, Subcutaneous, Q6H  metoclopramide, 10 mg, Intravenous, Q6H  metroNIDAZOLE, 500 mg, Intravenous, Q8H  potassium phosphate, 15 mmol, Intravenous, Once  sodium chloride, 10 mL, Intravenous, Q12H    Continuous Infusions:  dextrose, 75 mL/hr, Last Rate: 75 mL/hr (12/10/23 1006)    PRN Meds:    acetaminophen **OR** acetaminophen    dextrose    dextrose    glucagon (human recombinant)    Magnesium Standard Dose Replacement - Follow Nurse / BPA Driven Protocol    Morphine    nitroglycerin    ondansetron **OR** ondansetron    phenol    Phosphorus Replacement - Follow Nurse / BPA Driven Protocol    Potassium Replacement - Follow Nurse / BPA Driven Protocol    sodium chloride    sodium chloride    sodium chloride     ALLERGIES:    Allergies  "  Allergen Reactions    Bactrim [Sulfamethoxazole-Trimethoprim] Rash     Objective    VITALS:   /95   Pulse 107   Temp 97.5 °F (36.4 °C) (Oral)   Resp 14   Ht 185.4 cm (73\")   Wt 96.5 kg (212 lb 11.9 oz)   SpO2 (!) 87%   BMI 28.07 kg/m²     PHYSICAL EXAM:  Physical Exam  Vitals and nursing note reviewed.   Constitutional:       General: He is not in acute distress.     Appearance: Normal appearance. He is well-developed. He is not diaphoretic.   HENT:      Head: Normocephalic and atraumatic.      Nose: Nose normal.      Comments: NG tube.      Mouth/Throat:      Mouth: Mucous membranes are moist.      Pharynx: Oropharynx is clear. No oropharyngeal exudate or posterior oropharyngeal erythema.      Comments: Dental fillings.  Eyes:      General: No scleral icterus.     Extraocular Movements: Extraocular movements intact.      Conjunctiva/sclera: Conjunctivae normal.      Pupils: Pupils are equal, round, and reactive to light.   Cardiovascular:      Rate and Rhythm: Regular rhythm. Tachycardia present.      Heart sounds: Normal heart sounds. No murmur heard.     Comments: Cardiac monitor leads.   Pulmonary:      Effort: Pulmonary effort is normal. No respiratory distress.      Breath sounds: Normal breath sounds. No stridor. No wheezing or rales.   Abdominal:      General: Bowel sounds are normal. There is no distension.      Palpations: Abdomen is soft. There is no mass.      Tenderness: There is no abdominal tenderness. There is no guarding.      Comments: Bowel sounds present.    Genitourinary:     Comments: Deferred   Musculoskeletal:         General: No swelling, tenderness or deformity. Normal range of motion.      Cervical back: Normal range of motion and neck supple.      Right lower leg: No edema.      Left lower leg: No edema.      Comments: Left hand oxygen saturation monitor.  SCDs.   Bilateral upper extremity peripheral IVs.   Lymphadenopathy:      Cervical: No cervical adenopathy.      Upper " Body:      Right upper body: No supraclavicular adenopathy.      Left upper body: No supraclavicular adenopathy.   Skin:     General: Skin is warm and dry.      Coloration: Skin is not pale.      Findings: No bruising, erythema or rash.   Neurological:      General: No focal deficit present.      Mental Status: He is alert and oriented to person, place, and time.      Coordination: Coordination normal.   Psychiatric:         Mood and Affect: Mood normal.         Behavior: Behavior normal.         Thought Content: Thought content normal.     RECENT LABS:  Lab Results (last 24 hours)       Procedure Component Value Units Date/Time    Pathology Consultation [[id number]] Collected: 12/10/23 0434    Specimen: Blood, Venous Line Updated: 12/11/23 0727    CBC & Differential [[id number]]  (Abnormal) Collected: 12/11/23 0554    Specimen: Blood Updated: 12/11/23 0655    Narrative:      The following orders were created for panel order CBC & Differential.  Procedure                               Abnormality         Status                     ---------                               -----------         ------                     CBC Auto Differential[[id number]]        Abnormal            Final result               Scan Slide[[id number]]                                       Final result                 Please view results for these tests on the individual orders.    CBC Auto Differential [[id number]]  (Abnormal) Collected: 12/11/23 0554    Specimen: Blood Updated: 12/11/23 0655     WBC 10.40 10*3/mm3      RBC 4.20 10*6/mm3      Hemoglobin 12.5 g/dL      Hematocrit 37.5 %      MCV 89.2 fL      MCH 29.8 pg      MCHC 33.4 g/dL      RDW 15.2 %      RDW-SD 49.0 fl      MPV 8.4 fL      Platelets 168 10*3/mm3     Narrative:      The previously reported component NRBC is no longer being reported. Previous result was 0.1 /100 WBC (Reference Range: 0.0-0.2 /100 WBC) on 12/11/2023 at 0609 EST.    Scan Slide [[id number]] Collected: 12/11/23 0554     Specimen: Blood Updated: 12/11/23 0655     Scan Slide --     Comment: See Manual Differential Results       Manual Differential [[id number]]  (Abnormal) Collected: 12/11/23 0554    Specimen: Blood Updated: 12/11/23 0655     Neutrophil % 38.0 %      Lymphocyte % 50.0 %      Monocyte % 1.0 %      Bands %  8.0 %      Metamyelocyte % 2.0 %      Myelocyte % 1.0 %      Neutrophils Absolute 4.78 10*3/mm3      Lymphocytes Absolute 5.20 10*3/mm3      Monocytes Absolute 0.10 10*3/mm3      Poikilocytes Slight/1+     RBC Fragments Slight/1+     Target Cells Slight/1+     Smudge Cells Slight/1+     Giant Platelets Slight/1+    Narrative:      Path Review TF [id number]     POC Glucose Once [[id number]]  (Abnormal) Collected: 12/11/23 0641    Specimen: Blood Updated: 12/11/23 0642     Glucose 213 mg/dL      Comment: Serial Number: [id number]:  [id number]       Magnesium [[id number]]  (Normal) Collected: 12/11/23 0554    Specimen: Blood Updated: 12/11/23 0634     Magnesium 2.0 mg/dL     Phosphorus [[id number]]  (Abnormal) Collected: 12/11/23 0554    Specimen: Blood Updated: 12/11/23 0634     Phosphorus 2.2 mg/dL     Comprehensive Metabolic Panel [[id number]]  (Abnormal) Collected: 12/11/23 0554    Specimen: Blood Updated: 12/11/23 0634     Glucose 239 mg/dL      BUN 15 mg/dL      Creatinine 0.54 mg/dL      Sodium 155 mmol/L      Potassium 3.3 mmol/L      Chloride 118 mmol/L      CO2 26.0 mmol/L      Calcium 8.8 mg/dL      Total Protein 4.9 g/dL      Albumin 2.3 g/dL      ALT (SGPT) 12 U/L      AST (SGOT) 9 U/L      Alkaline Phosphatase 56 U/L      Total Bilirubin 0.2 mg/dL      Globulin 2.6 gm/dL      A/G Ratio 0.9 g/dL      BUN/Creatinine Ratio 27.8     Anion Gap 11.0 mmol/L      eGFR 112.0 mL/min/1.73     Narrative:      GFR Normal >60  Chronic Kidney Disease <60  Kidney Failure <15      Blood Culture - Blood, Arm, Right [[id number]]  (Normal) Collected: 12/08/23 0311    Specimen: Blood from Arm, Right Updated: 12/11/23 0331      Blood Culture No growth at 3 days    Narrative:      Less than seven (7) mL's of blood was collected.  Insufficient quantity may yield false negative results.    Blood Culture - Blood, Arm, Left [[id number]]  (Normal) Collected: 12/08/23 0256    Specimen: Blood from Arm, Left Updated: 12/11/23 0300     Blood Culture No growth at 3 days    POC Glucose Once [[id number]]  (Abnormal) Collected: 12/10/23 2317    Specimen: Blood Updated: 12/10/23 2320     Glucose 211 mg/dL      Comment: Serial Number: [id number]:  [id number]       Phosphorus [[id number]]  (Abnormal) Collected: 12/10/23 1828    Specimen: Blood Updated: 12/10/23 1904     Phosphorus 2.1 mg/dL     Potassium [[id number]]  (Normal) Collected: 12/10/23 1828    Specimen: Blood Updated: 12/10/23 1900     Potassium 3.6 mmol/L     POC Glucose Once [[id number]]  (Abnormal) Collected: 12/10/23 1722    Specimen: Blood Updated: 12/10/23 1724     Glucose 268 mg/dL      Comment: Serial Number: [id number]:  [id number]       POC Glucose Once [[id number]]  (Abnormal) Collected: 12/10/23 1327    Specimen: Blood Updated: 12/10/23 1329     Glucose 235 mg/dL      Comment: Serial Number: [id number]:  [id number]       POC Glucose Once [[id number]]  (Abnormal) Collected: 12/10/23 1212    Specimen: Blood Updated: 12/10/23 1214     Glucose 242 mg/dL      Comment: Serial Number: [id number]:  [id number]             PENDING RESULTS: Blood cultures (final).     IMAGING REVIEWED:  CT Abdomen Pelvis With Contrast    Result Date: 12/10/2023  Impression: 1.Persistent findings of small bowel obstruction. The extent of small bowel dilatation does appear decreased compared to the prior CT from December 10, 2023. There is enteric contrast within dilated bowel loops but there does not appear to be contrast in  distal nondilated loops. Recommend radiographic follow-up to assess for progression of enteric contrast. 2.Enteric tube terminates in the mid stomach. 3.Mild diffuse wall  thickening of the distal colon and rectum which could be related to colitis or underdistention. 4.New trace left pleural effusion and mild dependent bilateral lower lobe consolidation which could represent atelectasis or pneumonia. 5.Right lower lobe may have increased since December 2022 although motion limits quantification. Recommend follow-up chest CT for additional evaluation. 6.Stable right adrenal lesion and indeterminate left adrenal nodule. These may represent adrenal adenomas but could be confirmed with adrenal protocol MRI. 7.Mild body wall edema. Electronically Signed: Vasquez Gomez  12/10/2023 4:53 PM EST  Workstation ID: [id number]    XR Abdomen KUB    Result Date: 12/10/2023  Impression: Persistent small bowel obstruction. Nasogastric tube tip and sidehole overlie the stomach. Electronically Signed: Will Rao MD  12/10/2023 8:58 AM EST  Workstation ID: [id number]     I have reviewed the patient's labs, imaging, reports, and other clinician documentation.    Assessment & Plan   ASSESSMENT  Stage I CLL: Started acalabrutinib in 8/2023. S/p 3 cycles obinutuzumab with WBC decreasing well.  GI side effects of acalabrutinib include: Abdominal pain (15%), constipation (15%), diarrhea (18% to 35%; grade ?3: ?3%), nausea (19% to 22%; grade ?3: <1%), vomiting (13%; grade ?3: 2%) with less than 5% incidence of atrial fibrillation and GI side effects of obinutuzumab include: Constipation (8% to 32%) decreased appetite (14%), diarrhea (monotherapy: ?10%; combination therapy: 10% to 30%; grades 3/4: 2% to 3%).  Holding acalabrutinib and obinutuzumab.   Right lung nodules:  These will be followed on repeat imaging.  Iron-deficiency anemia with heme-positive stool:  He was on ferrous sulfate 325 mg by mouth daily and Pepcid as an outpatient. This was not continued as an inpatient.   Small bowel obstruction versus enteritis, abdominal pain: Gastroenterology was consulted. NG tube placed and is to low wall suction.  General surgery was consulted with no plan for surgical intervention. Received Unasyn and was then switched to IV Rocephin and Flagyl. KUB on 12/10/2023 showed persistent small bowel obstruction.  Doubt SBO related to current CLL therapy. CT A/P showed overall improvement in small bowel obstruction.   Suspected septic shock due to enteritis/Hypotension: Off Levophed drip. Blood cultures show no growth to date. Leukocytosis improved.   Paroxysmal atrial fibrillation: New onset, likely sepsis-induced.  GI recommended Cardiology evaluation but they were not consulted. 2D echocardiogram showed LVEF 61 - 65%. He is on SQ heparin.    BETTIE, metabolic acidosis, and hypernatremia: Management per Primary team. On bicarbonate drip. Hypernatremia and BETTIE resolved.       PLAN   Continue IV antibiotics per Primary team.   Hold oral iron replacement.  Hold acalabrutinib and obinutuzumab.     Electronically signed by REYES Lira, 12/10/23, 10:42 AM EST.      On 12/11/2023, I have personally performed a face-to-face diagnostic evaluation on this patient. I have performed a complete history and physical examination, reviewed laboratory studies, and radiographic examinations.  I have completed the majority and substantive portion of the medical decision making.  I have formulated the assessment on this patient and the plan of action as noted above. I have discussed the case with Génesis Reina NP, have edited/reviewed the note, and agree with the care plan.  He is feeling better today with 2 bowel movements yesterday and 1 this morning.  On examination, bowel sounds are present.  CT reveals less swelling in the small bowel area.  Total white cell count has normalized.  He seems to be responding to conservative management at present.  We will follow.    On 12/11/2023, I discussed the patient's findings and my recommendations with patient and family.    Part of this note may be an electronic transcription/translation of  spoken language to printed text using the Dragon Dictation System.     Electronically signed by Patrick Fontaine MD, 12/11/23, 4:25 PM EST.

## 2023-12-11 NOTE — PROGRESS NOTES
[facility]     Progress Note    Patient Name: Bharathi Darnell  : 1960  MRN: [id number]  Primary Care Physician:  Jamar Pham MD  Date of admission: 2023  Service date and time: 23 14:36 EST  Subjective   Subjective     Chief Complaint: Nausea vomiting and weakness     HPI:  Patient feels better today, denying any abdominal pain      Objective   Objective     Vitals:   Temp:  [97.5 °F (36.4 °C)-98.9 °F (37.2 °C)] 98 °F (36.7 °C)  Heart Rate:  [] 107  Resp:  [14-17] 14  BP: (120-146)/(67-95) 146/95  Physical Exam    Constitutional: Awake, alert in no distress.   Eyes: PERRLA, sclerae anicteric, no conjunctival injection   HENT: NCAT, mucous membranes moist   Neck: Supple, no thyromegaly, no lymphadenopathy, trachea midline   Respiratory: Clear to auscultation bilaterally, nonlabored respirations    Cardiovascular: RRR, no murmurs, rubs, or gallops, palpable pedal pulses bilaterally   Gastrointestinal: Nontender non distended   musculoskeletal: No bilateral ankle edema, no clubbing or cyanosis to extremities   Psychiatric: Appropriate affect, cooperative   Neurologic: Oriented x 3, strength symmetric in all extremities, Cranial Nerves grossly intact to confrontation, speech clear   Skin: No rashes     Result Review    Result Review:  I have personally reviewed the results from the time of this admission to 2023 14:36 EST and agree with these findings:  [x]  Laboratory list / accordion  []  Microbiology  []  Radiology  []  EKG/Telemetry   []  Cardiology/Vascular   []  Pathology  []  Old records  []  Other:  Most notable findings include: KUB showed    Left upper quadrant dilated small bowel loops are suggestive of the small bowel obstruction. The small bowel dilation appears improved since 2023.       Assessment & Plan   Assessment / Plan       Active Hospital Problems:  Active Hospital Problems    Diagnosis     **Septic shock due to enteritis     Bowel  obstruction     CLL (chronic lymphocytic leukemia)      Plan:    Septic Shock due to enteritis  .  IV antibiotics  Off IV vasopressors     Small bowel obstruction versus enteritis  Resolving, patient started on clear liquid diet, NG tube was clamped     Acute Kidney Injury / Metabolic acidosis / Hypernatremia:   Remains nonoliguric.   Likely prerenal. Possible ATN from septic shock.  Changed IV fluids to bicarb drip.  Monitor Input/Output very closely.   Net IO Since Admission: 467 mL [12/09/23 1305]      Paroxysmal atrial fibrillation, new diagnosis  Likely sepsis induced. Normal TSH.    Not on any rate control agents due to hypotension.  Will give 1 dose of digoxin for rate control.  [id number] Score of atleast 2.  Will eventually discharge on Eliquis, no emergency to start at this time.  Echo images reviewed, normal LVEF, normal atrial size.  No obvious left atrial thrombus.     Diabetes mellitus Type 2, not insulin-dependent : well controlled.   Hold home Farxiga.  Accu checks every 6 hours and c/w humalog coverage as needed.   Last A1c of 8.3.     Essential Hypertension: Currently in shock  Hold home Norvasc and lisinopril.  Restart medications as needed.       DVT prophylaxis:  Medical and mechanical DVT prophylaxis orders are present.    CODE STATUS:   Code Status (Patient has no pulse and is not breathing): CPR (Attempt to Resuscitate)  Medical Interventions (Patient has pulse or is breathing): Full Support    Disposition:  I expect patient to be discharged in 2 days.    Nicholas Luther MD

## 2023-12-11 NOTE — CASE MANAGEMENT/SOCIAL WORK
Continued Stay Note  CASSIA Nieto     Patient Name: Bharathi Darnell  MRN: [id number]  Today's Date: 12/11/2023    Admit Date: 12/8/2023    Plan: DC Plan: Home with spouse pending clinical course and PT/OT recommendations.   Discharge Plan       Row Name 12/11/23 1552       Plan    Plan DC Plan: Home with spouse pending clinical course and PT/OT recommendations.    Provided Post Acute Provider List? N/A    Provided Post Acute Provider Quality & Resource List? N/A    Plan Comments CM reviewed chart documentation to obtain clinical updates. Plan to clamp NGT and give patient a clear liquid diet. If able to tolerated then NGT to be discontinued tomorrow. CM will continue to follow for any further needs and adjust discharge plan accordingly. DC Barriers: Cardiac Monitoring, NGT, IV push meds, IV abx/flagyl, and abnormal labs.               Expected Discharge Date and Time       Expected Discharge Date Expected Discharge Time    Dec 14, 2023               Pinky Molina RN    Office Phone: [phone]  Office Cell:     [phone]

## 2023-12-11 NOTE — PAYOR COMM NOTE
"AUTHORIZATION PENDING:   PLEASE CALL OR FAX DETERMINATION TO CONTACT BELOW. THANK YOU.        Fouzia Hackett RN MSN  /UR  [facility]  [phone] office  [phone] fax  [email or web address]    [facility]  NPI: [id number]  Tax: [phone]            Bharathi Darnell (63 y.o. Male)       Date of Birth   1960    Social Security Number       Address   [address] [place] [address] [address] [place] [address]    Home Phone   [phone]    MRN   [id number]       Hinduism   None    Marital Status                               Admission Date   12/8/23    Admission Type   Emergency    Admitting Provider   Oh Hooks MD    Attending Provider   Nicholas Luther MD    Department, Room/Bed   [facility] INTENSIVE CARE UNIT, 2310/1       Discharge Date       Discharge Disposition       Discharge Destination                                 Attending Provider: Nicholas Luther MD    Allergies: Bactrim [Sulfamethoxazole-trimethoprim]    Isolation: None   Infection: None   Code Status: CPR    Ht: 185.4 cm (73\")   Wt: 96.5 kg (212 lb 11.9 oz)    Admission Cmt: None   Principal Problem: Septic shock due to enteritis [A41.9,R65.21]                   Active Insurance as of 12/8/2023       Primary Coverage       Payor Plan Insurance Group Employer/Plan Group    [facility] Transmit Promo [facility] Transmit Promo BLUE [facility] PPO [id number]       Payor Plan Address Payor Plan Phone Number Payor Plan Fax Number Effective Dates    [address] [phone]  7/1/2022 - None Entered    [facility] [id number]         Subscriber Name Subscriber Birth Date Member ID       BHARATHI DARNELL 1960 [id number]                     Emergency Contacts        (Rel.) Home Phone Work Phone Mobile Phone    WAN DARNELL (Spouse) [phone] -- [phone]          12/08/23 0616  Inpatient Admission  Once     Completed     Level of Care: Critical Care  Diagnosis: Bowel obstruction " [003692]  Admitting Physician: YEVGENIY HINTON [[id number]]  Attending Physician: YEVGENIY HINTON [[id number]]  Certification: I Certify That Inpatient Hospital Services Are Medically Necessary For Greater Than 2 Midnights             History & Physical        Matilde GarciaREYES at 23 0223       Attestation signed by Yevgeniy Hinton MD at 23 0822 (Updated)      Attending Addendum     Subjective: Feels better, denies any abdominal pain.  Last bowel movement was yesterday.  No flatus today.    Exam: Alert and awake, abdomen is distended, nontender.  Sluggish bowel sounds.    Assessment / Plan:   Septic shock due to enteritis: Change antibiotics to Unasyn.  Continue with additional fluid resuscitation.  Add stress dose steroids.  Titrate vasopressors for MAP target of 65.  Paroxysmal atrial fibrillation: Likely precipitated by septic shock.  Currently back to sinus rhythm.  CHADS-VASc out of 2, no emergent need for anticoagulation.  Eventually will discharge on Eliquis.  Check echocardiogram.  Acute kidney injury: Likely prerenal along with ATN from septic shock.  Continue with IV fluids.    Dr. Yevgeniy Hinton MD MPH  Staff Intensivist  23   15:30 EST                          Critical Care History and Physical     Bharathi Darnell : 1960 MRN:[id number] LOS:0 ROOM: [facility]/     Reason for admission: Bowel obstruction     Assessment / Plan     Septic Shock: ( WBC>12 or <4 or >10% bands, HR>90, and MAP<65 or SBP<90 )  Likely due to intra-abdominal.  Procalcitonin 27.83  CT abdomen pelvis findings consistent with small bowel obstruction.  Patient has a history of diaphragmatic hernia and diverticulosis of large intestine.  Will do blood / urine / sputum cultures.   Started on cefepime.  Patient received a one-time dose of Flagyl.  Patient received 2925 mL per sepsis bolus.  Patient received 2 additional liters of fluid persistent hypotension in spite of adequate fluid  resuscitation.   Maintenance IV fluid normal saline at 125.  Avoid fluid overload.   Patient currently requiring Levophed. Titrate vasopressors for a target MAP of 65.    Findings consistent with small bowel obstruction on CT  General surgery consulted by ER  Nasogastric tube placed    Acute Kidney injury likely 2/2 to dehydration from GI loss, decrease perfusion  Repeat BMP pending  Monitor electrolytes  Continue to support blood pressure  Monitor I's and O's    New onset atrial fibrillation  Currently in normal sinus  EKG with rhythm changes    Chronic:  Type 2 diabetes mellitus --last A1c 10/19/2023 8.3, Low dose insulin sliding scale  Hypertension --patient currently hypotensive, holding meds  Chronic lymphocytic leukemia        Code Status (Patient has no pulse and is not breathing): CPR (Attempt to Resuscitate)  Medical Interventions (Patient has pulse or is breathing): Full Support       Nutrition:   NPO Diet NPO Type: Strict NPO     DVT prophylaxis:  Mechanical DVT prophylaxis orders are present.     History of Present illness     Bharathi Darnell is a 63 y.o. male with PMH of CLL, diaphragmatic hernia, diverticulosis of large intestine, hypertension, hyperlipidemia, type 2 diabetes mellitus, presented to the hospital for 1 week of nausea, vomiting, and general weakness, and was admitted with a principal diagnosis of Bowel obstruction.     In ER patient was given 30/kg sepsis bolus cefepime and Flagyl. CT abdomen and pelvis showed possible small bowel obstruction. ER MD spoke with general surgery. No immediate need for surgery. Patient remained hypotensive following fluids resuscitation and required to be started on Levophed. Patient admitted to ICU for further monitoring and management.     ACP: CPR, Full Intervention. No ACP docs on file. Patient spouse is the NOK to make decisions for him in the event he is unable.     Patient was seen and examined on 12/08/23 at 08:00 EST .    Subjective / Review of  systems     Review of Systems   Constitutional:  Negative for chills and fever.   Respiratory:  Negative for chest tightness and shortness of breath.    Cardiovascular:  Negative for chest pain.   Gastrointestinal:  Negative for abdominal pain, nausea and vomiting.   Genitourinary:  Negative for difficulty urinating.   Musculoskeletal:  Negative for arthralgias and myalgias.   Neurological:  Negative for dizziness and light-headedness.        Past Medical/Surgical/Social/Family History & Allergies     Past Medical History:   Diagnosis Date    CLL (chronic lymphocytic leukemia)     2022    DM (diabetes mellitus), type 2     Erectile dysfunction     Hyperlipidemia     Hypertension       Past Surgical History:   Procedure Laterality Date    COLONOSCOPY      2019      Social History     Socioeconomic History    Marital status:    Tobacco Use    Smoking status: Never    Smokeless tobacco: Never   Vaping Use    Vaping Use: Never used   Substance and Sexual Activity    Alcohol use: Not Currently    Drug use: Never    Sexual activity: Yes     Partners: Female     Birth control/protection: None      Family History   Problem Relation Age of Onset    Diabetes Mother     Hyperlipidemia Mother       Allergies   Allergen Reactions    Bactrim [Sulfamethoxazole-Trimethoprim] Rash      Social Determinants of Health     Tobacco Use: Low Risk  (10/18/2023)    Patient History     Smoking Tobacco Use: Never     Smokeless Tobacco Use: Never     Passive Exposure: Not on file   Alcohol Use: Not on file   Financial Resource Strain: Not on file   Food Insecurity: Not on file   Transportation Needs: Not on file   Physical Activity: Not on file   Stress: Not on file   Social Connections: Unknown (10/12/2023)    Family and Community Support     Help with Day-to-Day Activities: Not on file     Lonely or Isolated: Not on file   Interpersonal Safety: Unknown (12/8/2023)    Abuse Screen     Unsafe at Home or Work/School: unable to answer  (comment required)     Feels Threatened by Someone?: unable to answer (comment required)     Does Anyone Keep You from Contacting Others or Doint Things Outside the Home?: unable to answer (comment required)     Physical Sign of Abuse Present: no   Depression: Not at risk (10/18/2023)    PHQ-2     PHQ-2 Score: 1   Housing Stability: Unknown (10/12/2023)    Housing Stability     Current Living Arrangements: Not on file     Potentially Unsafe Housing Conditions: Not on file   Utilities: Not on file   Health Literacy: Unknown (10/12/2023)    Education     Help with school or training?: Not on file     Preferred Language: Not on file   Employment: Unknown (10/12/2023)    Employment     Do you want help finding or keeping work or a job?: Not on file   Disabilities: Unknown (10/12/2023)    Disabilities     Concentrating, Remembering, or Making Decisions Difficulty: Not on file     Doing Errands Independently Difficulty: Not on file        Home Medications     Prior to Admission medications    Medication Sig Start Date End Date Taking? Authorizing Provider   allopurinol (ZYLOPRIM) 300 MG tablet  7/25/23   Mart Plaza MD   amLODIPine (NORVASC) 10 MG tablet TAKE 1 TABLET BY MOUTH EVERY DAY 7/24/23   Mahnaz Caldera MD   Blood Glucose Monitoring Suppl (Accu-Chek Guide) w/Device kit 1 Device Daily. 2/14/23   Mahnaz Caldera MD   Calquence 100 MG tablet Take 1 tablet by mouth. 9/29/23   Mart Plaza MD   dapagliflozin Propanediol (Farxiga) 10 MG tablet Take 10 mg by mouth Daily. 10/20/23   Jamar Pham MD   Dulaglutide (Trulicity) 0.75 MG/0.5ML solution pen-injector Inject 0.75 mg under the skin into the appropriate area as directed 1 (One) Time Per Week. 10/20/23   Jamar Pham MD   glucose blood (Accu-Chek Guide) test strip 1 each by Other route Daily. 2/14/23   Mahnaz Caldera MD   hydrOXYzine (ATARAX) 25 MG tablet Take 1 tablet by mouth 3 (Three) Times a Day As Needed for  Itching. 11/9/23   Jamar Pham MD   Lancets (accu-chek multiclix) lancets 1 each by Other route Daily. 2/14/23   Mahnaz Caldera MD   lisinopril (PRINIVIL,ZESTRIL) 40 MG tablet Take 1 tablet by mouth Daily. for blood pressure. 10/18/23   Jamar Pham MD   metFORMIN (GLUCOPHAGE) 500 MG tablet Take 2 tablets by mouth 2 (Two) Times a Day for 90 days. 5/18/23 8/16/23  Mahnaz Caldera MD   mupirocin (BACTROBAN) 2 % cream Apply 1 application topically to the appropriate area as directed 3 (Three) Times a Day. 5/26/23   Mahnaz Caldera MD   sildenafil (VIAGRA) 100 MG tablet TAKE 1 TABLET BY MOUTH PRE INTERCOURSE 5/13/20   Steven Fuller MD   vitamin D (ERGOCALCIFEROL) 1.25 MG (80093 UT) capsule capsule Take 1 capsule by mouth 2 (Two) Times a Week. 11/30/23   Jamar Pham MD        Objective / Physical Exam     Vital signs:  Temp: 98.7 °F (37.1 °C)  BP: (!) 85/57  Heart Rate: (!) 124  Resp: 16  SpO2: 95 %  Weight: 98.8 kg (217 lb 13 oz)    Admission Weight: Weight: 97.5 kg (215 lb)    Physical Exam  Vitals and nursing note reviewed.   Constitutional:       General: He is not in acute distress.     Appearance: He is not ill-appearing.   HENT:      Head: Normocephalic.      Mouth/Throat:      Mouth: Mucous membranes are moist.      Pharynx: Oropharynx is clear.   Eyes:      Extraocular Movements: Extraocular movements intact.      Conjunctiva/sclera: Conjunctivae normal.      Pupils: Pupils are equal, round, and reactive to light.   Cardiovascular:      Rate and Rhythm: Normal rate. Rhythm irregular.      Pulses: Normal pulses.      Heart sounds: Normal heart sounds.   Pulmonary:      Effort: Pulmonary effort is normal.      Breath sounds: Normal breath sounds.   Abdominal:      General: Bowel sounds are normal.      Palpations: Abdomen is soft.   Musculoskeletal:      Right lower leg: No edema.      Left lower leg: No edema.   Skin:     General: Skin is warm and dry.       Findings: No rash.   Neurological:      Mental Status: He is alert and oriented to person, place, and time.   Psychiatric:         Mood and Affect: Mood normal.         Behavior: Behavior normal.        Labs     Results from last 7 days   Lab Units 12/08/23  0256 12/04/23  1105   WBC 10*3/mm3 10.40 5.83   HEMATOCRIT % 39.9 47.9   PLATELETS 10*3/mm3 165 207      Results from last 7 days   Lab Units 12/08/23  0311 12/04/23  1105   SODIUM mmol/L 131* 142   POTASSIUM mmol/L 3.3* 3.8   CHLORIDE mmol/L 92* 102   CO2 mmol/L 19.0* 21.9*   BUN mg/dL 82* 15   CREATININE mg/dL 1.78* 0.92        Imaging     Chest X ray: My independent assessment showed no infiltrates or effusions    EKG: My independent evaluation showed atrial fibrillation no ST -T changes    Current Medications     Scheduled Meds:  cefepime, 2,000 mg, Intravenous, Q12H  senna-docusate sodium, 2 tablet, Oral, BID  sodium chloride, 10 mL, Intravenous, Q12H         Continuous Infusions:  norepinephrine, 0.02-0.3 mcg/kg/min, Last Rate: 0.07 mcg/kg/min (12/08/23 0615)  Pharmacy to Dose Cefepime,   sodium chloride, 125 mL/hr           REYES Nieves   Critical Care  12/08/23   08:00 EST      Electronically signed by Oh Hooks MD at 12/09/23 0831          Emergency Department Notes        Tony Bhardwaj MD at 12/08/23 0358          Subjective   History of Present Illness  Chief complaint weakness and vomiting    History of present illness a 63-year-old male he has history of lymphoma who is on oral agents for chemotherapy and received IV agent last week who complains of a 1 week history of nausea vomiting and cannot hold anything down and general weakness.  No fever chills no ill exposures or foreign travels no antibiotic use or recent hospitalization or injury no one in the home with similar illness no recent long car ride plane or immobilization or vaccinations.  Denies any history of frequency but decreased urine output some bowel movements  but no diarrhea.  No bleeding.  He denies any significant cough congestion or shortness of breath headaches vision change speech difficulty or paralysis.  No rashes.  Nothing really seems to trigger or make it better or worse severe 1 week      Review of Systems   Constitutional:  Negative for chills and fever.   HENT:  Negative for congestion.    Respiratory:  Negative for chest tightness and shortness of breath.    Cardiovascular:  Negative for chest pain and palpitations.   Gastrointestinal:  Positive for abdominal pain and vomiting. Negative for blood in stool.   Genitourinary:  Positive for testicular pain. Negative for decreased urine volume, difficulty urinating and dysuria.   Musculoskeletal:  Negative for back pain and neck pain.   Skin:  Negative for rash.   Neurological:  Negative for facial asymmetry and speech difficulty.   Psychiatric/Behavioral:  Negative for confusion.        Past Medical History:   Diagnosis Date    CLL (chronic lymphocytic leukemia)     2022    DM (diabetes mellitus), type 2     Erectile dysfunction     Hyperlipidemia     Hypertension        Allergies   Allergen Reactions    Bactrim [Sulfamethoxazole-Trimethoprim] Rash       Past Surgical History:   Procedure Laterality Date    COLONOSCOPY      2019       Family History   Problem Relation Age of Onset    Diabetes Mother     Hyperlipidemia Mother        Social History     Socioeconomic History    Marital status:    Tobacco Use    Smoking status: Never    Smokeless tobacco: Never   Vaping Use    Vaping Use: Never used   Substance and Sexual Activity    Alcohol use: Not Currently    Drug use: Never    Sexual activity: Yes     Partners: Female     Birth control/protection: None     Prior to Admission medications    Medication Sig Start Date End Date Taking? Authorizing Provider   allopurinol (ZYLOPRIM) 300 MG tablet  7/25/23   Provider, MD Mart   amLODIPine (NORVASC) 10 MG tablet TAKE 1 TABLET BY MOUTH EVERY DAY 7/24/23    Mahnaz Caldera MD   Blood Glucose Monitoring Suppl (Accu-Chek Guide) w/Device kit 1 Device Daily. 2/14/23   Mahnaz Caldera MD   Calquence 100 MG tablet Take 1 tablet by mouth. 9/29/23   Provider, MD Mart   dapagliflozin Propanediol (Farxiga) 10 MG tablet Take 10 mg by mouth Daily. 10/20/23   Jamar Pham MD   Dulaglutide (Trulicity) 0.75 MG/0.5ML solution pen-injector Inject 0.75 mg under the skin into the appropriate area as directed 1 (One) Time Per Week. 10/20/23   Jamar Pham MD   glucose blood (Accu-Chek Guide) test strip 1 each by Other route Daily. 2/14/23   Mahnaz Caldera MD   hydrOXYzine (ATARAX) 25 MG tablet Take 1 tablet by mouth 3 (Three) Times a Day As Needed for Itching. 11/9/23   Jamar Pham MD   Lancets (accu-chek multiclix) lancets 1 each by Other route Daily. 2/14/23   Mahnaz Caldera MD   lisinopril (PRINIVIL,ZESTRIL) 40 MG tablet Take 1 tablet by mouth Daily. for blood pressure. 10/18/23   Jamar Pham MD   metFORMIN (GLUCOPHAGE) 500 MG tablet Take 2 tablets by mouth 2 (Two) Times a Day for 90 days. 5/18/23 8/16/23  Mahnaz Caldera MD   mupirocin (BACTROBAN) 2 % cream Apply 1 application topically to the appropriate area as directed 3 (Three) Times a Day. 5/26/23   Mahnaz Caldera MD   sildenafil (VIAGRA) 100 MG tablet TAKE 1 TABLET BY MOUTH PRE INTERCOURSE 5/13/20   Steven Fuller MD   vitamin D (ERGOCALCIFEROL) 1.25 MG (87989 UT) capsule capsule Take 1 capsule by mouth 2 (Two) Times a Week. 11/30/23   Jamar Pham MD          Objective   Physical Exam  Constitutional this is a 63-year-old gentleman awake alert triage vital signs reviewed initial blood pressure was in the 60s.  HEENT extraocular muscles are intact pupils equal round reactive.  Mouth clear but dry.  Neck supple no adenopathy no JV no bruits no meningeal signs.  Lungs clear no retractions heart irregular without murmur abdomen soft with some  mild diffuse tenderness but no rebound no guarding good bowel sounds no peritoneal findings or pulsatile masses extremities pulses equal upper and lower extremities no edema no cords no Homans' sign no evidence of DVT skin is warm and dry without rashes or cellulitic changes.  Neurologic awake alert follows commands no facial asymmetry speech normal without focal weakness  Procedures          ED Course      Results for orders placed or performed during the hospital encounter of 12/08/23   COVID-19 and FLU A/B PCR, 1 HR TAT - Swab, Nasopharynx    Specimen: Nasopharynx; Swab   Result Value Ref Range    COVID19 Not Detected Not Detected - Ref. Range    Influenza A PCR Not Detected Not Detected    Influenza B PCR Not Detected Not Detected   Comprehensive Metabolic Panel    Specimen: Arm, Right; Blood   Result Value Ref Range    Glucose 163 (H) 65 - 99 mg/dL    BUN 82 (H) 8 - 23 mg/dL    Creatinine 1.78 (H) 0.76 - 1.27 mg/dL    Sodium 131 (L) 136 - 145 mmol/L    Potassium 3.3 (L) 3.5 - 5.2 mmol/L    Chloride 92 (L) 98 - 107 mmol/L    CO2 19.0 (L) 22.0 - 29.0 mmol/L    Calcium 8.5 (L) 8.6 - 10.5 mg/dL    Total Protein 5.8 (L) 6.0 - 8.5 g/dL    Albumin 2.7 (L) 3.5 - 5.2 g/dL    ALT (SGPT) 17 1 - 41 U/L    AST (SGOT) 15 1 - 40 U/L    Alkaline Phosphatase 49 39 - 117 U/L    Total Bilirubin 0.5 0.0 - 1.2 mg/dL    Globulin 3.1 gm/dL    A/G Ratio 0.9 g/dL    BUN/Creatinine Ratio 46.1 (H) 7.0 - 25.0    Anion Gap 20.0 (H) 5.0 - 15.0 mmol/L    eGFR 42.3 (L) >60.0 mL/min/1.73   Protime-INR    Specimen: Blood   Result Value Ref Range    Protime 11.0 9.6 - 11.7 Seconds    INR 1.01 0.93 - 1.10   aPTT    Specimen: Blood   Result Value Ref Range    PTT 34.6 (H) 24.0 - 31.0 seconds   Procalcitonin    Specimen: Arm, Right; Blood   Result Value Ref Range    Procalcitonin 27.83 (H) 0.00 - 0.25 ng/mL   High Sensitivity Troponin T    Specimen: Arm, Right; Blood   Result Value Ref Range    HS Troponin T 21 <22 ng/L   Urinalysis With  Microscopic If Indicated (No Culture) - Urine, Catheter    Specimen: Urine, Catheter   Result Value Ref Range    Color, UA Yellow Yellow, Straw    Appearance, UA Clear Clear    pH, UA <=5.0 5.0 - 8.0    Specific Gravity, UA 1.015 1.005 - 1.030    Glucose, UA >=1000 mg/dL (3+) (A) Negative    Ketones, UA 15 mg/dL (1+) (A) Negative    Bilirubin, UA Negative Negative    Blood, UA Negative Negative    Protein, UA Trace (A) Negative    Leuk Esterase, UA Negative Negative    Nitrite, UA Negative Negative    Urobilinogen, UA 0.2 E.U./dL 0.2 - 1.0 E.U./dL   CBC Auto Differential    Specimen: Blood   Result Value Ref Range    WBC 10.40 3.40 - 10.80 10*3/mm3    RBC 4.46 4.14 - 5.80 10*6/mm3    Hemoglobin 13.3 13.0 - 17.7 g/dL    Hematocrit 39.9 37.5 - 51.0 %    MCV 89.5 79.0 - 97.0 fL    MCH 29.7 26.6 - 33.0 pg    MCHC 33.2 31.5 - 35.7 g/dL    RDW 14.6 12.3 - 15.4 %    RDW-SD 46.8 37.0 - 54.0 fl    MPV 9.5 6.0 - 12.0 fL    Platelets 165 140 - 450 10*3/mm3   Lipase    Specimen: Arm, Right; Blood   Result Value Ref Range    Lipase 20 13 - 60 U/L   Scan Slide    Specimen: Blood   Result Value Ref Range    Scan Slide     Manual Differential    Specimen: Blood   Result Value Ref Range    Neutrophil % 61.0 42.7 - 76.0 %    Lymphocyte % 25.0 19.6 - 45.3 %    Monocyte % 8.0 5.0 - 12.0 %    Eosinophil % 3.0 0.3 - 6.2 %    Basophil % 1.0 0.0 - 1.5 %    Atypical Lymphocyte % 2.0 0.0 - 5.0 %    Neutrophils Absolute 6.34 1.70 - 7.00 10*3/mm3    Lymphocytes Absolute 2.81 0.70 - 3.10 10*3/mm3    Monocytes Absolute 0.83 0.10 - 0.90 10*3/mm3    Eosinophils Absolute 0.31 0.00 - 0.40 10*3/mm3    Basophils Absolute 0.10 0.00 - 0.20 10*3/mm3    nRBC 2.0 (H) 0.0 - 0.2 /100 WBC    Acanthocytes Slight/1+ None Seen    Dacrocytes Slight/1+ None Seen    Poikilocytes Slight/1+ None Seen    RBC Fragments Slight/1+ None Seen    Target Cells Slight/1+ None Seen    WBC Morphology Normal Normal    Platelet Estimate Adequate Normal    Large Platelets  Slight/1+ None Seen    Giant Platelets Slight/1+ None Seen   POC Lactate    Specimen: Blood   Result Value Ref Range    Lactate 1.5 0.3 - 2.0 mmol/L   ECG 12 Lead Altered Mental Status   Result Value Ref Range    QT Interval 331 ms    QTC Interval 482 ms   Green Top (Gel)   Result Value Ref Range    Extra Tube Hold for add-ons.    Lavender Top   Result Value Ref Range    Extra Tube hold for add-on    Gold Top - SST   Result Value Ref Range    Extra Tube Hold for add-ons.    Light Blue Top   Result Value Ref Range    Extra Tube Hold for add-ons.    Lavender Top   Result Value Ref Range    Extra Tube hold for add-on      CT Abdomen Pelvis Without Contrast    Result Date: 12/8/2023  Impression: Findings are consistent with small bowel obstruction with dilated and fluid-filled loops of predominantly jejunum and proximal to mid ileum Electronically Signed: Ga Farfan MD  12/8/2023 5:03 AM EST  Workstation ID: [id number]    XR Chest 1 View    Result Date: 12/8/2023  Impression: No active disease Electronically Signed: Ga Farfan MD  12/8/2023 3:18 AM EST  Workstation ID: [id number]   Medications   sodium chloride 0.9 % flush 10 mL (has no administration in time range)   lactated ringers bolus 1,000 mL (has no administration in time range)   metroNIDAZOLE (FLAGYL) IVPB 500 mg (has no administration in time range)   norepinephrine (LEVOPHED) 8 mg in 250 mL NS infusion (premix) (has no administration in time range)   sepsis fluid NS 0.9 % bolus 2,925 mL (2,925 mL Intravenous New Bag 12/8/23 0301)   cefepime 2 gm IVPB in 100 ml NS (MBP) (2,000 mg Intravenous New Bag 12/8/23 0355)       SEPTIC SHOCK FOCUSED EXAM ATTESTATION    I attest that I have reassessed tissue perfusion after the fluid bolus given.    Tony Bhardwaj MD  12/08/23  05:25 EST                                 EKG my interpretation atrial fibrillation rate of 127 normal axis no hypertrophy QTc of 482 abnormal EKG unchanged from previous normal sinus  rhythm  10/25/2023       Medical Decision Making  Medical decision making.  IV established monitor placement obvious atrial fibrillation with a rate of 120 blood pressure in the 60s.  Sepsis protocol triggered and severe sepsis orders placed.  Patient labs including cultures obtained and started on IV normal saline 30 mill per kilo antibiotics cefepime provided.  Labs obtained independent reviewed by me COVID-19 and flu negative.  Comprehensive metabolic profile blood sugar 163 BUN of 82 creatinine 1.7 sodium 131 potassium is 3.3 CO2 of 19 liver enzymes unremarkable Promin 21.  The patient CBC normal lactate was 1.5 cultures pending lipase 20.  Chest x-ray my dependent review no pneumonia pneumothorax or acute findings radiology unremarkable.  The patient repeat examination and 3:50 AM blood pressure is up to 94/43 heart rate now down to about 90 on the monitor.  He is awake and alert he follows commands lungs were clear heart was regular without murmur abdomen was soft mild diffuse tenderness good cap refill good skin turgor he is awake alert mentating well without focal weakness.  Patient's blood pressure came up to the 94 range but then fell back into the 89/44 with a MAP of 50 heart rate about 120 still in atrial fibrillation which is new onset.  The patient has what looks like bilateral radiology report on the CT scan of small bowel obstruction my dependent review I do not see free air perforation diverticulitis or pancreatitis but consider bowel obstruction versus a large ileus.  The patient will be started on the third liter lactated Ringer's and Levophed to get his pressure up.  I talked to the intensivist nurse practitioner Case discussed patient made aware of the findings he will have a nasogastric tube placed as well I talked to the surgeon Dr. Foreman on-call she will see the patient and evaluate further.  Patient is significantly dehydrated and I suspect that the etiology of his hypotension lactate is  okay Pro-[place] is elevated he has been given cefepime and Flagyl as well.  He will be admitted to ICU for further care.  Critical care of 30 minutes.  Patient made aware of the findings as well as the family.  He is significantly ill.  He will need several specialty evaluations with surgery and cardiology and GI.  Patient started on Levophed nasogastric tube placed blood pressure with a MAP of 65 after starting Levophed.  He is mentating well.  Good cap refill lungs clear heart irregular tachycardic in about the 110 range.  His skin turgor.  He is awake alert and follows commands carrying on a conversation.    Problems Addressed:  Acute kidney injury: complicated acute illness or injury  Dehydration: complicated acute illness or injury  Hypotension, unspecified hypotension type: complicated acute illness or injury  Persistent vomiting: complicated acute illness or injury  Sepsis, due to unspecified organism, unspecified whether acute organ dysfunction present: complicated acute illness or injury  Small bowel obstruction: complicated acute illness or injury  Weakness: complicated acute illness or injury    Amount and/or Complexity of Data Reviewed  Labs: ordered. Decision-making details documented in ED Course.  Radiology: ordered and independent interpretation performed. Decision-making details documented in ED Course.  ECG/medicine tests: ordered and independent interpretation performed. Decision-making details documented in ED Course.    Risk  Prescription drug management.  Decision regarding hospitalization.        Final diagnoses:   Weakness   Persistent vomiting   Acute kidney injury   Dehydration   Small bowel obstruction   Hypotension, unspecified hypotension type   Sepsis, due to unspecified organism, unspecified whether acute organ dysfunction present       ED Disposition  ED Disposition       ED Disposition   Decision to Admit    Condition   --    Comment   Level of Care: Critical Care [6]   Admitting  Physician: ELLE BHARDWAJ [[id number]]   Attending Physician: ELLE BHARDWAJ [[id number]]                 No follow-up provider specified.       Medication List      No changes were made to your prescriptions during this visit.            Tony Bhardwaj MD  23 0537       Tony Bhardwaj MD  23      Electronically signed by Tony Bhardwaj MD at 23       Popeye Adair, RN at 23          Pt arrived via Pv with family. Family states pt has been c/o abd pain and n/v since Monday and had increased generalized weakness and lethargy. Pt was seen by PCP and was told they would be directly admitted tomorrow but family states she does not think he can wait that long    Electronically signed by Popeye Adair, RN at 23 022       Operative/Procedure Notes (all)    No notes of this type exist for this encounter.          Physician Progress Notes (all)        Nicholas Luther MD at 23 1436           [facility]     Progress Note    Patient Name: Bharathi Darnell  : 1960  MRN: [id number]  Primary Care Physician:  Jamar Pham MD  Date of admission: 2023  Service date and time: 23 14:36 EST  Subjective   Subjective     Chief Complaint: Nausea vomiting and weakness     HPI:  Patient feels better today, denying any abdominal pain      Objective   Objective     Vitals:   Temp:  [97.5 °F (36.4 °C)-98.9 °F (37.2 °C)] 98 °F (36.7 °C)  Heart Rate:  [] 107  Resp:  [14-17] 14  BP: (120-146)/(67-95) 146/95  Physical Exam    Constitutional: Awake, alert in no distress.   Eyes: PERRLA, sclerae anicteric, no conjunctival injection   HENT: NCAT, mucous membranes moist   Neck: Supple, no thyromegaly, no lymphadenopathy, trachea midline   Respiratory: Clear to auscultation bilaterally, nonlabored respirations    Cardiovascular: RRR, no murmurs, rubs, or gallops, palpable pedal pulses bilaterally   Gastrointestinal: Nontender non  distended   musculoskeletal: No bilateral ankle edema, no clubbing or cyanosis to extremities   Psychiatric: Appropriate affect, cooperative   Neurologic: Oriented x 3, strength symmetric in all extremities, Cranial Nerves grossly intact to confrontation, speech clear   Skin: No rashes     Result Review    Result Review:  I have personally reviewed the results from the time of this admission to 12/11/2023 14:36 EST and agree with these findings:  [x]  Laboratory list / accordion  []  Microbiology  []  Radiology  []  EKG/Telemetry   []  Cardiology/Vascular   []  Pathology  []  Old records  []  Other:  Most notable findings include: KUB showed    Left upper quadrant dilated small bowel loops are suggestive of the small bowel obstruction. The small bowel dilation appears improved since 12/8/2023.       Assessment & Plan   Assessment / Plan       Active Hospital Problems:  Active Hospital Problems    Diagnosis     **Septic shock due to enteritis     Bowel obstruction     CLL (chronic lymphocytic leukemia)      Plan:    Septic Shock due to enteritis  .  IV antibiotics  Off IV vasopressors     Small bowel obstruction versus enteritis  Resolving, patient started on clear liquid diet, NG tube was clamped     Acute Kidney Injury / Metabolic acidosis / Hypernatremia:   Remains nonoliguric.   Likely prerenal. Possible ATN from septic shock.  Changed IV fluids to bicarb drip.  Monitor Input/Output very closely.   Net IO Since Admission: 467 mL [12/09/23 1305]      Paroxysmal atrial fibrillation, new diagnosis  Likely sepsis induced. Normal TSH.    Not on any rate control agents due to hypotension.  Will give 1 dose of digoxin for rate control.  [id number] Score of atleast 2.  Will eventually discharge on Eliquis, no emergency to start at this time.  Echo images reviewed, normal LVEF, normal atrial size.  No obvious left atrial thrombus.     Diabetes mellitus Type 2, not insulin-dependent : well controlled.   Hold home  Farxiga.  Accu checks every 6 hours and c/w humalog coverage as needed.   Last A1c of 8.3.     Essential Hypertension: Currently in shock  Hold home Norvasc and lisinopril.  Restart medications as needed.       DVT prophylaxis:  Medical and mechanical DVT prophylaxis orders are present.    CODE STATUS:   Code Status (Patient has no pulse and is not breathing): CPR (Attempt to Resuscitate)  Medical Interventions (Patient has pulse or is breathing): Full Support    Disposition:  I expect patient to be discharged in 2 days.    Nicholas Luther MD    Electronically signed by Nicholas Luther MD at 23 1442       Pushpa Monte PA at 23 1135       Attestation signed by Jennifer Foreman MD at 23 1517    I have reviewed this documentation and agree.      Patient seen and examined independently and chart and labs reviewed.  Patient is doing much better.  Denies pain.  Denies N/V and NG tube clamped and patient on clears.  Passing flatus and had BM.    Labs reviewed, no leukocytosis.  BETTIE resolved.    AFVSS  NAD, alert  NG tube clamped  Nonlabored respirations  Abdomen soft, mildly distended, NTTP    KUB and chest x-ray reviewed    63 year old male with CLL recently received chemotherapy admitted with BETTIE, abdominal pain, small bowel dilation.    - Suspect symptoms secondary to chemotherapy and hypotension related to dehydration and patient has never appeared to be clinically obstructed  - Low suspicion for small bowel obstruction given no prior abdominal surgeries and patient was having bowel movements prior to admission  - Continue clears today with NG clamped and if patient is able to tolerate clears without any worsening of symptoms and continues to pass flatus/have bowel movements we will plan to discontinue NG tube tomorrow and advance diet    Jennifer Foreman MD  General Surgery                General Surgery Progress Note    Name: Bharathi Darnell ADMIT: 2023   : 1960  PCP: Ankit  Jamar GARCIA MD    MRN: [id number] LOS: 3 days   AGE/SEX: 63 y.o. male  ROOM: 2310/1   [facility]    Chief Complaint   Patient presents with    Abdominal Pain    Weakness - Generalized     Subjective     Patient seen and examined. Mild tachycardia, but overall vitals appear stable. Afebrile. Has remained off pressors. Appears to be doing better overall. Tolerating sips of clears, denies nausea and vomiting. Had multiple BMs yesterday and has had one this morning. He described his stool as normal, without blood. Denies pain at time of examination. NGT in place with 550 mL recorded overnight. States that he's starting to get hungry. WBC count has normalized at 10.4 (15.1). K and phos are low on CMP this AM.    Objective     Scheduled Medications:   cefTRIAXone, 1,000 mg, Intravenous, Q24H  enoxaparin, 40 mg, Subcutaneous, Daily  insulin glargine, 8 Units, Subcutaneous, Daily  insulin lispro, 2-7 Units, Subcutaneous, Q6H  metoclopramide, 10 mg, Intravenous, Q6H  metroNIDAZOLE, 500 mg, Intravenous, Q8H  potassium phosphate, 15 mmol, Intravenous, Once  sodium chloride, 10 mL, Intravenous, Q12H        Active Infusions:       As Needed Medications:    acetaminophen **OR** acetaminophen    dextrose    dextrose    glucagon (human recombinant)    Magnesium Standard Dose Replacement - Follow Nurse / BPA Driven Protocol    Morphine    nitroglycerin    ondansetron **OR** ondansetron    phenol    Phosphorus Replacement - Follow Nurse / BPA Driven Protocol    Potassium Replacement - Follow Nurse / BPA Driven Protocol    sodium chloride    sodium chloride    sodium chloride    Vital Signs  Vital Signs Patient Vitals for the past 24 hrs:   BP Temp Temp src Pulse Resp SpO2 Weight   12/11/23 0700 -- 97.5 °F (36.4 °C) Oral -- -- -- --   12/11/23 0600 146/95 -- -- 107 -- (!) 87 % --   12/11/23 0500 137/71 -- -- 65 -- 95 % 96.5 kg (212 lb 11.9 oz)   12/11/23 0400 136/70 98 °F (36.7 °C) Oral 71 14 95 % --   12/11/23 0300 134/69 -- -- 59 -- 96  % --   12/11/23 0200 128/69 -- -- 64 -- 96 % --   12/11/23 0100 128/73 -- -- 68 -- 95 % --   12/11/23 0000 133/77 98.9 °F (37.2 °C) Oral 72 14 95 % --   12/10/23 2300 120/67 -- -- 74 -- 90 % --   12/10/23 2200 135/72 -- -- 86 -- 95 % --   12/10/23 2135 -- -- -- 73 -- 95 % --   12/10/23 2130 144/70 -- -- 74 -- 97 % --   12/10/23 2105 142/68 -- -- 75 -- 96 % --   12/10/23 2100 -- -- -- 78 -- 95 % --   12/10/23 2030 125/75 -- -- 84 -- 95 % --   12/10/23 2000 135/76 98.4 °F (36.9 °C) Oral 75 17 95 % --   12/10/23 1900 -- -- -- 101 -- 97 % --   12/10/23 1700 131/72 -- -- 87 -- 91 % --   12/10/23 1600 137/73 -- -- 84 -- 97 % --   12/10/23 1542 -- 98.1 °F (36.7 °C) Oral -- -- -- --   12/10/23 1500 136/67 -- -- 82 -- 94 % --   12/10/23 1400 122/62 -- -- 86 -- 94 % --   12/10/23 1200 120/72 -- -- 96 -- 95 % --   12/10/23 1147 -- 98 °F (36.7 °C) Oral -- -- -- --     I/O:  I/O last 3 completed shifts:  In: 2240 [I.V.:2240]  Out: 3075 [Urine:1775; Emesis/NG output:1300]    Physical Exam:  Physical Exam  Constitutional:       General: He is not in acute distress.  Cardiovascular:      Rate and Rhythm: Tachycardia present.   Pulmonary:      Effort: No respiratory distress.      Comments: On O2  Abdominal:      General: There is no distension.      Palpations: Abdomen is soft.      Tenderness: There is no abdominal tenderness. There is no guarding.   Neurological:      Mental Status: He is alert and oriented to person, place, and time.   Psychiatric:         Mood and Affect: Mood normal.         Results Review:     CBC    Results from last 7 days   Lab Units 12/11/23  0554 12/10/23  0434 12/09/23  0517 12/08/23  1909 12/08/23  0256   WBC 10*3/mm3 10.40 15.10* 22.40* 20.10* 10.40   HEMOGLOBIN g/dL 12.5* 13.0 13.9 12.7* 13.3   PLATELETS 10*3/mm3 168 208 237 201 165     BMP   Results from last 7 days   Lab Units 12/11/23  0554 12/10/23  1828 12/10/23  0434 12/09/23  0517 12/08/23  0716 12/08/23  0311   SODIUM mmol/L 155*  --  156*  148* 135* 131*   POTASSIUM mmol/L 3.3* 3.6 3.4* 3.7 3.1* 3.3*   CHLORIDE mmol/L 118*  --  120* 112* 98 92*   CO2 mmol/L 26.0  --  20.0* 10.0* 14.0* 19.0*   BUN mg/dL 15  --  18 27* 70* 82*   CREATININE mg/dL 0.54*  --  0.74* 1.00 1.30* 1.78*   GLUCOSE mg/dL 239*  --  219* 183* 138* 163*   MAGNESIUM mg/dL 2.0  --  2.3 2.5*  --   --    PHOSPHORUS mg/dL 2.2* 2.1* 2.0* 3.3  --   --      Radiology(recent) XR Abdomen KUB    Result Date: 12/11/2023  Impression: Left upper quadrant dilated small bowel loops are suggestive of the small bowel obstruction. The small bowel dilation appears improved since 12/8/2023. Electronically Signed: Altagracia Maciel MD  12/11/2023 10:48 AM EST  Workstation ID: [id number]    XR Chest PA & Lateral    Result Date: 12/11/2023  Impression: 1. No acute chest finding. 2. Dilated small bowel loops in the upper abdomen. Correlate with recent CT abdomen and pelvis study from 12/10/2023. Electronically Signed: Altagracia Maciel MD  12/11/2023 10:43 AM EST  Workstation ID: [id number]    CT Abdomen Pelvis With Contrast    Result Date: 12/10/2023  Impression: 1.Persistent findings of small bowel obstruction. The extent of small bowel dilatation does appear decreased compared to the prior CT from December 10, 2023. There is enteric contrast within dilated bowel loops but there does not appear to be contrast in  distal nondilated loops. Recommend radiographic follow-up to assess for progression of enteric contrast. 2.Enteric tube terminates in the mid stomach. 3.Mild diffuse wall thickening of the distal colon and rectum which could be related to colitis or underdistention. 4.New trace left pleural effusion and mild dependent bilateral lower lobe consolidation which could represent atelectasis or pneumonia. 5.Right lower lobe may have increased since December 2022 although motion limits quantification. Recommend follow-up chest CT for additional evaluation. 6.Stable right adrenal lesion and indeterminate left  adrenal nodule. These may represent adrenal adenomas but could be confirmed with adrenal protocol MRI. 7.Mild body wall edema. Electronically Signed: Vasquez Jason  12/10/2023 4:53 PM EST  Workstation ID: [id number]    XR Abdomen KUB    Result Date: 12/10/2023  Impression: Persistent small bowel obstruction. Nasogastric tube tip and sidehole overlie the stomach. Electronically Signed: Will Rao MD  12/10/2023 8:58 AM EST  Workstation ID: [id number]     I reviewed the patient's new clinical results.    Assessment & Plan       Septic shock due to enteritis    CLL (chronic lymphocytic leukemia)    Bowel obstruction      63 y.o. male, with a history of CLL, who presents with a history of abdominal pain, nausea, vomiting, loose stools  NGT clamped and clear liquid diet started per GI, will continue to appreciate their recommendations  Continue IV antibiotics  Antiemetics and pain meds PRN  Monitor and replace electrolytes as PRN  Encourage mobilization  Oncology following, will appreciate their recommendations  Will discuss patient further with Dr. Foreman        This note was created using Dragon Voice Recognition software.    JOSE Freitas  12/11/23  11:36 EST     Electronically signed by Jennifer Foreman MD at 12/11/23 1517       Virginia Calvillo APRN at 12/11/23 1044       Attestation signed by Hever Barnett MD at 12/11/23 1232    I have reviewed this documentation and agree.  63-year-old man with small bowel obstruction and CLL.  He reports feeling better.  He had 3 bowel movements since yesterday.  No nausea vomiting or abdominal distention.  On exam his abdomen is nondistended good bowel sounds.  He is nontender.  White blood count 10.4.  Hemoglobin 12.5.  BUN 15 creatinine 0.54.  I am going to clamp his NG tube.  Begin clear liquid diet.  If he tolerates this then we can discontinue the nasogastric tube later today.  General surgery is following.  I saw the patient today with Virginia Calvillo NP.  I have  "performed a complete history and physical examination and reviewed appropriate laboratory studies and radiographic exams.  I have completed the majority and substantive portion of the history and physical examination.  I completed the majority and substantive portion of the medical decision making.    Hever Barnett M.D.                     LOS: 3 days   Patient Care Team:  Jamar Pham MD as PCP - General (Family Medicine)  Patrick Fontaine MD as Consulting Physician (Hematology and Oncology)      Subjective \"I am a lot better\"    Interval History:     Subjective: Bowel movements x 3 since yesterday.  No nausea or vomiting and tolerating ice chips.  Feeling a lot better overall.  No abdominal pain.    ROS:   No chest pain, shortness of breath, or cough.     Medication Review:     Current Facility-Administered Medications:     acetaminophen (TYLENOL) tablet 650 mg, 650 mg, Oral, Q4H PRN **OR** acetaminophen (TYLENOL) suppository 650 mg, 650 mg, Rectal, Q4H PRN, Berenice Oneil APRN    cefTRIAXone (ROCEPHIN) 1,000 mg in sodium chloride 0.9 % 100 mL IVPB, 1,000 mg, Intravenous, Q24H, Oh Hooks MD, Last Rate: 200 mL/hr at 12/11/23 1043, 1,000 mg at 12/11/23 1043    dextrose (D50W) (25 g/50 mL) IV injection 25 g, 25 g, Intravenous, Q15 Min PRN, Matilde Garcia APRN    dextrose (GLUTOSE) oral gel 15 g, 15 g, Oral, Q15 Min PRN, Matilde Garcia APRN    Enoxaparin Sodium (LOVENOX) syringe 40 mg, 40 mg, Subcutaneous, Daily, Oh Hooks MD, 40 mg at 12/10/23 1558    glucagon (GLUCAGEN) injection 1 mg, 1 mg, Intramuscular, Q15 Min PRN, Matilde Garcia APRN    insulin glargine (LANTUS, SEMGLEE) injection 8 Units, 8 Units, Subcutaneous, Daily, Oh Hooks MD, 8 Units at 12/10/23 0827    insulin lispro (HUMALOG/ADMELOG) injection 2-7 Units, 2-7 Units, Subcutaneous, Q6H, Matilde Garcia APRN, 3 Units at 12/10/23 2325    Magnesium Standard Dose Replacement - " Follow Nurse / BPA Driven Protocol, , Does not apply, Jessee MIN Satish R, MD    metoclopramide (REGLAN) injection 10 mg, 10 mg, Intravenous, Q6H, Gila Landon APRN, 10 mg at 12/11/23 1043    metroNIDAZOLE (FLAGYL) IVPB 500 mg, 500 mg, Intravenous, Q8H, Oh Hooks MD, Last Rate: 100 mL/hr at 12/11/23 1044, 500 mg at 12/11/23 1044    morphine injection 2 mg, 2 mg, Intravenous, Q4H PRN, Matilde Garcia APRN, 2 mg at 12/09/23 1646    nitroglycerin (NITROSTAT) SL tablet 0.4 mg, 0.4 mg, Sublingual, Q5 Min PRN, Berenice Oneil APRN    ondansetron (ZOFRAN) tablet 4 mg, 4 mg, Oral, Q6H PRN **OR** ondansetron (ZOFRAN) injection 4 mg, 4 mg, Intravenous, Q6H PRN, Berenice Oneil APRN    phenol (CHLORASEPTIC) 1.4 % liquid 1 spray, 1 spray, Mouth/Throat, Q2H PRN, Pushpa Monte PA, 1 spray at 12/09/23 1119    Phosphorus Replacement - Follow Nurse / BPA Driven Protocol, , Does not apply, Jessee MIN Satish R, MD    potassium phosphate 15 mmol in 0.9% normal saline 250 mL IVPB, 15 mmol, Intravenous, Once, Nicholas Luther MD, 15 mmol at 12/11/23 1044    Potassium Replacement - Follow Nurse / BPA Driven Protocol, , Does not apply, PAKO, Oh Hooks MD    sodium chloride 0.9 % flush 10 mL, 10 mL, Intravenous, PRN, Tony Bhardwaj MD    sodium chloride 0.9 % flush 10 mL, 10 mL, Intravenous, Q12H, Berenice Oneil APRN, 10 mL at 12/11/23 0820    sodium chloride 0.9 % flush 10 mL, 10 mL, Intravenous, PRN, Berenice Oneil APRN    sodium chloride 0.9 % infusion 40 mL, 40 mL, Intravenous, PRN, Berenice Oneil, REYES      Objective resting in bed, no acute distress, family at bedside    Vital Signs  Vitals:    12/11/23 0400 12/11/23 0500 12/11/23 0600 12/11/23 0700   BP: 136/70 137/71 146/95    BP Location: Right arm      Patient Position: Lying      Pulse: 71 65 107    Resp: 14      Temp: 98 °F (36.7 °C)   97.5 °F (36.4 °C)   TempSrc: Oral   Oral   SpO2: 95% 95% (!) 87%     Weight:  96.5 kg (212 lb 11.9 oz)     Height:           Physical Exam:     General Appearance:    Awake and alert, in no acute distress   Head:    Normocephalic, without obvious abnormality   Eyes:          Conjunctivae normal, anicteric sclera   Throat:   No oral lesions, no thrush, oral mucosa moist   Neck:   No adenopathy, supple, no JVD, NG intact to low intermittent wall suction   Lungs:     respirations regular, even and unlabored   Abdomen:     Soft, non-tender, nondistended, positive bowel sounds   Rectal:     Deferred   Extremities:   No edema, no cyanosis   Skin:   No bruising or rash, no jaundice        Results Review:    CBC    Results from last 7 days   Lab Units 12/11/23  0554 12/10/23  0434 12/09/23  0517 12/08/23  1909 12/08/23  0256 12/04/23  1105   WBC 10*3/mm3 10.40 15.10* 22.40* 20.10* 10.40 5.83   HEMOGLOBIN g/dL 12.5* 13.0 13.9 12.7* 13.3 15.8   PLATELETS 10*3/mm3 168 208 237 201 165 207     CMP   Results from last 7 days   Lab Units 12/11/23  0554 12/10/23  1828 12/10/23  0434 12/09/23  0517 12/08/23  0716 12/08/23  0311 12/04/23  1105   SODIUM mmol/L 155*  --  156* 148* 135* 131* 142   POTASSIUM mmol/L 3.3* 3.6 3.4* 3.7 3.1* 3.3* 3.8   CHLORIDE mmol/L 118*  --  120* 112* 98 92* 102   CO2 mmol/L 26.0  --  20.0* 10.0* 14.0* 19.0* 21.9*   BUN mg/dL 15  --  18 27* 70* 82* 15   CREATININE mg/dL 0.54*  --  0.74* 1.00 1.30* 1.78* 0.92   GLUCOSE mg/dL 239*  --  219* 183* 138* 163* 146*   ALBUMIN g/dL 2.3*  --  2.3* 2.5*  --  2.7* 3.9   BILIRUBIN mg/dL 0.2  --  <0.2 <0.2  --  0.5 0.6   ALK PHOS U/L 56  --  53 57  --  49 51   AST (SGOT) U/L 9  --  <5 9  --  15 15   ALT (SGPT) U/L 12  --  13 15  --  17 19   MAGNESIUM mg/dL 2.0  --  2.3 2.5*  --   --   --    PHOSPHORUS mg/dL 2.2* 2.1* 2.0* 3.3  --   --   --    AMYLASE U/L  --   --   --   --   --   --  62   LIPASE U/L  --   --   --   --   --  20 16     Cr Clearance Estimated Creatinine Clearance: 171.3 mL/min (A) (by C-G formula based on SCr of 0.54  mg/dL (L)).  Coag   Results from last 7 days   Lab Units 12/08/23  0256   INR  1.01   APTT seconds 34.6*     HbA1C   Lab Results   Component Value Date    HGBA1C 8.30 (H) 10/19/2023    HGBA1C 7.60 (H) 05/17/2023    HGBA1C 6.9 (H) 02/14/2023     Blood Glucose   Glucose   Date/Time Value Ref Range Status   12/11/2023 0641 213 (H) 70 - 105 mg/dL Final     Comment:     Serial Number: [id number]:  [id number]   12/10/2023 2317 211 (H) 70 - 105 mg/dL Final     Comment:     Serial Number: [id number]:  [id number]   12/10/2023 1722 268 (H) 70 - 105 mg/dL Final     Comment:     Serial Number: [id number]:  [id number]   12/10/2023 1327 235 (H) 70 - 105 mg/dL Final     Comment:     Serial Number: [id number]:  [id number]   12/10/2023 1212 242 (H) 70 - 105 mg/dL Final     Comment:     Serial Number: [id number]:  [id number]   12/10/2023 0435 199 (H) 70 - 105 mg/dL Final     Comment:     Serial Number: [id number]:  [id number]   12/09/2023 2329 202 (H) 70 - 105 mg/dL Final     Comment:     Serial Number: [id number]:  [id number]   12/09/2023 1804 202 (H) 70 - 105 mg/dL Final     Comment:     Serial Number: [id number]:  [id number]     Infection   Results from last 7 days   Lab Units 12/08/23  0311 12/08/23  0256   BLOODCX  No growth at 3 days No growth at 3 days   PROCALCITONIN ng/mL 27.83*  --      UA    Results from last 7 days   Lab Units 12/08/23  0450   NITRITE UA  Negative     Radiology(recent) CT Abdomen Pelvis With Contrast    Result Date: 12/10/2023  Impression: 1.Persistent findings of small bowel obstruction. The extent of small bowel dilatation does appear decreased compared to the prior CT from December 10, 2023. There is enteric contrast within dilated bowel loops but there does not appear to be contrast in  distal nondilated loops. Recommend radiographic follow-up to assess for progression of enteric contrast. 2.Enteric tube terminates in the mid stomach. 3.Mild diffuse wall  thickening of the distal colon and rectum which could be related to colitis or underdistention. 4.New trace left pleural effusion and mild dependent bilateral lower lobe consolidation which could represent atelectasis or pneumonia. 5.Right lower lobe may have increased since December 2022 although motion limits quantification. Recommend follow-up chest CT for additional evaluation. 6.Stable right adrenal lesion and indeterminate left adrenal nodule. These may represent adrenal adenomas but could be confirmed with adrenal protocol MRI. 7.Mild body wall edema. Electronically Signed: Vasquez Jason  12/10/2023 4:53 PM EST  Workstation ID: [id number]    XR Abdomen KUB    Result Date: 12/10/2023  Impression: Persistent small bowel obstruction. Nasogastric tube tip and sidehole overlie the stomach. Electronically Signed: Will Rao MD  12/10/2023 8:58 AM EST  Workstation ID: [id number]        Assessment & Plan   -Small bowel obstruction-improving  -Abdominal pain-resolved  -Nausea, vomiting-resolved  -Lymphocytic gastritis  -CLL on [place]  -A-fib-new onset  -Hypotension-resolved  -Hypophosphatemia/hypokalemia/hyperkalemia  -DMII     Plan  Improvement overnight with multiple bowel movements.  No abdominal pain, nausea or vomiting.  We will clamp NG tube and start clear liquid diet.  If tolerating, consider continuing NG later today.  Remains on ceftriaxone and metronidazole.  We will continue metoclopramide 10 mg 4 times daily at this time.  Electrolyte replacement per primary.  Continue supportive care and we will follow.  Etiology to small bowel obstruction still unclear.  Hematology following with acalabrutinib and obinutuzumab currently on hold.  Oral iron supplements also on hold.  General surgery following without plan for surgical intervention.    I discussed the patients findings and my recommendations with the patient.         Electronically signed by REYES Red, 12/11/23, 10:44 AM EST.             Electronically signed by Hever Barnett MD at 23 1232       Génesis Reina, REYES at 23 [phone]          Hematology/Oncology Inpatient Progress Note    PATIENT NAME: Bharathi Darnell  : 1960  MRN: [id number]    CHIEF COMPLAINT: Stage I CLL and right lung nodules     HISTORY OF PRESENT ILLNESS:    63 y.o. male presented to [facility] on 2023 with complaints of abdominal pain. He started having abdominal pain on 12/3/2023. The pain became worse and he called his gastroenterologist, Dr. Hardeep Hdz. He was treated for constipation and then ended up with diarrhea. He started on pain medications which improved his pain about 50%. On 2023 he was vomiting. He ultimately came to the ED because of increased abdominal pain and he could not eat or drink well.  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  Labs in the ED were as follows: WBC 10.4 with 2% neutrophils, 50% lymphocytes, 32% monocytes, 11% bands, 3% metamyelocytes, 2% atypical lymphocytes, hemoglobin 13.3, platelets 165,000, CMP significant for creatinine 1.78, sodium 131, potassium 3.3, calcium 8.5, blood cultures no growth to date, PTT 34.6, PT 11.0, INR 1.01, Covid-19 negative. CXR showed no active disease.  CT scan of abdomen and pelvis without contrast showed findings were consistent with small bowel obstruction with dilated and fluid-filled loops of predominantly jejunum and proximal to mid ileum. EKG showed atrial fibrillation  with significant change in rhythm, previously sinus. The patient was hypotensive in the ED and was possibly septic. He was treated with IV fluid rehydration, a Levophed drip was initiated, and an NG tube was placed. He was admitted to the ICU for further evaluation and treatment. GI was consulted. Per GI the  cause of the  enteritis was thought to be infectious or inflammatory etiology, and a small bowel obstruction was in the differential as well. GI recommended consultation by Cardiology for new onset atrial fibrillation. There was concern for small bowel ischemia given atrial fibrillation. He was started on IV Unasyn and then switched to IV Rocephin and Flagyl. Repeat KUB on 12/9/2023 showed a persistent small bowel obstruction, similar bowel gas pattern compared to previous day's radiograph. General surgery was consulted and there was no indication for surgical intervention.     12/09/23  Hematology/Oncology was consulted by Jessee Moon MD on this established patient for septic shock due to enteritis. 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  He was diagnosed with CLL, IgVH unmutated, stage I in August of 2022. He had a  right lower lobe lung nodule diagnosed August of 2022.  He was seen by Dr. Marmolejo and underwent evaluation with biopsy performed 07/14/2022 of the midline submental neck mass with cytology revealing chronic lymphocytic leukemia/small lymphocytic lymphoma with 90% of B-cells revealing a CD19 positive/dim CD20 positive/CD52 positive/CD23 positive/FMC7 negative/ positive and lambda-restricted immunophenotype.  On 08/12/2022 bone marrow biopsy with aspiration showed chronic lymphocytic leukemia.  Pattern of involvement was mixed (nodular and interstitial progressing to diffuse).  The extent of the involvement was 70%.  The phenotype was CD20+ (moderate), lambda+, CD5+, CD23+, CD38-.  There was no morphologic evidence of large cell transformation (Sorensen's).  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  The flow cytometry findings were  consistent with CLL.  Phenotype of clonal cells:  Lambda+, CD5+, CD23+, CD20+ (moderate), CD81-, +, and CD38-.  Immunohistochemistry showed CLL with CD20 positivity.  Cytogenetics revealed abnormal male karyotype positive for 8p deletion and monosomy 17.  These current cytogenetic results were compatible with lymphoma and supported concurrent morphologic and flow cytometric findings of CLL. IgVH mutation analysis showed unmutated CLL.  Lymphoid neoplasm NGS report showed Tier III variants of unknown significance:  CHRISTIANE p. ([id number]) and BIRC3 p. ([id number]).  Tumor mutation burden was low and microsatellite instability was negative.  This was an [employer] chronic lymphoid neoplasm NGS report.  FISH study revealed monoallelic deletion of [id number] (13q14.3) locus detected.  These deletions are found in approximately 50% of CLL patients and as a sole aberration associated with more favorable clinical outcome, however, patients with more than 70% of these deletions, as in this case, experience shorter time to treatment.  FISH studies were also positive for p53 (17p13) deletion. PET/CT scan at [facility] on 8/17/2022 showed no abnormal uptake on this study.  There was an 8 mm noncalcified nodule in the right lower lobe of the lung.  There were numerous bilateral cervical lymph nodes, one of the largest measures 17 x 10 mm.  There was bilateral common femoral adenopathy greater on the right side than the left side with the largest node on the right side measuring 2.7 x 0.8 cm.  On 06/30/23  a CT chest with contrast revealed 1.1 cm irregular shaped nodule in the right middle lobe and 0.9 cm well-circumscribed nodule in the right lower lobe.  Hilar, axillary, and upper abdominal adenopathy.  Bilateral adrenal masses and emphysema.  Compared to December 2022, the enlarged axillary and hilar lymph nodes were stable.  The right middle and right lower lobe nodules were stable.  Rounded atelectasis in  the posterior left lower lobe was present previously.  The enlarged portacaval node and adrenal nodules were present previously with no significant change in the interval.  WBC increased from 35,000 in August 2022 to 98,000 in July 2023.  On 08/09/23 patient started acalabrutinib. On 09/06/23 he was started on cycle 1 of obinutuzumab.and received cycle 3 obinutuzumab most recently on 11/3/2023. He was last seen in the office on 11/20/2023 and was due for follow up in 1 month.      PCP: Jamar Pham MD    INTERVAL HISTORY:  12/10/2023 - white blood count 15.10 with 59% lymphocytes.  2D echocardiogram on 12/9/2023 showed left ventricular systolic function was normal. Calculated left ventricular EF = 61.3%. Left ventricular wall thickness was consistent with mild concentric hypertrophy. The left atrial cavity was mildly to moderately dilated. The estimated right ventricular systolic pressure from tricuspid regurgitation was normal. KUB showed persistent small bowel obstruction. Nasogastric tube tip and sidehole was overlying the stomach.   12/11/2023 - white blood count 12.5 with 50% lymphocytes. CT A/P with contrast on 12/10/2023 identified persistent findings of small bowel obstruction, overall small bowel dilatation decreased compared to prior. There was mild diffuse wall thickening of the distal colon and rectum, new trace left pleural effusion and mild dependent bilateral lower lobe consolidation, and right lower lobe may have increased since 12/2022 although motion limited quantification. Recommend follow-up chest CT for additional evaluation. Stable right adrenal lesion and indeterminate left adrenal nodule.    Subjective   Had two bowel movements yesterday and one bowel movement today. He feels better.      ROS:  Review of Systems   Constitutional:  Negative for activity change, chills, fatigue, fever and unexpected weight change.   HENT:  Negative for congestion, dental problem, hearing loss, mouth  sores, nosebleeds, sore throat and trouble swallowing.    Eyes:  Negative for photophobia and visual disturbance.   Respiratory:  Negative for cough, chest tightness and shortness of breath.    Cardiovascular:  Negative for chest pain, palpitations and leg swelling.   Gastrointestinal:  Negative for abdominal distention, abdominal pain, blood in stool, constipation, diarrhea, nausea and vomiting.   Endocrine: Negative for cold intolerance and heat intolerance.   Genitourinary:  Negative for decreased urine volume, difficulty urinating, frequency, hematuria and urgency.   Musculoskeletal:  Negative for arthralgias and gait problem.   Skin:  Negative for rash and wound.   Neurological:  Negative for dizziness, tremors, speech difficulty, weakness, light-headedness, numbness and headaches.   Hematological:  Negative for adenopathy. Does not bruise/bleed easily.   Psychiatric/Behavioral:  Negative for confusion and hallucinations. The patient is not nervous/anxious.    All other systems reviewed and are negative.    MEDICATIONS:    Scheduled Meds:  cefTRIAXone, 1,000 mg, Intravenous, Q24H  enoxaparin, 40 mg, Subcutaneous, Daily  insulin glargine, 8 Units, Subcutaneous, Daily  insulin lispro, 2-7 Units, Subcutaneous, Q6H  metoclopramide, 10 mg, Intravenous, Q6H  metroNIDAZOLE, 500 mg, Intravenous, Q8H  potassium phosphate, 15 mmol, Intravenous, Once  sodium chloride, 10 mL, Intravenous, Q12H    Continuous Infusions:  dextrose, 75 mL/hr, Last Rate: 75 mL/hr (12/10/23 1006)    PRN Meds:    acetaminophen **OR** acetaminophen    dextrose    dextrose    glucagon (human recombinant)    Magnesium Standard Dose Replacement - Follow Nurse / BPA Driven Protocol    Morphine    nitroglycerin    ondansetron **OR** ondansetron    phenol    Phosphorus Replacement - Follow Nurse / BPA Driven Protocol    Potassium Replacement - Follow Nurse / BPA Driven Protocol    sodium chloride    sodium chloride    sodium chloride     ALLERGIES:   "  Allergies   Allergen Reactions    Bactrim [Sulfamethoxazole-Trimethoprim] Rash     Objective    VITALS:   /95   Pulse 107   Temp 97.5 °F (36.4 °C) (Oral)   Resp 14   Ht 185.4 cm (73\")   Wt 96.5 kg (212 lb 11.9 oz)   SpO2 (!) 87%   BMI 28.07 kg/m²     PHYSICAL EXAM:  Physical Exam  Vitals and nursing note reviewed.   Constitutional:       General: He is not in acute distress.     Appearance: Normal appearance. He is well-developed. He is not diaphoretic.   HENT:      Head: Normocephalic and atraumatic.      Nose: Nose normal.      Comments: NG tube.      Mouth/Throat:      Mouth: Mucous membranes are moist.      Pharynx: Oropharynx is clear. No oropharyngeal exudate or posterior oropharyngeal erythema.      Comments: Dental fillings.  Eyes:      General: No scleral icterus.     Extraocular Movements: Extraocular movements intact.      Conjunctiva/sclera: Conjunctivae normal.      Pupils: Pupils are equal, round, and reactive to light.   Cardiovascular:      Rate and Rhythm: Normal rate and regular rhythm.      Heart sounds: Normal heart sounds. No murmur heard.     Comments: Cardiac monitor leads.   Pulmonary:      Effort: Pulmonary effort is normal. No respiratory distress.      Breath sounds: Normal breath sounds. No stridor. No wheezing or rales.   Abdominal:      General: Bowel sounds are normal. There is no distension.      Palpations: Abdomen is soft. There is no mass.      Tenderness: There is no abdominal tenderness. There is no guarding.      Comments: Bowel sounds present.    Genitourinary:     Comments: Deferred   Musculoskeletal:         General: No swelling, tenderness or deformity. Normal range of motion.      Cervical back: Normal range of motion and neck supple.      Right lower leg: No edema.      Left lower leg: No edema.      Comments: Left hand oxygen saturation monitor.  SCDs.   Bilateral upper extremity peripheral IVs.   Lymphadenopathy:      Cervical: No cervical adenopathy.      " Upper Body:      Right upper body: No supraclavicular adenopathy.      Left upper body: No supraclavicular adenopathy.   Skin:     General: Skin is warm and dry.      Coloration: Skin is not pale.      Findings: No bruising, erythema or rash.   Neurological:      General: No focal deficit present.      Mental Status: He is alert and oriented to person, place, and time.      Coordination: Coordination normal.   Psychiatric:         Mood and Affect: Mood normal.         Behavior: Behavior normal.         Thought Content: Thought content normal.     RECENT LABS:  Lab Results (last 24 hours)       Procedure Component Value Units Date/Time    Pathology Consultation [[id number]] Collected: 12/10/23 0434    Specimen: Blood, Venous Line Updated: 12/11/23 0727    CBC & Differential [[id number]]  (Abnormal) Collected: 12/11/23 0554    Specimen: Blood Updated: 12/11/23 0655    Narrative:      The following orders were created for panel order CBC & Differential.  Procedure                               Abnormality         Status                     ---------                               -----------         ------                     CBC Auto Differential[[id number]]        Abnormal            Final result               Scan Slide[[id number]]                                       Final result                 Please view results for these tests on the individual orders.    CBC Auto Differential [[id number]]  (Abnormal) Collected: 12/11/23 0554    Specimen: Blood Updated: 12/11/23 0655     WBC 10.40 10*3/mm3      RBC 4.20 10*6/mm3      Hemoglobin 12.5 g/dL      Hematocrit 37.5 %      MCV 89.2 fL      MCH 29.8 pg      MCHC 33.4 g/dL      RDW 15.2 %      RDW-SD 49.0 fl      MPV 8.4 fL      Platelets 168 10*3/mm3     Narrative:      The previously reported component NRBC is no longer being reported. Previous result was 0.1 /100 WBC (Reference Range: 0.0-0.2 /100 WBC) on 12/11/2023 at 0609 EST.    Scan Slide [[id number]] Collected:  12/11/23 0554    Specimen: Blood Updated: 12/11/23 0655     Scan Slide --     Comment: See Manual Differential Results       Manual Differential [[id number]]  (Abnormal) Collected: 12/11/23 0554    Specimen: Blood Updated: 12/11/23 0655     Neutrophil % 38.0 %      Lymphocyte % 50.0 %      Monocyte % 1.0 %      Bands %  8.0 %      Metamyelocyte % 2.0 %      Myelocyte % 1.0 %      Neutrophils Absolute 4.78 10*3/mm3      Lymphocytes Absolute 5.20 10*3/mm3      Monocytes Absolute 0.10 10*3/mm3      Poikilocytes Slight/1+     RBC Fragments Slight/1+     Target Cells Slight/1+     Smudge Cells Slight/1+     Giant Platelets Slight/1+    Narrative:      Path Review TF [id number]     POC Glucose Once [[id number]]  (Abnormal) Collected: 12/11/23 0641    Specimen: Blood Updated: 12/11/23 0642     Glucose 213 mg/dL      Comment: Serial Number: [id number]:  [id number]       Magnesium [[id number]]  (Normal) Collected: 12/11/23 0554    Specimen: Blood Updated: 12/11/23 0634     Magnesium 2.0 mg/dL     Phosphorus [[id number]]  (Abnormal) Collected: 12/11/23 0554    Specimen: Blood Updated: 12/11/23 0634     Phosphorus 2.2 mg/dL     Comprehensive Metabolic Panel [[id number]]  (Abnormal) Collected: 12/11/23 0554    Specimen: Blood Updated: 12/11/23 0634     Glucose 239 mg/dL      BUN 15 mg/dL      Creatinine 0.54 mg/dL      Sodium 155 mmol/L      Potassium 3.3 mmol/L      Chloride 118 mmol/L      CO2 26.0 mmol/L      Calcium 8.8 mg/dL      Total Protein 4.9 g/dL      Albumin 2.3 g/dL      ALT (SGPT) 12 U/L      AST (SGOT) 9 U/L      Alkaline Phosphatase 56 U/L      Total Bilirubin 0.2 mg/dL      Globulin 2.6 gm/dL      A/G Ratio 0.9 g/dL      BUN/Creatinine Ratio 27.8     Anion Gap 11.0 mmol/L      eGFR 112.0 mL/min/1.73     Narrative:      GFR Normal >60  Chronic Kidney Disease <60  Kidney Failure <15      Blood Culture - Blood, Arm, Right [[id number]]  (Normal) Collected: 12/08/23 0311    Specimen: Blood from Arm, Right Updated:  12/11/23 0330     Blood Culture No growth at 3 days    Narrative:      Less than seven (7) mL's of blood was collected.  Insufficient quantity may yield false negative results.    Blood Culture - Blood, Arm, Left [[id number]]  (Normal) Collected: 12/08/23 0256    Specimen: Blood from Arm, Left Updated: 12/11/23 0300     Blood Culture No growth at 3 days    POC Glucose Once [[id number]]  (Abnormal) Collected: 12/10/23 2317    Specimen: Blood Updated: 12/10/23 2320     Glucose 211 mg/dL      Comment: Serial Number: [id number]:  [id number]       Phosphorus [[id number]]  (Abnormal) Collected: 12/10/23 1828    Specimen: Blood Updated: 12/10/23 1904     Phosphorus 2.1 mg/dL     Potassium [[id number]]  (Normal) Collected: 12/10/23 1828    Specimen: Blood Updated: 12/10/23 1900     Potassium 3.6 mmol/L     POC Glucose Once [[id number]]  (Abnormal) Collected: 12/10/23 1722    Specimen: Blood Updated: 12/10/23 1724     Glucose 268 mg/dL      Comment: Serial Number: [id number]:  [id number]       POC Glucose Once [[id number]]  (Abnormal) Collected: 12/10/23 1327    Specimen: Blood Updated: 12/10/23 1329     Glucose 235 mg/dL      Comment: Serial Number: [id number]:  [id number]       POC Glucose Once [[id number]]  (Abnormal) Collected: 12/10/23 1212    Specimen: Blood Updated: 12/10/23 1214     Glucose 242 mg/dL      Comment: Serial Number: [id number]:  [id number]             PENDING RESULTS: Blood cultures (final).     IMAGING REVIEWED:  CT Abdomen Pelvis With Contrast    Result Date: 12/10/2023  Impression: 1.Persistent findings of small bowel obstruction. The extent of small bowel dilatation does appear decreased compared to the prior CT from December 10, 2023. There is enteric contrast within dilated bowel loops but there does not appear to be contrast in  distal nondilated loops. Recommend radiographic follow-up to assess for progression of enteric contrast. 2.Enteric tube terminates in the mid stomach.  3.Mild diffuse wall thickening of the distal colon and rectum which could be related to colitis or underdistention. 4.New trace left pleural effusion and mild dependent bilateral lower lobe consolidation which could represent atelectasis or pneumonia. 5.Right lower lobe may have increased since December 2022 although motion limits quantification. Recommend follow-up chest CT for additional evaluation. 6.Stable right adrenal lesion and indeterminate left adrenal nodule. These may represent adrenal adenomas but could be confirmed with adrenal protocol MRI. 7.Mild body wall edema. Electronically Signed: Vasquez Gomez  12/10/2023 4:53 PM EST  Workstation ID: [id number]    XR Abdomen KUB    Result Date: 12/10/2023  Impression: Persistent small bowel obstruction. Nasogastric tube tip and sidehole overlie the stomach. Electronically Signed: Will Rao MD  12/10/2023 8:58 AM EST  Workstation ID: [id number]     I have reviewed the patient's labs, imaging, reports, and other clinician documentation.    Assessment & Plan   ASSESSMENT  Stage I CLL: Started acalabrutinib in 8/2023. S/p 3 cycles obinutuzumab with WBC decreasing well.  GI side effects of acalabrutinib include: Abdominal pain (15%), constipation (15%), diarrhea (18% to 35%; grade ?3: ?3%), nausea (19% to 22%; grade ?3: <1%), vomiting (13%; grade ?3: 2%) with less than 5% incidence of atrial fibrillation and GI side effects of obinutuzumab include: Constipation (8% to 32%) decreased appetite (14%), diarrhea (monotherapy: ?10%; combination therapy: 10% to 30%; grades 3/4: 2% to 3%).  Holding acalabrutinib and obinutuzumab.   Right lung nodules:  These will be followed on repeat imaging.  Iron-deficiency anemia with heme-positive stool:  He was on ferrous sulfate 325 mg by mouth daily and Pepcid as an outpatient. This was not continued as an inpatient.   Small bowel obstruction versus enteritis, abdominal pain: Gastroenterology was consulted. NG tube placed and is to  low wall suction. General surgery was consulted with no plan for surgical intervention. Received Unasyn and was then switched to IV Rocephin and Flagyl. KUB on 12/10/2023 showed persistent small bowel obstruction.  Doubt SBO related to current CLL therapy. CT A/P showed overall improvement in small bowel obstruction.   Suspected septic shock due to enteritis/Hypotension: Off Levophed drip. Blood cultures show no growth to date. Leukocytosis improved.   Paroxysmal atrial fibrillation: New onset, likely sepsis-induced.  GI recommended Cardiology evaluation but they were not consulted. 2D echocardiogram showed LVEF 61 - 65%. He is on SQ heparin.    BETTIE, metabolic acidosis, and hypernatremia: Management per Primary team. On bicarbonate drip. Hypernatremia and BETTIE resolved.       PLAN   Continue IV antibiotics per Primary team.   Hold oral iron replacement.  Hold acalabrutinib and obinutuzumab.     Electronically signed by REYES Lira, 12/10/23, 10:42 AM EST.     On 12/11/2023, I have personally performed a face-to-face diagnostic evaluation on this patient. I have performed a complete history and physical examination, reviewed laboratory studies, and radiographic examinations.  I have completed the majority and substantive portion of the medical decision making.  I have formulated the assessment on this patient and the plan of action as noted above. I have discussed the case with Génesis Reina NP, have edited/reviewed the note, and agree with the care plan.      On 12/11/2023, I discussed the patient's findings and my recommendations with patient, family and nursing.    Part of this note may be an electronic transcription/translation of spoken language to printed text using the Dragon Dictation System.     Electronically signed by Génesis Reina APRN at 12/11/23 2844       Tammy Dempsey APRN at 12/10/23 0038       Attestation signed by Oh Hooks MD at 12/11/23 4527      Attending Addendum       Subjective: Feels much better, passing some flatus but did not have a bowel movement since admission.     Exam: Abdomen is soft, distended, nontender.  + bowel sounds.     Assessment / Plan:   Septic shock due to enteritis: Off vasopressors, continue with ceftriaxone and Flagyl for total of 7 days.    Acute hypernatremia: BETTIE resolved.  Switch IV fluids to D5W and reassess in a.m.        Dr. Oh Hooks MD MPH  Staff Intensivist  12/10/23   17:41 EST                     Critical Care Progress Note   Bharathi Darnell : 1960 MRN:[id number] LOS:2     Principal Problem: Septic shock     Reason for follow up: All the medical problems listed below    Summary     A 63 y.o. male with PMH of CLL, diabetes, hypertension, diverticulosis presented to the hospital for nausea and vomiting and was admitted with a principal diagnosis of Septic shock.  CT abdomen and pelvis showed findings suspicious for small bowel obstruction.  Remained hypotensive in spite of adequate fluid resuscitation and needed vasopressors.  Also started on ceftriaxone and Flagyl for possible enteritis.  GI and general surgery was consulted and recommended conservative management.    Also found to have new onset A-fib.    Significant events     12/10/23 : The patient reports feeling much better today.  He still has occasional, transient low abdominal pain but it is improving.  He denies nausea or vomiting today.  Passing gas but no BM yet.  No shortness of air on room air.  Hemodynamically stable off pressor support.  His only complaint today is the NG tube which is very uncomfortable.  Plan to get him out of bed today and mobilize now that he is off pressor support. TN PICC      Assessment / Plan     Septic Shock due to enteritis  CT abdomen pelvis suspicious for small bowel obstruction versus enteritis.    Procalcitonin 27.83.  Currently on ceftriaxone and Flagyl.  Adequately fluid resuscitated.  Continue with maintenance fluids  while NPO.  Avoid fluid overload.  Titrate vasopressors if needed for MAP target of 65.  DC steroids  Echo images reviewed, normal LVEF.    Small bowel obstruction versus enteritis  General surgery following.  Currently no surgery indicated, showing improvement.  Remains n.p.o. with NG tube suction.  GI following, plan for CT with oral contrast today due to lack of progression/improvement     Acute Kidney Injury--resolved/ Metabolic acidosis / Hypernatremia:   Remains nonoliguric.   Likely prerenal. Possible ATN from septic shock.  Discontinue bicarb drip. Add D5W drip x 24 hours secondary to acute hypernatremia  Monitor Input/Output very closely.   Net IO Since Admission: 196.65 mL [12/10/23 1319]      Paroxysmal atrial fibrillation, new diagnosis  Likely sepsis induced. Normal TSH.    Not on any chronic rate control agents due to marginal hypotension.  1 dose of digoxin for improved rate control 12/9.  [id number] Score of at least 2.  Will eventually discharge on Eliquis, no emergency to start at this time, especially if there is a chance of any procedure being performed.  Echo images reviewed, normal LVEF, normal atrial size.  No obvious left atrial thrombus.    Diabetes mellitus Type 2, not insulin-dependent : Not well controlled.   Hold home Farxiga.  Accu checks every 6 hours and c/w humalog coverage as needed.   Last A1c of 8.3.    Essential Hypertension:   Hold home Norvasc and lisinopril due to marginal blood pressure and n.p.o. status.  Restart medications as needed.    Chronic lymphocytic leukemia: Oncology consulted.    Code status:   Code Status (Patient has no pulse and is not breathing): CPR (Attempt to Resuscitate)  Medical Interventions (Patient has pulse or is breathing): Full Support       Nutrition: NPO Diet NPO Type: Strict NPO   Patient isn't on Tube Feeding    DVT prophylaxis:  Medical and mechanical DVT prophylaxis orders are present.     Subjective / Review of systems     Review of Systems    Feels better today.  Reports occasional, low transverse abdominal pain which is sharp and transient. No nausea or vomiting.  Passing some flatus however no stool yet.  Complaining about NG tube and really wants to drink water  Objective / Physical Exam   Vital signs:  Temp: 98 °F (36.7 °C)  BP: 136/75  Heart Rate: 74  Resp: 16  SpO2: 97 %  Weight: 98.4 kg (217 lb)    Admission Weight: Weight: 97.5 kg (215 lb)  Current Weight: Weight: 98.4 kg (217 lb)    Input/Output in last 24 hours:    Intake/Output Summary (Last 24 hours) at 12/10/2023 1319  Last data filed at 12/10/2023 0800  Gross per 24 hour   Intake 3304.65 ml   Output 2875 ml   Net 429.65 ml      Physical Exam  Vitals and nursing note reviewed.   Constitutional:       General: He is not in acute distress.     Appearance: Normal appearance.   HENT:      Head: Normocephalic and atraumatic.      Nose:      Comments: NG tube secured     Mouth/Throat:      Mouth: Mucous membranes are dry.   Eyes:      General: No scleral icterus.     Conjunctiva/sclera: Conjunctivae normal.   Neck:      Comments: Trachea midline, no JVD  Cardiovascular:      Rate and Rhythm: Normal rate.      Heart sounds: No murmur heard.     No gallop.      Comments: Sinus rhythm  Pulmonary:      Breath sounds: Normal breath sounds. No wheezing or rales.   Abdominal:      General: There is no distension.      Palpations: Abdomen is soft.      Tenderness: There is abdominal tenderness. There is no guarding.      Comments: Very hypoactive bowel sounds   Musculoskeletal:      Cervical back: Neck supple.      Right lower leg: No edema.      Left lower leg: No edema.   Skin:     General: Skin is warm and dry.   Neurological:      General: No focal deficit present.      Mental Status: He is alert and oriented to person, place, and time.        Radiology and Labs     Results from last 7 days   Lab Units 12/10/23  0434 12/09/23  0517 12/08/23  1909 12/08/23  0256 12/04/23  1105   WBC 10*3/mm3 15.10*  22.40* 20.10* 10.40 5.83   HEMATOCRIT % 39.1 43.0 38.0 39.9 47.9   PLATELETS 10*3/mm3 208 237 201 165 207      Results from last 7 days   Lab Units 12/10/23  0434 12/09/23  0517 12/08/23  0716 12/08/23  0311 12/04/23  1105   SODIUM mmol/L 156* 148* 135* 131* 142   POTASSIUM mmol/L 3.4* 3.7 3.1* 3.3* 3.8   CHLORIDE mmol/L 120* 112* 98 92* 102   CO2 mmol/L 20.0* 10.0* 14.0* 19.0* 21.9*   BUN mg/dL 18 27* 70* 82* 15   CREATININE mg/dL 0.74* 1.00 1.30* 1.78* 0.92      Current medications   Scheduled Meds: cefTRIAXone, 1,000 mg, Intravenous, Q24H  enoxaparin, 40 mg, Subcutaneous, Daily  insulin glargine, 8 Units, Subcutaneous, Daily  insulin lispro, 2-7 Units, Subcutaneous, Q6H  metoclopramide, 10 mg, Intravenous, Q6H  metroNIDAZOLE, 500 mg, Intravenous, Q8H  sodium chloride, 10 mL, Intravenous, Q12H      Continuous Infusions: dextrose, 75 mL/hr, Last Rate: 75 mL/hr (12/10/23 1006)        Plan discussed with RN. Reviewed all other data in the last 24 hours, including but not limited to vitals, labs, microbiology, imaging and pertinent notes from other providers. High complexity decision making and high risk of deterioration.    REYES Jackson   Critical Care  12/10/23   13:19 EST       Electronically signed by Oh Hooks MD at 12/11/23 0827       Pushpa Monte PA at 12/10/23 0953       Attestation signed by Pawan Setarns MD at 12/10/23 1233    Patient seen and examined.  Clinically seems to be improving.  No severe nausea or vomiting is having flatus.  His abdominal pain is basically gone whenever he is recumbent.  Vital signs seem to be normalizing he is off pressors.  Abdomen is soft minimally tender to palpation voluntary or involuntary guarding.  Appreciate gastroenterology and oncology's involvement.  Differential diagnosis for the etiology of his symptoms is still broad.  Continue supportive measures. I do not clinically see any reason why he would need surgery today compared to yesterday or the  day before.  This will be an ongoing discussion if he fails to clinically improve but currently he is on the right track.  Once again spent quite a bit of time with counseling about the decision about major operation include exploratory laparotomy  with significant risk factors for morbidity and acute severe condition with signs of systemic illness.                  General Surgery Progress Note    Name: Bharathi Darnell ADMIT: 2023   : 1960  PCP: Jamar Pham MD    MRN: [id number] LOS: 2 days   AGE/SEX: 63 y.o. male  ROOM: [address] [place] [address]    Chief Complaint   Patient presents with    Abdominal Pain    Weakness - Generalized     Subjective     Patient seen and examined.  Vital signs stable, afebrile.  Patient has been weaned off of Levophed.  Patient overall looks better than previous days.  Reports feeling slightly better, having minimal pain.  Continues to tolerate ice chips for comfort.  Denies nausea and vomiting.  NG tube is in place with 1250 mL of output recorded over the last 24 hours.  No bowel movement still.  Leukocytosis improving at 15.1 this morning.    Objective     Scheduled Medications:   cefTRIAXone, 1,000 mg, Intravenous, Q24H  enoxaparin, 40 mg, Subcutaneous, Daily  insulin glargine, 8 Units, Subcutaneous, Daily  insulin lispro, 2-7 Units, Subcutaneous, Q6H  metoclopramide, 10 mg, Intravenous, Q6H  metroNIDAZOLE, 500 mg, Intravenous, Q8H  potassium chloride, 20 mEq, Intravenous, Q1H  potassium phosphate, 15 mmol, Intravenous, Once  sodium chloride, 10 mL, Intravenous, Q12H        Active Infusions:  dextrose, 75 mL/hr        As Needed Medications:    acetaminophen **OR** acetaminophen    dextrose    dextrose    glucagon (human recombinant)    Magnesium Standard Dose Replacement - Follow Nurse / BPA Driven Protocol    Morphine    nitroglycerin    ondansetron **OR** ondansetron    phenol    Phosphorus Replacement - Follow Nurse / BPA Driven Protocol    Potassium  Replacement - Follow Nurse / BPA Driven Protocol    sodium chloride    sodium chloride    sodium chloride    Vital Signs  Vital Signs Patient Vitals for the past 24 hrs:   BP Temp Temp src Pulse SpO2   12/10/23 0808 -- 97.4 °F (36.3 °C) Oral -- --   12/10/23 0800 136/75 -- -- 74 97 %   12/10/23 0700 122/67 -- -- 73 96 %   12/10/23 0600 133/73 -- -- 86 94 %   12/10/23 0500 131/67 -- -- 71 96 %   12/10/23 0400 139/73 -- -- 80 97 %   12/10/23 0300 134/64 -- -- 78 95 %   12/10/23 0200 142/73 -- -- 77 97 %   12/10/23 0100 135/74 -- -- 75 94 %   12/10/23 0000 118/71 -- -- 67 93 %   12/09/23 2352 -- 98 °F (36.7 °C) Oral -- --   12/09/23 2300 131/66 -- -- 73 95 %   12/09/23 2200 137/71 -- -- 83 96 %   12/09/23 2100 129/69 -- -- 70 95 %   12/09/23 2002 -- 98.1 °F (36.7 °C) Oral -- --   12/09/23 2000 127/72 -- -- 73 96 %   12/09/23 1900 132/66 -- -- 80 95 %   12/09/23 1834 131/66 -- -- -- --   12/09/23 1800 115/55 -- -- 79 96 %   12/09/23 1730 136/63 -- -- 78 96 %   12/09/23 1700 114/46 -- -- 76 96 %   12/09/23 1630 108/47 -- -- 74 96 %   12/09/23 1600 127/64 -- -- 73 95 %   12/09/23 1530 129/65 -- -- 75 96 %   12/09/23 1500 109/61 -- -- 69 93 %   12/09/23 1400 122/72 -- -- (!) 141 96 %   12/09/23 1300 121/67 -- -- 77 95 %   12/09/23 1200 124/70 97.5 °F (36.4 °C) Oral (!) 122 100 %   12/09/23 1119 130/62 -- -- -- --   12/09/23 1100 130/62 -- -- 77 99 %   12/09/23 1000 142/83 -- -- 91 98 %     I/O:  I/O last 3 completed shifts:  In: 5665.7 [I.V.:5665.7]  Out: 5250 [Urine:3600; Emesis/NG output:1650]    Physical Exam:  Physical Exam  Constitutional:       General: He is not in acute distress.  Cardiovascular:      Rate and Rhythm: Normal rate.   Pulmonary:      Effort: Pulmonary effort is normal. No respiratory distress.   Abdominal:      General: There is no distension.      Palpations: Abdomen is soft.      Tenderness: There is no abdominal tenderness. There is no guarding.      Comments: NG tube in place   Neurological:       Mental Status: He is alert and oriented to person, place, and time.   Psychiatric:         Mood and Affect: Mood normal.         Results Review:     CBC    Results from last 7 days   Lab Units 12/10/23  0434 12/09/23  0517 12/08/23  1909 12/08/23  0256 12/04/23  1105   WBC 10*3/mm3 15.10* 22.40* 20.10* 10.40 5.83   HEMOGLOBIN g/dL 13.0 13.9 12.7* 13.3 15.8   PLATELETS 10*3/mm3 208 237 201 165 207     BMP   Results from last 7 days   Lab Units 12/10/23  0434 12/09/23  0517 12/08/23  0716 12/08/23  0311 12/04/23  1105   SODIUM mmol/L 156* 148* 135* 131* 142   POTASSIUM mmol/L 3.4* 3.7 3.1* 3.3* 3.8   CHLORIDE mmol/L 120* 112* 98 92* 102   CO2 mmol/L 20.0* 10.0* 14.0* 19.0* 21.9*   BUN mg/dL 18 27* 70* 82* 15   CREATININE mg/dL 0.74* 1.00 1.30* 1.78* 0.92   GLUCOSE mg/dL 219* 183* 138* 163* 146*   MAGNESIUM mg/dL 2.3 2.5*  --   --   --    PHOSPHORUS mg/dL 2.0* 3.3  --   --   --      Radiology(recent) XR Abdomen KUB    Result Date: 12/10/2023  Impression: Persistent small bowel obstruction. Nasogastric tube tip and sidehole overlie the stomach. Electronically Signed: Will Rao MD  12/10/2023 8:58 AM EST  Workstation ID: [id number]    XR Abdomen KUB    Result Date: 12/9/2023  Impression: Persistent small bowel obstruction, similar bowel gas pattern compared to yesterday's radiograph. Electronically Signed: Will Rao MD  12/9/2023 8:04 AM EST  Workstation ID: [id number]    XR Abdomen KUB    Result Date: 12/8/2023  Impression: Increased small bowel dilatation with wall thickening. This may represent early or partial obstruction versus enteritis. Continued follow-up recommended. Electronically Signed: Frannie Barnes MD  12/8/2023 12:26 PM EST  Workstation ID: [id number]     I reviewed the patient's new clinical results.    Assessment & Plan       Septic shock due to enteritis    CLL (chronic lymphocytic leukemia)    Bowel obstruction      63 y.o. male, with a history of CLL, who presents with a history of abdominal pain,  "nausea, vomiting, loose stools  Repeat KUB this morning shows persistent small bowel obstruction, continue NG tube to low wall suction, continue to monitor output  Okay to continue ice chips for comfort, throat spray as needed  GI cardiology following, will appreciate recommendations  Will continue to follow  Further input from Dr. Stearns to follow      This note was created using Dragon Voice Recognition software.    JOSE Freitas  12/10/23  09:53 EST     Electronically signed by Pawan Stearns MD at 12/10/23 1233       Gila Landon APRN at 12/10/23 0950       Attestation signed by Antony Jackson MD at 12/10/23 1712    I have reviewed this documentation and agree.  I have personally seen the patient with nurse practitioner and agree with plan.  I have reviewed labs and imaging.  I also performed an appropriate and complete medical decision making component to the history and physical.  Feeling better and has more energy.  His abdomen is soft and mildly distended but nontender.  CT shows persistent small bowel obstruction.  White count 15 hemoglobin 13 platelets 208 liver enzymes normal creatinine normal.  Continue NG to low wall suction as well as antibiotics.  Etiology of small bowel obstruction is unclear and unsure if this was inflammatory from his chemotherapy regimen or other etiology.  Will follow-up.                   LOS: 2 days   Patient Care Team:  Jamar Pham MD as PCP - General (Family Medicine)  Patrick Fontaine MD as Consulting Physician (Hematology and Oncology)      Subjective   \"Passing gas but no bowel movement\"     Interval History:    KUB 12/10/2023 persistent small bowel obstruction.  NG tube in place  Labs: WBCs down to 15.1, hemoglobin 13, platelets 208.  Sodium 156, potassium 3.4, creatinine 0.74.  LFTs normal.    ROS:   No chest pain, shortness of breath, or cough.         Medication Review:     Current Facility-Administered Medications:     acetaminophen " (TYLENOL) tablet 650 mg, 650 mg, Oral, Q4H PRN **OR** acetaminophen (TYLENOL) suppository 650 mg, 650 mg, Rectal, Q4H PRN, Berenice Oneil APRN    cefTRIAXone (ROCEPHIN) 1,000 mg in sodium chloride 0.9 % 100 mL IVPB, 1,000 mg, Intravenous, Q24H, Oh Hooks MD, Last Rate: 200 mL/hr at 12/09/23 0940, 1,000 mg at 12/09/23 0940    dextrose (D50W) (25 g/50 mL) IV injection 25 g, 25 g, Intravenous, Q15 Min PRN, Matilde Garcia APRN    dextrose (D5W) 5 % infusion, 75 mL/hr, Intravenous, Continuous, Oh Hooks MD    dextrose (GLUTOSE) oral gel 15 g, 15 g, Oral, Q15 Min PRN, Matilde Garcia APRN    Enoxaparin Sodium (LOVENOX) syringe 40 mg, 40 mg, Subcutaneous, Daily, Oh Hooks MD    glucagon (GLUCAGEN) injection 1 mg, 1 mg, Intramuscular, Q15 Min PRN, Matilde Garcia APRN    insulin glargine (LANTUS, SEMGLEE) injection 8 Units, 8 Units, Subcutaneous, Daily, Oh Hooks MD, 8 Units at 12/10/23 0827    insulin lispro (HUMALOG/ADMELOG) injection 2-7 Units, 2-7 Units, Subcutaneous, Q6H, Matilde Garcia APRN, 2 Units at 12/10/23 0525    Magnesium Standard Dose Replacement - Follow Nurse / BPA Driven Protocol, , Does not apply, PRN, Oh Hooks MD    metoclopramide (REGLAN) injection 10 mg, 10 mg, Intravenous, Q6H, Gila Landon APRN    metroNIDAZOLE (FLAGYL) IVPB 500 mg, 500 mg, Intravenous, Q8H, Oh Hooks MD, Last Rate: 100 mL/hr at 12/10/23 0144, 500 mg at 12/10/23 0144    morphine injection 2 mg, 2 mg, Intravenous, Q4H PRN, Matilde Garcia APRN, 2 mg at 12/09/23 1646    nitroglycerin (NITROSTAT) SL tablet 0.4 mg, 0.4 mg, Sublingual, Q5 Min PRN, Berenice Oneil APRN    ondansetron (ZOFRAN) tablet 4 mg, 4 mg, Oral, Q6H PRN **OR** ondansetron (ZOFRAN) injection 4 mg, 4 mg, Intravenous, Q6H PRN, Berenice Oneil APRN    phenol (CHLORASEPTIC) 1.4 % liquid 1 spray, 1 spray, Mouth/Throat, Q2H PRN, Pushpa Monte PA, 1 spray at  12/09/23 1119    Phosphorus Replacement - Follow Nurse / BPA Driven Protocol, , Does not apply, Jessee MIN Satish R, MD    potassium chloride 20 mEq in 50 mL IVPB, 20 mEq, Intravenous, Q1H, Oh Hooks MD    potassium phosphate 15 mmol in 0.9% sodium chloride 100 mL IVPB, 15 mmol, Intravenous, Once, Oh Hooks MD    Potassium Replacement - Follow Nurse / BPA Driven Protocol, , Does not apply, Jessee MIN Satish R, MD    sodium chloride 0.9 % flush 10 mL, 10 mL, Intravenous, PRN, Tony Bhardwaj MD    sodium chloride 0.9 % flush 10 mL, 10 mL, Intravenous, Q12H, Berenice Oneil APRN, 10 mL at 12/09/23 2105    sodium chloride 0.9 % flush 10 mL, 10 mL, Intravenous, PRN, Berenice Oneil, APRN    sodium chloride 0.9 % infusion 40 mL, 40 mL, Intravenous, PRN, Berenice Oneil APRN      Objective  Sitting up in chair. NAD.  Family at bedside. NGT in place.     Vital Signs  Temp:  [97.4 °F (36.3 °C)-98.1 °F (36.7 °C)] 97.4 °F (36.3 °C)  Heart Rate:  [] 74  BP: (108-142)/(46-83) 136/75  Physical Exam:    General Appearance:    Awake and alert, in no acute distress   Head:    Normocephalic, without obvious abnormality   Eyes:          Conjunctivae normal, anicteric sclerae   Ears:    Hearing intact   Throat:   No oral lesions, no thrush, oral mucosa moist   Neck:   No adenopathy, supple, no JVD   Lungs:       respirations regular, even and unlabored        Abdomen:     soft, non-tender, no rebound or guarding, non-distended, no hepatosplenomegaly   Rectal:     Deferred   Extremities:   No edema, no cyanosis, no redness   Skin:   No bleeding, bruising or rash, no jaundice   Neurologic:   Cranial nerves 2 - 12 grossly intact, no asterixis, sensation   intact        Results Review:    CBC    Results from last 7 days   Lab Units 12/10/23  0434 12/09/23  0517 12/08/23  1909 12/08/23  0256 12/04/23  1105   WBC 10*3/mm3 15.10* 22.40* 20.10* 10.40 5.83   HEMOGLOBIN g/dL 13.0 13.9 12.7*  13.3 15.8   PLATELETS 10*3/mm3 208 237 201 165 207     CMP   Results from last 7 days   Lab Units 12/10/23  0434 12/09/23  0517 12/08/23  0716 12/08/23  0311 12/04/23  1105   SODIUM mmol/L 156* 148* 135* 131* 142   POTASSIUM mmol/L 3.4* 3.7 3.1* 3.3* 3.8   CHLORIDE mmol/L 120* 112* 98 92* 102   CO2 mmol/L 20.0* 10.0* 14.0* 19.0* 21.9*   BUN mg/dL 18 27* 70* 82* 15   CREATININE mg/dL 0.74* 1.00 1.30* 1.78* 0.92   GLUCOSE mg/dL 219* 183* 138* 163* 146*   ALBUMIN g/dL 2.3* 2.5*  --  2.7* 3.9   BILIRUBIN mg/dL <0.2 <0.2  --  0.5 0.6   ALK PHOS U/L 53 57  --  49 51   AST (SGOT) U/L <5 9  --  15 15   ALT (SGPT) U/L 13 15  --  17 19   MAGNESIUM mg/dL 2.3 2.5*  --   --   --    PHOSPHORUS mg/dL 2.0* 3.3  --   --   --    AMYLASE U/L  --   --   --   --  62   LIPASE U/L  --   --   --  20 16     Cr Clearance Estimated Creatinine Clearance: 126.2 mL/min (A) (by C-G formula based on SCr of 0.74 mg/dL (L)).  Coag   Results from last 7 days   Lab Units 12/08/23  0256   INR  1.01   APTT seconds 34.6*     HbA1C   Lab Results   Component Value Date    HGBA1C 8.30 (H) 10/19/2023    HGBA1C 7.60 (H) 05/17/2023    HGBA1C 6.9 (H) 02/14/2023     Blood Glucose   Glucose   Date/Time Value Ref Range Status   12/10/2023 0435 199 (H) 70 - 105 mg/dL Final     Comment:     Serial Number: [id number]:  [id number]   12/09/2023 2329 202 (H) 70 - 105 mg/dL Final     Comment:     Serial Number: [id number]:  [id number]   12/09/2023 1804 202 (H) 70 - 105 mg/dL Final     Comment:     Serial Number: [id number]:  [id number]   12/09/2023 1140 219 (H) 70 - 105 mg/dL Final     Comment:     Serial Number: [id number]:  [id number]   12/09/2023 0513 180 (H) 70 - 105 mg/dL Final     Comment:     Serial Number: [id number]:  [id number]   12/08/2023 2351 169 (H) 70 - 105 mg/dL Final     Comment:     Serial Number: [id number]:  [id number]   12/08/2023 1718 139 (H) 70 - 105 mg/dL Final     Comment:     Serial Number:  [id number]:  [id number]   12/08/2023 1123 142 (H) 70 - 105 mg/dL Final     Comment:     Serial Number: [id number]:  [id number]     Infection   Results from last 7 days   Lab Units 12/08/23  0311 12/08/23  0256   BLOODCX  No growth at 2 days No growth at 2 days   PROCALCITONIN ng/mL 27.83*  --      UA    Results from last 7 days   Lab Units 12/08/23  0450   NITRITE UA  Negative     Radiology(recent) XR Abdomen KUB    Result Date: 12/10/2023  Impression: Persistent small bowel obstruction. Nasogastric tube tip and sidehole overlie the stomach. Electronically Signed: Will Rao MD  12/10/2023 8:58 AM EST  Workstation ID: [id number]    XR Abdomen KUB    Result Date: 12/9/2023  Impression: Persistent small bowel obstruction, similar bowel gas pattern compared to yesterday's radiograph. Electronically Signed: Will Rao MD  12/9/2023 8:04 AM EST  Workstation ID: [id number]    XR Abdomen KUB    Result Date: 12/8/2023  Impression: Increased small bowel dilatation with wall thickening. This may represent early or partial obstruction versus enteritis. Continued follow-up recommended. Electronically Signed: Frannie Barnes MD  12/8/2023 12:26 PM EST  Workstation ID: [id number]           Assessment & Plan   63-year-old male with history of CLL, lymphocytic gastritis presented to the hospital with nausea, vomiting, abdominal pain.  CT consistent with small bowel obstruction.  Also with new onset A-fib.      -Small bowel obstruction  -Abdominal pain  -Nausea, vomiting  -Lymphocytic gastritis  -CLL on [place]  -A-fib-new onset  -Hypotension     Plan  Plan for CT with oral contrast today as he is not progressing.   Continues to have persistent bilious drainage from NGT. Start Reglan. KUB unchanged.   Patient is on Rocephin and Flagyl.  N.p.o.  Continue NG to low wall suction.  Repeat KUB in the morning.  Continue supportive care.     I discussed the patients findings and my recommendations with the patient.      Gila  Araceli Landon, APRN  12/10/23  09:51 EST              Electronically signed by Antony Jackson MD at 12/10/23 1712       Patrick Fontaine MD at 12/10/23 3452          Hematology/Oncology Inpatient Progress Note    PATIENT NAME: Bharathi Darnell  : 1960  MRN: [id number]    CHIEF COMPLAINT: Stage I CLL and right lung nodules     HISTORY OF PRESENT ILLNESS:    63 y.o. male presented to [facility] on 2023 with complaints of abdominal pain. He started having abdominal pain on 12/3/2023. The pain became worse and he called his gastroenterologist, Dr. Hardeep Hdz. He was treated for constipation and then ended up with diarrhea. He started on pain medications which improved his pain about 50%. On 2023 he was vomiting. He ultimately came to the ED because of increased abdominal pain and he could not eat or drink well.  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  Labs in the ED were as follows: WBC 10.4 with 2% neutrophils, 50% lymphocytes, 32% monocytes, 11% bands, 3% metamyelocytes, 2% atypical lymphocytes, hemoglobin 13.3, platelets 165,000, CMP significant for creatinine 1.78, sodium 131, potassium 3.3, calcium 8.5, blood cultures no growth to date, PTT 34.6, PT 11.0, INR 1.01, Covid-19 negative. CXR showed no active disease.  CT scan of abdomen and pelvis without contrast showed findings were consistent with small bowel obstruction with dilated and fluid-filled loops of predominantly jejunum and proximal to mid ileum. EKG showed atrial fibrillation  with significant change in rhythm, previously sinus. The patient was hypotensive in the ED and was possibly septic. He was treated with IV fluid rehydration, a Levophed drip was initiated, and an NG tube was placed. He was admitted to the ICU for further evaluation and  treatment. GI was consulted. Per GI the cause of the  enteritis was thought to be infectious or inflammatory etiology, and a small bowel obstruction was in the differential as well. GI recommended consultation by Cardiology for new onset atrial fibrillation. There was concern for small bowel ischemia given atrial fibrillation. He was started on IV Unasyn and then switched to IV Rocephin and Flagyl. Repeat KUB on 12/9/2023 showed a persistent small bowel obstruction, similar bowel gas pattern compared to previous day's radiograph. General surgery was consulted and there was no indication for surgical intervention.     12/09/23  Hematology/Oncology was consulted by Jessee Moon MD on this established patient for septic shock due to enteritis. 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  He was diagnosed with CLL, IgVH unmutated, stage I in August of 2022. He had a  right lower lobe lung nodule diagnosed August of 2022.  He was seen by Dr. Marmolejo and underwent evaluation with biopsy performed 07/14/2022 of the midline submental neck mass with cytology revealing chronic lymphocytic leukemia/small lymphocytic lymphoma with 90% of B-cells revealing a CD19 positive/dim CD20 positive/CD52 positive/CD23 positive/FMC7 negative/ positive and lambda-restricted immunophenotype.  On 08/12/2022 bone marrow biopsy with aspiration showed chronic lymphocytic leukemia.  Pattern of involvement was mixed (nodular and interstitial progressing to diffuse).  The extent of the involvement was 70%.  The phenotype was CD20+ (moderate), lambda+, CD5+, CD23+, CD38-.  There was no morphologic evidence of large cell transformation (Sorensen's).  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   The flow cytometry findings were consistent with CLL.  Phenotype of clonal cells:  Lambda+, CD5+, CD23+, CD20+ (moderate), CD81-, +, and CD38-.  Immunohistochemistry showed CLL with CD20 positivity.  Cytogenetics revealed abnormal male karyotype positive for 8p deletion and monosomy 17.  These current cytogenetic results were compatible with lymphoma and supported concurrent morphologic and flow cytometric findings of CLL. IgVH mutation analysis showed unmutated CLL.  Lymphoid neoplasm NGS report showed Tier III variants of unknown significance:  CHRISTIANE p. ([id number]) and BIRC3 p. ([id number]).  Tumor mutation burden was low and microsatellite instability was negative.  This was an [employer] [facility] chronic lymphoid neoplasm NGS report.  FISH study revealed monoallelic deletion of H16X064 (13q14.3) locus detected.  These deletions are found in approximately 50% of CLL patients and as a sole aberration associated with more favorable clinical outcome, however, patients with more than 70% of these deletions, as in this case, experience shorter time to treatment.  FISH studies were also positive for p53 (17p13) deletion. PET/[facility] scan at [facility] on 8/17/2022 showed no abnormal uptake on this study.  There was an 8 mm noncalcified nodule in the right lower lobe of the lung.  There were numerous bilateral cervical lymph nodes, one of the largest measures 17 x 10 mm.  There was bilateral common femoral adenopathy greater on the right side than the left side with the largest node on the right side measuring 2.7 x 0.8 cm.  On 06/30/23  a CT chest with contrast revealed 1.1 cm irregular shaped nodule in the right middle lobe and 0.9 cm well-circumscribed nodule in the right lower lobe.  Hilar, axillary, and upper abdominal adenopathy.  Bilateral adrenal masses and emphysema.  Compared to December 2022, the enlarged axillary and hilar lymph nodes were stable.  The right middle and right lower lobe nodules were  stable.  Rounded atelectasis in the posterior left lower lobe was present previously.  The enlarged portacaval node and adrenal nodules were present previously with no significant change in the interval.  WBC increased from 35,000 in August 2022 to 98,000 in July 2023.  On 08/09/23 patient started acalabrutinib. On 09/06/23 he was started on cycle 1 of obinutuzumab.and received cycle 3 obinutuzumab most recently on 11/3/2023. He was last seen in the office on 11/20/2023 and was due for follow up in 1 month.      PCP: Jamar Pham MD    INTERVAL HISTORY:  12/10/2023 - white blood count 15.10 with 59% lymphocytes.  2D echocardiogram on 12/9/2023 showed left ventricular systolic function was normal. Calculated left ventricular EF = 61.3%. Left ventricular wall thickness was consistent with mild concentric hypertrophy. The left atrial cavity was mildly to moderately dilated. The estimated right ventricular systolic pressure from tricuspid regurgitation was normal. KUB showed persistent small bowel obstruction. Nasogastric tube tip and sidehole was overlying the stomach    History of present illness reviewed since last visit and changes noted on 12/10/23.    Subjective   Has a cough and dyspnea on exertion.     ROS:  Review of Systems   Constitutional:  Negative for activity change, chills, fatigue, fever and unexpected weight change.   HENT:  Negative for congestion, dental problem, hearing loss, mouth sores, nosebleeds, sore throat and trouble swallowing.    Eyes:  Negative for photophobia and visual disturbance.   Respiratory:  Positive for cough. Negative for chest tightness and shortness of breath.         Dyspnea on exertion.    Cardiovascular:  Negative for chest pain, palpitations and leg swelling.   Gastrointestinal:  Negative for abdominal distention, abdominal pain, blood in stool, constipation, diarrhea, nausea and vomiting.   Endocrine: Negative for cold intolerance and heat intolerance.  "  Genitourinary:  Negative for decreased urine volume, difficulty urinating, frequency, hematuria and urgency.   Musculoskeletal:  Negative for arthralgias and gait problem.   Skin:  Negative for rash and wound.   Neurological:  Negative for dizziness, tremors, speech difficulty, weakness, light-headedness, numbness and headaches.   Hematological:  Negative for adenopathy. Does not bruise/bleed easily.   Psychiatric/Behavioral:  Negative for confusion and hallucinations. The patient is not nervous/anxious.    All other systems reviewed and are negative.    MEDICATIONS:    Scheduled Meds:  cefTRIAXone, 1,000 mg, Intravenous, Q24H  enoxaparin, 40 mg, Subcutaneous, Daily  insulin glargine, 8 Units, Subcutaneous, Daily  insulin lispro, 2-7 Units, Subcutaneous, Q6H  metoclopramide, 10 mg, Intravenous, Q6H  metroNIDAZOLE, 500 mg, Intravenous, Q8H  potassium chloride, 20 mEq, Intravenous, Q1H  potassium phosphate, 15 mmol, Intravenous, Once  sodium chloride, 10 mL, Intravenous, Q12H    Continuous Infusions:  dextrose, 75 mL/hr, Last Rate: 75 mL/hr (12/10/23 1006)    PRN Meds:    acetaminophen **OR** acetaminophen    dextrose    dextrose    glucagon (human recombinant)    Magnesium Standard Dose Replacement - Follow Nurse / BPA Driven Protocol    Morphine    nitroglycerin    ondansetron **OR** ondansetron    phenol    Phosphorus Replacement - Follow Nurse / BPA Driven Protocol    Potassium Replacement - Follow Nurse / BPA Driven Protocol    sodium chloride    sodium chloride    sodium chloride     ALLERGIES:    Allergies   Allergen Reactions    Bactrim [Sulfamethoxazole-Trimethoprim] Rash     Objective    VITALS:   /75   Pulse 74   Temp 97.4 °F (36.3 °C) (Oral)   Resp 16   Ht 185.4 cm (73\")   Wt 98.4 kg (217 lb)   SpO2 97%   BMI 28.63 kg/m²     PHYSICAL EXAM:  Physical Exam  Vitals and nursing note reviewed.   Constitutional:       General: He is not in acute distress.     Appearance: Normal appearance. He is " well-developed. He is not diaphoretic.   HENT:      Head: Normocephalic and atraumatic.      Nose: Nose normal.      Comments: NG tube.      Mouth/Throat:      Mouth: Mucous membranes are moist.      Pharynx: Oropharynx is clear. No oropharyngeal exudate or posterior oropharyngeal erythema.      Comments: Dental fillings.  Eyes:      General: No scleral icterus.     Extraocular Movements: Extraocular movements intact.      Conjunctiva/sclera: Conjunctivae normal.      Pupils: Pupils are equal, round, and reactive to light.   Cardiovascular:      Rate and Rhythm: Normal rate and regular rhythm.      Heart sounds: Normal heart sounds. No murmur heard.     Comments: Cardiac monitor leads.   Pulmonary:      Effort: Pulmonary effort is normal. No respiratory distress.      Breath sounds: Normal breath sounds. No stridor. No wheezing or rales.   Abdominal:      General: Bowel sounds are normal. There is no distension.      Palpations: Abdomen is soft. There is no mass.      Tenderness: There is no abdominal tenderness. There is no guarding.      Comments: Decreased bowel sounds.    Genitourinary:     Comments: Deferred   Musculoskeletal:         General: No swelling, tenderness or deformity. Normal range of motion.      Cervical back: Normal range of motion and neck supple.      Right lower leg: No edema.      Left lower leg: No edema.      Comments: Left hand oxygen saturation monitor.  SCDs.   Bilateral upper extremity peripheral IVs.   Left upper extremity PICC.   Lymphadenopathy:      Cervical: No cervical adenopathy.      Upper Body:      Right upper body: No supraclavicular adenopathy.      Left upper body: No supraclavicular adenopathy.   Skin:     General: Skin is warm and dry.      Coloration: Skin is not pale.      Findings: No bruising, erythema or rash.   Neurological:      General: No focal deficit present.      Mental Status: He is alert and oriented to person, place, and time.      Coordination:  Coordination normal.   Psychiatric:         Mood and Affect: Mood normal.         Behavior: Behavior normal.         Thought Content: Thought content normal.     RECENT LABS:  Lab Results (last 24 hours)       Procedure Component Value Units Date/Time    CBC & Differential [[id number]]  (Abnormal) Collected: 12/10/23 0434    Specimen: Blood Updated: 12/10/23 0626    Narrative:      The following orders were created for panel order CBC & Differential.  Procedure                               Abnormality         Status                     ---------                               -----------         ------                     CBC Auto Differential[[id number]]        Abnormal            Final result               Scan Slide[[id number]]                                       Final result                 Please view results for these tests on the individual orders.    Scan Slide [[id number]] Collected: 12/10/23 0434    Specimen: Blood Updated: 12/10/23 0626     Scan Slide --     Comment: See Manual Differential Results       Manual Differential [[id number]]  (Abnormal) Collected: 12/10/23 0434    Specimen: Blood Updated: 12/10/23 0626     Neutrophil % 25.0 %      Lymphocyte % 59.0 %      Monocyte % 2.0 %      Bands %  1.0 %      Metamyelocyte % 4.0 %      Myelocyte % 6.0 %      Atypical Lymphocyte % 2.0 %      Blasts % 1.0 %      Neutrophils Absolute 3.93 10*3/mm3      Lymphocytes Absolute 9.21 10*3/mm3      Monocytes Absolute 0.30 10*3/mm3      nRBC 1.0 /100 WBC      Anisocytosis Slight/1+     Conway Cells Slight/1+     Poikilocytes Slight/1+     Target Cells Slight/1+     Toxic Granulation Slight/1+     Platelet Morphology Normal    Path Consult Reflex [[id number]] Collected: 12/10/23 0434    Specimen: Blood Updated: 12/10/23 0626     Pathology Review Yes    CBC Auto Differential [[id number]]  (Abnormal) Collected: 12/10/23 0434    Specimen: Blood Updated: 12/10/23 0626     WBC 15.10 10*3/mm3      RBC 4.34 10*6/mm3      Hemoglobin  13.0 g/dL      Hematocrit 39.1 %      MCV 90.1 fL      MCH 30.0 pg      MCHC 33.3 g/dL      RDW 15.4 %      RDW-SD 47.7 fl      MPV 8.8 fL      Platelets 208 10*3/mm3     Narrative:      The previously reported component NRBC is no longer being reported. Previous result was 0.1 /100 WBC (Reference Range: 0.0-0.2 /100 WBC) on 12/10/2023 at 0445 EST.    Phosphorus [[id number]]  (Abnormal) Collected: 12/10/23 0434    Specimen: Blood Updated: 12/10/23 0525     Phosphorus 2.0 mg/dL     Magnesium [[id number]]  (Normal) Collected: 12/10/23 0434    Specimen: Blood Updated: 12/10/23 0515     Magnesium 2.3 mg/dL     Comprehensive Metabolic Panel [[id number]]  (Abnormal) Collected: 12/10/23 0434    Specimen: Blood Updated: 12/10/23 0515     Glucose 219 mg/dL      BUN 18 mg/dL      Creatinine 0.74 mg/dL      Sodium 156 mmol/L      Potassium 3.4 mmol/L      Comment: Slight hemolysis detected by analyzer. Result may be falsely elevated.        Chloride 120 mmol/L      CO2 20.0 mmol/L      Calcium 9.0 mg/dL      Total Protein 5.2 g/dL      Albumin 2.3 g/dL      ALT (SGPT) 13 U/L      AST (SGOT) <5 U/L      Alkaline Phosphatase 53 U/L      Total Bilirubin <0.2 mg/dL      Globulin 2.9 gm/dL      A/G Ratio 0.8 g/dL      BUN/Creatinine Ratio 24.3     Anion Gap 16.0 mmol/L      eGFR 101.8 mL/min/1.73     Narrative:      GFR Normal >60  Chronic Kidney Disease <60  Kidney Failure <15      POC Glucose Once [[id number]]  (Abnormal) Collected: 12/10/23 0435    Specimen: Blood Updated: 12/10/23 0437     Glucose 199 mg/dL      Comment: Serial Number: [id number]:  [id number]       Blood Culture - Blood, Arm, Right [[id number]]  (Normal) Collected: 12/08/23 0311    Specimen: Blood from Arm, Right Updated: 12/10/23 0330     Blood Culture No growth at 2 days    Narrative:      Less than seven (7) mL's of blood was collected.  Insufficient quantity may yield false negative results.    Blood Culture - Blood, Arm, Left [[id number]]  (Normal)  Collected: 12/08/23 0256    Specimen: Blood from Arm, Left Updated: 12/10/23 0300     Blood Culture No growth at 2 days    POC Glucose Once [[id number]]  (Abnormal) Collected: 12/09/23 2329    Specimen: Blood Updated: 12/09/23 2331     Glucose 202 mg/dL      Comment: Serial Number: [id number]:  [id number]       POC Glucose Once [[id number]]  (Abnormal) Collected: 12/09/23 1804    Specimen: Blood Updated: 12/09/23 1806     Glucose 202 mg/dL      Comment: Serial Number: [id number]:  [id number]       POC Glucose Once [[id number]]  (Abnormal) Collected: 12/09/23 1140    Specimen: Blood Updated: 12/09/23 1142     Glucose 219 mg/dL      Comment: Serial Number: [id number]:  [id number]             PENDING RESULTS: Blood cultures (final).     IMAGING REVIEWED:  XR Abdomen KUB    Result Date: 12/10/2023  Impression: Persistent small bowel obstruction. Nasogastric tube tip and sidehole overlie the stomach. Electronically Signed: Will Rao MD  12/10/2023 8:58 AM EST  Workstation ID: [id number]    XR Abdomen KUB    Result Date: 12/9/2023  Impression: Persistent small bowel obstruction, similar bowel gas pattern compared to yesterday's radiograph. Electronically Signed: Will Rao MD  12/9/2023 8:04 AM EST  Workstation ID: [id number]    XR Abdomen KUB    Result Date: 12/8/2023  Impression: Increased small bowel dilatation with wall thickening. This may represent early or partial obstruction versus enteritis. Continued follow-up recommended. Electronically Signed: Frannie Barnes MD  12/8/2023 12:26 PM EST  Workstation ID: [id number]     I have reviewed the patient's labs, imaging, reports, and other clinician documentation.    Assessment & Plan   ASSESSMENT  Stage I CLL: Started acalabrutinib in 8/2023. S/p 3 cycles obinutuzumab with WBC decreasing well.  GI side effects of acalabrutinib include: Abdominal pain (15%), constipation (15%), diarrhea (18% to 35%; grade ?3: ?3%), nausea (19% to 22%; grade ?3: <1%),  vomiting (13%; grade ?3: 2%) with less than 5% incidence of atrial fibrillation and GI side effects of obinutuzumab include: Constipation (8% to 32%) decreased appetite (14%), diarrhea (monotherapy: ?10%; combination therapy: 10% to 30%; grades 3/4: 2% to 3%).  Holding acalabrutinib and obinutuzumab.   Right lung nodules:  These will be followed on repeat imaging.  Iron-deficiency anemia with heme-positive stool:  He was on ferrous sulfate 325 mg by mouth daily and Pepcid as an outpatient. This was not continued as an inpatient.   Small bowel obstruction versus enteritis, abdominal pain: Gastroenterology was consulted. NG tube placed and is to low wall suction. General surgery was consulted with no plan for surgical intervention. Received Unasyn and was then switched to IV Rocephin and Flagyl. KUB on 12/10/2023 showed persistent small bowel obstruction.  Doubt SBO related to current CLL therapy.  Suspected septic shock due to enteritis/Hypotension: Off Levophed drip. Blood cultures show no growth to date. Leukocytosis improved.   Paroxysmal atrial fibrillation: New onset, likely sepsis-induced.  GI recommended Cardiology evaluation but they were not yet consulted. 2D echocardiogram showed LVEF 61 - 65%. He is on SQ heparin.    BETTIE, metabolic acidosis, and hypernatremia: Management per Primary team. On bicarbonate drip.      PLAN   KUB today.   Continue IV antibiotics per Primary team.   Hold oral iron replacement.  Hold acalabrutinib and obinutuzumab.     Electronically signed by REYES Lira, 12/10/23, 10:42 AM EST.     On 12/10/2023, I have personally performed a face-to-face diagnostic evaluation on this patient. I have performed a complete history and physical examination, reviewed laboratory studies, and radiographic examinations.  I have completed the majority and substantive portion of the medical decision making.  I have formulated the assessment on this patient and the plan of action as noted  "above. I have discussed the case with Génesis Reina NP, have edited/reviewed the note, and agree with the care plan.  He has some cough and dyspnea on exertion.  On examination, NG tube is present and he has decreased bowel sounds with soft abdomen.  WBC is down to 15.1 with 59% lymphocytes.  Doubt his current SBO is related to CLL or its treatment.  Will hold his CLL treatment and follow.      On 12/10/2023, I discussed the patient's findings and my recommendations with patient, family and nursing.    Part of this note may be an electronic transcription/translation of spoken language to printed text using the Dragon Dictation System.     Electronically signed by Patrick Fontaine MD, 12/10/23, 11:29 AM EST.      Electronically signed by Patrick Fontaine MD at 12/10/23 1129       Gila Landon APRN at 12/09/23 1503       Attestation signed by Antony Jackson MD at 12/09/23 1789    I have reviewed this documentation and agree.  I have personally seen the patient with nurse practitioner and agree with plan.  I have reviewed labs and imaging.  I also performed an appropriate and complete medical decision making component to the history and physical.  Feels better today without vomiting or abdominal pain and he is passing gas.  He feels weak.  His abdomen is soft and nontender.  KUB shows persistent small bowel obstruction.  White count is 22 hemoglobin 13 platelets 237 and liver enzymes are normal.  Continue NG tube to low wall suction and continue supportive care.  Repeat KUB tomorrow.  Will follow.                   LOS: 1 day   Patient Care Team:  Jamar Pham MD as PCP - General (Family Medicine)  Patrick Fontaine MD as Consulting Physician (Hematology and Oncology)      Subjective   \" I just feel exhausted\"    Interval History:   Labs: Sodium 148, potassium 3.7, creatinine 1.  LFTs normal.  WBCs 22.4 up from 20 yesterday, hemoglobin 13.9, platelets 237.  5 bands.    KUB persistent small bowel " obstruction similar bowel gas pattern compared to yesterday's radiograph.  NG tube lies left upper quadrant.  Bilious drainage in NG tube.  Passing gas but no bowel movements.    ROS:   No chest pain, shortness of breath, or cough.         Medication Review:     Current Facility-Administered Medications:     acetaminophen (TYLENOL) tablet 650 mg, 650 mg, Oral, Q4H PRN **OR** acetaminophen (TYLENOL) suppository 650 mg, 650 mg, Rectal, Q4H PRN, Berenice Oneil APRN    cefTRIAXone (ROCEPHIN) 1,000 mg in sodium chloride 0.9 % 100 mL IVPB, 1,000 mg, Intravenous, Q24H, Oh Hooks MD, Last Rate: 200 mL/hr at 12/09/23 0940, 1,000 mg at 12/09/23 0940    dextrose (D50W) (25 g/50 mL) IV injection 25 g, 25 g, Intravenous, Q15 Min PRN, Matilde Garcia APRN    dextrose (GLUTOSE) oral gel 15 g, 15 g, Oral, Q15 Min PRN, Matilde Garcia APRN    glucagon (GLUCAGEN) injection 1 mg, 1 mg, Intramuscular, Q15 Min PRN, Matilde Garcia APRN    heparin (porcine) 5000 UNIT/ML injection 5,000 Units, 5,000 Units, Subcutaneous, Q8H, Oh Hooks MD, 5,000 Units at 12/09/23 1424    Hydrocortisone Sod Suc (PF) (Solu-CORTEF) injection 50 mg, 50 mg, Intravenous, Q6H, Oh Hooks MD, 50 mg at 12/09/23 0939    insulin glargine (LANTUS, SEMGLEE) injection 8 Units, 8 Units, Subcutaneous, Daily, Oh Hooks MD, 8 Units at 12/09/23 1211    insulin lispro (HUMALOG/ADMELOG) injection 2-7 Units, 2-7 Units, Subcutaneous, Q6H, Matilde Garcia APRN, 3 Units at 12/09/23 1210    metroNIDAZOLE (FLAGYL) IVPB 500 mg, 500 mg, Intravenous, Q8H, Oh Hooks MD, Last Rate: 100 mL/hr at 12/09/23 0939, 500 mg at 12/09/23 0939    morphine injection 2 mg, 2 mg, Intravenous, Q4H PRN, Matilde Garcia APRN, 2 mg at 12/09/23 0229    nitroglycerin (NITROSTAT) SL tablet 0.4 mg, 0.4 mg, Sublingual, Q5 Min PRN, Berenice Oneil APRN    norepinephrine (LEVOPHED) 8 mg in 250 mL NS infusion (premix),  0.02-0.3 mcg/kg/min, Intravenous, Titrated, Tony Bhardwaj MD, Last Rate: 23.8 mL/hr at 12/09/23 1121, 0.13 mcg/kg/min at 12/09/23 1121    ondansetron (ZOFRAN) tablet 4 mg, 4 mg, Oral, Q6H PRN **OR** ondansetron (ZOFRAN) injection 4 mg, 4 mg, Intravenous, Q6H PRN, Berenice Oneil APRN    phenol (CHLORASEPTIC) 1.4 % liquid 1 spray, 1 spray, Mouth/Throat, Q2H PRN, Pushpa Monte PA, 1 spray at 12/09/23 1119    sodium bicarbonate 8.4 % 150 mEq in dextrose (D5W) 5 % 1,000 mL infusion (greater than 75 mEq), 150 mEq, Intravenous, Continuous, Oh Hooks MD, Last Rate: 75 mL/hr at 12/09/23 0908, 150 mEq at 12/09/23 0908    sodium chloride 0.9 % flush 10 mL, 10 mL, Intravenous, PRN, Tony Bhardwaj MD    sodium chloride 0.9 % flush 10 mL, 10 mL, Intravenous, Q12H, Berenice Oneil APRN, 10 mL at 12/09/23 0855    sodium chloride 0.9 % flush 10 mL, 10 mL, Intravenous, PRN, Berenice Oneil APRN    sodium chloride 0.9 % infusion 40 mL, 40 mL, Intravenous, PRN, Berenice Oneil APRN      Objective resting in the hospital bed.  NAD.  Family at bedside.    Vital Signs  Temp:  [97.5 °F (36.4 °C)-98.6 °F (37 °C)] 97.5 °F (36.4 °C)  Heart Rate:  [] 77  BP: ()/(36-83) 121/67  Physical Exam:    General Appearance:    Awake and alert, in no acute distress   Head:    Normocephalic, without obvious abnormality   Eyes:          Conjunctivae normal, anicteric sclerae   Ears:    Hearing intact   Throat:   No oral lesions, no thrush, oral mucosa moist   Neck:   No adenopathy, supple, no JVD   Lungs:       respirations regular, even and unlabored        Abdomen:     soft, non-tender, no rebound or guarding, non-distended, no hepatosplenomegaly   Rectal:     Deferred   Extremities:   No edema, no cyanosis, no redness   Skin:   No bleeding, bruising or rash, no jaundice   Neurologic:   Cranial nerves 2 - 12 grossly intact, no asterixis, sensation   intact        Results Review:    CBC    Results  from last 7 days   Lab Units 12/09/23  0517 12/08/23  1909 12/08/23  0256 12/04/23  1105   WBC 10*3/mm3 22.40* 20.10* 10.40 5.83   HEMOGLOBIN g/dL 13.9 12.7* 13.3 15.8   PLATELETS 10*3/mm3 237 201 165 207     CMP   Results from last 7 days   Lab Units 12/09/23  0517 12/08/23  0716 12/08/23  0311 12/04/23  1105   SODIUM mmol/L 148* 135* 131* 142   POTASSIUM mmol/L 3.7 3.1* 3.3* 3.8   CHLORIDE mmol/L 112* 98 92* 102   CO2 mmol/L 10.0* 14.0* 19.0* 21.9*   BUN mg/dL 27* 70* 82* 15   CREATININE mg/dL 1.00 1.30* 1.78* 0.92   GLUCOSE mg/dL 183* 138* 163* 146*   ALBUMIN g/dL 2.5*  --  2.7* 3.9   BILIRUBIN mg/dL <0.2  --  0.5 0.6   ALK PHOS U/L 57  --  49 51   AST (SGOT) U/L 9  --  15 15   ALT (SGPT) U/L 15  --  17 19   MAGNESIUM mg/dL 2.5*  --   --   --    PHOSPHORUS mg/dL 3.3  --   --   --    AMYLASE U/L  --   --   --  62   LIPASE U/L  --   --  20 16     Cr Clearance Estimated Creatinine Clearance: 93.4 mL/min (by C-G formula based on SCr of 1 mg/dL).  Coag   Results from last 7 days   Lab Units 12/08/23  0256   INR  1.01   APTT seconds 34.6*     HbA1C   Lab Results   Component Value Date    HGBA1C 8.30 (H) 10/19/2023    HGBA1C 7.60 (H) 05/17/2023    HGBA1C 6.9 (H) 02/14/2023     Blood Glucose   Glucose   Date/Time Value Ref Range Status   12/09/2023 1140 219 (H) 70 - 105 mg/dL Final     Comment:     Serial Number: [id number]:  [id number]   12/09/2023 0513 180 (H) 70 - 105 mg/dL Final     Comment:     Serial Number: [id number]:  [id number]   12/08/2023 2351 169 (H) 70 - 105 mg/dL Final     Comment:     Serial Number: [id number]:  [id number]   12/08/2023 1718 139 (H) 70 - 105 mg/dL Final     Comment:     Serial Number: [id number]:  [id number]   12/08/2023 1123 142 (H) 70 - 105 mg/dL Final     Comment:     Serial Number: [id number]:  [id number]   12/08/2023 0851 146 (H) 70 - 105 mg/dL Final     Comment:     Serial Number: [id number]:  [id number]     Infection   Results from last 7 days    Lab Units 12/08/23  0311 12/08/23  0256   BLOODCX  No growth at 24 hours No growth at 24 hours   PROCALCITONIN ng/mL 27.83*  --      UA    Results from last 7 days   Lab Units 12/08/23  0450   NITRITE UA  Negative     Radiology(recent) XR Abdomen KUB    Result Date: 12/9/2023  Impression: Persistent small bowel obstruction, similar bowel gas pattern compared to yesterday's radiograph. Electronically Signed: Will Rao MD  12/9/2023 8:04 AM EST  Workstation ID: [id number]    XR Abdomen KUB    Result Date: 12/8/2023  Impression: Increased small bowel dilatation with wall thickening. This may represent early or partial obstruction versus enteritis. Continued follow-up recommended. Electronically Signed: Frannie Barnes MD  12/8/2023 12:26 PM EST  Workstation ID: [id number]    XR Abdomen KUB    Result Date: 12/8/2023  Impression: Esophagogastric tube is in the stomach. Electronically Signed: Ga Farfan MD  12/8/2023 6:29 AM EST  Workstation ID: [id number]    CT Abdomen Pelvis Without Contrast    Result Date: 12/8/2023  Impression: Findings are consistent with small bowel obstruction with dilated and fluid-filled loops of predominantly jejunum and proximal to mid ileum Electronically Signed: Ga Farfan MD  12/8/2023 5:03 AM EST  Workstation ID: [id number]    XR Chest 1 View    Result Date: 12/8/2023  Impression: No active disease Electronically Signed: Ga Farfan MD  12/8/2023 3:18 AM EST  Workstation ID: [id number]           Assessment & Plan   63-year-old male with history of CLL, lymphocytic gastritis presented to the hospital with nausea, vomiting, abdominal pain.  CT consistent with small bowel obstruction.  Also with new onset A-fib.      -Small bowel obstruction  -Abdominal pain  -Nausea, vomiting  -Lymphocytic gastritis  -CLL on [place]  -A-fib-new onset  -Hypotension     Plan  NG tube placed yesterday.  KUB shows very little changes.  Having bilious drainage.  Patient is passing gas but not having any bowel  movements yet.    Repeat KUB in the morning.  Patient/family are concerned about CLL and are requesting oncology see him inpatient.  Consult has been placed.  General surgery and cardiology following.  Patient is on Rocephin and Flagyl.  N.p.o. status.  Consider small bowel follow-through once KUB improves.  Continue NG to low wall suction.  Repeat KUB in the morning.  Continue supportive care.     I discussed the patients findings and my recommendations with the patient.      Gila Landon, APRN  23  15:03 EST              Electronically signed by Antony Jackson MD at 23 1651       Oh Hooks MD at 23 1253            Critical Care Progress Note   Bharathi Darnell : 1960 MRN:[id number] LOS:1     Principal Problem: Septic shock     Reason for follow up: All the medical problems listed below    Summary     A 63 y.o. male with PMH of CLL, diabetes, hypertension, diverticulosis presented to the hospital for nausea and vomiting and was admitted with a principal diagnosis of Septic shock.  CT abdomen and pelvis showed findings suspicious for small bowel obstruction.  Remained hypotensive in spite of adequate fluid resuscitation and needed vasopressors.  Also started on ceftriaxone and Flagyl for possible enteritis.  GI and general surgery was consulted and recommended conservative management.    Also found to have new onset A-fib.    Significant events     23 : Will give 1 dose of digoxin for rate control.  Follow echocardiogram results.  Changed Unasyn to ceftriaxone and Flagyl for better E. coli coverage.  Add Lantus.    Assessment / Plan     Septic Shock due to enteritis  CT abdomen pelvis suspicious for small bowel obstruction versus enteritis.    Procalcitonin 27.83.  Currently on ceftriaxone and Flagyl.  Adequately fluid resuscitated.  Continue with maintenance fluids while NPO.  Avoid fluid overload.  Titrate vasopressors for MAP target of 65.  Add stress dose  steroids.  Echo images reviewed, normal LVEF.    Small bowel obstruction versus enteritis  General surgery following.  Currently n.p.o. with NG tube suction.     Acute Kidney Injury / Metabolic acidosis / Hypernatremia:   Remains nonoliguric.   Likely prerenal. Possible ATN from septic shock.  Changed IV fluids to bicarb drip.  Monitor Input/Output very closely.   Net IO Since Admission: 467 mL [12/09/23 1305]      Paroxysmal atrial fibrillation, new diagnosis  Likely sepsis induced. Normal TSH.    Not on any rate control agents due to hypotension.  Will give 1 dose of digoxin for rate control.  [id number] Score of atleast 2.  Will eventually discharge on Eliquis, no emergency to start at this time.  Echo images reviewed, normal LVEF, normal atrial size.  No obvious left atrial thrombus.    Diabetes mellitus Type 2, not insulin-dependent : well controlled.   Hold home Farxiga.  Accu checks every 6 hours and c/w humalog coverage as needed.   Last A1c of 8.3.    Essential Hypertension: Currently in shock  Hold home Norvasc and lisinopril.  Restart medications as needed.    Chronic lymphocytic leukemia: Oncology consulted.    Code status:   Code Status (Patient has no pulse and is not breathing): CPR (Attempt to Resuscitate)  Medical Interventions (Patient has pulse or is breathing): Full Support       Nutrition: NPO Diet NPO Type: Strict NPO   Patient isn't on Tube Feeding    DVT prophylaxis:  Medical and mechanical DVT prophylaxis orders are present.     Subjective / Review of systems     Review of Systems   Feels okay, denies any abdominal pain.  Not passing any flatus.  No nausea or vomiting.  No chest pain.  No shortness of breath.  Objective / Physical Exam   Vital signs:  Temp: 97.7 °F (36.5 °C)  BP: 130/62  Heart Rate: 107  Resp: 16  SpO2: 96 %  Weight: 98.4 kg (217 lb)    Admission Weight: Weight: 97.5 kg (215 lb)  Current Weight: Weight: 98.4 kg (217 lb)    Input/Output in last 24 hours:    Intake/Output  Summary (Last 24 hours) at 12/9/2023 1305  Last data filed at 12/9/2023 0852  Gross per 24 hour   Intake 5067 ml   Output 3000 ml   Net 2067 ml      Physical Exam  Vitals and nursing note reviewed.   Constitutional:       General: He is not in acute distress.     Appearance: Normal appearance.   HENT:      Mouth/Throat:      Mouth: Mucous membranes are moist.      Pharynx: Oropharynx is clear.   Eyes:      General: No scleral icterus.     Extraocular Movements: Extraocular movements intact.      Conjunctiva/sclera: Conjunctivae normal.   Cardiovascular:      Rate and Rhythm: Normal rate.      Heart sounds: No murmur heard.     No gallop.   Pulmonary:      Breath sounds: Normal breath sounds. No wheezing or rales.   Abdominal:      General: Bowel sounds are normal. There is distension.      Palpations: Abdomen is soft.      Tenderness: There is no abdominal tenderness.   Musculoskeletal:         General: No tenderness.      Right lower leg: No edema.      Left lower leg: No edema.   Neurological:      General: No focal deficit present.      Mental Status: He is alert and oriented to person, place, and time.        Radiology and Labs     Results from last 7 days   Lab Units 12/09/23  0517 12/08/23  1909 12/08/23  0256 12/04/23  1105   WBC 10*3/mm3 22.40* 20.10* 10.40 5.83   HEMATOCRIT % 43.0 38.0 39.9 47.9   PLATELETS 10*3/mm3 237 201 165 207      Results from last 7 days   Lab Units 12/09/23  0517 12/08/23  0716 12/08/23  0311 12/04/23  1105   SODIUM mmol/L 148* 135* 131* 142   POTASSIUM mmol/L 3.7 3.1* 3.3* 3.8   CHLORIDE mmol/L 112* 98 92* 102   CO2 mmol/L 10.0* 14.0* 19.0* 21.9*   BUN mg/dL 27* 70* 82* 15   CREATININE mg/dL 1.00 1.30* 1.78* 0.92      Current medications   Scheduled Meds: cefTRIAXone, 1,000 mg, Intravenous, Q24H  digoxin, 500 mcg, Intravenous, Once  heparin (porcine), 5,000 Units, Subcutaneous, Q8H  hydrocortisone sodium succinate, 50 mg, Intravenous, Q6H  insulin glargine, 8 Units, Subcutaneous,  Daily  insulin lispro, 2-7 Units, Subcutaneous, Q6H  metroNIDAZOLE, 500 mg, Intravenous, Q8H  sodium chloride, 10 mL, Intravenous, Q12H      Continuous Infusions: norepinephrine, 0.02-0.3 mcg/kg/min, Last Rate: 0.13 mcg/kg/min (12/09/23 1121)  sodium bicarbonate 8.4 % 150 mEq in dextrose (D5W) 5 % 1,000 mL infusion (greater than 75 mEq), 150 mEq, Last Rate: 150 mEq (12/09/23 0908)        Plan discussed with RN. Reviewed all other data in the last 24 hours, including but not limited to vitals, labs, microbiology, imaging and pertinent notes from other providers. High complexity decision making and high risk of deterioration.    Oh Hooks MD   Critical Care  12/09/23   13:05 EST       Electronically signed by Oh Hooks MD at 12/09/23 1305       Pushpa Monte PA at 12/09/23 0939       Attestation signed by Pawan Stearns MD at 12/09/23 1211    Patient seen and examined.  Carefully I talked to him about his presentation.  He says that his pain began almost a week ago.  He says that after his infusions he generally would feel bad for a couple of days and then by Sunday he would usually feel better but this time on Sunday instead of getting better he developed this abdominal discomfort.  Has been an ache has been constant throughout the week.  He has had some constipation but has been having bowel function.  Never had this pain before.  Has not had any abdominal surgeries.  Since he has been here his abdominal pain has improved.  It is better with the pain medication.  He does have a leukocytosis he does not have a lactic acidosis his lactate is only 1.3 and has been low ever since arrival.  He has gotten the fluid resuscitation he was still on some vasopressors.  I reviewed the gastroenterology's notes.  I believe that oncology is going to weigh in as well to see if this could be a more enteritis related to his chemotherapy.  With atrial fibrillation and embolic event is always possible.   However he is a week out from the likely insult if that was the case.  His echo is pending.  Counseled him about some of the diagnostic dilemma if he clinically declines with the medical teams need me to prove in the operating room that his bowel is nonischemic talk to him about either diagnostic laparoscopy or laparotomy to make that happen.  However in the setting of some type of enteritis related to chemotherapy that operation will not be therapeutic.  Today to follow while he is in the intensive care unit and offer surgery if needed.                General Surgery Progress Note    Name: Bharathi Darnell ADMIT: 2023   : 1960  PCP: Jamar Pham MD    MRN: [id number] LOS: 1 days   AGE/SEX: 63 y.o. male  ROOM: [facility]/[address] [place] [address]    Chief Complaint   Patient presents with    Abdominal Pain    Weakness - Generalized     Subjective     Patient seen and examined.  BP soft, tachycardic at 107 bpm, afebrile.  Patient remains on 0.18 of Levophed this morning.  Not feeling well this morning, does not complain of pain at time of exam.  Tolerating ice chips for comfort, denies nausea and vomiting.  NGT tube in place with 1225 mL output recorded x 24 hours.  Has not had a bowel movement since admission.  UOP 3125 mL x 24 hours. Leukocytosis worsening, 22.4 (20.1) this morning    Objective     Scheduled Medications:   cefTRIAXone, 1,000 mg, Intravenous, Q24H  heparin (porcine), 5,000 Units, Subcutaneous, Q8H  hydrocortisone sodium succinate, 50 mg, Intravenous, Q6H  insulin lispro, 2-7 Units, Subcutaneous, Q6H  metroNIDAZOLE, 500 mg, Intravenous, Q8H  sodium chloride, 10 mL, Intravenous, Q12H        Active Infusions:  norepinephrine, 0.02-0.3 mcg/kg/min, Last Rate: 0.18 mcg/kg/min (23 6753)  sodium bicarbonate 8.4 % 150 mEq in dextrose (D5W) 5 % 1,000 mL infusion (greater than 75 mEq), 150 mEq, Last Rate: 150 mEq (23 0908)        As Needed Medications:    acetaminophen **OR**  "acetaminophen    dextrose    dextrose    glucagon (human recombinant)    Morphine    nitroglycerin    ondansetron **OR** ondansetron    sodium chloride    sodium chloride    sodium chloride    Vital Signs  Vital Signs Patient Vitals for the past 24 hrs:   BP Temp Temp src Pulse SpO2 Height Weight   12/09/23 0700 -- 97.7 °F (36.5 °C) Oral -- -- -- --   12/09/23 0600 109/70 -- -- 107 96 % 185.4 cm (73\") 98.4 kg (217 lb)   12/09/23 0500 119/63 -- -- 85 95 % -- --   12/09/23 0400 112/64 -- -- 81 95 % -- --   12/09/23 0300 103/59 -- -- 115 91 % -- --   12/09/23 0200 110/65 -- -- 114 92 % -- --   12/09/23 0100 111/62 -- -- (!) 121 94 % -- --   12/09/23 0000 123/67 -- -- 88 94 % -- --   12/08/23 2355 -- 98.2 °F (36.8 °C) Oral -- -- -- --   12/08/23 2300 130/56 -- -- 86 94 % -- --   12/08/23 2200 107/59 -- -- 112 94 % -- --   12/08/23 2100 114/65 -- -- 108 93 % -- --   12/08/23 2000 109/74 -- -- 116 93 % -- --   12/08/23 1935 -- 98.2 °F (36.8 °C) Oral -- -- -- --   12/08/23 1900 126/66 -- -- 118 95 % -- --   12/08/23 1800 116/64 -- -- (!) 138 95 % -- --   12/08/23 1730 107/63 -- -- (!) 136 95 % -- --   12/08/23 1700 107/62 -- -- (!) 139 93 % -- --   12/08/23 1645 (!) 77/53 -- -- (!) 137 93 % -- --   12/08/23 1630 (!) 86/41 -- -- (!) 137 94 % -- --   12/08/23 1615 107/60 -- -- (!) 136 91 % -- --   12/08/23 1607 91/49 -- -- (!) 138 96 % -- --   12/08/23 1600 -- 98.6 °F (37 °C) Oral -- -- -- --   12/08/23 1545 (!) 96/36 -- -- (!) 135 96 % -- --   12/08/23 1530 102/53 -- -- 90 96 % -- --   12/08/23 1500 108/55 -- -- 89 94 % -- --   12/08/23 1430 101/49 -- -- 91 95 % -- --   12/08/23 1400 100/49 -- -- 89 96 % -- --   12/08/23 1330 101/51 -- -- 89 94 % -- --   12/08/23 1300 (!) 88/44 -- -- 90 95 % -- --   12/08/23 1230 94/44 -- -- 94 95 % -- --   12/08/23 1200 -- 98.7 °F (37.1 °C) Oral -- -- -- --   12/08/23 1130 103/49 -- -- 83 93 % -- --   12/08/23 1100 107/58 -- -- 80 96 % -- --   12/08/23 1030 101/53 -- -- 90 95 % -- -- "   12/08/23 1000 100/50 -- -- 94 95 % -- --     I/O:  I/O last 3 completed shifts:  In: 5067 [I.V.:5067]  Out: 4350 [Urine:3125; Emesis/NG output:1225]    Physical Exam:  Physical Exam  Constitutional:       Appearance: He is ill-appearing.   Cardiovascular:      Rate and Rhythm: Tachycardia present.   Pulmonary:      Effort: Pulmonary effort is normal. No respiratory distress.   Abdominal:      General: There is no distension.      Palpations: Abdomen is soft.      Tenderness: There is no abdominal tenderness. There is no guarding.      Comments: NG tube in place with thin, brown output.   Neurological:      Mental Status: He is alert and oriented to person, place, and time.   Psychiatric:         Mood and Affect: Mood normal.         Results Review:     CBC    Results from last 7 days   Lab Units 12/09/23  0517 12/08/23  1909 12/08/23  0256 12/04/23  1105   WBC 10*3/mm3 22.40* 20.10* 10.40 5.83   HEMOGLOBIN g/dL 13.9 12.7* 13.3 15.8   PLATELETS 10*3/mm3 237 201 165 207     BMP   Results from last 7 days   Lab Units 12/09/23  0517 12/08/23  0716 12/08/23  0311 12/04/23  1105   SODIUM mmol/L 148* 135* 131* 142   POTASSIUM mmol/L 3.7 3.1* 3.3* 3.8   CHLORIDE mmol/L 112* 98 92* 102   CO2 mmol/L 10.0* 14.0* 19.0* 21.9*   BUN mg/dL 27* 70* 82* 15   CREATININE mg/dL 1.00 1.30* 1.78* 0.92   GLUCOSE mg/dL 183* 138* 163* 146*   MAGNESIUM mg/dL 2.5*  --   --   --    PHOSPHORUS mg/dL 3.3  --   --   --      Radiology(recent) XR Abdomen KUB    Result Date: 12/9/2023  Impression: Persistent small bowel obstruction, similar bowel gas pattern compared to yesterday's radiograph. Electronically Signed: Will Rao MD  12/9/2023 8:04 AM EST  Workstation ID: [id number]    XR Abdomen KUB    Result Date: 12/8/2023  Impression: Increased small bowel dilatation with wall thickening. This may represent early or partial obstruction versus enteritis. Continued follow-up recommended. Electronically Signed: Frannie Barnes MD  12/8/2023 12:26  PM EST  Workstation ID: [id number]    XR Abdomen KUB    Result Date: 12/8/2023  Impression: Esophagogastric tube is in the stomach. Electronically Signed: Ga Farfan MD  12/8/2023 6:29 AM EST  Workstation ID: [id number]    CT Abdomen Pelvis Without Contrast    Result Date: 12/8/2023  Impression: Findings are consistent with small bowel obstruction with dilated and fluid-filled loops of predominantly jejunum and proximal to mid ileum Electronically Signed: Ga Farfan MD  12/8/2023 5:03 AM EST  Workstation ID: [id number]    XR Chest 1 View    Result Date: 12/8/2023  Impression: No active disease Electronically Signed: Ga Farfan MD  12/8/2023 3:18 AM EST  Workstation ID: [id number]     I reviewed the patient's new clinical results.    Assessment & Plan       Septic shock due to enteritis    CLL (chronic lymphocytic leukemia)    Bowel obstruction      63 y.o. male, with a history of CLL, who presents with a history of abdominal pain, nausea, vomiting, loose stools  Continue NG tube to low wall suction, continue to monitor output  Ordered throat spray for comfort.  Okay to have ice chips for comfort  Ordered 1L LR bolus  Wean pressors as tolerated  Will continue to monitor closely  Further input from Dr. Stearns to follow        This note was created using Dragon Voice Recognition software.    JOSE Freitas  12/09/23  09:40 EST     Electronically signed by Pawan Setarns MD at 12/09/23 8241

## 2023-12-11 NOTE — PROGRESS NOTES
General Surgery Progress Note    Name: Bharathi Darnell ADMIT: 2023   : 1960  PCP: Jamar Pham MD    MRN: [id number] LOS: 3 days   AGE/SEX: 63 y.o. male  ROOM: [address] [place] [address]    Chief Complaint   Patient presents with    Abdominal Pain    Weakness - Generalized     Subjective     Patient seen and examined. Mild tachycardia, but overall vitals appear stable. Afebrile. Has remained off pressors. Appears to be doing better overall. Tolerating sips of clears, denies nausea and vomiting. Had multiple BMs yesterday and has had one this morning. He described his stool as normal, without blood. Denies pain at time of examination. NGT in place with 550 mL recorded overnight. States that he's starting to get hungry. WBC count has normalized at 10.4 (15.1). K and phos are low on CMP this AM.    Objective     Scheduled Medications:   cefTRIAXone, 1,000 mg, Intravenous, Q24H  enoxaparin, 40 mg, Subcutaneous, Daily  insulin glargine, 8 Units, Subcutaneous, Daily  insulin lispro, 2-7 Units, Subcutaneous, Q6H  metoclopramide, 10 mg, Intravenous, Q6H  metroNIDAZOLE, 500 mg, Intravenous, Q8H  potassium phosphate, 15 mmol, Intravenous, Once  sodium chloride, 10 mL, Intravenous, Q12H        Active Infusions:       As Needed Medications:    acetaminophen **OR** acetaminophen    dextrose    dextrose    glucagon (human recombinant)    Magnesium Standard Dose Replacement - Follow Nurse / BPA Driven Protocol    Morphine    nitroglycerin    ondansetron **OR** ondansetron    phenol    Phosphorus Replacement - Follow Nurse / BPA Driven Protocol    Potassium Replacement - Follow Nurse / BPA Driven Protocol    sodium chloride    sodium chloride    sodium chloride    Vital Signs  Vital Signs Patient Vitals for the past 24 hrs:   BP Temp Temp src Pulse Resp SpO2 Weight   23 0700 -- 97.5 °F (36.4 °C) Oral -- -- -- --   23 0600 146/95 -- -- 107 -- (!) 87 % --   23 0500 137/71 -- -- 65 -- 95 % 96.5 kg  (212 lb 11.9 oz)   12/11/23 0400 136/70 98 °F (36.7 °C) Oral 71 14 95 % --   12/11/23 0300 134/69 -- -- 59 -- 96 % --   12/11/23 0200 128/69 -- -- 64 -- 96 % --   12/11/23 0100 128/73 -- -- 68 -- 95 % --   12/11/23 0000 133/77 98.9 °F (37.2 °C) Oral 72 14 95 % --   12/10/23 2300 120/67 -- -- 74 -- 90 % --   12/10/23 2200 135/72 -- -- 86 -- 95 % --   12/10/23 2135 -- -- -- 73 -- 95 % --   12/10/23 2130 144/70 -- -- 74 -- 97 % --   12/10/23 2105 142/68 -- -- 75 -- 96 % --   12/10/23 2100 -- -- -- 78 -- 95 % --   12/10/23 2030 125/75 -- -- 84 -- 95 % --   12/10/23 2000 135/76 98.4 °F (36.9 °C) Oral 75 17 95 % --   12/10/23 1900 -- -- -- 101 -- 97 % --   12/10/23 1700 131/72 -- -- 87 -- 91 % --   12/10/23 1600 137/73 -- -- 84 -- 97 % --   12/10/23 1542 -- 98.1 °F (36.7 °C) Oral -- -- -- --   12/10/23 1500 136/67 -- -- 82 -- 94 % --   12/10/23 1400 122/62 -- -- 86 -- 94 % --   12/10/23 1200 120/72 -- -- 96 -- 95 % --   12/10/23 1147 -- 98 °F (36.7 °C) Oral -- -- -- --     I/O:  I/O last 3 completed shifts:  In: 2240 [I.V.:2240]  Out: 3075 [Urine:1775; Emesis/NG output:1300]    Physical Exam:  Physical Exam  Constitutional:       General: He is not in acute distress.  Cardiovascular:      Rate and Rhythm: Tachycardia present.   Pulmonary:      Effort: No respiratory distress.      Comments: On O2  Abdominal:      General: There is no distension.      Palpations: Abdomen is soft.      Tenderness: There is no abdominal tenderness. There is no guarding.   Neurological:      Mental Status: He is alert and oriented to person, place, and time.   Psychiatric:         Mood and Affect: Mood normal.         Results Review:     CBC    Results from last 7 days   Lab Units 12/11/23  0554 12/10/23  0434 12/09/23  0517 12/08/23  1909 12/08/23  0256   WBC 10*3/mm3 10.40 15.10* 22.40* 20.10* 10.40   HEMOGLOBIN g/dL 12.5* 13.0 13.9 12.7* 13.3   PLATELETS 10*3/mm3 168 208 237 201 165     BMP   Results from last 7 days   Lab Units  12/11/23  0554 12/10/23  1828 12/10/23  0434 12/09/23  0517 12/08/23  0716 12/08/23  0311   SODIUM mmol/L 155*  --  156* 148* 135* 131*   POTASSIUM mmol/L 3.3* 3.6 3.4* 3.7 3.1* 3.3*   CHLORIDE mmol/L 118*  --  120* 112* 98 92*   CO2 mmol/L 26.0  --  20.0* 10.0* 14.0* 19.0*   BUN mg/dL 15  --  18 27* 70* 82*   CREATININE mg/dL 0.54*  --  0.74* 1.00 1.30* 1.78*   GLUCOSE mg/dL 239*  --  219* 183* 138* 163*   MAGNESIUM mg/dL 2.0  --  2.3 2.5*  --   --    PHOSPHORUS mg/dL 2.2* 2.1* 2.0* 3.3  --   --      Radiology(recent) XR Abdomen KUB    Result Date: 12/11/2023  Impression: Left upper quadrant dilated small bowel loops are suggestive of the small bowel obstruction. The small bowel dilation appears improved since 12/8/2023. Electronically Signed: Altagracia Maciel MD  12/11/2023 10:48 AM EST  Workstation ID: [id number]    XR Chest PA & Lateral    Result Date: 12/11/2023  Impression: 1. No acute chest finding. 2. Dilated small bowel loops in the upper abdomen. Correlate with recent CT abdomen and pelvis study from 12/10/2023. Electronically Signed: Altagracia Maciel MD  12/11/2023 10:43 AM EST  Workstation ID: [id number]    CT Abdomen Pelvis With Contrast    Result Date: 12/10/2023  Impression: 1.Persistent findings of small bowel obstruction. The extent of small bowel dilatation does appear decreased compared to the prior CT from December 10, 2023. There is enteric contrast within dilated bowel loops but there does not appear to be contrast in  distal nondilated loops. Recommend radiographic follow-up to assess for progression of enteric contrast. 2.Enteric tube terminates in the mid stomach. 3.Mild diffuse wall thickening of the distal colon and rectum which could be related to colitis or underdistention. 4.New trace left pleural effusion and mild dependent bilateral lower lobe consolidation which could represent atelectasis or pneumonia. 5.Right lower lobe may have increased since December 2022 although motion limits  quantification. Recommend follow-up chest CT for additional evaluation. 6.Stable right adrenal lesion and indeterminate left adrenal nodule. These may represent adrenal adenomas but could be confirmed with adrenal protocol MRI. 7.Mild body wall edema. Electronically Signed: Vasquez Jason  12/10/2023 4:53 PM EST  Workstation ID: [id number]    XR Abdomen KUB    Result Date: 12/10/2023  Impression: Persistent small bowel obstruction. Nasogastric tube tip and sidehole overlie the stomach. Electronically Signed: Will Rao MD  12/10/2023 8:58 AM EST  Workstation ID: [id number]     I reviewed the patient's new clinical results.    Assessment & Plan       Septic shock due to enteritis    CLL (chronic lymphocytic leukemia)    Bowel obstruction      63 y.o. male, with a history of CLL, who presents with a history of abdominal pain, nausea, vomiting, loose stools  NGT clamped and clear liquid diet started per GI, will continue to appreciate their recommendations  Continue IV antibiotics  Antiemetics and pain meds PRN  Monitor and replace electrolytes as PRN  Encourage mobilization  Oncology following, will appreciate their recommendations  Will discuss patient further with Dr. Foreman        This note was created using Dragon Voice Recognition software.    JOSE Freitas  12/11/23  11:36 EST

## 2023-12-11 NOTE — PROGRESS NOTES
" LOS: 3 days   Patient Care Team:  Jamar Pham MD as PCP - General (Family Medicine)  Patrick Fontaine MD as Consulting Physician (Hematology and Oncology)      Subjective \"I am a lot better\"    Interval History:     Subjective: Bowel movements x 3 since yesterday.  No nausea or vomiting and tolerating ice chips.  Feeling a lot better overall.  No abdominal pain.    ROS:   No chest pain, shortness of breath, or cough.     Medication Review:     Current Facility-Administered Medications:     acetaminophen (TYLENOL) tablet 650 mg, 650 mg, Oral, Q4H PRN **OR** acetaminophen (TYLENOL) suppository 650 mg, 650 mg, Rectal, Q4H PRN, Berenice Oneil APRN    cefTRIAXone (ROCEPHIN) 1,000 mg in sodium chloride 0.9 % 100 mL IVPB, 1,000 mg, Intravenous, Q24H, Oh Hooks MD, Last Rate: 200 mL/hr at 12/11/23 1043, 1,000 mg at 12/11/23 1043    dextrose (D50W) (25 g/50 mL) IV injection 25 g, 25 g, Intravenous, Q15 Min PRN, Matilde Garcai APRN    dextrose (GLUTOSE) oral gel 15 g, 15 g, Oral, Q15 Min PRN, Matilde Garcia APRN    Enoxaparin Sodium (LOVENOX) syringe 40 mg, 40 mg, Subcutaneous, Daily, Oh Hooks MD, 40 mg at 12/10/23 1558    glucagon (GLUCAGEN) injection 1 mg, 1 mg, Intramuscular, Q15 Min PRN, Matilde Garcia APRN    insulin glargine (LANTUS, SEMGLEE) injection 8 Units, 8 Units, Subcutaneous, Daily, Oh Hooks MD, 8 Units at 12/10/23 0827    insulin lispro (HUMALOG/ADMELOG) injection 2-7 Units, 2-7 Units, Subcutaneous, Q6H, Matilde Garcia APRN, 3 Units at 12/10/23 5329    Magnesium Standard Dose Replacement - Follow Nurse / BPA Driven Protocol, , Does not apply, PRN, Oh Hooks MD    metoclopramide (REGLAN) injection 10 mg, 10 mg, Intravenous, Q6H, Gila Landon APRN, 10 mg at 12/11/23 1043    metroNIDAZOLE (FLAGYL) IVPB 500 mg, 500 mg, Intravenous, Q8H, Oh Hooks MD, Last Rate: 100 mL/hr at 12/11/23 1044, 500 mg at 12/11/23 " 1044    morphine injection 2 mg, 2 mg, Intravenous, Q4H PRN, Matilde Garcia APRN, 2 mg at 12/09/23 1646    nitroglycerin (NITROSTAT) SL tablet 0.4 mg, 0.4 mg, Sublingual, Q5 Min PRN, Berenice Oneil APRN    ondansetron (ZOFRAN) tablet 4 mg, 4 mg, Oral, Q6H PRN **OR** ondansetron (ZOFRAN) injection 4 mg, 4 mg, Intravenous, Q6H PRN, Berenice Oneil APRN    phenol (CHLORASEPTIC) 1.4 % liquid 1 spray, 1 spray, Mouth/Throat, Q2H PRN, [place] PA, 1 spray at 12/09/23 1119    Phosphorus Replacement - Follow Nurse / BPA Driven Protocol, , Does not apply, PRRIVAS, Oh Hooks MD    potassium phosphate 15 mmol in 0.9% normal saline 250 mL IVPB, 15 mmol, Intravenous, Once, Nicholas Luther MD, 15 mmol at 12/11/23 1044    Potassium Replacement - Follow Nurse / BPA Driven Protocol, , Does not apply, PAKO, Oh Hooks MD    sodium chloride 0.9 % flush 10 mL, 10 mL, Intravenous, PRN, Tony Bhardwaj MD    sodium chloride 0.9 % flush 10 mL, 10 mL, Intravenous, Q12H, Berenice Oneil APRN, 10 mL at 12/11/23 0820    sodium chloride 0.9 % flush 10 mL, 10 mL, Intravenous, PRN, Berenice Oneil APRN    sodium chloride 0.9 % infusion 40 mL, 40 mL, Intravenous, PRN, Berenice Oneil APRN      Objective resting in bed, no acute distress, family at bedside    Vital Signs  Vitals:    12/11/23 0400 12/11/23 0500 12/11/23 0600 12/11/23 0700   BP: 136/70 137/71 146/95    BP Location: Right arm      Patient Position: Lying      Pulse: 71 65 107    Resp: 14      Temp: 98 °F (36.7 °C)   97.5 °F (36.4 °C)   TempSrc: Oral   Oral   SpO2: 95% 95% (!) 87%    Weight:  96.5 kg (212 lb 11.9 oz)     Height:           Physical Exam:     General Appearance:    Awake and alert, in no acute distress   Head:    Normocephalic, without obvious abnormality   Eyes:          Conjunctivae normal, anicteric sclera   Throat:   No oral lesions, no thrush, oral mucosa moist   Neck:   No adenopathy, supple, no  JVD, NG intact to low intermittent wall suction   Lungs:     respirations regular, even and unlabored   Abdomen:     Soft, non-tender, nondistended, positive bowel sounds   Rectal:     Deferred   Extremities:   No edema, no cyanosis   Skin:   No bruising or rash, no jaundice        Results Review:    CBC    Results from last 7 days   Lab Units 12/11/23  0554 12/10/23  0434 12/09/23  0517 12/08/23  1909 12/08/23  0256 12/04/23  1105   WBC 10*3/mm3 10.40 15.10* 22.40* 20.10* 10.40 5.83   HEMOGLOBIN g/dL 12.5* 13.0 13.9 12.7* 13.3 15.8   PLATELETS 10*3/mm3 168 208 237 201 165 207     CMP   Results from last 7 days   Lab Units 12/11/23  0554 12/10/23  1828 12/10/23  0434 12/09/23  0517 12/08/23  0716 12/08/23  0311 12/04/23  1105   SODIUM mmol/L 155*  --  156* 148* 135* 131* 142   POTASSIUM mmol/L 3.3* 3.6 3.4* 3.7 3.1* 3.3* 3.8   CHLORIDE mmol/L 118*  --  120* 112* 98 92* 102   CO2 mmol/L 26.0  --  20.0* 10.0* 14.0* 19.0* 21.9*   BUN mg/dL 15  --  18 27* 70* 82* 15   CREATININE mg/dL 0.54*  --  0.74* 1.00 1.30* 1.78* 0.92   GLUCOSE mg/dL 239*  --  219* 183* 138* 163* 146*   ALBUMIN g/dL 2.3*  --  2.3* 2.5*  --  2.7* 3.9   BILIRUBIN mg/dL 0.2  --  <0.2 <0.2  --  0.5 0.6   ALK PHOS U/L 56  --  53 57  --  49 51   AST (SGOT) U/L 9  --  <5 9  --  15 15   ALT (SGPT) U/L 12  --  13 15  --  17 19   MAGNESIUM mg/dL 2.0  --  2.3 2.5*  --   --   --    PHOSPHORUS mg/dL 2.2* 2.1* 2.0* 3.3  --   --   --    AMYLASE U/L  --   --   --   --   --   --  62   LIPASE U/L  --   --   --   --   --  20 16     Cr Clearance Estimated Creatinine Clearance: 171.3 mL/min (A) (by C-G formula based on SCr of 0.54 mg/dL (L)).  Coag   Results from last 7 days   Lab Units 12/08/23  0256   INR  1.01   APTT seconds 34.6*     HbA1C   Lab Results   Component Value Date    HGBA1C 8.30 (H) 10/19/2023    HGBA1C 7.60 (H) 05/17/2023    HGBA1C 6.9 (H) 02/14/2023     Blood Glucose   Glucose   Date/Time Value Ref Range Status   12/11/2023 0641 213 (H) 70 - 105 mg/dL  Final     Comment:     Serial Number: [id number]:  [id number]   12/10/2023 2317 211 (H) 70 - 105 mg/dL Final     Comment:     Serial Number: [id number]:  [id number]   12/10/2023 1722 268 (H) 70 - 105 mg/dL Final     Comment:     Serial Number: [id number]:  [id number]   12/10/2023 1327 235 (H) 70 - 105 mg/dL Final     Comment:     Serial Number: [id number]:  [id number]   12/10/2023 1212 242 (H) 70 - 105 mg/dL Final     Comment:     Serial Number: [id number]:  [id number]   12/10/2023 0435 199 (H) 70 - 105 mg/dL Final     Comment:     Serial Number: [id number]:  [id number]   12/09/2023 2329 202 (H) 70 - 105 mg/dL Final     Comment:     Serial Number: [id number]:  [id number]   12/09/2023 1804 202 (H) 70 - 105 mg/dL Final     Comment:     Serial Number: [id number]:  [id number]     Infection   Results from last 7 days   Lab Units 12/08/23  0311 12/08/23  0256   BLOODCX  No growth at 3 days No growth at 3 days   PROCALCITONIN ng/mL 27.83*  --      UA    Results from last 7 days   Lab Units 12/08/23  0450   NITRITE UA  Negative     Radiology(recent) CT Abdomen Pelvis With Contrast    Result Date: 12/10/2023  Impression: 1.Persistent findings of small bowel obstruction. The extent of small bowel dilatation does appear decreased compared to the prior CT from December 10, 2023. There is enteric contrast within dilated bowel loops but there does not appear to be contrast in  distal nondilated loops. Recommend radiographic follow-up to assess for progression of enteric contrast. 2.Enteric tube terminates in the mid stomach. 3.Mild diffuse wall thickening of the distal colon and rectum which could be related to colitis or underdistention. 4.New trace left pleural effusion and mild dependent bilateral lower lobe consolidation which could represent atelectasis or pneumonia. 5.Right lower lobe may have increased since December 2022 although motion limits quantification. Recommend  follow-up chest CT for additional evaluation. 6.Stable right adrenal lesion and indeterminate left adrenal nodule. These may represent adrenal adenomas but could be confirmed with adrenal protocol MRI. 7.Mild body wall edema. Electronically Signed: Vasquez Gomez  12/10/2023 4:53 PM EST  Workstation ID: [id number]    XR Abdomen KUB    Result Date: 12/10/2023  Impression: Persistent small bowel obstruction. Nasogastric tube tip and sidehole overlie the stomach. Electronically Signed: Will Rao MD  12/10/2023 8:58 AM EST  Workstation ID: [id number]        Assessment & Plan   -Small bowel obstruction-improving  -Abdominal pain-resolved  -Nausea, vomiting-resolved  -Lymphocytic gastritis  -CLL on [place]  -A-fib-new onset  -Hypotension-resolved  -Hypophosphatemia/hypokalemia/hyperkalemia  -DMII     Plan  Improvement overnight with multiple bowel movements.  No abdominal pain, nausea or vomiting.  We will clamp NG tube and start clear liquid diet.  If tolerating, consider continuing NG later today.  Remains on ceftriaxone and metronidazole.  We will continue metoclopramide 10 mg 4 times daily at this time.  Electrolyte replacement per primary.  Continue supportive care and we will follow.  Etiology to small bowel obstruction still unclear.  Hematology following with acalabrutinib and obinutuzumab currently on hold.  Oral iron supplements also on hold.  General surgery following without plan for surgical intervention.    I discussed the patients findings and my recommendations with the patient.         Electronically signed by REYES Red, 12/11/23, 10:44 AM EST.

## 2023-12-12 ENCOUNTER — INPATIENT HOSPITAL (AMBULATORY)
Dept: URBAN - METROPOLITAN AREA HOSPITAL 84 | Facility: HOSPITAL | Age: 63
End: 2023-12-12
Payer: COMMERCIAL

## 2023-12-12 DIAGNOSIS — R11.2 NAUSEA WITH VOMITING, UNSPECIFIED: ICD-10-CM

## 2023-12-12 LAB
ALBUMIN SERPL-MCNC: 2.6 G/DL (ref 3.5–5.2)
ALBUMIN/GLOB SERPL: 1.1 G/DL
ALP SERPL-CCNC: 51 U/L (ref 39–117)
ALT SERPL W P-5'-P-CCNC: 17 U/L (ref 1–41)
ANION GAP SERPL CALCULATED.3IONS-SCNC: 8 MMOL/L (ref 5–15)
AST SERPL-CCNC: 18 U/L (ref 1–40)
BILIRUB SERPL-MCNC: 0.3 MG/DL (ref 0–1.2)
BUN SERPL-MCNC: 11 MG/DL (ref 8–23)
BUN/CREAT SERPL: 17.7 (ref 7–25)
CALCIUM SPEC-SCNC: 8.5 MG/DL (ref 8.6–10.5)
CHLORIDE SERPL-SCNC: 116 MMOL/L (ref 98–107)
CO2 SERPL-SCNC: 27 MMOL/L (ref 22–29)
CREAT SERPL-MCNC: 0.62 MG/DL (ref 0.76–1.27)
DEPRECATED RDW RBC AUTO: 49.4 FL (ref 37–54)
EGFRCR SERPLBLD CKD-EPI 2021: 107.4 ML/MIN/1.73
EOSINOPHIL # BLD MANUAL: 0.1 10*3/MM3 (ref 0–0.4)
EOSINOPHIL NFR BLD MANUAL: 1 % (ref 0.3–6.2)
ERYTHROCYTE [DISTWIDTH] IN BLOOD BY AUTOMATED COUNT: 15.2 % (ref 12.3–15.4)
GIANT PLATELETS: ABNORMAL
GLOBULIN UR ELPH-MCNC: 2.4 GM/DL
GLUCOSE BLDC GLUCOMTR-MCNC: 151 MG/DL (ref 70–105)
GLUCOSE BLDC GLUCOMTR-MCNC: 153 MG/DL (ref 70–105)
GLUCOSE BLDC GLUCOMTR-MCNC: 183 MG/DL (ref 70–105)
GLUCOSE SERPL-MCNC: 122 MG/DL (ref 65–99)
HCT VFR BLD AUTO: 37.3 % (ref 37.5–51)
HGB BLD-MCNC: 12.6 G/DL (ref 13–17.7)
LYMPHOCYTES # BLD MANUAL: 3.67 10*3/MM3 (ref 0.7–3.1)
LYMPHOCYTES NFR BLD MANUAL: 6 % (ref 5–12)
MAGNESIUM SERPL-MCNC: 1.9 MG/DL (ref 1.6–2.4)
MCH RBC QN AUTO: 30.2 PG (ref 26.6–33)
MCHC RBC AUTO-ENTMCNC: 33.8 G/DL (ref 31.5–35.7)
MCV RBC AUTO: 89.4 FL (ref 79–97)
MONOCYTES # BLD: 0.61 10*3/MM3 (ref 0.1–0.9)
NEUTROPHILS # BLD AUTO: 5.81 10*3/MM3 (ref 1.7–7)
NEUTROPHILS NFR BLD MANUAL: 56 % (ref 42.7–76)
NEUTS BAND NFR BLD MANUAL: 1 % (ref 0–5)
PHOSPHATE SERPL-MCNC: 4.4 MG/DL (ref 2.5–4.5)
PLATELET # BLD AUTO: 183 10*3/MM3 (ref 140–450)
PMV BLD AUTO: 9.1 FL (ref 6–12)
POTASSIUM SERPL-SCNC: 3.1 MMOL/L (ref 3.5–5.2)
POTASSIUM SERPL-SCNC: 3.3 MMOL/L (ref 3.5–5.2)
PROT SERPL-MCNC: 5 G/DL (ref 6–8.5)
RBC # BLD AUTO: 4.17 10*6/MM3 (ref 4.14–5.8)
RBC MORPH BLD: NORMAL
SCAN SLIDE: NORMAL
SMUDGE CELLS BLD QL SMEAR: ABNORMAL
SODIUM SERPL-SCNC: 151 MMOL/L (ref 136–145)
VARIANT LYMPHS NFR BLD MANUAL: 2 % (ref 0–5)
VARIANT LYMPHS NFR BLD MANUAL: 34 % (ref 19.6–45.3)
WBC NRBC COR # BLD AUTO: 10.2 10*3/MM3 (ref 3.4–10.8)

## 2023-12-12 PROCEDURE — 82948 REAGENT STRIP/BLOOD GLUCOSE: CPT

## 2023-12-12 PROCEDURE — 25010000002 METOCLOPRAMIDE PER 10 MG: Performed by: NURSE PRACTITIONER

## 2023-12-12 PROCEDURE — 85007 BL SMEAR W/DIFF WBC COUNT: CPT | Performed by: NURSE PRACTITIONER

## 2023-12-12 PROCEDURE — 99231 SBSQ HOSP IP/OBS SF/LOW 25: CPT | Performed by: SURGERY

## 2023-12-12 PROCEDURE — 99232 SBSQ HOSP IP/OBS MODERATE 35: CPT | Performed by: NURSE PRACTITIONER

## 2023-12-12 PROCEDURE — 80053 COMPREHEN METABOLIC PANEL: CPT | Performed by: NURSE PRACTITIONER

## 2023-12-12 PROCEDURE — 25010000002 METRONIDAZOLE 500 MG/100ML SOLUTION: Performed by: INTERNAL MEDICINE

## 2023-12-12 PROCEDURE — 25010000002 ENOXAPARIN PER 10 MG: Performed by: INTERNAL MEDICINE

## 2023-12-12 PROCEDURE — 85025 COMPLETE CBC W/AUTO DIFF WBC: CPT | Performed by: NURSE PRACTITIONER

## 2023-12-12 PROCEDURE — 84100 ASSAY OF PHOSPHORUS: CPT | Performed by: NURSE PRACTITIONER

## 2023-12-12 PROCEDURE — 36415 COLL VENOUS BLD VENIPUNCTURE: CPT | Performed by: NURSE PRACTITIONER

## 2023-12-12 PROCEDURE — 84132 ASSAY OF SERUM POTASSIUM: CPT | Performed by: INTERNAL MEDICINE

## 2023-12-12 PROCEDURE — 63710000001 INSULIN LISPRO (HUMAN) PER 5 UNITS

## 2023-12-12 PROCEDURE — 83735 ASSAY OF MAGNESIUM: CPT | Performed by: NURSE PRACTITIONER

## 2023-12-12 PROCEDURE — 25010000002 POTASSIUM CHLORIDE 10 MEQ/100ML SOLUTION: Performed by: INTERNAL MEDICINE

## 2023-12-12 PROCEDURE — 63710000001 INSULIN GLARGINE PER 5 UNITS: Performed by: INTERNAL MEDICINE

## 2023-12-12 PROCEDURE — 25010000002 CEFTRIAXONE PER 250 MG: Performed by: INTERNAL MEDICINE

## 2023-12-12 RX ORDER — POTASSIUM CHLORIDE 7.45 MG/ML
10 INJECTION INTRAVENOUS
Status: COMPLETED | OUTPATIENT
Start: 2023-12-12 | End: 2023-12-12

## 2023-12-12 RX ADMIN — Medication 10 ML: at 21:09

## 2023-12-12 RX ADMIN — POTASSIUM CHLORIDE 10 MEQ: 7.46 INJECTION, SOLUTION INTRAVENOUS at 09:34

## 2023-12-12 RX ADMIN — CEFTRIAXONE 1000 MG: 1 INJECTION, POWDER, FOR SOLUTION INTRAMUSCULAR; INTRAVENOUS at 10:43

## 2023-12-12 RX ADMIN — POTASSIUM CHLORIDE 10 MEQ: 7.46 INJECTION, SOLUTION INTRAVENOUS at 07:06

## 2023-12-12 RX ADMIN — METOCLOPRAMIDE 10 MG: 5 INJECTION, SOLUTION INTRAMUSCULAR; INTRAVENOUS at 21:47

## 2023-12-12 RX ADMIN — EMPAGLIFLOZIN 10 MG: 10 TABLET, FILM COATED ORAL at 08:23

## 2023-12-12 RX ADMIN — METRONIDAZOLE 500 MG: 500 INJECTION, SOLUTION INTRAVENOUS at 10:43

## 2023-12-12 RX ADMIN — METOCLOPRAMIDE 10 MG: 5 INJECTION, SOLUTION INTRAMUSCULAR; INTRAVENOUS at 10:43

## 2023-12-12 RX ADMIN — INSULIN GLARGINE 8 UNITS: 100 INJECTION, SOLUTION SUBCUTANEOUS at 08:24

## 2023-12-12 RX ADMIN — METOCLOPRAMIDE 10 MG: 5 INJECTION, SOLUTION INTRAMUSCULAR; INTRAVENOUS at 16:27

## 2023-12-12 RX ADMIN — POTASSIUM CHLORIDE 10 MEQ: 7.46 INJECTION, SOLUTION INTRAVENOUS at 08:14

## 2023-12-12 RX ADMIN — POTASSIUM CHLORIDE 10 MEQ: 7.46 INJECTION, SOLUTION INTRAVENOUS at 05:35

## 2023-12-12 RX ADMIN — METRONIDAZOLE 500 MG: 500 INJECTION, SOLUTION INTRAVENOUS at 18:19

## 2023-12-12 RX ADMIN — METOCLOPRAMIDE 10 MG: 5 INJECTION, SOLUTION INTRAMUSCULAR; INTRAVENOUS at 04:00

## 2023-12-12 RX ADMIN — INSULIN LISPRO 2 UNITS: 100 INJECTION, SOLUTION INTRAVENOUS; SUBCUTANEOUS at 12:09

## 2023-12-12 RX ADMIN — METRONIDAZOLE 500 MG: 500 INJECTION, SOLUTION INTRAVENOUS at 01:36

## 2023-12-12 RX ADMIN — ENOXAPARIN SODIUM 40 MG: 100 INJECTION SUBCUTANEOUS at 16:27

## 2023-12-12 RX ADMIN — Medication 10 ML: at 08:24

## 2023-12-12 RX ADMIN — INSULIN LISPRO 2 UNITS: 100 INJECTION, SOLUTION INTRAVENOUS; SUBCUTANEOUS at 05:34

## 2023-12-12 NOTE — PROGRESS NOTES
[facility]     Progress Note    Patient Name: Bharathi Darnell  : 1960  MRN: [id number]  Primary Care Physician:  Jamar Pham MD  Date of admission: 2023  Service date and time: 23 14:28 EST  Subjective   Subjective     Chief Complaint: Nausea vomiting and weakness     HPI:  Patient feels better today, tolerating diet.      Objective   Objective     Vitals:   Temp:  [97.6 °F (36.4 °C)-98.8 °F (37.1 °C)] 97.8 °F (36.6 °C)  Heart Rate:  [57-87] 74  Resp:  [10-15] 14  BP: (121-151)/(67-86) 135/72  Physical Exam    Constitutional: Awake, alert oriented in no distress.   Eyes: PERRLA, sclerae anicteric, no conjunctival injection   HENT: NCAT, mucous membranes moist   Neck: Supple, no thyromegaly, no lymphadenopathy, trachea midline   Respiratory: Clear to auscultation bilaterally, nonlabored respirations    Cardiovascular: RRR, no murmurs, rubs, or gallops, palpable pedal pulses bilaterally   Gastrointestinal: Positive bowel sounds, soft, nontender, nondistended   Musculoskeletal: No bilateral ankle edema, no clubbing or cyanosis to extremities   Psychiatric: Appropriate affect, cooperative   Neurologic: Oriented x 3, strength symmetric in all extremities, Cranial Nerves grossly intact to confrontation, speech clear   Skin: No rashes     Result Review    Result Review:  I have personally reviewed the results from the time of this admission to 2023 14:28 EST and agree with these findings:  [x]  Laboratory list / accordion  []  Microbiology  []  Radiology  []  EKG/Telemetry   []  Cardiology/Vascular   []  Pathology  []  Old records  []  Other:  Most notable findings include: Glucose 122, creatinine 0.62, BUN 11 CO2 27 total protein 5 albumin 2.6      Assessment & Plan   Assessment / Plan       Active Hospital Problems:  Active Hospital Problems    Diagnosis     **Septic shock due to enteritis     Bowel obstruction     CLL (chronic lymphocytic leukemia)      Plan:    Septic Shock  due to enteritis  .  IV antibiotics with Flagyl and Rocephin  Off IV vasopressors     Small bowel obstruction versus enteritis  Resolving, patient started on clear liquid diet, NG tube was clamped     Acute Kidney Injury / Metabolic acidosis / Hypernatremia:   Remains nonoliguric.   Likely prerenal. Possible ATN from septic shock.  Changed IV fluids to bicarb drip.  Monitor Input/Output very closely.   Net IO Since Admission: 467 mL [12/09/23 1305]      Paroxysmal atrial fibrillation, new diagnosis  Likely sepsis induced. Normal TSH.    Not on any rate control agents due to hypotension.  Will give 1 dose of digoxin for rate control.  [id number] Score of atleast 2.  Will eventually discharge on Eliquis, no emergency to start at this time.  Echo images reviewed, normal LVEF, normal atrial size.  No obvious left atrial thrombus.     Diabetes mellitus Type 2, not insulin-dependent : well controlled.   Hold home Farxiga.  Accu checks every 6 hours and c/w humalog coverage as needed.   Last A1c of 8.3.     Essential Hypertension: Currently in shock  Hold home Norvasc and lisinopril.  Restart medications as needed.     Crease activity       DVT prophylaxis:  Medical and mechanical DVT prophylaxis orders are present.    CODE STATUS:   Code Status (Patient has no pulse and is not breathing): CPR (Attempt to Resuscitate)  Medical Interventions (Patient has pulse or is breathing): Full Support    Disposition:  I expect patient to be discharged in 2 days.    Nicholas Luther MD

## 2023-12-12 NOTE — PROGRESS NOTES
". LOS: 4 days   Patient Care Team:  Jamar Pham MD as PCP - General (Family Medicine)  Patrick Fontaine MD as Consulting Physician (Hematology and Oncology)      Subjective :\"I'm feeling better\"    Interval History:    Subjective: Patient states he is feeling much better and has had no new complaints overnight.  NG was clamped yesterday morning and patient was able to tolerate full liquid diet yesterday without any nausea/vomiting or abdominal pain.  Patient states he had 3 bowel movements overnight.      ROS:   No chest pain, shortness of breath, or cough.        Medication Review:     Current Facility-Administered Medications:     acetaminophen (TYLENOL) tablet 650 mg, 650 mg, Oral, Q4H PRN **OR** acetaminophen (TYLENOL) suppository 650 mg, 650 mg, Rectal, Q4H PRN, Berenice Oneil APRN    cefTRIAXone (ROCEPHIN) 1,000 mg in sodium chloride 0.9 % 100 mL IVPB, 1,000 mg, Intravenous, Q24H, Oh Hooks MD, Last Rate: 200 mL/hr at 12/12/23 1043, 1,000 mg at 12/12/23 1043    dextrose (D50W) (25 g/50 mL) IV injection 25 g, 25 g, Intravenous, Q15 Min PRN, Matilde Garcia APRN    dextrose (GLUTOSE) oral gel 15 g, 15 g, Oral, Q15 Min PRN, Matilde Garcia APRN    empagliflozin (JARDIANCE) tablet 10 mg, 10 mg, Oral, Daily, Nicholas Luther MD, 10 mg at 12/12/23 0823    Enoxaparin Sodium (LOVENOX) syringe 40 mg, 40 mg, Subcutaneous, Daily, Oh Hooks MD, 40 mg at 12/11/23 1607    glucagon (GLUCAGEN) injection 1 mg, 1 mg, Intramuscular, Q15 Min PRN, Matilde Garcia APRN    insulin glargine (LANTUS, SEMGLEE) injection 8 Units, 8 Units, Subcutaneous, Daily, Oh Hooks MD, 8 Units at 12/12/23 0824    insulin lispro (HUMALOG/ADMELOG) injection 2-7 Units, 2-7 Units, Subcutaneous, Q6H, Matilde Garcia, APRN, 2 Units at 12/12/23 0534    Magnesium Standard Dose Replacement - Follow Nurse / BPA Driven Protocol, , Does not apply, PAKO, Oh Hooks MD    " metoclopramide (REGLAN) injection 10 mg, 10 mg, Intravenous, Q6H, Gila Landon APRN, 10 mg at 12/12/23 1043    metroNIDAZOLE (FLAGYL) IVPB 500 mg, 500 mg, Intravenous, Q8H, Oh Hooks MD, Last Rate: 100 mL/hr at 12/12/23 1043, 500 mg at 12/12/23 1043    morphine injection 2 mg, 2 mg, Intravenous, Q4H PRN, Matilde Garcia APRN, 2 mg at 12/09/23 1646    nitroglycerin (NITROSTAT) SL tablet 0.4 mg, 0.4 mg, Sublingual, Q5 Min PRN, Berenice Oneil APRN    ondansetron (ZOFRAN) tablet 4 mg, 4 mg, Oral, Q6H PRN **OR** ondansetron (ZOFRAN) injection 4 mg, 4 mg, Intravenous, Q6H PRN, Berenice Oneil APRN    phenol (CHLORASEPTIC) 1.4 % liquid 1 spray, 1 spray, Mouth/Throat, Q2H PRN, Pushpa Monte PA, 1 spray at 12/09/23 1119    Phosphorus Replacement - Follow Nurse / BPA Driven Protocol, , Does not apply, Jessee MIN Satish R, MD    Potassium Replacement - Follow Nurse / BPA Driven Protocol, , Does not apply, Jessee MIN Satish R, MD    sodium chloride 0.9 % flush 10 mL, 10 mL, Intravenous, PRN, Tony Bhardwaj MD    sodium chloride 0.9 % flush 10 mL, 10 mL, Intravenous, Q12H, Berenice Oneil APRN, 10 mL at 12/12/23 0824    sodium chloride 0.9 % flush 10 mL, 10 mL, Intravenous, PRN, Berenice Oneil APRN    sodium chloride 0.9 % infusion 40 mL, 40 mL, Intravenous, PRN, Berenice Oneil APRN      Objective resting in bed comfortably.  No family at bedside    Vital Signs  Vitals:    12/12/23 0400 12/12/23 0600 12/12/23 0739 12/12/23 0800   BP: 145/84   151/77   BP Location: Left arm      Patient Position: Lying      Pulse: 87   62   Resp: 14      Temp: 98.8 °F (37.1 °C)  98.3 °F (36.8 °C)    TempSrc: Axillary  Oral    SpO2: 96%   95%   Weight:  96.9 kg (213 lb 10 oz)     Height:           Physical Exam:     General Appearance:    Awake and alert, in no acute distress   Head:    Normocephalic, without obvious abnormality   Eyes:          Conjunctivae normal, anicteric  sclera   Throat:   No oral lesions, no thrush, oral mucosa moist   Neck:   supple, no JVD   Lungs:     respirations regular, even and unlabored   Abdomen:     Soft, mild abdominal tenderness, no rebound or guarding, non-distended   Rectal:     Deferred   Extremities:   No edema, no cyanosis   Skin:   No bruising or rash, no jaundice        Results Review:    CBC    Results from last 7 days   Lab Units 12/12/23  0333 12/11/23  0554 12/10/23  0434 12/09/23  0517 12/08/23  1909 12/08/23  0256   WBC 10*3/mm3 10.20 10.40 15.10* 22.40* 20.10* 10.40   HEMOGLOBIN g/dL 12.6* 12.5* 13.0 13.9 12.7* 13.3   PLATELETS 10*3/mm3 183 168 208 237 201 165     CMP   Results from last 7 days   Lab Units 12/12/23  0333 12/11/23  1612 12/11/23  0554 12/10/23  1828 12/10/23  0434 12/09/23  0517 12/08/23  0716 12/08/23  0311   SODIUM mmol/L 151*  --  155*  --  156* 148* 135* 131*   POTASSIUM mmol/L 3.3* 3.4* 3.3* 3.6 3.4* 3.7 3.1* 3.3*   CHLORIDE mmol/L 116*  --  118*  --  120* 112* 98 92*   CO2 mmol/L 27.0  --  26.0  --  20.0* 10.0* 14.0* 19.0*   BUN mg/dL 11  --  15  --  18 27* 70* 82*   CREATININE mg/dL 0.62*  --  0.54*  --  0.74* 1.00 1.30* 1.78*   GLUCOSE mg/dL 122*  --  239*  --  219* 183* 138* 163*   ALBUMIN g/dL 2.6*  --  2.3*  --  2.3* 2.5*  --  2.7*   BILIRUBIN mg/dL 0.3  --  0.2  --  <0.2 <0.2  --  0.5   ALK PHOS U/L 51  --  56  --  53 57  --  49   AST (SGOT) U/L 18  --  9  --  <5 9  --  15   ALT (SGPT) U/L 17  --  12  --  13 15  --  17   MAGNESIUM mg/dL 1.9  --  2.0  --  2.3 2.5*  --   --    PHOSPHORUS mg/dL 4.4 3.3 2.2* 2.1* 2.0* 3.3  --   --    LIPASE U/L  --   --   --   --   --   --   --  20     Cr Clearance Estimated Creatinine Clearance: 149.5 mL/min (A) (by C-G formula based on SCr of 0.62 mg/dL (L)).  Coag   Results from last 7 days   Lab Units 12/08/23  0256   INR  1.01   APTT seconds 34.6*     HbA1C   Lab Results   Component Value Date    HGBA1C 8.30 (H) 10/19/2023    HGBA1C 7.60 (H) 05/17/2023    HGBA1C 6.9 (H)  02/14/2023     Blood Glucose   Glucose   Date/Time Value Ref Range Status   12/12/2023 0528 153 (H) 70 - 105 mg/dL Final     Comment:     Serial Number: [id number]:  [id number]   12/11/2023 2344 154 (H) 70 - 105 mg/dL Final     Comment:     Serial Number: [id number]:  [id number]   12/11/2023 1802 207 (H) 70 - 105 mg/dL Final     Comment:     Serial Number: [id number]:  [id number]   12/11/2023 1230 253 (H) 70 - 105 mg/dL Final     Comment:     Serial Number: [id number]:  [id number]   12/11/2023 0641 213 (H) 70 - 105 mg/dL Final     Comment:     Serial Number: [id number]:  [id number]   12/10/2023 2317 211 (H) 70 - 105 mg/dL Final     Comment:     Serial Number: [id number]:  [id number]   12/10/2023 1722 268 (H) 70 - 105 mg/dL Final     Comment:     Serial Number: [id number]:  [id number]   12/10/2023 1327 235 (H) 70 - 105 mg/dL Final     Comment:     Serial Number: [id number]:  [id number]     Infection   Results from last 7 days   Lab Units 12/08/23  0311 12/08/23  0256   BLOODCX  No growth at 4 days No growth at 4 days   PROCALCITONIN ng/mL 27.83*  --      UA    Results from last 7 days   Lab Units 12/08/23  0450   NITRITE UA  Negative     Radiology(recent) XR Abdomen KUB    Result Date: 12/11/2023  Impression: Left upper quadrant dilated small bowel loops are suggestive of the small bowel obstruction. The small bowel dilation appears improved since 12/8/2023. Electronically Signed: Altagracia Maciel MD  12/11/2023 10:48 AM EST  Workstation ID: [id number]    XR Chest PA & Lateral    Result Date: 12/11/2023  Impression: 1. No acute chest finding. 2. Dilated small bowel loops in the upper abdomen. Correlate with recent CT abdomen and pelvis study from 12/10/2023. Electronically Signed: Altagracia Maciel MD  12/11/2023 10:43 AM EST  Workstation ID: [id number]    CT Abdomen Pelvis With Contrast    Result Date: 12/10/2023  Impression: 1.Persistent findings of small bowel obstruction.  The extent of small bowel dilatation does appear decreased compared to the prior CT from December 10, 2023. There is enteric contrast within dilated bowel loops but there does not appear to be contrast in  distal nondilated loops. Recommend radiographic follow-up to assess for progression of enteric contrast. 2.Enteric tube terminates in the mid stomach. 3.Mild diffuse wall thickening of the distal colon and rectum which could be related to colitis or underdistention. 4.New trace left pleural effusion and mild dependent bilateral lower lobe consolidation which could represent atelectasis or pneumonia. 5.Right lower lobe may have increased since December 2022 although motion limits quantification. Recommend follow-up chest CT for additional evaluation. 6.Stable right adrenal lesion and indeterminate left adrenal nodule. These may represent adrenal adenomas but could be confirmed with adrenal protocol MRI. 7.Mild body wall edema. Electronically Signed: Vasquez Gomez  12/10/2023 4:53 PM EST  Workstation ID: [id number]        Assessment & Plan   -Small bowel obstruction- resolved  -Abdominal pain-resolved  -Nausea, vomiting-resolved  -Lymphocytic gastritis  -CLL on [place]  -A-fib-new onset  -Hypotension-resolved  -Hypophosphatemia/hypokalemia/hyperkalemia  -DMII     Plan  Patient states he is feeling much better today and was able to tolerate full liquid diet yesterday.  Patient had no episodes of nausea/vomiting or abdominal pain and NG remain clamped for 24 hours.  NG tube removed this morning during rounds.  Patient had 3 bowel movements yesterday.  He is ready to advance his diet to a low residue diet.  Continue ceftriaxone and Flagyl.  Continue Reglan. Electrolyte replacement per primary.  Etiology to small bowel obstruction still unclear.  Hematology following with acalabrutinib and obinutuzumab currently on hold.  Oral iron supplements also on hold.  General surgery following without plan for surgical  intervention.  Supportive care.  Hopefully home soon.    Electronically signed by REYES Red, 12/12/23, 11:03 AM EST.

## 2023-12-12 NOTE — CONSULTS
"Diabetes Education  Assessment/Teaching    Patient Name:  Bharathi Darnell  YOB: 1960  MRN: [id number]  Admit Date:  12/8/2023      Assessment Date:  12/12/2023  Flowsheet Row Most Recent Value   General Information     Referral From: A1c  [Met with pt at bedside due to A1c >7%. Last A1c in [facility] was from 10/19/2023 and result was 8.3%.]   Height 185.4 cm (73\")   Weight 96.9 kg (213 lb 10 oz)   Weight Method Bed scale   Pregnancy Assessment    Diabetes History    What type of diabetes do you have? Type 2   Length of Diabetes Diagnosis --  [Dx about 15 years ago.]   Do you test your blood sugar at home? no  [Pt does not have bs meter.]   Have you had low blood sugar? (<70mg/dl) no   Education Preferences    Nutrition Information    Assessment Topics    DM Goals             Flowsheet Row Most Recent Value   DM Education Needs    Meter Meter provided  [Gave pt the One Touch Verio Flex meter and instructed pt in use. He was able to insert test strip into meter correctly and demonstrated correct use of lancing device. Reviewed where to apply blood on test strip.]   Meter Type One Touch   Frequency of Testing Daily  [Discussed importance of checking bs daily and rotating the times when he checks. Gave log book to record bs.]   Blood Glucose Target Range Discussed A1c result from 10/19/2023 of 8.3%. Reviewed healthy bs range and healthy A1c target. Discussed importance of bs control.   Medication Oral  [Pt taking Farxiga 10 mg daily.]   Reducing Risks A1C testing  [Gave A1c info sheet.]   Healthy Eating --  [Pt has not been feeling well but when he feels well, he usually eats 2-3 meals/day.]   Motivation Engaged   Teaching Method Explanation, Discussion, Demonstration, Handouts   Patient Response Verbalized understanding, Demonstrates adequately              Other Comments:  Met with pt at bedside. Pt states has PCP, Dr. Jamar Pham. Pt states he feels bs has been elevated due to steroid therapy " since 9/2023. Discussed importance of bs control during steroid therapy. Pt agreeable to begin checking bs daily and to record readings. Discussed importance of taking bs log to MD appts. Rxs started for One Touch Verio test strips and One Touch Delica Plus lancets. Gave ADA diabetes booklet and handout on steroid use with diabetes. Pt with no additional questions at this time.       Electronically signed by:  Cassie Spears RN  12/12/23 13:00 EST

## 2023-12-12 NOTE — CASE MANAGEMENT/SOCIAL WORK
Continued Stay Note  [facility]     Patient Name: Bharathi Darnell  MRN: [id number]  Today's Date: 12/12/2023    Admit Date: 12/8/2023    Plan: DC Plan: Home with spouse pending clinical course and PT/OT recommendations.   Discharge Plan       Row Name 12/12/23 6830       Plan    Plan DC Plan: Home with spouse pending clinical course and PT/OT recommendations.    Provided Post Acute Provider List? N/A    Provided Post Acute Provider Quality & Resource List? N/A    Plan Comments CM reviewed chart documentation to obtain clinical updates.No significant changes in condition or care plans to report. Sips Monitor Overflow.CM will continue to follow for any further needs and adjust discharge plan accordingly. DC Barriers: Cardiac monitoring, IV abx, IV Flagyl, IV Reglan, and abnormal labs.               Expected Discharge Date and Time       Expected Discharge Date Expected Discharge Time    Dec 14, 2023               Pinky Molina RN     Office Phone: [phone]  Office Cell:     [phone]

## 2023-12-12 NOTE — PROGRESS NOTES
Hematology/Oncology Inpatient Progress Note    PATIENT NAME: Bharathi Darnell  : 1960  MRN: [id number]    CHIEF COMPLAINT: Stage I CLL and right lung nodules     HISTORY OF PRESENT ILLNESS:    63 y.o. male presented to [facility] on 2023 with complaints of abdominal pain. He started having abdominal pain on 12/3/2023. The pain became worse and he called his gastroenterologist, Dr. Hardeep Hdz. He was treated for constipation and then ended up with diarrhea. He started on pain medications which improved his pain about 50%. On 2023, he was vomiting. He ultimately came to the ED because of increased abdominal pain and he could not eat or drink well.  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  Labs in the ED were as follows: WBC 10.4 with 2% neutrophils, 50% lymphocytes, 32% monocytes, 11% bands, 3% metamyelocytes, 2% atypical lymphocytes, hemoglobin 13.3, platelets 165,000, CMP significant for creatinine 1.78, sodium 131, potassium 3.3, calcium 8.5, blood cultures no growth to date, PTT 34.6, PT 11.0, INR 1.01, Covid-19 negative. CXR showed no active disease.  CT scan of abdomen and pelvis without contrast showed findings were consistent with small bowel obstruction with dilated and fluid-filled loops of predominantly jejunum and proximal to mid ileum. EKG showed atrial fibrillation  with significant change in rhythm, previously sinus. The patient was hypotensive in the ED and was possibly septic. He was treated with IV fluid rehydration, a Levophed drip was initiated, and an NG tube was placed. He was admitted to the ICU. Per GI the cause of the  enteritis was thought to be infectious or inflammatory etiology, and a small bowel obstruction was in the differential as well. GI recommended consultation by  Cardiology for new onset atrial fibrillation. There was a concern for small bowel ischemia given atrial fibrillation. He was started on IV Unasyn and then switched to IV Rocephin and Flagyl. Repeat KUB on 12/9/2023 showed a persistent small bowel obstruction, similar bowel gas pattern compared to previous day's radiograph. General surgery was consulted and there was no indication for surgical intervention.     12/09/23  Hematology/Oncology was consulted by Jessee Moon MD on this established patient for septic shock due to enteritis. 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  He was diagnosed with CLL, IgVH unmutated, stage I in August of 2022. He had a right lower lobe lung nodule diagnosed August of 2022.  He was seen by Dr. Marmolejo and underwent evaluation with biopsy performed 07/14/2022 of the midline submental neck mass with cytology revealing chronic lymphocytic leukemia/small lymphocytic lymphoma with 90% of B-cells revealing a CD19 positive/dim CD20 positive/CD52 positive/CD23 positive/FMC7 negative/ positive and lambda-restricted immunophenotype.  On 08/12/2022, a bone marrow biopsy with aspiration showed chronic lymphocytic leukemia.  Pattern of involvement was mixed (nodular and interstitial progressing to diffuse).  The extent of the involvement was 70%.  The phenotype was CD20+ (moderate), lambda+, CD5+, CD23+, CD38-.  There was no morphologic evidence of large cell transformation (Sorensen's).  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  The flow cytometry findings were consistent with CLL.  Phenotype of clonal cells:  Lambda+, CD5+, CD23+, CD20+ (moderate), CD81-, +, and CD38-.  Immunohistochemistry showed CLL with CD20 positivity.   Cytogenetics revealed abnormal male karyotype positive for 8p deletion and monosomy 17.  These current cytogenetic results were compatible with lymphoma and supported concurrent morphologic and flow cytometric findings of CLL. IgVH mutation analysis showed unmutated CLL.  Lymphoid neoplasm NGS report showed Tier III variants of unknown significance:  CHRISTIANE p. ([id number]) and BIRC3 p. ([id number]).  Tumor mutation burden was low and microsatellite instability was negative.  This was an [employer] chronic lymphoid neoplasm NGS report.  FISH study revealed monoallelic deletion of [id number] (13q14.3) locus detected.  These deletions are found in approximately 50% of CLL patients and as a sole aberration associated with more favorable clinical outcome, however, patients with more than 70% of these deletions, as in this case, experience shorter time to treatment.  FISH studies were also positive for p53 (17p13) deletion. PET/[facility] scan [facility] on 8/17/2022 showed no abnormal uptake on this study.  There was an 8 mm noncalcified nodule in the right lower lobe of the lung.  There were numerous bilateral cervical lymph nodes, one of the largest measures 17 x 10 mm.  There was bilateral common femoral adenopathy greater on the right side than the left side with the largest node on the right side measuring 2.7 x 0.8 cm.  On 06/30/23  a CT chest with contrast revealed 1.1 cm irregular shaped nodule in the right middle lobe and 0.9 cm well-circumscribed nodule in the right lower lobe.  Hilar, axillary, and upper abdominal adenopathy.  Bilateral adrenal masses and emphysema.  Compared to December 2022, the enlarged axillary and hilar lymph nodes were stable.  The right middle and right lower lobe nodules were stable.  Rounded atelectasis in the posterior left lower lobe was present previously.  The enlarged portacaval node and adrenal nodules were present previously with no significant change in the interval.   WBC increased from 35,000 in August 2022 to 98,000 in July 2023.  On 08/09/23, he started acalabrutinib. On 09/06/23, he was started on cycle 1 of obinutuzumab.and received cycle 3 obinutuzumab most recently on 11/3/2023. He was last seen in the office on 11/20/2023 and was due for follow up in 1 month.      PCP: Jamar Pham MD    INTERVAL HISTORY:  12/10/2023 - white blood count 15.10 with 59% lymphocytes.  2D echocardiogram on 12/9/2023 showed left ventricular systolic function was normal. Calculated left ventricular EF = 61.3%. Left ventricular wall thickness was consistent with mild concentric hypertrophy. The left atrial cavity was mildly to moderately dilated. The estimated right ventricular systolic pressure from tricuspid regurgitation was normal. KUB showed persistent small bowel obstruction. Nasogastric tube tip and sidehole was overlying the stomach.   12/11/2023 - white blood count 12.5 with 50% lymphocytes. CT A/P with contrast on 12/10/2023 identified persistent findings of small bowel obstruction, overall small bowel dilatation decreased compared to prior. There was mild diffuse wall thickening of the distal colon and rectum, new trace left pleural effusion and mild dependent bilateral lower lobe consolidation, and right lower lobe may have increased since 12/2022 although motion limited quantification. Recommend follow-up chest CT for additional evaluation. Stable right adrenal lesion and indeterminate left adrenal nodule.  12/12/2023-white count 10.2, hemoglobin 12.6 and platelets 183,000.  Sodium 151 and potassium 3.3.  KUB showed left upper quadrant dilated small bowel loops suggestive of small bowel obstruction.  Chest x-ray showed no acute chest findings.  NG tube removed.    History of present illness reviewed since last visit and changes noted on 12/12/23.    Subjective   he reports he feels better.  His NG tube has been removed.    ROS:  Review of Systems   Constitutional:   Negative for activity change, chills, fatigue, fever and unexpected weight change.   HENT:  Negative for congestion, dental problem, hearing loss, mouth sores, nosebleeds, sore throat and trouble swallowing.    Eyes:  Negative for photophobia and visual disturbance.   Respiratory:  Negative for cough, chest tightness and shortness of breath.    Cardiovascular:  Negative for chest pain, palpitations and leg swelling.   Gastrointestinal:  Negative for abdominal distention, abdominal pain, blood in stool, constipation, diarrhea, nausea and vomiting.   Endocrine: Negative for cold intolerance and heat intolerance.   Genitourinary:  Negative for decreased urine volume, difficulty urinating, dysuria, frequency, hematuria and urgency.   Musculoskeletal:  Negative for arthralgias and gait problem.   Skin:  Negative for rash and wound.   Neurological:  Negative for dizziness, tremors, seizures, speech difficulty, weakness, light-headedness, numbness and headaches.   Hematological:  Negative for adenopathy. Does not bruise/bleed easily.   Psychiatric/Behavioral:  Negative for confusion and hallucinations. The patient is not nervous/anxious.    All other systems reviewed and are negative.    MEDICATIONS:    Scheduled Meds:  cefTRIAXone, 1,000 mg, Intravenous, Q24H  empagliflozin, 10 mg, Oral, Daily  enoxaparin, 40 mg, Subcutaneous, Daily  insulin glargine, 8 Units, Subcutaneous, Daily  insulin lispro, 2-7 Units, Subcutaneous, Q6H  metoclopramide, 10 mg, Intravenous, Q6H  metroNIDAZOLE, 500 mg, Intravenous, Q8H  potassium chloride, 10 mEq, Intravenous, Q1H  sodium chloride, 10 mL, Intravenous, Q12H    Continuous Infusions:     PRN Meds:    acetaminophen **OR** acetaminophen    dextrose    dextrose    glucagon (human recombinant)    Magnesium Standard Dose Replacement - Follow Nurse / BPA Driven Protocol    Morphine    nitroglycerin    ondansetron **OR** ondansetron    phenol    Phosphorus Replacement - Follow Nurse / BPA Driven  "Protocol    Potassium Replacement - Follow Nurse / BPA Driven Protocol    sodium chloride    sodium chloride    sodium chloride     ALLERGIES:    Allergies   Allergen Reactions    Bactrim [Sulfamethoxazole-Trimethoprim] Rash     Objective    VITALS:   /77   Pulse 62   Temp 98.3 °F (36.8 °C) (Oral)   Resp 14   Ht 185.4 cm (73\")   Wt 96.9 kg (213 lb 10 oz)   SpO2 95%   BMI 28.18 kg/m²     PHYSICAL EXAM:  Physical Exam  Vitals and nursing note reviewed.   Constitutional:       General: He is not in acute distress.     Appearance: Normal appearance. He is well-developed and normal weight. He is not diaphoretic.   HENT:      Head: Normocephalic and atraumatic.      Nose: Nose normal.      Mouth/Throat:      Mouth: Mucous membranes are moist.      Pharynx: Oropharynx is clear. No oropharyngeal exudate or posterior oropharyngeal erythema.      Comments: Dental fillings.  Eyes:      General: No scleral icterus.     Extraocular Movements: Extraocular movements intact.      Conjunctiva/sclera: Conjunctivae normal.      Pupils: Pupils are equal, round, and reactive to light.   Cardiovascular:      Rate and Rhythm: Normal rate and regular rhythm.      Heart sounds: Normal heart sounds. No murmur heard.     Comments: Cardiac monitor leads.   Pulmonary:      Effort: Pulmonary effort is normal. No respiratory distress.      Breath sounds: Normal breath sounds. No stridor. No wheezing or rales.   Abdominal:      General: Bowel sounds are normal. There is no distension.      Palpations: Abdomen is soft. There is no mass.      Tenderness: There is no abdominal tenderness. There is no guarding.   Genitourinary:     Comments: Deferred   Musculoskeletal:         General: No swelling, tenderness or deformity. Normal range of motion.      Cervical back: Normal range of motion and neck supple.      Right lower leg: No edema.      Left lower leg: No edema.      Comments: Left hand oxygen saturation monitor.  Bilateral upper " extremity peripheral IVs.   Lymphadenopathy:      Cervical: No cervical adenopathy.      Upper Body:      Right upper body: No supraclavicular adenopathy.      Left upper body: No supraclavicular adenopathy.   Skin:     General: Skin is warm and dry.      Coloration: Skin is not jaundiced or pale.      Findings: No bruising, erythema or rash.   Neurological:      General: No focal deficit present.      Mental Status: He is alert and oriented to person, place, and time.      Coordination: Coordination normal.   Psychiatric:         Mood and Affect: Mood normal.         Behavior: Behavior normal.         Thought Content: Thought content normal.         Judgment: Judgment normal.     RECENT LABS:  Lab Results (last 24 hours)       Procedure Component Value Units Date/Time    POC Glucose Once [[id number]]  (Abnormal) Collected: 12/12/23 0528    Specimen: Blood Updated: 12/12/23 0530     Glucose 153 mg/dL      Comment: Serial Number: [id number]:  [id number]       Phosphorus [[id number]]  (Normal) Collected: 12/12/23 0333    Specimen: Blood Updated: 12/12/23 0444     Phosphorus 4.4 mg/dL     Manual Differential [[id number]]  (Abnormal) Collected: 12/12/23 0333    Specimen: Blood Updated: 12/12/23 0441     Neutrophil % 56.0 %      Lymphocyte % 34.0 %      Monocyte % 6.0 %      Eosinophil % 1.0 %      Bands %  1.0 %      Atypical Lymphocyte % 2.0 %      Neutrophils Absolute 5.81 10*3/mm3      Lymphocytes Absolute 3.67 10*3/mm3      Monocytes Absolute 0.61 10*3/mm3      Eosinophils Absolute 0.10 10*3/mm3      RBC Morphology Normal     Smudge Cells Slight/1+     Giant Platelets Slight/1+    CBC & Differential [[id number]]  (Abnormal) Collected: 12/12/23 0333    Specimen: Blood Updated: 12/12/23 0441    Narrative:      The following orders were created for panel order CBC & Differential.  Procedure                               Abnormality         Status                     ---------                               -----------          ------                     CBC Auto Differential[[id number]]        Abnormal            Final result               Scan Slide[[id number]]                                       Final result                 Please view results for these tests on the individual orders.    CBC Auto Differential [[id number]]  (Abnormal) Collected: 12/12/23 0333    Specimen: Blood Updated: 12/12/23 0441     WBC 10.20 10*3/mm3      RBC 4.17 10*6/mm3      Hemoglobin 12.6 g/dL      Hematocrit 37.3 %      MCV 89.4 fL      MCH 30.2 pg      MCHC 33.8 g/dL      RDW 15.2 %      RDW-SD 49.4 fl      MPV 9.1 fL      Platelets 183 10*3/mm3     Narrative:      The previously reported component NRBC is no longer being reported. Previous result was 0.2 /100 WBC (Reference Range: 0.0-0.2 /100 WBC) on 12/12/2023 at 0415 EST.    Scan Slide [[id number]] Collected: 12/12/23 0333    Specimen: Blood Updated: 12/12/23 0441     Scan Slide --     Comment: See Manual Differential Results       Magnesium [[id number]]  (Normal) Collected: 12/12/23 0333    Specimen: Blood Updated: 12/12/23 0440     Magnesium 1.9 mg/dL     Comprehensive Metabolic Panel [[id number]]  (Abnormal) Collected: 12/12/23 0333    Specimen: Blood Updated: 12/12/23 0440     Glucose 122 mg/dL      BUN 11 mg/dL      Creatinine 0.62 mg/dL      Sodium 151 mmol/L      Potassium 3.3 mmol/L      Chloride 116 mmol/L      CO2 27.0 mmol/L      Calcium 8.5 mg/dL      Total Protein 5.0 g/dL      Albumin 2.6 g/dL      ALT (SGPT) 17 U/L      AST (SGOT) 18 U/L      Alkaline Phosphatase 51 U/L      Total Bilirubin 0.3 mg/dL      Globulin 2.4 gm/dL      A/G Ratio 1.1 g/dL      BUN/Creatinine Ratio 17.7     Anion Gap 8.0 mmol/L      eGFR 107.4 mL/min/1.73     Narrative:      GFR Normal >60  Chronic Kidney Disease <60  Kidney Failure <15      Blood Culture - Blood, Arm, Right [[id number]]  (Normal) Collected: 12/08/23 0311    Specimen: Blood from Arm, Right Updated: 12/12/23 0330     Blood Culture No growth at 4  days    Narrative:      Less than seven (7) mL's of blood was collected.  Insufficient quantity may yield false negative results.    Blood Culture - Blood, Arm, Left [[id number]]  (Normal) Collected: 12/08/23 0256    Specimen: Blood from Arm, Left Updated: 12/12/23 0315     Blood Culture No growth at 4 days    POC Glucose Once [[id number]]  (Abnormal) Collected: 12/11/23 2344    Specimen: Blood Updated: 12/11/23 2349     Glucose 154 mg/dL      Comment: Serial Number: [id number]:  [id number]       POC Glucose Once [[id number]]  (Abnormal) Collected: 12/11/23 1802    Specimen: Blood Updated: 12/11/23 1803     Glucose 207 mg/dL      Comment: Serial Number: [id number]:  [id number]       Phosphorus [[id number]]  (Normal) Collected: 12/11/23 1612    Specimen: Blood Updated: 12/11/23 1715     Phosphorus 3.3 mg/dL     Potassium [[id number]]  (Abnormal) Collected: 12/11/23 1612    Specimen: Blood Updated: 12/11/23 1655     Potassium 3.4 mmol/L      Comment: Slight hemolysis detected by analyzer. Result may be falsely elevated.       POC Glucose Once [[id number]]  (Abnormal) Collected: 12/11/23 1230    Specimen: Blood Updated: 12/11/23 1231     Glucose 253 mg/dL      Comment: Serial Number: [id number]:  [id number]       Pathology Consultation [[id number]] Collected: 12/10/23 0434    Specimen: Blood, Venous Line Updated: 12/11/23 1041     Final Diagnosis --     Leukocytosis with left shifted granulocytes  No blasts identified       Case Report --     Surgical Pathology Report                         Case: [id number]                                  Authorizing Provider:  Berenice Oneil,     Collected:           12/10/2023 04:34 AM                                 APRN                                                                         Ordering Location:     [facility]       Received:            12/11/2023 07:27 AM                                 [facility]                                                           Pathologist:           Deniz Rothman MD                                                            Specimen:    Blood, Venous Line                                                                               PENDING RESULTS: n/a     IMAGING REVIEWED:  XR Abdomen KUB    Result Date: 12/11/2023  Impression: Left upper quadrant dilated small bowel loops are suggestive of the small bowel obstruction. The small bowel dilation appears improved since 12/8/2023. Electronically Signed: Altagracia Maciel MD  12/11/2023 10:48 AM EST  Workstation ID: [id number]    XR Chest PA & Lateral    Result Date: 12/11/2023  Impression: 1. No acute chest finding. 2. Dilated small bowel loops in the upper abdomen. Correlate with recent CT abdomen and pelvis study from 12/10/2023. Electronically Signed: Altagracia Maciel MD  12/11/2023 10:43 AM EST  Workstation ID: [id number]    CT Abdomen Pelvis With Contrast    Result Date: 12/10/2023  Impression: 1.Persistent findings of small bowel obstruction. The extent of small bowel dilatation does appear decreased compared to the prior CT from December 10, 2023. There is enteric contrast within dilated bowel loops but there does not appear to be contrast in  distal nondilated loops. Recommend radiographic follow-up to assess for progression of enteric contrast. 2.Enteric tube terminates in the mid stomach. 3.Mild diffuse wall thickening of the distal colon and rectum which could be related to colitis or underdistention. 4.New trace left pleural effusion and mild dependent bilateral lower lobe consolidation which could represent atelectasis or pneumonia. 5.Right lower lobe may have increased since December 2022 although motion limits quantification. Recommend follow-up chest CT for additional evaluation. 6.Stable right adrenal lesion and indeterminate left adrenal nodule. These may represent adrenal adenomas but could be confirmed with adrenal protocol MRI. 7.Mild body wall edema.  Electronically Signed: Vasquez Gomez  12/10/2023 4:53 PM EST  Workstation ID: [id number]     I have reviewed the patient's labs, imaging, reports, and other clinician documentation.    Assessment & Plan   ASSESSMENT  Stage I CLL: Started acalabrutinib in 8/2023 and S/p 3 cycles obinutuzumab with WBC decreasing well.  GI side effects of acalabrutinib include: Abdominal pain (15%), constipation (15%), diarrhea (18% to 35%; grade ?3: ?3%), nausea (19% to 22%; grade ?3: <1%), vomiting (13%; grade ?3: 2%) with less than 5% incidence of atrial fibrillation.  GI side effects of obinutuzumab include: Constipation (8% to 32%) decreased appetite (14%), diarrhea (monotherapy: ?10%; combination therapy: 10% to 30%; grades 3/4: 2% to 3%). Holding acalabrutinib and obinutuzumab.   Right lung nodules:  These will be followed on repeat imaging.  Iron-deficiency anemia with heme-positive stool:  He was on ferrous sulfate 325 mg by mouth daily and Pepcid as an outpatient.  Hemoglobin stable.  Small bowel obstruction versus enteritis, abdominal pain: Gastroenterology and surgery were consulted. NG tube placed and was removed on 12/11/2023.  No plan for surgical intervention.  On Rocephin and Flagyl. CT A/P showed overall improvement in small bowel obstruction.   Suspected septic shock due to enteritis/Hypotension: Off Levophed drip. Blood cultures show no growth to date.  White count has normalized.     Paroxysmal atrial fibrillation: New onset, likely sepsis-induced. 2D echocardiogram showed LVEF 61 - 65%. On Lovenox prophylaxis. Per Primary team.     Electrolyte imbalances: Management per Primary team.        PLAN   Continue IV antibiotics per Primary team.   Continue to hold oral iron replacement.  Continue to hold acalabrutinib and obinutuzumab.     The patient was seen and evaluated by Dr. Fontaine.  Electronically signed by REYES Stokes, 12/12/23, 10:45 AM EST.        On 12/12/2023, I have personally performed a  face-to-face diagnostic evaluation on this patient. I have performed a complete history and physical examination, reviewed laboratory studies, and radiographic examinations.  I have completed the majority and substantive portion of the medical decision making.  I have formulated the assessment on this patient and the plan of action as noted above. I have discussed the case with Ya Rahman NP, have edited/reviewed the note, and agree with the care plan.  He claims he.  He is feeling well today.  On examination, bilateral upper extremity IVs and left hand O2 monitor are present.  Abdomen is soft.  Abdominal x-ray yesterday still revealed left upper quadrant dilated small bowel loops.  Clinically he is improving on current therapy.  Will continue to hold his chemotherapy.    On 12/12/2023, I discussed the patient's findings and my recommendations with patient.    Part of this note may be an electronic transcription/translation of spoken language to printed text using the Dragon Dictation System.     Electronically signed by Patrick Fontaine MD, 12/12/23, 7:27 PM EST.

## 2023-12-13 ENCOUNTER — READMISSION MANAGEMENT (OUTPATIENT)
Dept: CALL CENTER | Facility: HOSPITAL | Age: 63
End: 2023-12-13
Payer: COMMERCIAL

## 2023-12-13 VITALS
DIASTOLIC BLOOD PRESSURE: 67 MMHG | OXYGEN SATURATION: 96 % | TEMPERATURE: 98.2 F | BODY MASS INDEX: 28.31 KG/M2 | HEIGHT: 73 IN | SYSTOLIC BLOOD PRESSURE: 135 MMHG | HEART RATE: 54 BPM | RESPIRATION RATE: 14 BRPM | WEIGHT: 213.63 LBS

## 2023-12-13 PROBLEM — A41.9 SEPTIC SHOCK: Status: RESOLVED | Noted: 2023-12-09 | Resolved: 2023-12-13

## 2023-12-13 PROBLEM — K56.609 BOWEL OBSTRUCTION: Status: RESOLVED | Noted: 2023-12-08 | Resolved: 2023-12-13

## 2023-12-13 PROBLEM — R65.21 SEPTIC SHOCK: Status: RESOLVED | Noted: 2023-12-09 | Resolved: 2023-12-13

## 2023-12-13 LAB
ALBUMIN SERPL-MCNC: 2.3 G/DL (ref 3.5–5.2)
ALBUMIN/GLOB SERPL: 1.1 G/DL
ALP SERPL-CCNC: 55 U/L (ref 39–117)
ALT SERPL W P-5'-P-CCNC: 63 U/L (ref 1–41)
ANION GAP SERPL CALCULATED.3IONS-SCNC: 11 MMOL/L (ref 5–15)
ANISOCYTOSIS BLD QL: ABNORMAL
AST SERPL-CCNC: 99 U/L (ref 1–40)
BACTERIA SPEC AEROBE CULT: NORMAL
BACTERIA SPEC AEROBE CULT: NORMAL
BILIRUB SERPL-MCNC: 0.3 MG/DL (ref 0–1.2)
BUN SERPL-MCNC: 8 MG/DL (ref 8–23)
BUN/CREAT SERPL: 17.8 (ref 7–25)
CALCIUM SPEC-SCNC: 8.1 MG/DL (ref 8.6–10.5)
CHLORIDE SERPL-SCNC: 105 MMOL/L (ref 98–107)
CO2 SERPL-SCNC: 26 MMOL/L (ref 22–29)
CREAT SERPL-MCNC: 0.45 MG/DL (ref 0.76–1.27)
DEPRECATED RDW RBC AUTO: 48.6 FL (ref 37–54)
EGFRCR SERPLBLD CKD-EPI 2021: 118.3 ML/MIN/1.73
EOSINOPHIL # BLD MANUAL: 0.18 10*3/MM3 (ref 0–0.4)
EOSINOPHIL NFR BLD MANUAL: 2 % (ref 0.3–6.2)
ERYTHROCYTE [DISTWIDTH] IN BLOOD BY AUTOMATED COUNT: 15 % (ref 12.3–15.4)
GLOBULIN UR ELPH-MCNC: 2.1 GM/DL
GLUCOSE BLDC GLUCOMTR-MCNC: 108 MG/DL (ref 70–105)
GLUCOSE BLDC GLUCOMTR-MCNC: 112 MG/DL (ref 70–105)
GLUCOSE BLDC GLUCOMTR-MCNC: 126 MG/DL (ref 70–105)
GLUCOSE SERPL-MCNC: 99 MG/DL (ref 65–99)
HCT VFR BLD AUTO: 34.8 % (ref 37.5–51)
HGB BLD-MCNC: 11.9 G/DL (ref 13–17.7)
LYMPHOCYTES # BLD MANUAL: 5.87 10*3/MM3 (ref 0.7–3.1)
LYMPHOCYTES NFR BLD MANUAL: 3 % (ref 5–12)
MAGNESIUM SERPL-MCNC: 1.6 MG/DL (ref 1.6–2.4)
MCH RBC QN AUTO: 30.2 PG (ref 26.6–33)
MCHC RBC AUTO-ENTMCNC: 34.1 G/DL (ref 31.5–35.7)
MCV RBC AUTO: 88.5 FL (ref 79–97)
MONOCYTES # BLD: 0.27 10*3/MM3 (ref 0.1–0.9)
NEUTROPHILS # BLD AUTO: 2.58 10*3/MM3 (ref 1.7–7)
NEUTROPHILS NFR BLD MANUAL: 28 % (ref 42.7–76)
NEUTS BAND NFR BLD MANUAL: 1 % (ref 0–5)
PHOSPHATE SERPL-MCNC: 3.5 MG/DL (ref 2.5–4.5)
PLAT MORPH BLD: NORMAL
PLATELET # BLD AUTO: 160 10*3/MM3 (ref 140–450)
PMV BLD AUTO: 8.4 FL (ref 6–12)
POTASSIUM SERPL-SCNC: 3.1 MMOL/L (ref 3.5–5.2)
PROT SERPL-MCNC: 4.4 G/DL (ref 6–8.5)
RBC # BLD AUTO: 3.94 10*6/MM3 (ref 4.14–5.8)
SCAN SLIDE: NORMAL
SMUDGE CELLS BLD QL SMEAR: ABNORMAL
SODIUM SERPL-SCNC: 142 MMOL/L (ref 136–145)
VARIANT LYMPHS NFR BLD MANUAL: 66 % (ref 19.6–45.3)
WBC NRBC COR # BLD AUTO: 8.9 10*3/MM3 (ref 3.4–10.8)

## 2023-12-13 PROCEDURE — 83735 ASSAY OF MAGNESIUM: CPT | Performed by: NURSE PRACTITIONER

## 2023-12-13 PROCEDURE — 82948 REAGENT STRIP/BLOOD GLUCOSE: CPT

## 2023-12-13 PROCEDURE — 80053 COMPREHEN METABOLIC PANEL: CPT | Performed by: NURSE PRACTITIONER

## 2023-12-13 PROCEDURE — 85007 BL SMEAR W/DIFF WBC COUNT: CPT | Performed by: NURSE PRACTITIONER

## 2023-12-13 PROCEDURE — 25010000002 METOCLOPRAMIDE PER 10 MG: Performed by: NURSE PRACTITIONER

## 2023-12-13 PROCEDURE — 85025 COMPLETE CBC W/AUTO DIFF WBC: CPT | Performed by: NURSE PRACTITIONER

## 2023-12-13 PROCEDURE — 25010000002 CEFTRIAXONE PER 250 MG: Performed by: INTERNAL MEDICINE

## 2023-12-13 PROCEDURE — 84100 ASSAY OF PHOSPHORUS: CPT | Performed by: NURSE PRACTITIONER

## 2023-12-13 PROCEDURE — 25010000002 METRONIDAZOLE 500 MG/100ML SOLUTION: Performed by: INTERNAL MEDICINE

## 2023-12-13 RX ORDER — LANCETS 33 GAUGE
1 EACH MISCELLANEOUS DAILY
Qty: 100 EACH | Refills: 2 | Status: SHIPPED | OUTPATIENT
Start: 2023-12-13

## 2023-12-13 RX ORDER — BLOOD SUGAR DIAGNOSTIC
1 STRIP MISCELLANEOUS DAILY
Qty: 50 EACH | Refills: 2 | Status: SHIPPED | OUTPATIENT
Start: 2023-12-13

## 2023-12-13 RX ORDER — METRONIDAZOLE 500 MG/1
500 TABLET ORAL 3 TIMES DAILY
Qty: 21 TABLET | Refills: 0 | Status: SHIPPED | OUTPATIENT
Start: 2023-12-13

## 2023-12-13 RX ORDER — HYDROCODONE BITARTRATE AND ACETAMINOPHEN 5; 325 MG/1; MG/1
1 TABLET ORAL EVERY 6 HOURS PRN
Qty: 12 TABLET | Refills: 0 | Status: SHIPPED | OUTPATIENT
Start: 2023-12-13

## 2023-12-13 RX ORDER — CEPHALEXIN 500 MG/1
500 CAPSULE ORAL 4 TIMES DAILY
Qty: 28 CAPSULE | Refills: 0 | Status: SHIPPED | OUTPATIENT
Start: 2023-12-13

## 2023-12-13 RX ORDER — POTASSIUM CHLORIDE 20 MEQ/1
40 TABLET, EXTENDED RELEASE ORAL EVERY 4 HOURS
Status: DISCONTINUED | OUTPATIENT
Start: 2023-12-13 | End: 2023-12-13 | Stop reason: HOSPADM

## 2023-12-13 RX ADMIN — METRONIDAZOLE 500 MG: 500 INJECTION, SOLUTION INTRAVENOUS at 10:21

## 2023-12-13 RX ADMIN — METRONIDAZOLE 500 MG: 500 INJECTION, SOLUTION INTRAVENOUS at 01:58

## 2023-12-13 RX ADMIN — METOCLOPRAMIDE 10 MG: 5 INJECTION, SOLUTION INTRAMUSCULAR; INTRAVENOUS at 04:10

## 2023-12-13 RX ADMIN — CEFTRIAXONE 1000 MG: 1 INJECTION, POWDER, FOR SOLUTION INTRAMUSCULAR; INTRAVENOUS at 09:22

## 2023-12-13 RX ADMIN — METOCLOPRAMIDE 10 MG: 5 INJECTION, SOLUTION INTRAMUSCULAR; INTRAVENOUS at 10:21

## 2023-12-13 RX ADMIN — EMPAGLIFLOZIN 10 MG: 10 TABLET, FILM COATED ORAL at 08:06

## 2023-12-13 RX ADMIN — POTASSIUM CHLORIDE 40 MEQ: 1500 TABLET, EXTENDED RELEASE ORAL at 13:12

## 2023-12-13 RX ADMIN — Medication 10 ML: at 08:11

## 2023-12-13 RX ADMIN — POTASSIUM CHLORIDE 40 MEQ: 1500 TABLET, EXTENDED RELEASE ORAL at 09:22

## 2023-12-13 NOTE — DISCHARGE SUMMARY
[facility]         DISCHARGE SUMMARY    Patient Name: Bharathi Darnell  : 1960  MRN: [id number]    Date of Admission: 2023  Date of Discharge:  2023  Primary Care Physician: Jamar Pham MD    Consults       Date and Time Order Name Status Description    12/10/2023  1:15 PM Inpatient Hospitalist Consult Completed     2023 11:33 AM Hematology & Oncology Inpatient Consult      2023  5:17 AM Surgery (on-call MD unless specified) Completed     2023  5:17 AM Intensivist (on-call MD unless specified) Completed             Presenting Problem:   Persistent vomiting [R11.15]  Dehydration [E86.0]  Bowel obstruction [K56.609]  Small bowel obstruction [K56.609]  Weakness [R53.1]  Acute kidney injury [N17.9]  Hypotension, unspecified hypotension type [I95.9]  Sepsis, due to unspecified organism, unspecified whether acute organ dysfunction present [A41.9]    Active and Resolved Hospital Problems:  Active Hospital Problems    Diagnosis POA    CLL (chronic lymphocytic leukemia) [C91.10] Yes      Resolved Hospital Problems    Diagnosis POA    **Septic shock due to enteritis [A41.9, R65.21] Yes    Bowel obstruction [K56.609] Yes         Hospital Course     Hospital Course:    Bharathi Darnell is a 63 y.o. male with PMH of CLL, diaphragmatic hernia, diverticulosis of large intestine, hypertension, hyperlipidemia, type 2 diabetes mellitus, presented to the hospital for 1 week of nausea, vomiting, and general weakness, and was admitted with a principal diagnosis of Bowel obstruction.      In ER patient was given 30/kg sepsis bolus cefepime and Flagyl. CT abdomen and pelvis showed possible small bowel obstruction. ER MD spoke with general surgery. No immediate need for surgery. Patient remained hypotensive following fluids resuscitation and required to be started on Levophed. Patient admitted to ICU for further monitoring and management.      ACP: CPR, Full  Intervention. No ACP docs on file. Patient spouse is the NOK to make decisions for him in the event he is unable.    Patient was started on IV antibiotics, initially started on Levophed, received IV fluids, repeat imaging studies did show small bowel obstruction, NG tube was placed, imaging studies improved, NG tube was discontinued, patient was followed by general surgery, he was started on clear liquid diet there was advanced to soft and irregular, was able to move his bowels, ambulated well.  He will be discharged home today.  Condition on discharge is stable, diet as diabetic follow-up with primary care physician      Day of Discharge     Vital Signs:  Temp:  [97.5 °F (36.4 °C)-98.4 °F (36.9 °C)] 98.3 °F (36.8 °C)  Heart Rate:  [52-80] 54  Resp:  [14] 14  BP: (111-135)/(58-77) 135/67  Physical Exam:  Constitutional: Awake, alert   Eyes: PERRLA, sclerae anicteric, no conjunctival injection   HENT: NCAT, mucous membranes moist   Neck: Supple, no thyromegaly, no lymphadenopathy, trachea midline   Respiratory: Clear to auscultation bilaterally, nonlabored respirations    Cardiovascular: RRR, no murmurs, rubs, or gallops, palpable pedal pulses bilaterally   Gastrointestinal: Positive bowel sounds, soft, nontender, nondistended   Musculoskeletal: No bilateral ankle edema, no clubbing or cyanosis to extremities   Psychiatric: Appropriate affect, cooperative   Neurologic: Oriented x 3, strength symmetric in all extremities, Cranial Nerves grossly intact to confrontation, speech clear   Skin: No rashes     Pertinent  and/or Most Recent Results     LAB RESULTS:      Lab 12/13/23  0756 12/12/23  0333 12/11/23  0554 12/10/23  0434 12/09/23  0517 12/08/23  1909 12/08/23  1724 12/08/23  0716 12/08/23  0311 12/08/23  0300 12/08/23  0256   WBC 8.90 10.20 10.40 15.10* 22.40*   < >  --   --   --   --  10.40   HEMOGLOBIN 11.9* 12.6* 12.5* 13.0 13.9   < >  --   --   --   --  13.3   HEMATOCRIT 34.8* 37.3* 37.5 39.1 43.0   < >  --    --   --   --  39.9   PLATELETS 160 183 168 208 237   < >  --   --   --   --  165   NEUTROS ABS 2.58 5.81 4.78 3.93 5.82  --   --   --   --   --  1.35*   EOS ABS 0.18 0.10  --   --   --   --   --   --   --   --   --    MCV 88.5 89.4 89.2 90.1 93.8   < >  --   --   --   --  89.5   PROCALCITONIN  --   --   --   --   --   --   --   --  27.83*  --   --    LACTATE  --   --   --   --  1.3  --  1.2 1.7  --  1.5  --    PROTIME  --   --   --   --   --   --   --   --   --   --  11.0   APTT  --   --   --   --   --   --   --   --   --   --  34.6*    < > = values in this interval not displayed.         Lab 12/13/23  0756 12/12/23  2305 12/12/23  0333 12/11/23  1612 12/11/23  0554 12/10/23  1828 12/10/23  0434 12/09/23  0517   SODIUM 142  --  151*  --  155*  --  156* 148*   POTASSIUM 3.1* 3.1* 3.3* 3.4* 3.3* 3.6 3.4* 3.7   CHLORIDE 105  --  116*  --  118*  --  120* 112*   CO2 26.0  --  27.0  --  26.0  --  20.0* 10.0*   ANION GAP 11.0  --  8.0  --  11.0  --  16.0* 26.0*   BUN 8  --  11  --  15  --  18 27*   CREATININE 0.45*  --  0.62*  --  0.54*  --  0.74* 1.00   EGFR 118.3  --  107.4  --  112.0  --  101.8 84.6   GLUCOSE 99  --  122*  --  239*  --  219* 183*   CALCIUM 8.1*  --  8.5*  --  8.8  --  9.0 8.7   MAGNESIUM 1.6  --  1.9  --  2.0  --  2.3 2.5*   PHOSPHORUS 3.5  --  4.4 3.3 2.2* 2.1* 2.0* 3.3   TSH  --   --   --   --   --   --   --  0.760         Lab 12/13/23  0756 12/12/23  0333 12/11/23  0554 12/10/23  0434 12/09/23  0517 12/08/23  0311   TOTAL PROTEIN 4.4* 5.0* 4.9* 5.2* 5.6* 5.8*   ALBUMIN 2.3* 2.6* 2.3* 2.3* 2.5* 2.7*   GLOBULIN 2.1 2.4 2.6 2.9 3.1 3.1   ALT (SGPT) 63* 17 12 13 15 17   AST (SGOT) 99* 18 9 <5 9 15   BILIRUBIN 0.3 0.3 0.2 <0.2 <0.2 0.5   ALK PHOS 55 51 56 53 57 49   LIPASE  --   --   --   --   --  20         Lab 12/08/23  0716 12/08/23  0311 12/08/23  0256   HSTROP T 12 21  --    PROTIME  --   --  11.0   INR  --   --  1.01                 Brief Urine Lab Results  (Last result in the past 365 days)         Color   Clarity   Blood   Leuk Est   Nitrite   Protein   CREAT   Urine HCG        12/08/23 0450 Yellow   Clear   Negative   Negative   Negative   Trace                 Microbiology Results (last 10 days)       Procedure Component Value - Date/Time    MRSA Screen, PCR (Inpatient) - Swab, Nares [[id number]]  (Normal) Collected: 12/08/23 0859    Lab Status: Final result Specimen: Swab from Nares Updated: 12/08/23 1036     MRSA PCR No MRSA Detected    Narrative:      The negative predictive value of this diagnostic test is high and should only be used to consider de-escalating anti-MRSA therapy. A positive result may indicate colonization with MRSA and must be correlated clinically.    Blood Culture - Blood, Arm, Right [[id number]]  (Normal) Collected: 12/08/23 0311    Lab Status: Final result Specimen: Blood from Arm, Right Updated: 12/13/23 0330     Blood Culture No growth at 5 days    Narrative:      Less than seven (7) mL's of blood was collected.  Insufficient quantity may yield false negative results.    COVID-19 and FLU A/B PCR, 1 HR TAT - Swab, Nasopharynx [[id number]]  (Normal) Collected: 12/08/23 0306    Lab Status: Final result Specimen: Swab from Nasopharynx Updated: 12/08/23 0345     COVID19 Not Detected     Influenza A PCR Not Detected     Influenza B PCR Not Detected    Narrative:      Fact sheet for providers: https://www.fda.gov/media/075118/download    Fact sheet for patients: https://www.fda.gov/media/741096/download    Test performed by PCR.    Blood Culture - Blood, Arm, Left [[id number]]  (Normal) Collected: 12/08/23 0256    Lab Status: Final result Specimen: Blood from Arm, Left Updated: 12/13/23 0301     Blood Culture No growth at 5 days            XR Abdomen KUB    Result Date: 12/11/2023  Impression: Impression: Left upper quadrant dilated small bowel loops are suggestive of the small bowel obstruction. The small bowel dilation appears improved since 12/8/2023. Electronically Signed: Altagracia Maciel  MD  12/11/2023 10:48 AM EST  Workstation ID: [id number]    XR Chest PA & Lateral    Result Date: 12/11/2023  Impression: Impression: 1. No acute chest finding. 2. Dilated small bowel loops in the upper abdomen. Correlate with recent CT abdomen and pelvis study from 12/10/2023. Electronically Signed: Altagracia Maciel MD  12/11/2023 10:43 AM EST  Workstation ID: [id number]    CT Abdomen Pelvis With Contrast    Result Date: 12/10/2023  Impression: Impression: 1.Persistent findings of small bowel obstruction. The extent of small bowel dilatation does appear decreased compared to the prior CT from December 10, 2023. There is enteric contrast within dilated bowel loops but there does not appear to be contrast in  distal nondilated loops. Recommend radiographic follow-up to assess for progression of enteric contrast. 2.Enteric tube terminates in the mid stomach. 3.Mild diffuse wall thickening of the distal colon and rectum which could be related to colitis or underdistention. 4.New trace left pleural effusion and mild dependent bilateral lower lobe consolidation which could represent atelectasis or pneumonia. 5.Right lower lobe may have increased since December 2022 although motion limits quantification. Recommend follow-up chest CT for additional evaluation. 6.Stable right adrenal lesion and indeterminate left adrenal nodule. These may represent adrenal adenomas but could be confirmed with adrenal protocol MRI. 7.Mild body wall edema. Electronically Signed: Vasquez Gomez  12/10/2023 4:53 PM EST  Workstation ID: [id number]    XR Abdomen KUB    Result Date: 12/10/2023  Impression: Impression: Persistent small bowel obstruction. Nasogastric tube tip and sidehole overlie the stomach. Electronically Signed: Will Rao MD  12/10/2023 8:58 AM EST  Workstation ID: [id number]    XR Abdomen KUB    Result Date: 12/9/2023  Impression: Impression: Persistent small bowel obstruction, similar bowel gas pattern compared to yesterday's  radiograph. Electronically Signed: Will Rao MD  12/9/2023 8:04 AM EST  Workstation ID: [id number]    XR Abdomen KUB    Result Date: 12/8/2023  Impression: Impression: Increased small bowel dilatation with wall thickening. This may represent early or partial obstruction versus enteritis. Continued follow-up recommended. Electronically Signed: Frannie Barnes MD  12/8/2023 12:26 PM EST  Workstation ID: [id number]    XR Abdomen KUB    Result Date: 12/8/2023  Impression: Impression: Esophagogastric tube is in the stomach. Electronically Signed: Ga Farfan MD  12/8/2023 6:29 AM EST  Workstation ID: [id number]    CT Abdomen Pelvis Without Contrast    Result Date: 12/8/2023  Impression: Impression: Findings are consistent with small bowel obstruction with dilated and fluid-filled loops of predominantly jejunum and proximal to mid ileum Electronically Signed: Ga Farfan MD  12/8/2023 5:03 AM EST  Workstation ID: [id number]    XR Chest 1 View    Result Date: 12/8/2023  Impression: Impression: No active disease Electronically Signed: Ga Farfan MD  12/8/2023 3:18 AM EST  Workstation ID: [id number]             Results for orders placed during the hospital encounter of 12/08/23    Adult Transthoracic Echo Complete W/ Cont if Necessary Per Protocol    Interpretation Summary    Left ventricular systolic function is normal. Calculated left ventricular EF = 61.3% Left ventricular ejection fraction appears to be 61 - 65%.    Left ventricular wall thickness is consistent with mild concentric hypertrophy.    The left atrial cavity is mild to moderately dilated.    Estimated right ventricular systolic pressure from tricuspid regurgitation is normal (<35 mmHg).      Labs Pending at Discharge:        Discharge Details        Discharge Medications        New Medications        Instructions Start Date   cephalexin 500 MG capsule  Commonly known as: Keflex   500 mg, Oral, 4 Times Daily      HYDROcodone-acetaminophen 5-325 MG per  tablet  Commonly known as: Norco   1 tablet, Oral, Every 6 Hours PRN      metroNIDAZOLE 500 MG tablet  Commonly known as: Flagyl   500 mg, Oral, 3 Times Daily      OneTouch Delica Plus [id number] misc   1 each, Does not apply, Daily, Dx: E11.65      OneTouch Verio test strip  Generic drug: glucose blood   1 each, Other, Daily, Dx: E11.65. Use as instructed      phenol 1.4 % liquid liquid  Commonly known as: CHLORASEPTIC   1 spray, Mouth/Throat, Every 2 Hours PRN             Continue These Medications        Instructions Start Date   amLODIPine 10 MG tablet  Commonly known as: NORVASC   TAKE 1 TABLET BY MOUTH EVERY DAY      Calquence 100 MG tablet  Generic drug: Acalabrutinib Maleate   100 mg, Oral, 2 Times Daily      Farxiga 10 MG tablet  Generic drug: dapagliflozin Propanediol   10 mg, Oral, Daily      lisinopril 40 MG tablet  Commonly known as: PRINIVIL,ZESTRIL   40 mg, Oral, Daily, for blood pressure.      omeprazole 40 MG capsule  Commonly known as: priLOSEC   40 mg, Oral, Daily      ondansetron 4 MG tablet  Commonly known as: ZOFRAN   4 mg, Oral, Every 8 Hours PRN      Vitamin D3 1.25 MG (85209 UT) capsule   50,000 Units, Oral, Tuesday's and Thursday's.               Allergies   Allergen Reactions    Bactrim [Sulfamethoxazole-Trimethoprim] Rash         Discharge Disposition:   Home or Self Care    Discharge Condition: .cond    Diet:  Hospital:  Diet Order   Procedures    Diet: Regular/House Diet, Diabetic Diets, Gastrointestinal Diets; Consistent Carbohydrate; Low Irritant; Fluid Consistency: Thin (IDDSI 0)         Discharge Activity:         CODE STATUS:  Code Status and Medical Interventions:   Ordered at: 12/08/23 0615     Code Status (Patient has no pulse and is not breathing):    CPR (Attempt to Resuscitate)     Medical Interventions (Patient has pulse or is breathing):    Full Support         Future Appointments   Date Time Provider Department Center   1/15/2024  3:30 PM Jamar Pham MD [facility]   2/5/2024 10:45 AM ANTHONY Yañez DPM [facility]           Time spent on Discharge including face to face service:  30 minutes    Nicholas Luther MD

## 2023-12-13 NOTE — CASE MANAGEMENT/SOCIAL WORK
Continued Stay Note  CASSIA Nieto     Patient Name: Bharathi Darnell  MRN: [id number]  Today's Date: 12/13/2023    Admit Date: 12/8/2023    Plan: DC Plan: Home with spouse.   Discharge Plan       Row Name 12/13/23 0940       Plan    Plan DC Plan: Home with spouse.    Patient/Family in Agreement with Plan yes    Plan Comments Eliquis cost is free for this pt. CM spoke with floor RN at bedside - aware of Eliquis cost. Possible dc home today. DC Barriers:  IV Abxs                 Expected Discharge Date and Time       Expected Discharge Date Expected Discharge Time    Dec 14, 2023               MARYA Dyer RN  SIPS/ICU   O: [phone]  C: [phone]  [email or web address]

## 2023-12-13 NOTE — PROGRESS NOTES
Late note      General Surgery Inpatient Progress Note      Name: Bharathi Darnell ADMIT: 2023   : 1960  PCP: Jamar Pham MD    MRN: [id number] LOS: 5 days   AGE/SEX: 63 y.o. male  ROOM: [address] [place] [address]      Patient Care Team:  Jamar Pham MD as PCP - General (Family Medicine)  Patrick Fontaine MD as Consulting Physician (Hematology and Oncology)  Chief Complaint   Patient presents with    Abdominal Pain    Weakness - Generalized       Subjective:  Patient seen and examined on 23.  Doing much better.  Passing flatus, tolerating FLD.  Having bowel movements.  Denies pain or distention.    Medications:  Reviewed    Vitals:  AFVSS    Physical Exam:  NAD, alert  Nonlabored respirations  Abdomen soft, NT/ND    Labs:  WBC 10.2    Assessment and Plan:  63 y.o. male with CLL receiving chemotherapy prior to admission, admitted with abdominal pain, distention, leukocytosis, hypotension, BETTIE.  Likely 2/2 treatment and hypotension related to dehydration.  Now improved.    -Diet as tolerated  -No signs of obstruction or bowel ischemia  -From surgical standpoint no indication for any intervention and patient is okay to d/c from surgical standpoint when medically cleared  -Followup on an as needed basis only    Jennifer Foreman MD  23  09:02 EST

## 2023-12-13 NOTE — PROGRESS NOTES
Hematology/Oncology Inpatient Progress Note    PATIENT NAME: Bharathi Darnell  : 1960  MRN: [id number]    CHIEF COMPLAINT: Stage I CLL, VISHNU and right lung nodules     HISTORY OF PRESENT ILLNESS:    63 y.o. male presented to [facility] on 2023 with complaints of abdominal pain. He started having abdominal pain on 12/3/2023. The pain became worse and he called his gastroenterologist, Dr. Hardeep Hdz. He was treated for constipation and then ended up with diarrhea. He started on pain medications which improved his pain about 50%. On 2023, he was vomiting. He ultimately came to the ED because of increased abdominal pain and he could not eat or drink well.  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  Labs in the ED were as follows: WBC 10.4 with 2% neutrophils, 50% lymphocytes, 32% monocytes, 11% bands, 3% metamyelocytes, 2% atypical lymphocytes, hemoglobin 13.3, platelets 165,000, CMP significant for creatinine 1.78, sodium 131, potassium 3.3, calcium 8.5, blood cultures no growth to date, PTT 34.6, PT 11.0, INR 1.01, Covid-19 negative. CXR showed no active disease.  CT scan of abdomen and pelvis without contrast showed findings were consistent with small bowel obstruction with dilated and fluid-filled loops of predominantly jejunum and proximal to mid ileum. EKG showed atrial fibrillation  with significant change in rhythm, previously sinus. The patient was hypotensive in the ED and was possibly septic. He was treated with IV fluid rehydration, a Levophed drip was initiated, and an NG tube was placed. He was admitted to the ICU. Per GI the cause of the  enteritis was thought to be infectious or inflammatory etiology, and a small bowel obstruction was in the differential as well. GI recommended consultation by  Cardiology for new onset atrial fibrillation. There was a concern for small bowel ischemia given atrial fibrillation. He was started on IV Unasyn and then switched to IV Rocephin and Flagyl. Repeat KUB on 12/9/2023 showed a persistent small bowel obstruction, similar bowel gas pattern compared to previous day's radiograph. General surgery was consulted and there was no indication for surgical intervention.     12/09/23  Hematology/Oncology was consulted by Jessee Moon MD on this established patient for septic shock due to enteritis. 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  He was diagnosed with CLL, IgVH unmutated, stage I in August of 2022. He had a right lower lobe lung nodule diagnosed August of 2022.  He was seen by Dr. Marmolejo and underwent evaluation with biopsy performed 07/14/2022 of the midline submental neck mass with cytology revealing chronic lymphocytic leukemia/small lymphocytic lymphoma with 90% of B-cells revealing a CD19 positive/dim CD20 positive/CD52 positive/CD23 positive/FMC7 negative/ positive and lambda-restricted immunophenotype.  On 08/12/2022, a bone marrow biopsy with aspiration showed chronic lymphocytic leukemia.  Pattern of involvement was mixed (nodular and interstitial progressing to diffuse).  The extent of the involvement was 70%.  The phenotype was CD20+ (moderate), lambda+, CD5+, CD23+, CD38-.  There was no morphologic evidence of large cell transformation (Sorensen's).  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  The flow cytometry findings were consistent with CLL.  Phenotype of clonal cells:  Lambda+, CD5+, CD23+, CD20+ (moderate), CD81-, +, and CD38-.  Immunohistochemistry showed CLL with CD20 positivity.   Cytogenetics revealed abnormal male karyotype positive for 8p deletion and monosomy 17.  These current cytogenetic results were compatible with lymphoma and supported concurrent morphologic and flow cytometric findings of CLL. IgVH mutation analysis showed unmutated CLL.  Lymphoid neoplasm NGS report showed Tier III variants of unknown significance:  CHRISTIANE p. ([id number]) and BIRC3 p. ([id number]).  Tumor mutation burden was low and microsatellite instability was negative.  This was an [employer] chronic lymphoid neoplasm NGS report.  FISH study revealed monoallelic deletion of J98Y120 (13q14.3) locus detected.  These deletions are found in approximately 50% of CLL patients and as a sole aberration associated with more favorable clinical outcome, however, patients with more than 70% of these deletions, as in this case, experience shorter time to treatment.  FISH studies were also positive for p53 (17p13) deletion. PET/CT scan at [facility] on 8/17/2022 showed no abnormal uptake on this study.  There was an 8 mm noncalcified nodule in the right lower lobe of the lung.  There were numerous bilateral cervical lymph nodes, one of the largest measures 17 x 10 mm.  There was bilateral common femoral adenopathy greater on the right side than the left side with the largest node on the right side measuring 2.7 x 0.8 cm.  On 06/30/23  a CT chest with contrast revealed 1.1 cm irregular shaped nodule in the right middle lobe and 0.9 cm well-circumscribed nodule in the right lower lobe.  Hilar, axillary, and upper abdominal adenopathy.  Bilateral adrenal masses and emphysema.  Compared to December 2022, the enlarged axillary and hilar lymph nodes were stable.  The right middle and right lower lobe nodules were stable.  Rounded atelectasis in the posterior left lower lobe was present previously.  The enlarged portacaval node and adrenal nodules were present previously with no significant change in the interval.   WBC increased from 35,000 in August 2022 to 98,000 in July 2023.  On 08/09/23, he started acalabrutinib. On 09/06/23, he was started on cycle 1 of obinutuzumab.and received cycle 3 obinutuzumab most recently on 11/3/2023. He was last seen in the office on 11/20/2023 and was due for follow up in 1 month.      PCP: Jamar Pham MD    INTERVAL HISTORY:  12/10/2023 - white blood count 15.10 with 59% lymphocytes.  2D echocardiogram on 12/9/2023 showed left ventricular systolic function was normal. Calculated left ventricular EF = 61.3%. Left ventricular wall thickness was consistent with mild concentric hypertrophy. The left atrial cavity was mildly to moderately dilated. The estimated right ventricular systolic pressure from tricuspid regurgitation was normal. KUB showed persistent small bowel obstruction. Nasogastric tube tip and sidehole was overlying the stomach.   12/11/2023 - white blood count 12.5 with 50% lymphocytes. CT A/P with contrast on 12/10/2023 identified persistent findings of small bowel obstruction, overall small bowel dilatation decreased compared to prior. There was mild diffuse wall thickening of the distal colon and rectum, new trace left pleural effusion and mild dependent bilateral lower lobe consolidation, and right lower lobe may have increased since 12/2022 although motion limited quantification. Recommend follow-up chest CT for additional evaluation. Stable right adrenal lesion and indeterminate left adrenal nodule.  12/12/2023-white count 10.2, hemoglobin 12.6 and platelets 183,000.  Sodium 151 and potassium 3.3.  KUB showed left upper quadrant dilated small bowel loops suggestive of small bowel obstruction.  Chest x-ray showed no acute chest findings.  NG tube removed.  12/13/23-potassium 3.1.    History of present illness reviewed since last visit and changes noted on 12/12/23.    Subjective   he reports he ate a full meal last night and did well.    ROS:  Review of Systems    Constitutional:  Negative for activity change, chills, fatigue, fever and unexpected weight change.   HENT:  Negative for congestion, dental problem, hearing loss, mouth sores, nosebleeds, sore throat and trouble swallowing.    Eyes:  Negative for photophobia and visual disturbance.   Respiratory:  Negative for cough, chest tightness and shortness of breath.    Cardiovascular:  Negative for chest pain, palpitations and leg swelling.   Gastrointestinal:  Negative for abdominal distention, abdominal pain, blood in stool, constipation, diarrhea, nausea and vomiting.   Endocrine: Negative for cold intolerance and heat intolerance.   Genitourinary:  Negative for decreased urine volume, difficulty urinating, dysuria, enuresis, frequency, hematuria and urgency.   Musculoskeletal:  Negative for arthralgias and gait problem.   Skin:  Negative for rash and wound.   Neurological:  Negative for dizziness, tremors, seizures, speech difficulty, weakness, light-headedness, numbness and headaches.   Hematological:  Negative for adenopathy. Does not bruise/bleed easily.   Psychiatric/Behavioral:  Negative for confusion and hallucinations. The patient is not nervous/anxious.    All other systems reviewed and are negative.    MEDICATIONS:    Scheduled Meds:  cefTRIAXone, 1,000 mg, Intravenous, Q24H  empagliflozin, 10 mg, Oral, Daily  enoxaparin, 40 mg, Subcutaneous, Daily  insulin glargine, 8 Units, Subcutaneous, Daily  insulin lispro, 2-7 Units, Subcutaneous, Q6H  metoclopramide, 10 mg, Intravenous, Q6H  metroNIDAZOLE, 500 mg, Intravenous, Q8H  sodium chloride, 10 mL, Intravenous, Q12H    Continuous Infusions:     PRN Meds:    acetaminophen **OR** acetaminophen    dextrose    dextrose    glucagon (human recombinant)    Magnesium Standard Dose Replacement - Follow Nurse / BPA Driven Protocol    Morphine    nitroglycerin    ondansetron **OR** ondansetron    phenol    Phosphorus Replacement - Follow Nurse / BPA Driven Protocol     "Potassium Replacement - Follow Nurse / BPA Driven Protocol    sodium chloride    sodium chloride    sodium chloride     ALLERGIES:    Allergies   Allergen Reactions    Bactrim [Sulfamethoxazole-Trimethoprim] Rash     Objective    VITALS:   /67   Pulse 54   Temp 98.3 °F (36.8 °C) (Oral)   Resp 14   Ht 185.4 cm (73\")   Wt 96.9 kg (213 lb 10 oz)   SpO2 96%   BMI 28.18 kg/m²     PHYSICAL EXAM:  Physical Exam  Vitals and nursing note reviewed.   Constitutional:       General: He is not in acute distress.     Appearance: Normal appearance. He is well-developed and normal weight. He is not toxic-appearing or diaphoretic.   HENT:      Head: Normocephalic and atraumatic.      Nose: Nose normal.      Mouth/Throat:      Mouth: Mucous membranes are moist.      Pharynx: Oropharynx is clear. No oropharyngeal exudate or posterior oropharyngeal erythema.      Comments: Dental fillings.  Eyes:      General: No scleral icterus.     Extraocular Movements: Extraocular movements intact.      Conjunctiva/sclera: Conjunctivae normal.      Pupils: Pupils are equal, round, and reactive to light.      Comments: Eyeglasses.   Cardiovascular:      Rate and Rhythm: Normal rate and regular rhythm.      Heart sounds: Normal heart sounds. No murmur heard.     Comments: Cardiac monitor leads.   Pulmonary:      Effort: Pulmonary effort is normal. No respiratory distress.      Breath sounds: Normal breath sounds. No stridor. No wheezing or rales.   Abdominal:      General: Bowel sounds are normal. There is no distension.      Palpations: Abdomen is soft. There is no mass.      Tenderness: There is no abdominal tenderness. There is no guarding.   Genitourinary:     Comments: Deferred   Musculoskeletal:         General: No swelling, tenderness or deformity. Normal range of motion.      Cervical back: Normal range of motion and neck supple.      Right lower leg: No edema.      Left lower leg: No edema.   Lymphadenopathy:      Cervical: No " cervical adenopathy.      Upper Body:      Right upper body: No supraclavicular adenopathy.      Left upper body: No supraclavicular adenopathy.   Skin:     General: Skin is warm and dry.      Coloration: Skin is not jaundiced or pale.      Findings: No bruising, erythema or rash.      Comments: Bilateral upper extremity IVs.   Neurological:      General: No focal deficit present.      Mental Status: He is alert and oriented to person, place, and time.      Coordination: Coordination normal.   Psychiatric:         Mood and Affect: Mood normal.         Behavior: Behavior normal.         Thought Content: Thought content normal.         Judgment: Judgment normal.     RECENT LABS:  Lab Results (last 24 hours)       Procedure Component Value Units Date/Time    Magnesium [[id number]]  (Normal) Collected: 12/13/23 0756    Specimen: Blood Updated: 12/13/23 0827     Magnesium 1.6 mg/dL     Phosphorus [[id number]]  (Normal) Collected: 12/13/23 0756    Specimen: Blood Updated: 12/13/23 0827     Phosphorus 3.5 mg/dL     Comprehensive Metabolic Panel [[id number]]  (Abnormal) Collected: 12/13/23 0756    Specimen: Blood Updated: 12/13/23 0827     Glucose 99 mg/dL      BUN 8 mg/dL      Creatinine 0.45 mg/dL      Sodium 142 mmol/L      Potassium 3.1 mmol/L      Chloride 105 mmol/L      CO2 26.0 mmol/L      Calcium 8.1 mg/dL      Total Protein 4.4 g/dL      Albumin 2.3 g/dL      ALT (SGPT) 63 U/L      AST (SGOT) 99 U/L      Alkaline Phosphatase 55 U/L      Total Bilirubin 0.3 mg/dL      Globulin 2.1 gm/dL      A/G Ratio 1.1 g/dL      BUN/Creatinine Ratio 17.8     Anion Gap 11.0 mmol/L      eGFR 118.3 mL/min/1.73     Narrative:      GFR Normal >60  Chronic Kidney Disease <60  Kidney Failure <15      CBC & Differential [[id number]] Collected: 12/13/23 0756    Specimen: Blood Updated: 12/13/23 0803    Narrative:      The following orders were created for panel order CBC & Differential.  Procedure                               Abnormality          Status                     ---------                               -----------         ------                     CBC Auto Differential[[id number]]                            In process                 Scan Slide[[id number]]                                       In process                   Please view results for these tests on the individual orders.    Scan Slide [[id number]] Collected: 12/13/23 0756    Specimen: Blood Updated: 12/13/23 0803    CBC Auto Differential [[id number]] Collected: 12/13/23 0756    Specimen: Blood Updated: 12/13/23 0800    POC Glucose Once [[id number]]  (Abnormal) Collected: 12/13/23 0554    Specimen: Blood Updated: 12/13/23 0556     Glucose 108 mg/dL      Comment: Serial Number: [id number]:  [id number]       Blood Culture - Blood, Arm, Right [[id number]]  (Normal) Collected: 12/08/23 0311    Specimen: Blood from Arm, Right Updated: 12/13/23 0330     Blood Culture No growth at 5 days    Narrative:      Less than seven (7) mL's of blood was collected.  Insufficient quantity may yield false negative results.    Blood Culture - Blood, Arm, Left [[id number]]  (Normal) Collected: 12/08/23 0256    Specimen: Blood from Arm, Left Updated: 12/13/23 0301     Blood Culture No growth at 5 days    POC Glucose Once [[id number]]  (Abnormal) Collected: 12/13/23 0002    Specimen: Blood Updated: 12/13/23 0006     Glucose 126 mg/dL      Comment: Serial Number: [id number]:  [id number]       Potassium [[id number]]  (Abnormal) Collected: 12/12/23 2305    Specimen: Blood Updated: 12/12/23 2329     Potassium 3.1 mmol/L     POC Glucose Once [[id number]]  (Abnormal) Collected: 12/12/23 1757    Specimen: Blood Updated: 12/12/23 1802     Glucose 151 mg/dL      Comment: Serial Number: [id number]:  [id number]       POC Glucose Once [[id number]]  (Abnormal) Collected: 12/12/23 1202    Specimen: Blood Updated: 12/12/23 1204     Glucose 183 mg/dL      Comment: Serial Number: [id number]:  [id number]              PENDING RESULTS: n/a     IMAGING REVIEWED:  XR Abdomen KUB    Result Date: 12/11/2023  Impression: Left upper quadrant dilated small bowel loops are suggestive of the small bowel obstruction. The small bowel dilation appears improved since 12/8/2023. Electronically Signed: Altagracia Maciel MD  12/11/2023 10:48 AM EST  Workstation ID: [id number]    XR Chest PA & Lateral    Result Date: 12/11/2023  Impression: 1. No acute chest finding. 2. Dilated small bowel loops in the upper abdomen. Correlate with recent CT abdomen and pelvis study from 12/10/2023. Electronically Signed: Altagracia Maciel MD  12/11/2023 10:43 AM EST  Workstation ID: [id number]     I have reviewed the patient's labs, imaging, reports, and other clinician documentation.    Assessment & Plan   ASSESSMENT  Stage I CLL: Started acalabrutinib in 8/2023 and S/p 3 cycles obinutuzumab with WBC decreasing well.  GI side effects of acalabrutinib include: Abdominal pain (15%), constipation (15%), diarrhea (18% to 35%; grade ?3: ?3%), nausea (19% to 22%; grade ?3: <1%), vomiting (13%; grade ?3: 2%) with less than 5% incidence of atrial fibrillation.  GI side effects of obinutuzumab include: Constipation (8% to 32%) decreased appetite (14%), diarrhea (monotherapy: ?10%; combination therapy: 10% to 30%; grades 3/4: 2% to 3%). Holding acalabrutinib and obinutuzumab with plan to resume as an OP.   Right lung nodules: These will be followed on repeat imaging.  Iron-deficiency anemia with heme-positive stool:  He was on ferrous sulfate 325 mg by mouth daily and Pepcid as an outpatient.  Stable.  Small bowel obstruction versus enteritis, abdominal pain: Gastroenterology and surgery were consulted. NG tube placed and was removed on 12/12/2023.  No plan for surgical intervention.  On Rocephin and Flagyl. CT A/P showed overall improvement in small bowel obstruction.  Tolerated regular diet.  Suspected septic shock due to enteritis/Hypotension: Off Levophed drip. Blood  cultures show no growth to date.  White count has normalized.   Afebrile.  Paroxysmal atrial fibrillation: New onset, likely sepsis-induced. Echocardiogram showed LVEF 61 - 65%. On Lovenox prophylaxis. Per Primary team.     Electrolyte imbalances: Management per Primary team.        PLAN   Continue IV antibiotics per Primary team.   Continue to hold oral iron replacement.  Continue to hold acalabrutinib and obinutuzumab and resume as an outpatient.   Outpatient CT abdomen and pelvis.    The patient was seen and evaluated by Dr. Fontaine.  Electronically signed by REYES Stokes, 12/13/23, 8:34 AM EST.        On 12/13/2023, I have personally performed a face-to-face diagnostic evaluation on this patient. I have performed a complete history and physical examination, reviewed laboratory studies, and radiographic examinations.  I have completed the majority and substantive portion of the medical decision making.  I have formulated the assessment on this patient and the plan of action as noted above. I have discussed the case with Ya Rahman NP, have edited/reviewed the note, and agree with the care plan.  He is eating a regular diet.  On examination, abdomen is soft with bowel sounds present.  Transaminases have gone up.  Will plan on outpatient follow-up CT abdomen before resuming his chemotherapy.    On 12/13/2023, I discussed the patient's findings and my recommendations with patient.    Part of this note may be an electronic transcription/translation of spoken language to printed text using the Dragon Dictation System.     Electronically signed by Patrick Fontaine MD, 12/13/23, 5:36 PM EST.

## 2023-12-13 NOTE — CASE MANAGEMENT/SOCIAL WORK
Continued Stay Note  CASSIA Nieto     Patient Name: Bharathi Darnell  MRN: [id number]  Today's Date: 12/13/2023    Admit Date: 12/8/2023    Plan: DC home with spouse.   Discharge Plan       Row Name 12/13/23 1324       Plan    Plan DC home with spouse.    Patient/Family in Agreement with Plan yes    Plan Comments No CM needs, ready for dc home.                 Expected Discharge Date and Time       Expected Discharge Date Expected Discharge Time    Dec 13, 2023               MARYA Dyer RN  SIPS/ICU   O: [phone]  C: [phone]  [email or web address]

## 2023-12-13 NOTE — OUTREACH NOTE
Prep Survey      Flowsheet Row Responses   [facility] patient discharged from? [facility]   Is LACE score < 7 ? No   Eligibility [facility]   Date of Admission 12/08/23   Date of Discharge 12/13/23   Discharge Disposition Home or Self Care   Discharge diagnosis Septic shock due to enteritis   Does the patient have one of the following disease processes/diagnoses(primary or secondary)? Sepsis   Does the patient have Home health ordered? No   Is there a DME ordered? No   Prep survey completed? Yes            Varsha CRUZ - Registered Nurse

## 2023-12-14 ENCOUNTER — TRANSITIONAL CARE MANAGEMENT TELEPHONE ENCOUNTER (OUTPATIENT)
Dept: CALL CENTER | Facility: HOSPITAL | Age: 63
End: 2023-12-14
Payer: COMMERCIAL

## 2023-12-14 ENCOUNTER — TELEPHONE (OUTPATIENT)
Dept: FAMILY MEDICINE CLINIC | Facility: CLINIC | Age: 63
End: 2023-12-14
Payer: COMMERCIAL

## 2023-12-14 ENCOUNTER — TELEPHONE (OUTPATIENT)
Dept: DIABETES SERVICES | Facility: HOSPITAL | Age: 63
End: 2023-12-14
Payer: COMMERCIAL

## 2023-12-14 NOTE — CASE MANAGEMENT/SOCIAL WORK
Case Management Discharge Note      Final Note: Home    Provided Post Acute Provider List?: N/A  Provided Post Acute Provider Quality & Resource List?: N/A    Selected Continued Care - Discharged on 12/13/2023 Admission date: 12/8/2023 - Discharge disposition: Home or Self Care         Transportation Services  Private: Car    Final Discharge Disposition Code: 01 - home or self-care    MARYA Dyer RN  SIPS/ICU   O: [phone]  C: [phone]  [email or web address]

## 2023-12-14 NOTE — OUTREACH NOTE
Call Center TCM Note      Flowsheet Row Responses   [facility] facility patient discharged from? [facility]   Does the patient have one of the following disease processes/diagnoses(primary or secondary)? Sepsis   TCM attempt successful? No   Unsuccessful attempts Attempt 1            Carlyn Aguiar RN    12/14/2023, 14:23 EST

## 2023-12-14 NOTE — TELEPHONE ENCOUNTER
Caller: Bharathi Darnell    Relationship: Self    Best call back number: [phone]   Who are you requesting to speak with (clinical staff, provider,  specific staff member): DR. QUIROZ    What was the call regarding: PATIENT WANTED TO LET DR. QUIROZ KNOW THAT HE WAS DISCHARGED FROM THE HOSPITAL. PATIENT WOULD LIKE TO SPEAK WITH HIM TO SEE WHAT HE RECOMMENDS FOR PATIENT TO DO FROM HERE BEFORE SCHEDULING AN APPOINTMENT. PATIENT WOULD ALSO LIKE TO SPEAK WITH DR. QUIROZ REGARDING HIS MEDICATIONS    PLEASE ADVISE   0

## 2023-12-14 NOTE — OUTREACH NOTE
Call Center TCM Note      Flowsheet Row Responses   [facility] facility patient discharged from? [facility]   Does the patient have one of the following disease processes/diagnoses(primary or secondary)? Sepsis   TCM attempt successful? No   Unsuccessful attempts Attempt 2            Carlyn Aguiar RN    12/14/2023, 16:18 EST

## 2023-12-14 NOTE — TELEPHONE ENCOUNTER
Pt states he has picked up test strips and lancets for the One Touch Verio meter and has been checking bs. Pt states has no questions for educator at this time.

## 2023-12-15 ENCOUNTER — TRANSITIONAL CARE MANAGEMENT TELEPHONE ENCOUNTER (OUTPATIENT)
Dept: CALL CENTER | Facility: HOSPITAL | Age: 63
End: 2023-12-15
Payer: COMMERCIAL

## 2023-12-15 NOTE — TELEPHONE ENCOUNTER
He wants to know if he needs to take the medicine before his was in hosptial and the medicine from discharge?   Also he is having balance and low energy, what does he need to do?

## 2023-12-15 NOTE — PROGRESS NOTES
I spoke with Bharathi 12/15-he requested to talk to Dr. Shane's MA because he had been communicating with Dr. Shane.  Dr Shane is out of the office until 12/26.  I'm not sure what advice Marissa could or did give him.  He did not want to schedule a hospital f/u until he talked to Dr. Shane

## 2023-12-15 NOTE — OUTREACH NOTE
Call Center TCM Note      Flowsheet Row Responses   [facility] patient discharged from? [facility]   Does the patient have one of the following disease processes/diagnoses(primary or secondary)? Sepsis   TCM attempt successful? Yes   Call start time 0906   Call end time 0908   Discharge diagnosis Septic shock due to enteritis   Meds reviewed with patient/caregiver? Yes   Is the patient having any side effects they believe may be caused by any medication additions or changes? No   Does the patient have all medications related to this admission filled (includes all antibiotics, inhalers, nebulizers,steroids,etc.) Yes   Is the patient taking all medications as directed (includes completed medication regime)? Yes   Comments Pt states he is waiting for a return call from PCP office re: scheduling a FU apt   Does the patient have an appointment with their PCP within 7-14 days of discharge? Other   Nursing Interventions Routed TCM call to PCP office, PCP office requested to make appointment - message sent   Has home health visited the patient within 72 hours of discharge? N/A   Psychosocial issues? No   Did the patient receive a copy of their discharge instructions? Yes   Nursing interventions Reviewed instructions with patient   What is the patient's perception of their health status since discharge? Improving   Nursing interventions Nurse provided patient education   Is the patient/caregiver able to teach back TIME? T emperature - higher or lower than normal, I nfection - may have signs and symptoms of an infection, E xtremely Ill - severe pain, discomfort, shortness of breath, M ental Decline - confused, sleepy, difficult to arouse   Nursing interventions Nurse provided reassurance to patient   Is patient/caregiver able to teach back steps to recovery at home? Set small, achievable goals for return to baseline health, Rest and regain strength, Talk about feelings with family/friends, Make a list of questions for PCP  appoinment, Exercise as tolerated   Is the patient/caregiver able to teach back signs and symptoms of worsening condition: Fever, Rapid heart rate (>90), Shortness of breath/rapid respiratory rate, Edema, High blood glucose without diabetes   If the patient is a current smoker, are they able to teach back resources for cessation? Not a smoker   Is the patient/caregiver able to teach back the hierarchy of who to call/visit for symptoms/problems? PCP, Specialist, Home health nurse, Urgent Care, ED, 911 Yes   TCM call completed? Yes   Call end time 0908            Belinda Jones RN    12/15/2023, 09:09 EST

## 2023-12-21 ENCOUNTER — TELEPHONE (OUTPATIENT)
Dept: FAMILY MEDICINE CLINIC | Facility: CLINIC | Age: 63
End: 2023-12-21

## 2023-12-21 NOTE — TELEPHONE ENCOUNTER
Caller: Bharathi Darnell    Relationship to patient: Self    Best call back number: [phone]    Where are you experiencing symptoms: ANKLE AND FEET SWELLING    How long have you been experiencing symptoms: 1 WEEK    Have you had any recent surgeries, procedures or injections: [] Yes  [x] No   If yes, explain: PATIENT WAS RELEASED FROM HOSPITAL FOR STOMACH ISSUES    What therapies/medications have you tried: SOAKED    WHEN HE IS NOT STANDING ON THEM THEY GET BETTER.    BHARATHI WOULD LIKE A CALL WITH ADVICE AND TO DISCUSS PHYSICAL THERAPY    If a prescription is needed, what is your preferred pharmacy:   [facility]/pharmacy #[id number] - [place], [place] - [address] [place] [address] - [phone]  - [phone] [address] [place] [address]  Phone: [phone] Fax: [phone]

## 2023-12-26 ENCOUNTER — TRANSCRIBE ORDERS (OUTPATIENT)
Dept: ADMINISTRATIVE | Facility: HOSPITAL | Age: 63
End: 2023-12-26
Payer: COMMERCIAL

## 2023-12-26 ENCOUNTER — HOSPITAL ENCOUNTER (OUTPATIENT)
Dept: CARDIOLOGY | Facility: HOSPITAL | Age: 63
Discharge: HOME | End: 2023-12-26
Admitting: INTERNAL MEDICINE
Payer: COMMERCIAL

## 2023-12-26 ENCOUNTER — OFFICE VISIT (OUTPATIENT)
Dept: FAMILY MEDICINE CLINIC | Facility: CLINIC | Age: 63
End: 2023-12-26
Payer: COMMERCIAL

## 2023-12-26 VITALS
DIASTOLIC BLOOD PRESSURE: 72 MMHG | WEIGHT: 217 LBS | SYSTOLIC BLOOD PRESSURE: 133 MMHG | OXYGEN SATURATION: 98 % | BODY MASS INDEX: 28.76 KG/M2 | HEIGHT: 73 IN | HEART RATE: 66 BPM

## 2023-12-26 DIAGNOSIS — Z00.00 REGULAR CHECK-UP: ICD-10-CM

## 2023-12-26 DIAGNOSIS — R07.89 CHEST DISCOMFORT: ICD-10-CM

## 2023-12-26 DIAGNOSIS — Z00.00 REGULAR CHECK-UP: Primary | ICD-10-CM

## 2023-12-26 DIAGNOSIS — R53.1 WEAKNESS: ICD-10-CM

## 2023-12-26 DIAGNOSIS — R60.9 EDEMA, UNSPECIFIED TYPE: Primary | ICD-10-CM

## 2023-12-26 LAB
QT INTERVAL: 446 MS
QTC INTERVAL: 430 MS

## 2023-12-26 PROCEDURE — 99214 OFFICE O/P EST MOD 30 MIN: CPT | Performed by: NURSE PRACTITIONER

## 2023-12-26 PROCEDURE — 93005 ELECTROCARDIOGRAM TRACING: CPT | Performed by: INTERNAL MEDICINE

## 2023-12-26 RX ORDER — HYDROCHLOROTHIAZIDE 25 MG/1
25 TABLET ORAL DAILY
Qty: 30 TABLET | Refills: 0 | Status: SHIPPED | OUTPATIENT
Start: 2023-12-26

## 2023-12-26 RX ORDER — POLYETHYLENE GLYCOL 3350 17 G/17G
POWDER, FOR SOLUTION ORAL
COMMUNITY
Start: 2023-12-05

## 2023-12-26 RX ORDER — FAMOTIDINE 40 MG/1
1 TABLET, FILM COATED ORAL DAILY
COMMUNITY
Start: 2023-12-19

## 2023-12-26 NOTE — PROGRESS NOTES
"Subjective     Bharathi Darnell is a 63 y.o. male.     History of Present Illness  Pt is here today with c/o BLE edema.   He reports that it started about 2 weeks ago after he was in the hospital getting fluids.   He states the swelling has been in the ankles and feet.  He states they have been heavy and worse when he is on his feet for an extended period.  He has been off his feet more since Thursday and the swelling has improved.   He was in the hospital 2 weeks ago for a bowel obstruction.  He takes amlodipine 10 mg daily.  He has been on this for some time but never has swelling before.     Pt states he occasionally gets CP.  He was recommended to see a cardiologist by his oncologist.  No active CP    He would like some physical therapy due to weakness since being in the hospital.          The following portions of the patient's history were reviewed and updated as appropriate: allergies, current medications, past family history, past medical history, past social history, past surgical history, and problem list.    Review of Systems   Constitutional:  Negative for chills, fatigue and fever.   Respiratory:  Negative for chest tightness and shortness of breath.    Cardiovascular:  Positive for chest pain (occasional) and leg swelling. Negative for palpitations.   Neurological:  Negative for dizziness and headache.       Objective     /72 (BP Location: Left arm, Patient Position: Sitting, Cuff Size: Adult)   Pulse 66   Ht 185.4 cm (73\")   Wt 98.4 kg (217 lb)   SpO2 98%   BMI 28.63 kg/m²     Current Outpatient Medications on File Prior to Visit   Medication Sig Dispense Refill    amLODIPine (NORVASC) 10 MG tablet TAKE 1 TABLET BY MOUTH EVERY DAY 90 tablet 1    Calquence 100 MG tablet Take 1 tablet by mouth 2 (Two) Times a Day.      dapagliflozin Propanediol (Farxiga) 10 MG tablet Take 10 mg by mouth Daily. 30 tablet 5    famotidine (PEPCID) 40 MG tablet Take 1 tablet by mouth Daily.      " HYDROcodone-acetaminophen (Norco) 5-325 MG per tablet Take 1 tablet by mouth Every 6 (Six) Hours As Needed for Moderate Pain. 12 tablet 0    lisinopril (PRINIVIL,ZESTRIL) 40 MG tablet Take 1 tablet by mouth Daily. for blood pressure. 90 tablet 3    ondansetron (ZOFRAN) 4 MG tablet Take 1 tablet by mouth Every 8 (Eight) Hours As Needed for Nausea or Vomiting.      phenol (CHLORASEPTIC) 1.4 % liquid liquid Apply 1 spray to the mouth or throat Every 2 (Two) Hours As Needed (dulce throat). 250 mL 0    polyethylene glycol (MIRALAX) 17 GM/SCOOP powder TAKE 1 CAP WITH ANY LIQUID BY MOUTH TWICE A DAY      Ergocalciferol (VITAMIN D2 PO) Take 1.25 mL by mouth. Takes twice weekly      glucose blood (OneTouch Verio) test strip 1 each by Other route Daily. Dx: E11.65. Use as instructed 50 each 2    Lancets (OneTouch Delica Plus Rtrkov29B) misc Use 1 each Daily. Dx: E11.65 100 each 2    Obinutuzumab (GAZYVA IV) Infuse  into a venous catheter.      [DISCONTINUED] cephalexin (Keflex) 500 MG capsule Take 1 capsule by mouth 4 (Four) Times a Day. 28 capsule 0    [DISCONTINUED] Cholecalciferol (Vitamin D3) 1.25 MG (05546 UT) capsule Take 1 capsule by mouth. Tuesday's and Thursday's.      [DISCONTINUED] metroNIDAZOLE (Flagyl) 500 MG tablet Take 1 tablet by mouth 3 (Three) Times a Day. 21 tablet 0    [DISCONTINUED] omeprazole (priLOSEC) 40 MG capsule Take 1 capsule by mouth Daily.       No current facility-administered medications on file prior to visit.                 Physical Exam  Constitutional:       General: He is not in acute distress.     Appearance: Normal appearance. He is not ill-appearing.   HENT:      Head: Normocephalic and atraumatic.   Cardiovascular:      Rate and Rhythm: Normal rate and regular rhythm.      Heart sounds: No murmur heard.  Pulmonary:      Effort: Pulmonary effort is normal. No respiratory distress.      Breath sounds: Normal breath sounds.   Musculoskeletal:      Right lower leg: Edema present.      Left  lower leg: Edema present.   Skin:     General: Skin is warm and dry.   Neurological:      General: No focal deficit present.      Mental Status: He is alert and oriented to person, place, and time.   Psychiatric:         Mood and Affect: Mood normal.         Behavior: Behavior normal.         Thought Content: Thought content normal.         Judgment: Judgment normal.           Assessment & Plan     Diagnoses and all orders for this visit:    1. Edema, unspecified type (Primary)  Comments:  +1-2 edema BLE  start Hctz 25mg daily  monitor BMP on Friday  sees PCP in 2-3 wks  Orders:  -     hydroCHLOROthiazide (HYDRODIURIL) 25 MG tablet; Take 1 tablet by mouth Daily.  Dispense: 30 tablet; Refill: 0  -     Basic metabolic panel; Future    2. Chest discomfort  Comments:  comes and goes  will refer to cardio  no actiev chest pain  Orders:  -     Ambulatory Referral to Cardiology    3. Weakness  -     Ambulatory Referral to Physical Therapy Evaluate and treat

## 2023-12-29 ENCOUNTER — LAB (OUTPATIENT)
Dept: FAMILY MEDICINE CLINIC | Facility: CLINIC | Age: 63
End: 2023-12-29
Payer: COMMERCIAL

## 2023-12-29 DIAGNOSIS — R60.9 EDEMA, UNSPECIFIED TYPE: ICD-10-CM

## 2023-12-29 LAB
ANION GAP SERPL CALCULATED.3IONS-SCNC: 12 MMOL/L (ref 5–15)
BUN SERPL-MCNC: 11 MG/DL (ref 8–23)
BUN/CREAT SERPL: 17.2 (ref 7–25)
CALCIUM SPEC-SCNC: 8.9 MG/DL (ref 8.6–10.5)
CHLORIDE SERPL-SCNC: 103 MMOL/L (ref 98–107)
CO2 SERPL-SCNC: 29 MMOL/L (ref 22–29)
CREAT SERPL-MCNC: 0.64 MG/DL (ref 0.76–1.27)
EGFRCR SERPLBLD CKD-EPI 2021: 106.4 ML/MIN/1.73
GLUCOSE SERPL-MCNC: 134 MG/DL (ref 65–99)
POTASSIUM SERPL-SCNC: 3.2 MMOL/L (ref 3.5–5.2)
SODIUM SERPL-SCNC: 144 MMOL/L (ref 136–145)

## 2023-12-29 PROCEDURE — 80048 BASIC METABOLIC PNL TOTAL CA: CPT | Performed by: NURSE PRACTITIONER

## 2023-12-29 PROCEDURE — 36415 COLL VENOUS BLD VENIPUNCTURE: CPT

## 2024-01-01 DIAGNOSIS — E87.6 HYPOKALEMIA: Primary | ICD-10-CM

## 2024-01-01 RX ORDER — POTASSIUM CHLORIDE 20 MEQ/1
20 TABLET, EXTENDED RELEASE ORAL DAILY
Qty: 30 TABLET | Refills: 2 | Status: SHIPPED | OUTPATIENT
Start: 2024-01-01

## 2024-01-03 ENCOUNTER — OFFICE VISIT (OUTPATIENT)
Dept: CARDIOLOGY | Facility: CLINIC | Age: 64
End: 2024-01-03
Payer: COMMERCIAL

## 2024-01-03 VITALS
HEIGHT: 73 IN | BODY MASS INDEX: 28.36 KG/M2 | DIASTOLIC BLOOD PRESSURE: 71 MMHG | WEIGHT: 214 LBS | OXYGEN SATURATION: 99 % | HEART RATE: 62 BPM | SYSTOLIC BLOOD PRESSURE: 121 MMHG

## 2024-01-03 DIAGNOSIS — E78.5 DYSLIPIDEMIA: ICD-10-CM

## 2024-01-03 DIAGNOSIS — I10 ESSENTIAL HYPERTENSION: ICD-10-CM

## 2024-01-03 DIAGNOSIS — R07.2 PRECORDIAL PAIN: Primary | ICD-10-CM

## 2024-01-03 DIAGNOSIS — E11.65 TYPE 2 DIABETES MELLITUS WITH HYPERGLYCEMIA, WITHOUT LONG-TERM CURRENT USE OF INSULIN: ICD-10-CM

## 2024-01-03 DIAGNOSIS — R00.1 BRADYCARDIA: ICD-10-CM

## 2024-01-03 PROCEDURE — 99204 OFFICE O/P NEW MOD 45 MIN: CPT | Performed by: INTERNAL MEDICINE

## 2024-01-03 NOTE — PROGRESS NOTES
Cardiology Consult Note    Patient Identification:  Name: Bharathi Vizcarra  Age: 63 y.o.  Sex: male  :  1960  MRN: [id number]             Requesting Physician :  Jamar Pham MD     Reason for Consultation / Chief Complaint :   Chest pain    History of Present Illness:      Ms. Bharathi Vizcarra has PMH of    Hypertension  Hyperlipidemia  Diabetes  CLL    Here for evaluation of chest pain.  No aggravating or relieving factors.  Went to the ER 2023 with persistent vomiting dehydration and small bowel obstruction and BETTIE.  Was diagnosed with septic shock due to enteritis.  Had an episode of chest pain..    Patient's arterial blood pressure is 121/71, heart rate 62, O2 sat of 99% on room air.    Labs from 2023 reveal BMP with a glucose of 134, potassium 3.2.  Labs from 2023 reveal CMP with a potassium of 3.1, albumin 2.3, ALT 63, AST 99.  Labs from 2023 reveal HS troponin x 2 are negative.  Last A1c 10/19/2023 was 8.3 and lipid profile with cholesterol of 166, triglycerides 75, HDL 49, ..    EKG 2023 reviewed/interpreted by me reveals sinus bradycardia with rate of 56 bpm.    Echocardiogram 2023 reveals normal LV systolic function EF of 61 to 65% with concentric LVH and moderate left atrial enlargement.      Assessment:  :    Chest pain  Bradycardia  Hypertension  Dyslipidemia  Diabetes  Elevated LFTs      Recommendations / Plan:        Reviewed EKG and echo results with patient  Patient's likelihood of CAD is moderate to high, will schedule a stress test to evaluate for ischemia.  Will monitor bradycardia which is asymptomatic at the current time.  Follow-up with PMD for diabetes and dyslipidemia and elevated LFTs.  Blood pressure is well-controlled on the current medication.    The 10-year ASCVD risk score (Jocelyn DELACRUZ, et al., 2019) is: 23.2%    Values used to calculate the score:      Age: 63 years      Sex: Male      Is Non- :  Yes      Diabetic: Yes      Tobacco smoker: No      Systolic Blood Pressure: 121 mmHg      Is BP treated: Yes      HDL Cholesterol: 49 mg/dL      Total Cholesterol: 166 mg/dL                Diagnosis Plan   1. Precordial pain  Stress Test With Myocardial Perfusion One Day      2. Bradycardia  Stress Test With Myocardial Perfusion One Day      3. Essential hypertension  Stress Test With Myocardial Perfusion One Day      4. Type 2 diabetes mellitus with hyperglycemia, without long-term current use of insulin  Stress Test With Myocardial Perfusion One Day      5. Dyslipidemia  Stress Test With Myocardial Perfusion One Day                 Past Medical History:  Past Medical History:   Diagnosis Date    CLL (chronic lymphocytic leukemia)     2022    DM (diabetes mellitus), type 2     Erectile dysfunction     Hyperlipidemia     Hypertension      Past Surgical History:  Past Surgical History:   Procedure Laterality Date    COLONOSCOPY      2019      Allergies:  Allergies   Allergen Reactions    Bactrim [Sulfamethoxazole-Trimethoprim] Rash     Home Meds:  (Not in a hospital admission)    Current Meds:     Current Outpatient Medications:     amLODIPine (NORVASC) 10 MG tablet, TAKE 1 TABLET BY MOUTH EVERY DAY, Disp: 90 tablet, Rfl: 1    Calquence 100 MG tablet, Take 1 tablet by mouth 2 (Two) Times a Day., Disp: , Rfl:     dapagliflozin Propanediol (Farxiga) 10 MG tablet, Take 10 mg by mouth Daily., Disp: 30 tablet, Rfl: 5    Ergocalciferol (VITAMIN D2 PO), Take 1.25 mL by mouth. Takes twice weekly, Disp: , Rfl:     famotidine (PEPCID) 40 MG tablet, Take 1 tablet by mouth Daily., Disp: , Rfl:     glucose blood (OneTouch Verio) test strip, 1 each by Other route Daily. Dx: E11.65. Use as instructed, Disp: 50 each, Rfl: 2    hydroCHLOROthiazide (HYDRODIURIL) 25 MG tablet, Take 1 tablet by mouth Daily. (Patient taking differently: Take 1 tablet by mouth Daily. AS NEEDED), Disp: 30 tablet, Rfl: 0    HYDROcodone-acetaminophen  "(Norco) 5-325 MG per tablet, Take 1 tablet by mouth Every 6 (Six) Hours As Needed for Moderate Pain., Disp: 12 tablet, Rfl: 0    Lancets (OneTouch Delica Plus [id number]) misc, Use 1 each Daily. Dx: E11.65, Disp: 100 each, Rfl: 2    lisinopril (PRINIVIL,ZESTRIL) 40 MG tablet, Take 1 tablet by mouth Daily. for blood pressure., Disp: 90 tablet, Rfl: 3    Obinutuzumab (GAZYVA IV), Infuse  into a venous catheter., Disp: , Rfl:     ondansetron (ZOFRAN) 4 MG tablet, Take 1 tablet by mouth Every 8 (Eight) Hours As Needed for Nausea or Vomiting., Disp: , Rfl:     phenol (CHLORASEPTIC) 1.4 % liquid liquid, Apply 1 spray to the mouth or throat Every 2 (Two) Hours As Needed (dulce throat)., Disp: 250 mL, Rfl: 0    polyethylene glycol (MIRALAX) 17 GM/SCOOP powder, TAKE 1 CAP WITH ANY LIQUID BY MOUTH TWICE A DAY, Disp: , Rfl:     potassium chloride (K-DUR,KLOR-CON) 20 MEQ CR tablet, Take 1 tablet by mouth Daily., Disp: 30 tablet, Rfl: 2  Social History:   Social History     Tobacco Use    Smoking status: Never     Passive exposure: Never    Smokeless tobacco: Never   Substance Use Topics    Alcohol use: Not Currently      Family History:  Family History   Problem Relation Age of Onset    Diabetes Mother     Hyperlipidemia Mother         Review of Systems : Review of Systems   Constitutional: Negative for malaise/fatigue.   Cardiovascular:  Negative for chest pain, dyspnea on exertion, leg swelling and palpitations.   Respiratory:  Negative for cough and shortness of breath.    Gastrointestinal:  Negative for abdominal pain, nausea and vomiting.   Neurological:  Negative for dizziness, focal weakness, headaches, light-headedness and numbness.   All other systems reviewed and are negative.               Constitutional:  Heart Rate:  [62] 62  BP: (121)/(71) 121/71    Physical Exam   /71 (BP Location: Left arm, Patient Position: Sitting, Cuff Size: Large Adult)   Pulse 62   Ht 185.4 cm (73\")   Wt 97.1 kg (214 lb)   SpO2 99%  "  BMI 28.23 kg/m²   Physical Exam  General:  Appears in no acute distress  Eyes: Sclerae are anicteric,  conjunctivae are clear   HEENT:  No JVD. Thyroid not visibly enlarged. No mucosal pallor or cyanosis  Respiratory: Respirations regular and unlabored at rest.  Bilaterally good breath sounds with good air entry in all fields. No crackles, rubs or wheezes auscultated  Cardiovascular: S1,S2 Regular rate and rhythm. No murmur, rub or gallop auscultated. No pretibial pitting edema  Gastrointestinal: Abdomen soft, flat, nontender. Bowel sounds present.   Musculoskeletal:  No abnormal movements  Extremities: No digital clubbing or cyanosis  Skin: Color pink. Skin warm and dry to touch. No rashes  No xanthoma  Neuro: Alert and awake, no lateralizing deficits appreciated    Cardiographics  ECG: EKG tracing was  personally reviewed/interpreted by me  No orders to display       Telemetry:     Echocardiogram:   Results for orders placed during the hospital encounter of 12/08/23    Adult Transthoracic Echo Complete W/ Cont if Necessary Per Protocol    Interpretation Summary    Left ventricular systolic function is normal. Calculated left ventricular EF = 61.3% Left ventricular ejection fraction appears to be 61 - 65%.    Left ventricular wall thickness is consistent with mild concentric hypertrophy.    The left atrial cavity is mild to moderately dilated.    Estimated right ventricular systolic pressure from tricuspid regurgitation is normal (<35 mmHg).      Imaging  Chest X-ray:   Imaging Results (Last 24 Hours)       ** No results found for the last 24 hours. **            Lab Review: I have reviewed the labs          Results from last 7 days   Lab Units 12/29/23  0948   SODIUM mmol/L 144   POTASSIUM mmol/L 3.2*   BUN mg/dL 11   CREATININE mg/dL 0.64*   CALCIUM mg/dL 8.9                             Primo Silva MD  1/3/2024, 10:38 EST      EMR Dragon/Transcription:   Dictated utilizing Dragon dictation

## 2024-01-04 ENCOUNTER — PATIENT ROUNDING (BHMG ONLY) (OUTPATIENT)
Dept: CARDIOLOGY | Facility: CLINIC | Age: 64
End: 2024-01-04
Payer: COMMERCIAL

## 2024-01-09 ENCOUNTER — TREATMENT (OUTPATIENT)
Dept: PHYSICAL THERAPY | Facility: CLINIC | Age: 64
End: 2024-01-09
Payer: COMMERCIAL

## 2024-01-09 DIAGNOSIS — R53.1 WEAKNESS: Primary | ICD-10-CM

## 2024-01-09 PROCEDURE — 97530 THERAPEUTIC ACTIVITIES: CPT | Performed by: PHYSICAL THERAPIST

## 2024-01-09 PROCEDURE — 97110 THERAPEUTIC EXERCISES: CPT | Performed by: PHYSICAL THERAPIST

## 2024-01-09 PROCEDURE — 97161 PT EVAL LOW COMPLEX 20 MIN: CPT | Performed by: PHYSICAL THERAPIST

## 2024-01-09 PROCEDURE — 97535 SELF CARE MNGMENT TRAINING: CPT | Performed by: PHYSICAL THERAPIST

## 2024-01-09 NOTE — PROGRESS NOTES
Physical Therapy Initial Evaluation and Plan of Care        [address], [address] [place] [address]     Patient: Bharathi Darnell   : 1960  Diagnosis/ICD-10 Code:  Weakness [R53.1]  Referring practitioner: REYES Wesley  Date of Initial Visit: 2024  Today's Date: 2024  Patient seen for 1 sessions           Subjective Questionnaire: ABC: 79% confidence      Subjective Evaluation    History of Present Illness  Mechanism of injury: Pt presents to OP PT with generalized weakness.  He has not been able to run and play basketball for about a year since his cancer (CLL) diagnosis.  He has been taking chemo pills and getting and was admitted to the ER 3 weeks ago for stomach pains and dehydration.  He went from weighing 225 to 190 lbs in a short amount time since this.  He was in the hospital for a little more than a week, but the breakdown of his system was over the periods of 2 weeks and he was discharged form the hospital on 2023 and notices he is weak and was unable to walk from the bedroom to the bathroom due to weakness.  He has difficulty lifting a 12 pack of water and has lost feeling in his B hands and L foot.  He had swollen ankles from the fluids that he received due to his dehydration.  He is able ot descend stairs now using the railings.        Patient Occupation:  Pain  Current pain ratin  At best pain ratin  At worst pain ratin  Location: back off and on sometime, hands/foot  Quality: tingling in L foot and hands somtimes; stiffness.  Relieving factors: change in position and rest (sitting)  Aggravating factors: movement, sleeping, ambulation, stairs, standing and lifting  Progression: no change    Social Support  Lives in: multiple-level home  Lives with: alone    Hand dominance: right    Treatments  Current treatment: physical therapy  Patient Goals  Patient goals for therapy: increased strength, decreased edema, decreased pain,  improved balance, increased motion, independence with ADLs/IADLs and return to sport/leisure activities  Patient goal: play basketball and run         Precautions: DMII, Chronic Lymphocytic Leukemia (Dx 2022) with chemotherapy pills and injections    Objective          Strength/Myotome Testing     Left Hip   Planes of Motion   Flexion: 4-  Abduction: 4+  Adduction: 4+    Right Hip   Planes of Motion   Flexion: 4  Abduction: 4+  Adduction: 4+    Left Knee   Flexion: 4+  Extension: 4+    Right Knee   Flexion: 4+  Extension: 4+    Left Ankle/Foot   Dorsiflexion: 4  Inversion: 4+  Eversion: 4+    Right Ankle/Foot   Dorsiflexion: 4  Inversion: 4+  Eversion: 4+    Functional Assessment     Comments  5 time sit to stand = 25 seconds using UE to push from thighs    TUG = 11 seconds    Able to walk on toes and heels      See Exercise, Manual, and Modality Logs for complete treatment.     Assessment & Plan       Assessment  Impairments: abnormal gait, abnormal muscle tone, abnormal or restricted ROM, impaired balance, impaired physical strength and lacks appropriate home exercise program   Functional limitations: lifting, sleeping, walking, standing, reaching overhead and unable to perform repetitive tasks   Assessment details: The patient is a 63 y.o. male who presents to physical therapy today for weakness. Upon initial evaluation, the patient demonstrates the following impairments: strength impairments in hips and tightness in muscles. Due to these impairments, the patient is unable to walk, stand for prolonged periods, climb stairs. The patient would benefit from skilled PT services to address functional limitations and impairments and to improve patient quality of life.        Goals  Plan Goals: STGs (4 weeks):  1.  Pt will tolerate initial HEP.  2.  Pt will demonstrate improved 5 time sit to stand (less than 25 seconds)  3.  Pt will demonstrate increased hip flexion strength B.    LTGs (12 weeks):  1.  Pt will be I with  HEP  2.  Pt will perform 5 time sit to stand without UE use in decreased time.    3.   Pt will demonstrate 5/5 B hip flexion strength.      Plan  Therapy options: will be seen for skilled therapy services  Planned therapy interventions: body mechanics training, neuromuscular re-education, postural training, strengthening, stretching, therapeutic activities, flexibility, functional ROM exercises, gait training and home exercise program  Frequency: 2x week  Duration in weeks: 12  Treatment plan discussed with: patient        Visit Diagnoses:    ICD-10-CM ICD-9-CM   1. Weakness  R53.1 780.79       History # of Personal Factors and/or Comorbidities: MODERATE (1-2)  Examination of Body System(s): # of elements: MODERATE (3)  Clinical Presentation: STABLE   Clinical Decision Making: LOW       Timed:         Manual Therapy:         mins  22171;     Therapeutic Exercise:    8     mins  04143;     Neuromuscular Rodger:        mins  38847;    Therapeutic Activity:    8      mins  09110;     Gait Training:           mins  58878;     Ultrasound:          mins  11188;    Ionto                                   mins   99730  Self Care                       8     mins   52838  Canalith Repos         mins 27670      Un-Timed:  Electrical Stimulation:         mins  31230 ( );  Dry Needling          mins self-pay  Traction          mins 26969  Low Eval     30     Mins  17077  Mod Eval          Mins  56212  High Eval                            Mins  14510  Re-Eval                               mins  66875    Timed Treatment:   24   mins   Total Treatment:     54   mins    PT SIGNATURE: Glenis Holguin PT         Initial Certification  Certification Period: 1/9/2024 thru 4/8/2024  I certify that the therapy services are furnished while this patient is under my care.  The services outlined above are required by this patient, and will be reviewed every 90 days.     PHYSICIAN: Cecy Chavez, REYES      DATE:     Please sign and  return via fax to [phone].. Thank you, [facility].

## 2024-01-15 ENCOUNTER — OFFICE VISIT (OUTPATIENT)
Dept: FAMILY MEDICINE CLINIC | Facility: CLINIC | Age: 64
End: 2024-01-15
Payer: COMMERCIAL

## 2024-01-15 VITALS
OXYGEN SATURATION: 98 % | HEART RATE: 70 BPM | WEIGHT: 208.8 LBS | TEMPERATURE: 97.6 F | SYSTOLIC BLOOD PRESSURE: 136 MMHG | BODY MASS INDEX: 27.55 KG/M2 | DIASTOLIC BLOOD PRESSURE: 76 MMHG

## 2024-01-15 DIAGNOSIS — E11.42 TYPE 2 DIABETES MELLITUS WITH DIABETIC POLYNEUROPATHY, WITHOUT LONG-TERM CURRENT USE OF INSULIN: Primary | ICD-10-CM

## 2024-01-15 DIAGNOSIS — C91.10 CLL (CHRONIC LYMPHOCYTIC LEUKEMIA): ICD-10-CM

## 2024-01-15 PROCEDURE — 99213 OFFICE O/P EST LOW 20 MIN: CPT | Performed by: FAMILY MEDICINE

## 2024-01-15 RX ORDER — CHOLECALCIFEROL (VITAMIN D3) 125 MCG
TABLET ORAL
Qty: 24 TABLET | Refills: 1 | Status: SHIPPED | OUTPATIENT
Start: 2024-01-15 | End: 2024-01-18 | Stop reason: ALTCHOICE

## 2024-01-15 NOTE — PROGRESS NOTES
Subjective   Bharathi Darnell is a 63 y.o. male.     History of Present Illness  Bharathi Darnell is in for follow up on his diabetes.  Since our last visit he was hospitalized..  He has CLL and he was hospitalized for small bowel obstruction that resulted in dehydration.  There is no history of chest pain or dyspnea of late. There is no history of issue with bowel or bladder function. There is no history of vision or hearing problems. There is no history of dizziness or lightheadedness. There is no history of issue with sleep or mood. As for checking blood sugar readings, he has not had any readings higher than 150.. There is no issue with medication compliance. Diet and exercise compliance has been improving since the resolution of the small bowel obstruction issue.  He has an appointment later this week with a new oncologist, as his previous oncologist has left practice.  He also has an appointment next week for a stress test with a new cardiologist.  He was scheduled to have foot surgery later this week but he is leaning toward rescheduling.  He says the foot is no longer bothering him as badly as it was..    Diabetes  Associated symptoms include fatigue. Pertinent negatives for diabetes include no chest pain.          /76 (BP Location: Left arm, Patient Position: Sitting, Cuff Size: Large Adult)   Pulse 70   Temp 97.6 °F (36.4 °C) (Temporal)   Wt 94.7 kg (208 lb 12.8 oz)   SpO2 98%   BMI 27.55 kg/m²       Chief Complaint   Patient presents with    Diabetes     3 month f/u - scheduled this Friday for foot surgery            Current Outpatient Medications:     amLODIPine (NORVASC) 10 MG tablet, TAKE 1 TABLET BY MOUTH EVERY DAY, Disp: 90 tablet, Rfl: 1    Calquence 100 MG tablet, Take 1 tablet by mouth 2 (Two) Times a Day., Disp: , Rfl:     dapagliflozin Propanediol (Farxiga) 10 MG tablet, Take 10 mg by mouth Daily., Disp: 30 tablet, Rfl: 5    Ergocalciferol (Vitamin D2) 50 MCG (2000 UT) tablet,  Take 1 po twice weekly, Disp: 24 tablet, Rfl: 1    famotidine (PEPCID) 40 MG tablet, Take 1 tablet by mouth Daily., Disp: , Rfl:     glucose blood (OneTouch Verio) test strip, 1 each by Other route Daily. Dx: E11.65. Use as instructed, Disp: 50 each, Rfl: 2    hydroCHLOROthiazide (HYDRODIURIL) 25 MG tablet, Take 1 tablet by mouth Daily. (Patient taking differently: Take 1 tablet by mouth Daily. AS NEEDED), Disp: 30 tablet, Rfl: 0    HYDROcodone-acetaminophen (Norco) 5-325 MG per tablet, Take 1 tablet by mouth Every 6 (Six) Hours As Needed for Moderate Pain., Disp: 12 tablet, Rfl: 0    Lancets (OneTouch Delica Plus [id number]) misc, Use 1 each Daily. Dx: E11.65, Disp: 100 each, Rfl: 2    lisinopril (PRINIVIL,ZESTRIL) 40 MG tablet, Take 1 tablet by mouth Daily. for blood pressure., Disp: 90 tablet, Rfl: 3    Obinutuzumab (GAZYVA IV), Infuse  into a venous catheter., Disp: , Rfl:     ondansetron (ZOFRAN) 4 MG tablet, Take 1 tablet by mouth Every 8 (Eight) Hours As Needed for Nausea or Vomiting., Disp: , Rfl:     phenol (CHLORASEPTIC) 1.4 % liquid liquid, Apply 1 spray to the mouth or throat Every 2 (Two) Hours As Needed (dulce throat)., Disp: 250 mL, Rfl: 0    polyethylene glycol (MIRALAX) 17 GM/SCOOP powder, TAKE 1 CAP WITH ANY LIQUID BY MOUTH TWICE A DAY, Disp: , Rfl:     potassium chloride (K-DUR,KLOR-CON) 20 MEQ CR tablet, Take 1 tablet by mouth Daily., Disp: 30 tablet, Rfl: 2    Lab Results   Component Value Date    HGBA1C 8.30 (H) 10/19/2023    HGBA1C 7.60 (H) 05/17/2023    HGBA1C 6.9 (H) 02/14/2023     Lab Results   Component Value Date    MICROALBUR 5.5 10/19/2023    CREATININE 0.64 (L) 12/29/2023         Wt Readings from Last 3 Encounters:   01/15/24 94.7 kg (208 lb 12.8 oz)   01/03/24 97.1 kg (214 lb)   12/26/23 98.4 kg (217 lb)       The following portions of the patient's history were reviewed and updated as appropriate: allergies, current medications, past family history, past medical history, past social  history, past surgical history, and problem list.    Review of Systems   Constitutional:  Positive for diaphoresis and fatigue. Negative for activity change and fever.   HENT:  Negative for congestion, sinus pressure, sinus pain, sore throat and trouble swallowing.    Eyes:  Negative for visual disturbance.   Respiratory:  Negative for chest tightness, shortness of breath and wheezing.    Cardiovascular:  Negative for chest pain.   Gastrointestinal:  Negative for abdominal distention, abdominal pain, constipation, diarrhea, nausea and vomiting.   Genitourinary:  Negative for difficulty urinating and dysuria.   Musculoskeletal:  Negative for back pain and neck pain.   Psychiatric/Behavioral:  Negative for agitation, hallucinations and suicidal ideas.        Objective   Physical Exam  Vitals and nursing note reviewed.   Constitutional:       Appearance: Normal appearance.   HENT:      Right Ear: Tympanic membrane and ear canal normal.      Left Ear: Tympanic membrane and ear canal normal.   Cardiovascular:      Rate and Rhythm: Normal rate and regular rhythm.      Heart sounds: Normal heart sounds. No murmur heard.  Pulmonary:      Effort: Pulmonary effort is normal.      Breath sounds: No wheezing or rales.   Abdominal:      General: Bowel sounds are normal.      Palpations: Abdomen is soft.      Tenderness: There is no abdominal tenderness. There is no guarding.   Musculoskeletal:      Cervical back: Neck supple.      Right lower leg: No edema.      Left lower leg: No edema.   Lymphadenopathy:      Cervical: No cervical adenopathy.   Neurological:      General: No focal deficit present.      Mental Status: He is alert and oriented to person, place, and time.   Psychiatric:         Mood and Affect: Mood normal.           Assessment & Plan   Problems Addressed this Visit          Endocrine and Metabolic    Type 2 diabetes mellitus - Primary       Hematology and Neoplasia    CLL (chronic lymphocytic leukemia)      Diagnoses         Codes Comments    Type 2 diabetes mellitus with diabetic polyneuropathy, without long-term current use of insulin    -  Primary ICD-10-CM: E11.42  ICD-9-CM: 250.60, 357.2     CLL (chronic lymphocytic leukemia)     ICD-10-CM: C91.10  ICD-9-CM: 204.10           I do plan to order some labs that can be drawn at his oncology appointment  I plan to see him back in the spring for a follow-up  I asked him to contact me after he sees oncology to let me know if he has any lingering questions or concerns

## 2024-01-16 ENCOUNTER — TREATMENT (OUTPATIENT)
Dept: PHYSICAL THERAPY | Facility: CLINIC | Age: 64
End: 2024-01-16
Payer: COMMERCIAL

## 2024-01-16 DIAGNOSIS — R53.1 WEAKNESS: Primary | ICD-10-CM

## 2024-01-16 PROCEDURE — 97110 THERAPEUTIC EXERCISES: CPT | Performed by: PHYSICAL THERAPIST

## 2024-01-16 PROCEDURE — 97530 THERAPEUTIC ACTIVITIES: CPT | Performed by: PHYSICAL THERAPIST

## 2024-01-16 NOTE — PROGRESS NOTES
Physical Therapy Daily Treatment Note    Patient: Bharathi Darnell  : 1960  Referring practitioner: REYES Wesley  Today's Date: 2024    VISIT#: 2      Subjective   Bharathi Darnell reports: Doing ok, felt ok after last session, leg muscles maybe a little sore, but not overly fatigued.       Objective     See Exercise, Manual, and Modality Logs for complete treatment.     Patient Education: continue HEP    Assessment/Plan  Good response to session, progressed to Nustep and functional strengtheing exercises. No pain throughout, just some muscle fatigue reported. Instructed to monitor response to today's session and report back to therapist next visit.     Progress per Plan of Care            Timed:         Manual Therapy:         mins  44675;     Therapeutic Exercise:    23     mins  87665;     Neuromuscular Rodger:        mins  42580;    Therapeutic Activity:     10     mins  38979;     Gait Training:           mins  19243;     Ultrasound:          mins  69248;    Ionto:                                   mins   04185  Self Care:                            mins   62700    Un-Timed:  Electrical Stimulation:         mins  99431 ( );  Dry Needling          mins self-pay  Traction          mins 03274  Re-Eval                               mins  94986    Timed Treatment:   33   mins   Total Treatment:     33   mins    Shanti Arriaga, PT  Physical Therapist  [place] PT license #: [id number]  [place] PT license #: [id number]

## 2024-01-17 NOTE — PROGRESS NOTES
HEMATOLOGY ONCOLOGY OUTPATIENT CONSULTATION       Patient name: Bharathi Darnell  : 1960  MRN: [id number]  Primary Care Physician: Jamar Pham MD  Referring Physician: No ref. provider found  Reason For Consult: Chronic lymphocytic leukemia.    History of Present Illness:  Patient is a 63 y.o.     2024: In the office for the first time to transfer his care. Sometime in  he felt a preauricular nodule that seemed to come and go for some time. Later he found another similar lesion in the submental area. He sough attention from his otolaryngologist and he had a biopsy of the submental lesion. The final report of pathology was of lymphocytic leukemia/small lymphocytic lymphoma with 90% of the the cells being positive for CD19/dim CD20, positive CD52 and CD23. The cells were lambda light chain restricted. He had lymphocytosis in the peripheral blood. A bone marrow biopsy and aspiration showed the same with a mixed pattern of involvement. 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 He had a PET scan that not surprisingly revealed no significant abnormalities. He was seen at the [facility], where he was not felt to have an indication for treatment. However, with a slowly increasing lymphocyte count and some enlargement of lymph nodes on CT, a decision was made to start treatment with obinutuzumab and acalabrutinib. He received the first cycle in 2023. He completed 4 cycles and shortly after the last cycle, in early 2023,  he presented to the hospital with abdominal pain. He was found to have evidence of small bowel obstruction and he was admitted. He was not felt to require surgical intervention and his symptoms eventually resolved. He had atrial fibrillation that also had resolved at the time of the discharge. Today he feels much better but has  continued to be weak and is still having physical therapy. He has been eating better and has regained a small amount of the more than 20 lbs of weight he had lost. He has been free of chest pain. No abdominal pain or diarrhea and no dysuria. On exam no palpable adenopathy or hepato-splenomegaly. The laboratory exams reported a completely normal blood count. A decision was made to continue observation and to follow closely. I asked him to continue with the physical therapy. I asked him to come see me in approximately 4 weeks.     Subjective:  1/18/2024 as documented above.     Past Medical History:   Diagnosis Date    CLL (chronic lymphocytic leukemia)     2022    DM (diabetes mellitus), type 2     Erectile dysfunction     Hyperlipidemia     Hypertension      Past Surgical History:   Procedure Laterality Date    COLONOSCOPY      2019       Current Outpatient Medications:     amLODIPine (NORVASC) 10 MG tablet, TAKE 1 TABLET BY MOUTH EVERY DAY, Disp: 90 tablet, Rfl: 1    Calquence 100 MG tablet, Take 1 tablet by mouth 2 (Two) Times a Day., Disp: , Rfl:     dapagliflozin Propanediol (Farxiga) 10 MG tablet, Take 10 mg by mouth Daily., Disp: 30 tablet, Rfl: 5    Ergocalciferol (Vitamin D2) 50 MCG (2000 UT) tablet, Take 1 po twice weekly, Disp: 24 tablet, Rfl: 1    famotidine (PEPCID) 40 MG tablet, Take 1 tablet by mouth Daily., Disp: , Rfl:     glucose blood (OneTouch Verio) test strip, 1 each by Other route Daily. Dx: E11.65. Use as instructed, Disp: 50 each, Rfl: 2    hydroCHLOROthiazide (HYDRODIURIL) 25 MG tablet, Take 1 tablet by mouth Daily. (Patient taking differently: Take 1 tablet by mouth Daily. AS NEEDED), Disp: 30 tablet, Rfl: 0    HYDROcodone-acetaminophen (Norco) 5-325 MG per tablet, Take 1 tablet by mouth Every 6 (Six) Hours As Needed for Moderate Pain., Disp: 12 tablet, Rfl: 0    Lancets (OneTouch Delica Plus [id number]) misc, Use 1 each Daily. Dx: E11.65, Disp: 100 each, Rfl: 2    lisinopril  (PRINIVIL,ZESTRIL) 40 MG tablet, Take 1 tablet by mouth Daily. for blood pressure., Disp: 90 tablet, Rfl: 3    Obinutuzumab (GAZYVA IV), Infuse  into a venous catheter., Disp: , Rfl:     ondansetron (ZOFRAN) 4 MG tablet, Take 1 tablet by mouth Every 8 (Eight) Hours As Needed for Nausea or Vomiting., Disp: , Rfl:     phenol (CHLORASEPTIC) 1.4 % liquid liquid, Apply 1 spray to the mouth or throat Every 2 (Two) Hours As Needed (dulce throat)., Disp: 250 mL, Rfl: 0    polyethylene glycol (MIRALAX) 17 GM/SCOOP powder, TAKE 1 CAP WITH ANY LIQUID BY MOUTH TWICE A DAY, Disp: , Rfl:     potassium chloride (K-DUR,KLOR-CON) 20 MEQ CR tablet, Take 1 tablet by mouth Daily., Disp: 30 tablet, Rfl: 2    Allergies   Allergen Reactions    Bactrim [Sulfamethoxazole-Trimethoprim] Rash     Family History   Problem Relation Age of Onset    Diabetes Mother     Hyperlipidemia Mother      Cancer-related family history is not on file.    Social History     Tobacco Use    Smoking status: Never     Passive exposure: Never    Smokeless tobacco: Never   Vaping Use    Vaping Use: Never used   Substance Use Topics    Alcohol use: Not Currently    Drug use: Never     Social History     Social History Narrative    Not on file     ROS:   Review of Systems   Constitutional:  Negative for activity change, appetite change, chills, diaphoresis, fatigue, fever and unexpected weight change.   HENT:  Negative for congestion, dental problem, drooling, ear discharge, ear pain, facial swelling, hearing loss, mouth sores, nosebleeds, postnasal drip, rhinorrhea, sinus pressure, sinus pain, sneezing, sore throat, tinnitus, trouble swallowing and voice change.    Eyes:  Negative for photophobia, pain, discharge, redness, itching and visual disturbance.   Respiratory:  Negative for apnea, cough, choking, chest tightness, shortness of breath, wheezing and stridor.    Cardiovascular:  Negative for chest pain, palpitations and leg swelling.   Gastrointestinal:   Negative for abdominal distention, abdominal pain, anal bleeding, blood in stool, constipation, diarrhea, nausea, rectal pain and vomiting.   Endocrine: Negative for cold intolerance, heat intolerance, polydipsia and polyuria.   Genitourinary:  Negative for decreased urine volume, difficulty urinating, dysuria, flank pain, frequency, genital sores, hematuria and urgency.   Musculoskeletal:  Negative for arthralgias, back pain, gait problem, joint swelling, myalgias, neck pain and neck stiffness.   Skin:  Negative for color change, pallor and rash.   Neurological:  Negative for dizziness, tremors, seizures, syncope, facial asymmetry, speech difficulty, weakness, light-headedness, numbness and headaches.   Hematological:  Negative for adenopathy. Does not bruise/bleed easily.   Psychiatric/Behavioral:  Negative for agitation, behavioral problems, confusion, decreased concentration, hallucinations, self-injury, sleep disturbance and suicidal ideas. The patient is not nervous/anxious.      Objective:    Vital Signs:  There were no vitals filed for this visit.  There is no height or weight on file to calculate BMI.    ECOG  (0) Fully active, able to carry on all predisease performance without restriction    Physical Exam:   Physical Exam  Constitutional:       General: He is not in acute distress.     Appearance: He is not ill-appearing, toxic-appearing or diaphoretic.   HENT:      Head: Normocephalic and atraumatic.      Right Ear: External ear normal.      Left Ear: External ear normal.      Nose: Nose normal.      Mouth/Throat:      Mouth: Mucous membranes are moist.      Pharynx: Oropharynx is clear.   Eyes:      General: No scleral icterus.        Right eye: No discharge.         Left eye: No discharge.      Conjunctiva/sclera: Conjunctivae normal.      Pupils: Pupils are equal, round, and reactive to light.   Cardiovascular:      Rate and Rhythm: Normal rate and regular rhythm.      Pulses: Normal pulses.       Heart sounds: Normal heart sounds. No murmur heard.     No friction rub. No gallop.   Pulmonary:      Effort: No respiratory distress.      Breath sounds: No stridor. No wheezing, rhonchi or rales.   Chest:      Chest wall: No tenderness.   Abdominal:      General: Abdomen is flat. Bowel sounds are normal. There is no distension.      Palpations: Abdomen is soft. There is no mass.      Tenderness: There is no abdominal tenderness. There is no right CVA tenderness, left CVA tenderness, guarding or rebound.   Musculoskeletal:         General: No swelling, tenderness, deformity or signs of injury.      Cervical back: No rigidity.      Right lower leg: No edema.      Left lower leg: No edema.   Lymphadenopathy:      Cervical: No cervical adenopathy.   Skin:     General: Skin is warm and dry.      Coloration: Skin is not jaundiced.      Findings: No bruising or rash.   Neurological:      General: No focal deficit present.      Mental Status: He is alert and oriented to person, place, and time.      Gait: Gait normal.   Psychiatric:         Mood and Affect: Mood normal.         Behavior: Behavior normal.         Thought Content: Thought content normal.         Judgment: Judgment normal.     ASHLEY Pedersen MD performed the physical exam on 1/18/2024 as documented above.     Lab Results - Last 18 Months   Lab Units 12/13/23  0756 12/12/23  0333 12/11/23  0554   WBC 10*3/mm3 8.90 10.20 10.40   HEMOGLOBIN g/dL 11.9* 12.6* 12.5*   HEMATOCRIT % 34.8* 37.3* 37.5   PLATELETS 10*3/mm3 160 183 168   MCV fL 88.5 89.4 89.2     Lab Results - Last 18 Months   Lab Units 12/29/23  0948 12/13/23  0756 12/12/23  2305 12/12/23  0333 12/11/23  1612 12/11/23  0554   SODIUM mmol/L 144 142  --  151*  --  155*   POTASSIUM mmol/L 3.2* 3.1* 3.1* 3.3*   < > 3.3*   CHLORIDE mmol/L 103 105  --  116*  --  118*   CO2 mmol/L 29.0 26.0  --  27.0  --  26.0   BUN mg/dL 11 8  --  11  --  15   CREATININE mg/dL 0.64* 0.45*  --  0.62*  --  0.54*   CALCIUM  mg/dL 8.9 8.1*  --  8.5*  --  8.8   BILIRUBIN mg/dL  --  0.3  --  0.3  --  0.2   ALK PHOS U/L  --  55  --  51  --  56   ALT (SGPT) U/L  --  63*  --  17  --  12   AST (SGOT) U/L  --  99*  --  18  --  9   GLUCOSE mg/dL 134* 99  --  122*  --  239*    < > = values in this interval not displayed.     Lab Results   Component Value Date    GLUCOSE 134 (H) 12/29/2023    BUN 11 12/29/2023    CREATININE 0.64 (L) 12/29/2023    BCR 17.2 12/29/2023    K 3.2 (L) 12/29/2023    CO2 29.0 12/29/2023    CALCIUM 8.9 12/29/2023    ALBUMIN 2.3 (L) 12/13/2023    AST 99 (H) 12/13/2023    ALT 63 (H) 12/13/2023     Lab Results - Last 18 Months   Lab Units 12/08/23  0256   INR  1.01   APTT seconds 34.6*     Lab Results   Component Value Date    KOHPCEMN29 546 02/14/2023     Uric Acid   Date Value Ref Range Status   06/01/2023 4.8 3.4 - 7.0 mg/dL Final     Lab Results   Component Value Date    PTT 34.6 (H) 12/08/2023    INR 1.01 12/08/2023     Lab Results   Component Value Date    PSA 4.100 (H) 05/17/2023     Assessment & Plan     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 Received 4 cycles of obinutuzumab/acalabrutinib. With evidence of response. At this time does not require treatment. Continue physical therapy and see me in one month.   Blood count at the time of the next visit.   Reviewed the records from hospitals and [facility]. Reviewed all the laboratory exams including blood counts, chemistries and results of pathology.   See me in 4 weeks.     Eugenio Pedersen MD on 1/18/2024 at 13:18

## 2024-01-18 ENCOUNTER — CONSULT (OUTPATIENT)
Dept: ONCOLOGY | Facility: CLINIC | Age: 64
End: 2024-01-18
Payer: COMMERCIAL

## 2024-01-18 ENCOUNTER — LAB (OUTPATIENT)
Dept: LAB | Facility: HOSPITAL | Age: 64
End: 2024-01-18
Payer: COMMERCIAL

## 2024-01-18 VITALS
TEMPERATURE: 98.1 F | HEART RATE: 58 BPM | HEIGHT: 73 IN | SYSTOLIC BLOOD PRESSURE: 116 MMHG | WEIGHT: 204.4 LBS | DIASTOLIC BLOOD PRESSURE: 69 MMHG | BODY MASS INDEX: 27.09 KG/M2 | OXYGEN SATURATION: 98 %

## 2024-01-18 DIAGNOSIS — C91.10 CLL (CHRONIC LYMPHOCYTIC LEUKEMIA): Primary | ICD-10-CM

## 2024-01-18 DIAGNOSIS — C91.10 CLL (CHRONIC LYMPHOCYTIC LEUKEMIA): ICD-10-CM

## 2024-01-18 LAB
BASOPHILS # BLD AUTO: 0.03 10*3/MM3 (ref 0–0.2)
BASOPHILS NFR BLD AUTO: 0.7 % (ref 0–1.5)
DEPRECATED RDW RBC AUTO: 46.6 FL (ref 37–54)
EOSINOPHIL # BLD AUTO: 0.01 10*3/MM3 (ref 0–0.4)
EOSINOPHIL NFR BLD AUTO: 0.2 % (ref 0.3–6.2)
ERYTHROCYTE [DISTWIDTH] IN BLOOD BY AUTOMATED COUNT: 14.6 % (ref 12.3–15.4)
HCT VFR BLD AUTO: 42.8 % (ref 37.5–51)
HGB BLD-MCNC: 14.5 G/DL (ref 13–17.7)
LYMPHOCYTES # BLD AUTO: 1.49 10*3/MM3 (ref 0.7–3.1)
LYMPHOCYTES NFR BLD AUTO: 33.4 % (ref 19.6–45.3)
MCH RBC QN AUTO: 30.3 PG (ref 26.6–33)
MCHC RBC AUTO-ENTMCNC: 33.9 G/DL (ref 31.5–35.7)
MCV RBC AUTO: 89.5 FL (ref 79–97)
MONOCYTES # BLD AUTO: 0.46 10*3/MM3 (ref 0.1–0.9)
MONOCYTES NFR BLD AUTO: 10.3 % (ref 5–12)
NEUTROPHILS NFR BLD AUTO: 2.47 10*3/MM3 (ref 1.7–7)
NEUTROPHILS NFR BLD AUTO: 55.4 % (ref 42.7–76)
PLATELET # BLD AUTO: 167 10*3/MM3 (ref 140–450)
PMV BLD AUTO: 10.6 FL (ref 6–12)
RBC # BLD AUTO: 4.78 10*6/MM3 (ref 4.14–5.8)
WBC NRBC COR # BLD AUTO: 4.46 10*3/MM3 (ref 3.4–10.8)

## 2024-01-18 PROCEDURE — 85025 COMPLETE CBC W/AUTO DIFF WBC: CPT

## 2024-01-19 DIAGNOSIS — R60.9 EDEMA, UNSPECIFIED TYPE: ICD-10-CM

## 2024-01-19 RX ORDER — HYDROCHLOROTHIAZIDE 25 MG/1
25 TABLET ORAL DAILY
Qty: 90 TABLET | Refills: 1 | Status: SHIPPED | OUTPATIENT
Start: 2024-01-19

## 2024-01-21 NOTE — PROGRESS NOTES
Subjective:     Encounter Date:01/24/2024      Patient ID: Bharathi Vizcarra is a 63 y.o. male.    Chief Complaint and history of present illness:     Stress test follow-up for chest pain.  History of hypertension, dyslipidemia, diabetes     History of Present Illness:       Ms. Bharathi Vizcarra has PMH of     Hypertension  Hyperlipidemia  Diabetes  CLL     Here for stress test follow-up.  Patient was seen for evaluation of chest pain.  No aggravating or relieving factors.  Went to the ER 12/8/2023 with persistent vomiting dehydration and small bowel obstruction and BETTIE.  Was diagnosed with septic shock due to enteritis.  Had an episode of chest pain..     Patient's arterial blood pressure is 142/78, heart rate 52 bpm.     Labs from 12/29/2023 reveal BMP with a glucose of 134, potassium 3.2.  Labs from 12/13/2023 reveal CMP with a potassium of 3.1, albumin 2.3, ALT 63, AST 99.  Labs from 12/8/2023 reveal HS troponin x 2 are negative.  Last A1c 10/19/2023 was 8.3 and lipid profile with cholesterol of 166, triglycerides 75, HDL 49, ..     EKG 12/26/2023 reviewed/interpreted by me reveals sinus bradycardia with rate of 56 bpm.     Echocardiogram 12/9/2023 reveals normal LV systolic function EF of 61 to 65% with concentric LVH and moderate left atrial enlargement.        Assessment:  :     Chest pain  Bradycardia  Hypertension  Dyslipidemia  Diabetes  Elevated LFTs        Recommendations / Plan:         Reviewed stress test results with patient.  Patient had chest pain and underwent stress test since then has been doing well without chest pain.  Will add aspirin, statins and nitrates.  Continue amlodipine.  Will hold off beta-blockers due to bradycardia.  Will schedule CT coronary arteriogram and follow-up and consider further evaluation treatment.  Will monitor bradycardia which is asymptomatic at the current time.  Follow-up with PMD for diabetes and dyslipidemia and elevated LFTs.  Blood pressure is  well-controlled on the current medication.     The 10-year ASCVD risk score (Jocelyn DELACRUZ, et al., 2019) is: 23.2%    Values used to calculate the score:      Age: 63 years      Sex: Male      Is Non- : Yes      Diabetic: Yes      Tobacco smoker: No      Systolic Blood Pressure: 121 mmHg      Is BP treated: Yes      HDL Cholesterol: 49 mg/dL      Total Cholesterol: 166 mg/dL      Discussed various options with patient.         Procedures    Copied text in this portion of the note has been reviewed and is accurate as of 1/24/2024  The following portions of the patient's history were reviewed and updated as appropriate: allergies, current medications, past family history, past medical history, past social history, past surgical history and problem list.    Assessment:         MDM       Diagnosis Plan   1. Precordial pain  CT Angiogram Coronary    Comprehensive Metabolic Panel    Lipid Panel      2. Abnormal nuclear stress test  CT Angiogram Coronary    Comprehensive Metabolic Panel    Lipid Panel      3. Bradycardia  CT Angiogram Coronary    Comprehensive Metabolic Panel    Lipid Panel      4. Essential hypertension  CT Angiogram Coronary    Comprehensive Metabolic Panel    Lipid Panel      5. Dyslipidemia  CT Angiogram Coronary    Comprehensive Metabolic Panel    Lipid Panel      6. Type 2 diabetes mellitus with hyperglycemia, without long-term current use of insulin  CT Angiogram Coronary    Comprehensive Metabolic Panel    Lipid Panel             Plan:               Past Medical History:  Past Medical History:   Diagnosis Date    CLL (chronic lymphocytic leukemia)     2022    DM (diabetes mellitus), type 2     Erectile dysfunction     Hyperlipidemia     Hypertension      Past Surgical History:  Past Surgical History:   Procedure Laterality Date    COLONOSCOPY      2019      Allergies:  Allergies   Allergen Reactions    Bactrim [Sulfamethoxazole-Trimethoprim] Rash     Home Meds:  Current Meds:      Current Outpatient Medications:     amLODIPine (NORVASC) 10 MG tablet, TAKE 1 TABLET BY MOUTH EVERY DAY, Disp: 90 tablet, Rfl: 1    aspirin 81 MG EC tablet, Take 1 tablet by mouth Daily., Disp: 90 tablet, Rfl: 3    dapagliflozin Propanediol (Farxiga) 10 MG tablet, Take 10 mg by mouth Daily., Disp: 30 tablet, Rfl: 5    famotidine (PEPCID) 40 MG tablet, Take 1 tablet by mouth Daily., Disp: , Rfl:     glucose blood (OneTouch Verio) test strip, 1 each by Other route Daily. Dx: E11.65. Use as instructed, Disp: 50 each, Rfl: 2    hydroCHLOROthiazide (HYDRODIURIL) 25 MG tablet, Take 1 tablet by mouth Daily., Disp: 90 tablet, Rfl: 1    isosorbide mononitrate (IMDUR) 30 MG 24 hr tablet, Take 1 tablet by mouth Every Morning., Disp: 30 tablet, Rfl: 11    Lancets (OneTouch Delica Plus Tuamks17D) misc, Use 1 each Daily. Dx: E11.65, Disp: 100 each, Rfl: 2    lisinopril (PRINIVIL,ZESTRIL) 40 MG tablet, Take 1 tablet by mouth Daily. for blood pressure., Disp: 90 tablet, Rfl: 3    phenol (CHLORASEPTIC) 1.4 % liquid liquid, Apply 1 spray to the mouth or throat Every 2 (Two) Hours As Needed (dulce throat)., Disp: 250 mL, Rfl: 0    polyethylene glycol (MIRALAX) 17 GM/SCOOP powder, TAKE 1 CAP WITH ANY LIQUID BY MOUTH TWICE A DAY, Disp: , Rfl:     potassium chloride (K-DUR,KLOR-CON) 20 MEQ CR tablet, Take 1 tablet by mouth Daily., Disp: 30 tablet, Rfl: 2    pravastatin (Pravachol) 20 MG tablet, Take 1 tablet by mouth Daily., Disp: 90 tablet, Rfl: 3  No current facility-administered medications for this visit.  Social History:   Social History     Tobacco Use    Smoking status: Never     Passive exposure: Never    Smokeless tobacco: Never   Substance Use Topics    Alcohol use: Not Currently     Comment: Occasional      Family History:  Family History   Problem Relation Age of Onset    Diabetes Mother     Hyperlipidemia Mother     Stroke Sister               ROS  All other systems are negative         Objective:     Physical Exam  BP  "142/78   Pulse 52   Ht 185.4 cm (73\")   Wt 92.5 kg (204 lb)   BMI 26.91 kg/m²   General:  Appears in no acute distress  Eyes: Sclera is anicteric,  conjunctiva is clear   HEENT:  No JVD.  No carotid bruits  Respiratory: Respirations regular and unlabored at rest.  Clear to auscultation  Cardiovascular: S1,S2 Regular rate and rhythm. .   Extremities: No digital clubbing or cyanosis, no edema  Skin: Color pink. Skin warm and dry to touch. No rashes  No xanthoma  Neuro: Alert and awake.    Lab Reviewed:         Primo Silva MD  1/24/2024 12:49 EST      EMR Dragon/Transcription:   \"Dictated utilizing Dragon dictation\".        "

## 2024-01-23 ENCOUNTER — TREATMENT (OUTPATIENT)
Dept: PHYSICAL THERAPY | Facility: CLINIC | Age: 64
End: 2024-01-23
Payer: COMMERCIAL

## 2024-01-23 DIAGNOSIS — R53.1 WEAKNESS: Primary | ICD-10-CM

## 2024-01-23 PROCEDURE — 97530 THERAPEUTIC ACTIVITIES: CPT | Performed by: PHYSICAL THERAPIST

## 2024-01-23 PROCEDURE — 97110 THERAPEUTIC EXERCISES: CPT | Performed by: PHYSICAL THERAPIST

## 2024-01-23 PROCEDURE — 97112 NEUROMUSCULAR REEDUCATION: CPT | Performed by: PHYSICAL THERAPIST

## 2024-01-23 NOTE — PROGRESS NOTES
Physical Therapy Daily Treatment Note  [address] [facility], [address] [place] [address]     Patient: Bharathi Darnell   : 1960  Referring practitioner: REYES Wesley  Diagnosis:      ICD-10-CM ICD-9-CM   1. Weakness  R53.1 780.79     Date of Initial Visit: Type: THERAPY  Noted: 2024  Today's Date: 2024    VISIT#: 3          Subjective   Bharathi Darnell reports: he has some heaviness in his legs after exercising, but tolerated last session well otherwise.      Objective   See Exercise, Manual, and Modality Logs for complete treatment.       Assessment & Plan       Assessment  Assessment details: Progressed functional strengthening exercises today with good tolerance and only mild fatigue noted.            Progress per Plan of Care and Progress strengthening /stabilization /functional activity           Timed:  Manual Therapy:         mins  77611;  Therapeutic Exercise:    15     mins  06494;     Neuromuscular Rodger:    13   mins  74727;    Therapeutic Activity:     10     mins  63526;     Gait Training:           mins  94712;     Ultrasound:          mins  06704;    Electrical Stimulation:         mins  28845 ( );    Untimed:  Electrical Stimulation:         mins  39654 ( );  Mechanical Traction:         mins  09773;   Dry needling:       Self pay    Timed Treatment:   38   mins   Total Treatment:     38   mins  Glenis Holguin PT, DPT, CLT, VINEETN  Physical Therapist

## 2024-01-24 ENCOUNTER — TELEPHONE (OUTPATIENT)
Dept: FAMILY MEDICINE CLINIC | Facility: CLINIC | Age: 64
End: 2024-01-24

## 2024-01-24 ENCOUNTER — HOSPITAL ENCOUNTER (OUTPATIENT)
Dept: CARDIOLOGY | Facility: HOSPITAL | Age: 64
Discharge: HOME | End: 2024-01-24
Payer: COMMERCIAL

## 2024-01-24 ENCOUNTER — PATIENT MESSAGE (OUTPATIENT)
Dept: FAMILY MEDICINE CLINIC | Facility: CLINIC | Age: 64
End: 2024-01-24

## 2024-01-24 ENCOUNTER — TELEPHONE (OUTPATIENT)
Dept: FAMILY MEDICINE CLINIC | Facility: CLINIC | Age: 64
End: 2024-01-24
Payer: COMMERCIAL

## 2024-01-24 ENCOUNTER — OFFICE VISIT (OUTPATIENT)
Dept: CARDIOLOGY | Facility: CLINIC | Age: 64
End: 2024-01-24
Payer: COMMERCIAL

## 2024-01-24 ENCOUNTER — HOSPITAL ENCOUNTER (OUTPATIENT)
Dept: CARDIOLOGY | Facility: HOSPITAL | Age: 64
Discharge: HOME | End: 2024-01-24
Admitting: INTERNAL MEDICINE
Payer: COMMERCIAL

## 2024-01-24 VITALS
HEART RATE: 52 BPM | WEIGHT: 204 LBS | DIASTOLIC BLOOD PRESSURE: 78 MMHG | BODY MASS INDEX: 27.04 KG/M2 | HEIGHT: 73 IN | SYSTOLIC BLOOD PRESSURE: 142 MMHG

## 2024-01-24 DIAGNOSIS — E78.5 DYSLIPIDEMIA: ICD-10-CM

## 2024-01-24 DIAGNOSIS — I10 ESSENTIAL HYPERTENSION: ICD-10-CM

## 2024-01-24 DIAGNOSIS — R00.1 BRADYCARDIA: ICD-10-CM

## 2024-01-24 DIAGNOSIS — R07.2 PRECORDIAL PAIN: ICD-10-CM

## 2024-01-24 DIAGNOSIS — R00.1 BRADYCARDIA: Primary | ICD-10-CM

## 2024-01-24 DIAGNOSIS — E11.65 TYPE 2 DIABETES MELLITUS WITH HYPERGLYCEMIA, WITHOUT LONG-TERM CURRENT USE OF INSULIN: ICD-10-CM

## 2024-01-24 DIAGNOSIS — R07.2 PRECORDIAL PAIN: Primary | ICD-10-CM

## 2024-01-24 DIAGNOSIS — R94.39 ABNORMAL NUCLEAR STRESS TEST: ICD-10-CM

## 2024-01-24 LAB
BH CV REST NUCLEAR ISOTOPE DOSE: 10.5 MCI
BH CV STRESS BP STAGE 1: NORMAL
BH CV STRESS BP STAGE 2: NORMAL
BH CV STRESS DURATION MIN STAGE 1: 3
BH CV STRESS DURATION MIN STAGE 2: 3
BH CV STRESS DURATION SEC STAGE 1: 0
BH CV STRESS DURATION SEC STAGE 2: 0
BH CV STRESS GRADE STAGE 1: 10
BH CV STRESS GRADE STAGE 2: 12
BH CV STRESS HR STAGE 1: 98
BH CV STRESS HR STAGE 2: 133
BH CV STRESS METS STAGE 1: 5
BH CV STRESS METS STAGE 2: 7.5
BH CV STRESS NUCLEAR ISOTOPE DOSE: 33.5 MCI
BH CV STRESS PROTOCOL 1: NORMAL
BH CV STRESS RECOVERY BP: NORMAL MMHG
BH CV STRESS RECOVERY HR: 90 BPM
BH CV STRESS SPEED STAGE 1: 1.7
BH CV STRESS SPEED STAGE 2: 2.5
BH CV STRESS STAGE 1: 1
BH CV STRESS STAGE 2: 2
MAXIMAL PREDICTED HEART RATE: 157 BPM
PERCENT MAX PREDICTED HR: 84.71 %
STRESS BASELINE BP: NORMAL MMHG
STRESS BASELINE HR: 52 BPM
STRESS PERCENT HR: 100 %
STRESS POST ESTIMATED WORKLOAD: 7 METS
STRESS POST EXERCISE DUR MIN: 6 MIN
STRESS POST PEAK BP: NORMAL MMHG
STRESS POST PEAK HR: 133 BPM
STRESS TARGET HR: 133 BPM

## 2024-01-24 PROCEDURE — 0 TECHNETIUM SESTAMIBI: Performed by: INTERNAL MEDICINE

## 2024-01-24 PROCEDURE — 99214 OFFICE O/P EST MOD 30 MIN: CPT | Performed by: INTERNAL MEDICINE

## 2024-01-24 PROCEDURE — A9500 TC99M SESTAMIBI: HCPCS | Performed by: INTERNAL MEDICINE

## 2024-01-24 PROCEDURE — 93017 CV STRESS TEST TRACING ONLY: CPT

## 2024-01-24 PROCEDURE — 78452 HT MUSCLE IMAGE SPECT MULT: CPT | Performed by: INTERNAL MEDICINE

## 2024-01-24 PROCEDURE — 93016 CV STRESS TEST SUPVJ ONLY: CPT

## 2024-01-24 PROCEDURE — 78452 HT MUSCLE IMAGE SPECT MULT: CPT

## 2024-01-24 PROCEDURE — 93018 CV STRESS TEST I&R ONLY: CPT | Performed by: INTERNAL MEDICINE

## 2024-01-24 RX ORDER — ISOSORBIDE MONONITRATE 30 MG/1
30 TABLET, EXTENDED RELEASE ORAL EVERY MORNING
Qty: 30 TABLET | Refills: 11 | Status: SHIPPED | OUTPATIENT
Start: 2024-01-24

## 2024-01-24 RX ORDER — PRAVASTATIN SODIUM 20 MG
20 TABLET ORAL DAILY
Qty: 90 TABLET | Refills: 3 | Status: SHIPPED | OUTPATIENT
Start: 2024-01-24

## 2024-01-24 RX ORDER — ASPIRIN 81 MG/1
81 TABLET ORAL DAILY
Qty: 90 TABLET | Refills: 3 | Status: SHIPPED | OUTPATIENT
Start: 2024-01-24

## 2024-01-24 RX ADMIN — TECHNETIUM TC 99M SESTAMIBI 1 DOSE: 1 INJECTION INTRAVENOUS at 08:16

## 2024-01-24 RX ADMIN — TECHNETIUM TC 99M SESTAMIBI 1 DOSE: 1 INJECTION INTRAVENOUS at 09:58

## 2024-01-24 NOTE — TELEPHONE ENCOUNTER
"Caller: Bharathi Darnell \"Landon\"    Relationship: Self    Best call back number: [phone]     What is the medical concern/diagnosis: PATIENT WOULD LIKE SECOND OPINION (CURRENTLY SEEING Primo Silva MD)    What specialty or service is being requested: CARDIOLOGY    What is the provider, practice or medical service name: DR. INGRID COLE    What is the office location: [address] [place] [address]    What is the office phone number: [phone]  "

## 2024-01-24 NOTE — TELEPHONE ENCOUNTER
"Caller: Bharathi Darnell \"Landon\"    Relationship: Self    Best call back number: [phone]     Caller requesting test results: PATIENT    What test was performed: STRESS TEST    When was the test performed: 1/23/2024    Where was the test performed:  [address] [facility] [place] [address]    Additional notes: PATIENT WOULD LIKE TO HEAR FROM DR. QUIROZ REGARDING RESULTS    "

## 2024-01-26 DIAGNOSIS — E87.6 HYPOKALEMIA: ICD-10-CM

## 2024-01-26 RX ORDER — POTASSIUM CHLORIDE 20 MEQ/1
20 TABLET, EXTENDED RELEASE ORAL DAILY
Qty: 90 TABLET | Refills: 1 | Status: SHIPPED | OUTPATIENT
Start: 2024-01-26

## 2024-01-29 ENCOUNTER — TELEPHONE (OUTPATIENT)
Dept: CARDIOLOGY | Facility: CLINIC | Age: 64
End: 2024-01-29
Payer: COMMERCIAL

## 2024-01-29 ENCOUNTER — TREATMENT (OUTPATIENT)
Dept: PHYSICAL THERAPY | Facility: CLINIC | Age: 64
End: 2024-01-29
Payer: COMMERCIAL

## 2024-01-29 DIAGNOSIS — R53.1 WEAKNESS: Primary | ICD-10-CM

## 2024-01-29 PROCEDURE — 97530 THERAPEUTIC ACTIVITIES: CPT | Performed by: PHYSICAL THERAPIST

## 2024-01-29 PROCEDURE — 97110 THERAPEUTIC EXERCISES: CPT | Performed by: PHYSICAL THERAPIST

## 2024-01-29 PROCEDURE — 97112 NEUROMUSCULAR REEDUCATION: CPT | Performed by: PHYSICAL THERAPIST

## 2024-01-29 NOTE — TELEPHONE ENCOUNTER
Patient had isosorbide and aspirin called in 1/24/24. He was doing some reading and read it may not be safe to take Viagra with those medications.  Would like to know if it is safe to take Viagra.

## 2024-01-29 NOTE — TELEPHONE ENCOUNTER
He will need to be very cautious of his blood pressure if taking Viagra.  It really should not be taken with isosorbide

## 2024-01-29 NOTE — PROGRESS NOTES
Physical Therapy Daily Treatment Note    Patient: Bharathi Darnell  : 1960  Referring practitioner: REYES Wesley  Today's Date: 2024    VISIT#: 4      Subjective   Bharathi Darnell reports: Doing well, can feel the exercises working. Wants updated exercises for home.       Objective     See Exercise, Manual, and Modality Logs for complete treatment.     Patient Education: updated & progressed HEP.   Access Code: [email or web address]  URL: [email or web address]  Date: 2024  Prepared by: Shanti Arriaga    Exercises  - Seated Hamstring Stretch  - 1 x daily - 7 x weekly - 3 reps - 30 second hold  - Sit to Stand  - 1 x daily - 7 x weekly - 3 sets - 10 reps  - Seated Long Arc Quad  - 1 x daily - 7 x weekly - 3 sets - 10 reps  - Supine Bridge  - 1 x daily - 7 x weekly - 2 sets - 10 reps - 5 second hold  - Supine Active Straight Leg Raise  - 1 x daily - 7 x weekly - 2 sets - 10 reps  - Supine Figure 4 Piriformis Stretch  - 1 x daily - 7 x weekly - 3 reps - 30 seconds hold  - Forward Step Up  - 1 x daily - 7 x weekly - 2 sets - 10 reps    Assessment/Plan  Very good response to session, increased repetitions of exercises today and he did report some fatigue but felt good about it. Says when he first started therapy he would be exhausted at work later that day, but doing better now.     Progress per Plan of Care            Timed:         Manual Therapy:         mins  19493;     Therapeutic Exercise:    23     mins  42964;     Neuromuscular Rodger:    10    mins  87980;    Therapeutic Activity:     10     mins  63809;     Gait Training:           mins  93581;     Ultrasound:          mins  82661;    Ionto:                                   mins   02416  Self Care:                            mins   08672    Un-Timed:  Electrical Stimulation:         mins  55144 ( );  Dry Needling          mins self-pay  Traction          mins 06028  Re-Eval                               mins  71257    Timed  Treatment:   43   mins   Total Treatment:     43   mins    Shanti Arriaga PT  Physical Therapist  [place] PT license #: [id number]  [place] PT license #: [id number]

## 2024-02-01 ENCOUNTER — TELEPHONE (OUTPATIENT)
Dept: CARDIOLOGY | Facility: CLINIC | Age: 64
End: 2024-02-01
Payer: COMMERCIAL

## 2024-02-01 ENCOUNTER — TREATMENT (OUTPATIENT)
Dept: PHYSICAL THERAPY | Facility: CLINIC | Age: 64
End: 2024-02-01
Payer: COMMERCIAL

## 2024-02-01 DIAGNOSIS — R53.1 WEAKNESS: Primary | ICD-10-CM

## 2024-02-01 PROCEDURE — 97110 THERAPEUTIC EXERCISES: CPT | Performed by: PHYSICAL THERAPIST

## 2024-02-01 PROCEDURE — 97530 THERAPEUTIC ACTIVITIES: CPT | Performed by: PHYSICAL THERAPIST

## 2024-02-01 PROCEDURE — 97112 NEUROMUSCULAR REEDUCATION: CPT | Performed by: PHYSICAL THERAPIST

## 2024-02-01 NOTE — TELEPHONE ENCOUNTER
Caller: REJI    Relationship: [facility]    Best call back number: [phone]-    What form or medical record are you requesting: STRESS TEST AND LAS OV        How would you like to receive the form or medical records (pick-up, mail, fax): FAX  If fax, what is the fax number: [phone]      Timeframe paperwork needed:GASTROENTEROLOGY  PROCEDURE IS PENDING THESE RECORDS

## 2024-02-01 NOTE — PROGRESS NOTES
Physical Therapy Daily Treatment Note  [address] [facility], [address] [place] [address]     Patient: Bharathi Darnell   : 1960  Referring practitioner: REYES Wesley  Diagnosis:      ICD-10-CM ICD-9-CM   1. Weakness  R53.1 780.79     Date of Initial Visit: Type: THERAPY  Noted: 2024  Today's Date: 2024    VISIT#: 5          Subjective   Bharathi Darnell reports: he was feeling a little exhausted after last session, but good today.  He feels his strength is improving and fatigue levels are decreasing.      Objective   See Exercise, Manual, and Modality Logs for complete treatment.       Assessment & Plan       Assessment  Assessment details: Pt tolerated session with some minor fatigue from newly added strengthening exercises. Time increased on NuStep to improve endurance.             Progress per Plan of Care and Progress strengthening /stabilization /functional activity           Timed:  Manual Therapy:         mins  27850;  Therapeutic Exercise:    10     mins  49037;     Neuromuscular Rodger:    10    mins  64843;    Therapeutic Activity:     10     mins  76274;     Gait Training:           mins  50328;     Ultrasound:          mins  19322;    Electrical Stimulation:         mins  22589 ( );    Untimed:  Electrical Stimulation:         mins  55258 ( );  Mechanical Traction:         mins  89511;   Dry needling:       Self pay    Timed Treatment:   30   mins   Total Treatment:     30   mins  Glenis Holguin PT, DPT, CLT, CIDN  Physical Therapist

## 2024-02-06 ENCOUNTER — TREATMENT (OUTPATIENT)
Dept: PHYSICAL THERAPY | Facility: CLINIC | Age: 64
End: 2024-02-06
Payer: COMMERCIAL

## 2024-02-06 DIAGNOSIS — R53.1 WEAKNESS: Primary | ICD-10-CM

## 2024-02-06 PROCEDURE — 97112 NEUROMUSCULAR REEDUCATION: CPT | Performed by: PHYSICAL THERAPIST

## 2024-02-06 PROCEDURE — 97110 THERAPEUTIC EXERCISES: CPT | Performed by: PHYSICAL THERAPIST

## 2024-02-06 PROCEDURE — 97530 THERAPEUTIC ACTIVITIES: CPT | Performed by: PHYSICAL THERAPIST

## 2024-02-06 NOTE — PROGRESS NOTES
Physical Therapy Daily Treatment Note  [address] [facility], [address] [place] [address]     Patient: Bharathi Darnell   : 1960  Referring practitioner: REYES Wesley  Diagnosis:      ICD-10-CM ICD-9-CM   1. Weakness  R53.1 780.79     Date of Initial Visit: Type: THERAPY  Noted: 2024  Today's Date: 2024    VISIT#: 6          Subjective   Bharathi Darnell reports: he is feeling better and has more energy overall, especially in the morning.      Objective   See Exercise, Manual, and Modality Logs for complete treatment.       Assessment & Plan       Assessment  Assessment details: Pt continues to progress with functional strengthening with good tolerance other than some slight mm soreness.            Progress per Plan of Care and Progress strengthening /stabilization /functional activity           Timed:  Manual Therapy:         mins  52553;  Therapeutic Exercise:    10     mins  90073;     Neuromuscular Rodger:    10    mins  88188;    Therapeutic Activity:     15     mins  19707;     Gait Training:           mins  48381;     Ultrasound:          mins  39873;    Electrical Stimulation:         mins  06474 ( );    Untimed:  Electrical Stimulation:         mins  83266 ( );  Mechanical Traction:         mins  68883;   Dry needling:       Self pay    Timed Treatment:   35   mins   Total Treatment:     35   mins  Gelnis Holguin PT, DPT, CLT, VINEETN  Physical Therapist

## 2024-02-13 ENCOUNTER — TREATMENT (OUTPATIENT)
Dept: PHYSICAL THERAPY | Facility: CLINIC | Age: 64
End: 2024-02-13
Payer: COMMERCIAL

## 2024-02-13 DIAGNOSIS — R53.1 WEAKNESS: Primary | ICD-10-CM

## 2024-02-13 PROCEDURE — 97112 NEUROMUSCULAR REEDUCATION: CPT | Performed by: PHYSICAL THERAPIST

## 2024-02-13 PROCEDURE — 97530 THERAPEUTIC ACTIVITIES: CPT | Performed by: PHYSICAL THERAPIST

## 2024-02-13 PROCEDURE — 97110 THERAPEUTIC EXERCISES: CPT | Performed by: PHYSICAL THERAPIST

## 2024-02-19 DIAGNOSIS — I10 ESSENTIAL (PRIMARY) HYPERTENSION: ICD-10-CM

## 2024-02-19 RX ORDER — AMLODIPINE BESYLATE 10 MG/1
TABLET ORAL
Qty: 90 TABLET | Refills: 1 | OUTPATIENT
Start: 2024-02-19

## 2024-02-21 NOTE — ADDENDUM NOTE
Addended by: BÁRBARA LUCERO on: 12/26/2023 11:55 AM     Modules accepted: Orders     There are no Wet Read(s) to document.

## 2024-02-22 ENCOUNTER — OFFICE VISIT (OUTPATIENT)
Dept: CARDIOLOGY | Facility: CLINIC | Age: 64
End: 2024-02-22
Payer: COMMERCIAL

## 2024-02-22 VITALS
BODY MASS INDEX: 27.83 KG/M2 | SYSTOLIC BLOOD PRESSURE: 115 MMHG | WEIGHT: 210 LBS | DIASTOLIC BLOOD PRESSURE: 67 MMHG | HEART RATE: 59 BPM | HEIGHT: 73 IN | RESPIRATION RATE: 18 BRPM

## 2024-02-22 DIAGNOSIS — E78.5 DYSLIPIDEMIA: ICD-10-CM

## 2024-02-22 DIAGNOSIS — I10 ESSENTIAL HYPERTENSION: ICD-10-CM

## 2024-02-22 DIAGNOSIS — E11.65 TYPE 2 DIABETES MELLITUS WITH HYPERGLYCEMIA, WITHOUT LONG-TERM CURRENT USE OF INSULIN: Primary | ICD-10-CM

## 2024-02-22 RX ORDER — SILDENAFIL 100 MG/1
100 TABLET, FILM COATED ORAL DAILY PRN
COMMUNITY

## 2024-02-22 RX ORDER — ERGOCALCIFEROL 1.25 MG/1
50000 CAPSULE ORAL 2 TIMES WEEKLY
COMMUNITY
Start: 2024-01-28

## 2024-02-22 RX ORDER — BUDESONIDE 3 MG/1
3 CAPSULE, COATED PELLETS ORAL EVERY MORNING
COMMUNITY
Start: 2024-02-04

## 2024-02-23 ENCOUNTER — TREATMENT (OUTPATIENT)
Dept: PHYSICAL THERAPY | Facility: CLINIC | Age: 64
End: 2024-02-23
Payer: COMMERCIAL

## 2024-02-23 DIAGNOSIS — R53.1 WEAKNESS: Primary | ICD-10-CM

## 2024-02-23 PROCEDURE — 97110 THERAPEUTIC EXERCISES: CPT | Performed by: PHYSICAL THERAPIST

## 2024-02-23 PROCEDURE — 97530 THERAPEUTIC ACTIVITIES: CPT | Performed by: PHYSICAL THERAPIST

## 2024-02-23 PROCEDURE — 97112 NEUROMUSCULAR REEDUCATION: CPT | Performed by: PHYSICAL THERAPIST

## 2024-02-23 NOTE — PROGRESS NOTES
Physical Therapy Daily Treatment Note   [facility], [address] [place] [address]     Patient: Bharathi Darnell   : 1960  Referring practitioner: REYES Wesley  Diagnosis:      ICD-10-CM ICD-9-CM   1. Weakness  R53.1 780.79     Date of Initial Visit: Type: THERAPY  Noted: 2024  Today's Date: 2024    VISIT#: 8          Subjective   Bharathi Darnell reports: he has noticed strength improvements, but still feels some weakness in B LE.      Objective   See Exercise, Manual, and Modality Logs for complete treatment.       Assessment & Plan       Assessment  Assessment details: Added hip strengthening with cable column resistance with some mm fatigue noted.            Progress per Plan of Care and Progress strengthening /stabilization /functional activity           Timed:  Manual Therapy:         mins  66565;  Therapeutic Exercise:    8     mins  86407;     Neuromuscular Rodger:    10    mins  36000;    Therapeutic Activity:     10     mins  92400;     Gait Training:           mins  50051;     Ultrasound:          mins  53667;    Electrical Stimulation:         mins  84351 ( );    Untimed:  Electrical Stimulation:         mins  86901 ( );  Mechanical Traction:         mins  73201;   Dry needling:       Self pay    Timed Treatment:   28   mins   Total Treatment:     28   mins  Glenis Holguin PT, DPT, CLT, CIDN  Physical Therapist

## 2024-03-06 ENCOUNTER — TREATMENT (OUTPATIENT)
Dept: PHYSICAL THERAPY | Facility: CLINIC | Age: 64
End: 2024-03-06
Payer: COMMERCIAL

## 2024-03-06 DIAGNOSIS — R53.1 WEAKNESS: Primary | ICD-10-CM

## 2024-03-06 PROCEDURE — 97112 NEUROMUSCULAR REEDUCATION: CPT | Performed by: PHYSICAL THERAPIST

## 2024-03-06 PROCEDURE — 97110 THERAPEUTIC EXERCISES: CPT | Performed by: PHYSICAL THERAPIST

## 2024-03-06 PROCEDURE — 97530 THERAPEUTIC ACTIVITIES: CPT | Performed by: PHYSICAL THERAPIST

## 2024-03-06 NOTE — PROGRESS NOTES
Physical Therapy Daily Treatment Note/Discharge Note  [address] [facility], [address] [place] [address]     Patient: Bharathi Darnell   : 1960  Referring practitioner: REYES Wesley  Diagnosis:      ICD-10-CM ICD-9-CM   1. Weakness  R53.1 780.79     Date of Initial Visit: Type: THERAPY  Noted: 2024  Today's Date: 3/6/2024    VISIT#: 9          Subjective   Bharathi Darnell reports: 90% overall improvement since starting therapy, noting increased balance and strength.  He still has some fatigue depending on what he does through the day.      Objective          Strength/Myotome Testing     Left Hip   Planes of Motion   Flexion: 5    Right Hip   Planes of Motion   Flexion: 5    Functional Assessment     Comments  5 times sit to stand - 21 seconds (improved)      ABC score = 88% confidence    See Exercise, Manual, and Modality Logs for complete treatment.       Assessment & Plan       Assessment  Assessment details: Progressed strengthening with increased reps and resistance today with good tolerance. Pt will discharge with maintenance HEP.    Goals  Plan Goals: STGs (4 weeks):  1.  Pt will tolerate initial HEP. - MET  2.  Pt will demonstrate improved 5 time sit to stand (less than 25 seconds) - MET  3.  Pt will demonstrate increased hip flexion strength B.- MET    LTGs (12 weeks):  1.  Pt will be I with HEP - MET  2.  Pt will perform 5 time sit to stand without UE use in decreased time.  - mET  3.   Pt will demonstrate 5/5 B hip flexion strength.  - MET          Other           Timed:  Manual Therapy:         mins  09681;  Therapeutic Exercise:    8     mins  76019;     Neuromuscular Rodger:    10    mins  39101;    Therapeutic Activity:     10     mins  13864;     Gait Training:           mins  38140;     Ultrasound:          mins  72028;    Electrical Stimulation:         mins  00323 ( );    Untimed:  Electrical Stimulation:         mins  56109 ( );  Mechanical Traction:         mins   11236;   Dry needling:       Self pay    Timed Treatment:  28    mins   Total Treatment:     28   mins  Glenis Holguin PT, DPT, CLT, VINEETN  Physical Therapist

## 2024-03-09 NOTE — PROGRESS NOTES
Cardiology Clinic Note  Vasquez Figueroa MD, PhD    Subjective:     Encounter Date:02/22/2024      Patient ID: Bharathi Darnell is a 63 y.o. male.    Chief Complaint:  Chief Complaint   Patient presents with    Hypertension       HPI:     At the pleasure to see this 63-year-old gentleman as a new patient in clinic today wishing to switch his care to our clinic.  He has been followed in the past for hypertension hyperlipidemia diabetes, chronic leukemia, and recent hospitalization secondary to vomiting dehydration small-bowel obstruction with BETTIE also septic shock with enteritis back in December 2023. Repeat echo at that time revealed normal LV systolic function of 60-65% with mild concentric LVH, mild left atrial large, no significant valvular abnormality other than trace to mild regurgitation.  He has been on aspirin statin nitrates calcium channel blockers but not beta-blockers secondary to prior bradycardia.  Coronary CT was recommended for the  for the patient secondary to consume continued chest discomfort in further risk stratification.  Stress evaluation in January was centrally normal with low risk study with no reversible ischemia.  He remains without chest pain at this time.  We discussed risk factor reduction, nonsmoking, healthy diet exercise and heart healthy lifestyle.    Review of systems otherwise negative times 14 point review of system    Historical data copied forward from previous encounters in EMR including the history, exam, and assessment/plan has been reviewed and is unchanged unless noted otherwise.    Cardiac medicines reviewed with risk, benefits, and necessity of each discussed.    Most recent EKG reviewed interpreted by me demonstrates sinus rhythm with normal conduction, mild sinus bradycardia rate 55-58    Risk and benefit of cardiac testing reviewed including death heart attack stroke pain bleeding infection need for vascular /cardiovascular surgery were discussed and the patient  "    Objective:         /67 (BP Location: Left arm, Patient Position: Sitting)   Pulse 59   Resp 18   Ht 185.4 cm (73\")   Wt 95.3 kg (210 lb)   BMI 27.71 kg/m²     Physical Exam  regular rate and rhythm no rubs murmurs Or gallops  Clear to auscultation  Soft nontender nondistended  Intact grossly     Assessment:       Independently manage medical conditions     Diagnoses and all orders for this visit:    1. Type 2 diabetes mellitus with hyperglycemia, without long-term current use of insulin (Primary)  -     Hemoglobin A1c; Future    2. Essential hypertension  -     Comprehensive Metabolic Panel; Future  -     CBC (No Diff); Future  -     Magnesium; Future    3. Dyslipidemia  -     Lipid Panel; Future    Risk factors for coronary disease  Atypical chest pain historically  Essential hypertension   Hyperlipidemia       Continue present pharmacotherapy, blood pressure well controlled  Angina free   If patient has recurrent chest pain would recommend coronary CT  Primary prevention goals   Needs follow-up LFTs with prior elevation AST and ALT previously   Continue treatment of diabetes goal A1c is less than 7, last was 8.3  LDL goals less than 70 with diabetes last was 103, likely needs increase in pravastatin to 40 mg and recheck fasting lipid panel and LFTs in 3 months  Continue lisinopril, Farxiga  Patient off HCTZ and nitrates at this time    Follow-up labs with primary care     Follow-up 6 months     Vasquez Figueroa MD PhD  w          The pleasure to be involved in this patient's cardiovascular care.  Please call with any questions or concerns  Vasquez Figueroa MD, PhD    Most recent EKG as reviewed and interpreted by me:  Procedures     Most recent echo as reviewed and interpreted by me:  Results for orders placed during the hospital encounter of 12/08/23    Adult Transthoracic Echo Complete W/ Cont if Necessary Per Protocol    Interpretation Summary    Left ventricular systolic function is normal. " Calculated left ventricular EF = 61.3% Left ventricular ejection fraction appears to be 61 - 65%.    Left ventricular wall thickness is consistent with mild concentric hypertrophy.    The left atrial cavity is mild to moderately dilated.    Estimated right ventricular systolic pressure from tricuspid regurgitation is normal (<35 mmHg).      Most recent stress test as reviewed and interpreted by me:  Results for orders placed during the hospital encounter of 24    Stress Test With Myocardial Perfusion One Day    Interpretation Summary  STRESS CARDIOLITE REPORT    DATE OF PROCEDURE:   2024    INDICATION FOR PROCEDURE:  Chest pain, hypertension, diabetes, dyslipidemia    PROCEDURE PERFORMED: Stress Cardiolite    DESCRIPTION OF PROCEDURE:    After informed consent was obtained.. Patient's resting heart rate was 52 beats per min, resting blood pressure was 142/78, resting EKG revealed sinus bradycardia at the rate of 52 bpm.  Patient exercised on standard Joey protocol for total of 6 minutes, patient achieved a heart rate of 131 beats per minute, which is 83% of age-predicted maximum, without chest pain or EKG changes.Patient was given Cardiolite at rest and images obtained and post stress Cardiolite was given and images obtained . Patient tolerated procedure well.  Complications were none.    NUCLEAR IMAGIN.  There was reversible inferior defect consistent with ischemia seen ischemia seen.  2.  Gated images reveal normal LV size and contractility, LVEF of 65%.    CONCLUSION:  1.  Stress Cardiolite with abnormal perfusion,  inferior ischemia seen.  2.  Normal wall motion, LVEF of 65%.    RECOMMENDATIONS:    Clinical correlation recommended.    Primo Silva MD  24  10:36 EST      Most recent cardiac catheterization as reviewed interpreted by me:  No results found for this or any previous visit.    The following portions of the patient's history were reviewed and updated as  appropriate: allergies, current medications, past family history, past medical history, past social history, past surgical history, and problem list.      ROS:  14 point review of systems negative except as mentioned above    Current Outpatient Medications:     amLODIPine (NORVASC) 10 MG tablet, TAKE 1 TABLET BY MOUTH EVERY DAY, Disp: 90 tablet, Rfl: 1    Budesonide (ENTOCORT EC) 3 MG 24 hr capsule, Take 1 capsule by mouth Every Morning., Disp: , Rfl:     dapagliflozin Propanediol (Farxiga) 10 MG tablet, Take 10 mg by mouth Daily., Disp: 30 tablet, Rfl: 5    famotidine (PEPCID) 40 MG tablet, Take 1 tablet by mouth Daily., Disp: , Rfl:     lisinopril (PRINIVIL,ZESTRIL) 40 MG tablet, Take 1 tablet by mouth Daily. for blood pressure., Disp: 90 tablet, Rfl: 3    phenol (CHLORASEPTIC) 1.4 % liquid liquid, Apply 1 spray to the mouth or throat Every 2 (Two) Hours As Needed (dulce throat)., Disp: 250 mL, Rfl: 0    potassium chloride (K-DUR,KLOR-CON) 20 MEQ CR tablet, Take 1 tablet by mouth Daily., Disp: 90 tablet, Rfl: 1    sildenafil (Viagra) 100 MG tablet, Take 1 tablet by mouth Daily As Needed for Erectile Dysfunction., Disp: , Rfl:     vitamin D (ERGOCALCIFEROL) 1.25 MG (23463 UT) capsule capsule, Take 1 capsule by mouth 2 (Two) Times a Week., Disp: , Rfl:     aspirin 81 MG EC tablet, Take 1 tablet by mouth Daily. (Patient not taking: Reported on 2/22/2024), Disp: 90 tablet, Rfl: 3    glucose blood (OneTouch Verio) test strip, 1 each by Other route Daily. Dx: E11.65. Use as instructed, Disp: 50 each, Rfl: 2    Lancets (OneTouch Delica Plus Lonvme43H) misc, Use 1 each Daily. Dx: E11.65, Disp: 100 each, Rfl: 2    pravastatin (Pravachol) 20 MG tablet, Take 1 tablet by mouth Daily. (Patient not taking: Reported on 2/22/2024), Disp: 90 tablet, Rfl: 3    Problem List:  Patient Active Problem List   Diagnosis    Type 2 diabetes mellitus    Male erectile disorder (CODE)    Hypogonadism    High blood pressure    CLL (chronic  lymphocytic leukemia)    Mixed hyperlipidemia    Prostate cancer screening    Right foot pain    Benign prostatic hyperplasia without lower urinary tract symptoms    Vitamin D deficiency    Cellulitis of leg, left    Rash    Diverticulosis of large intestine without perforation or abscess without bleeding    Gout    Tenosynovial giant cell tumor of foot    Diaphragmatic hernia without obstruction or gangrene    Epigastric pain    Gastrointestinal hemorrhage, unspecified    Iron deficiency anemia due to chronic blood loss    Nausea    Neoplasm of uncertain behavior of stomach     Past Medical History:  Past Medical History:   Diagnosis Date    CLL (chronic lymphocytic leukemia)     2022    DM (diabetes mellitus), type 2     Erectile dysfunction     Hyperlipidemia     Hypertension      Past Surgical History:  Past Surgical History:   Procedure Laterality Date    COLONOSCOPY      2019     Social History:  Social History     Socioeconomic History    Marital status:    Tobacco Use    Smoking status: Never     Passive exposure: Never    Smokeless tobacco: Never   Vaping Use    Vaping status: Never Used   Substance and Sexual Activity    Alcohol use: Not Currently     Comment: Occasional    Drug use: Never    Sexual activity: Yes     Partners: Female     Birth control/protection: None     Allergies:  Allergies   Allergen Reactions    Bactrim [Sulfamethoxazole-Trimethoprim] Rash     Immunizations:  Immunization History   Administered Date(s) Administered    COVID-19 (MODERNA) 1st,2nd,3rd Dose Monovalent 09/30/2021, 01/14/2022            In-Office Procedure(s):  No orders to display        ASCVD RIsk Score::  The ASCVD Risk score (Jocelyn DK, et al., 2019) failed to calculate for the following reasons:    The patient has a prior MI or stroke diagnosis    Imaging:    Results for orders placed in visit on 01/18/24    SCANNED - IMAGING       Results for orders placed during the hospital encounter of 12/08/23    CT Abdomen  Pelvis With Contrast    Narrative  CT ABDOMEN PELVIS W CONTRAST    Date of Exam: 12/10/2023 12:53 PM EST    Indication: small bowel obstruction.    Comparison: [facility] December 10, 2023, December 9, 2023, December 8, 2023. CT abdomen pelvis December 8, 2023. CT chest December 9, 2022    Technique: Axial CT images were obtained of the abdomen and pelvis following the uneventful intravenous administration of iodinated contrast. Sagittal and coronal reconstructions were performed.  Automated exposure control and iterative reconstruction  methods were used.        Findings:  There is a new trace dependent left pleural effusion. Nodule is seen in the right lower lobe at the anterior-lateral, incompletely visualized on this exam. This was present on the prior chest CT from December 2022 measuring 8 mm and appears to have been  present on the prior CT abdomen and pelvis measuring 10 mm although there was motion. There is mild dependent bilateral lower lobe consolidation. Heart size appears within normal limits. No pericardial effusion.    Enteric tube terminates in the mid stomach. The stomach appears mildly distended. There are dilated loops of small bowel in the left abdomen, as before, consistent with small bowel obstruction. The extent of small bowel dilatation does appear decreased  from the prior exam. There is enteric contrast within dilated bowel loops. There is nondistention of the terminal ileum without enteric contrast in distal nondilated bowel loops at this time. The appendix appears within normal limits. No definite  pneumoperitoneum or pneumatosis. The colon is minimally distended. There appears to be mild diffuse wall thickening of the distal colon and rectum. There is diverticulosis of the colon. There is calcific atherosclerosis of the aorta and branch vessels.  No definite aneurysm is seen. There is a small fat-containing right inguinal hernia. No significant lymphadenopathy. Liver, spleen, pancreas, and  right adrenal gland appear unremarkable. There is a stable low-density right adrenal lesion measuring up to  3.2 cm. There is a suspected 18 mm nodule in the left adrenal gland which is indeterminate.    No hydronephrosis. No definite obstructing calculus. No acute renal abnormality is seen. No definite suspicious bladder lesion. No significant pelvic lymphadenopathy or free fluid. There is diffuse mild body wall edema. No acute osseous abnormality.  There are degenerative changes in the thoracolumbar spine.    Impression  Impression:  1.Persistent findings of small bowel obstruction. The extent of small bowel dilatation does appear decreased compared to the prior CT from December 10, 2023. There is enteric contrast within dilated bowel loops but there does not appear to be contrast in  distal nondilated loops. Recommend radiographic follow-up to assess for progression of enteric contrast.  2.Enteric tube terminates in the mid stomach.  3.Mild diffuse wall thickening of the distal colon and rectum which could be related to colitis or underdistention.  4.New trace left pleural effusion and mild dependent bilateral lower lobe consolidation which could represent atelectasis or pneumonia.  5.Right lower lobe may have increased since December 2022 although motion limits quantification. Recommend follow-up chest CT for additional evaluation.  6.Stable right adrenal lesion and indeterminate left adrenal nodule. These may represent adrenal adenomas but could be confirmed with adrenal protocol MRI.  7.Mild body wall edema.        Electronically Signed: Vasquez Gomez  12/10/2023 4:53 PM EST  Workstation ID: [id number]      Results for orders placed during the hospital encounter of 12/08/23    CT Abdomen Pelvis With Contrast    Narrative  CT ABDOMEN PELVIS W CONTRAST    Date of Exam: 12/10/2023 12:53 PM EST    Indication: small bowel obstruction.    Comparison: [facility] December 10, 2023, December 9, 2023, December 8, 2023. CT abdomen  pelvis December 8, 2023. CT chest December 9, 2022    Technique: Axial CT images were obtained of the abdomen and pelvis following the uneventful intravenous administration of iodinated contrast. Sagittal and coronal reconstructions were performed.  Automated exposure control and iterative reconstruction  methods were used.        Findings:  There is a new trace dependent left pleural effusion. Nodule is seen in the right lower lobe at the anterior-lateral, incompletely visualized on this exam. This was present on the prior chest CT from December 2022 measuring 8 mm and appears to have been  present on the prior CT abdomen and pelvis measuring 10 mm although there was motion. There is mild dependent bilateral lower lobe consolidation. Heart size appears within normal limits. No pericardial effusion.    Enteric tube terminates in the mid stomach. The stomach appears mildly distended. There are dilated loops of small bowel in the left abdomen, as before, consistent with small bowel obstruction. The extent of small bowel dilatation does appear decreased  from the prior exam. There is enteric contrast within dilated bowel loops. There is nondistention of the terminal ileum without enteric contrast in distal nondilated bowel loops at this time. The appendix appears within normal limits. No definite  pneumoperitoneum or pneumatosis. The colon is minimally distended. There appears to be mild diffuse wall thickening of the distal colon and rectum. There is diverticulosis of the colon. There is calcific atherosclerosis of the aorta and branch vessels.  No definite aneurysm is seen. There is a small fat-containing right inguinal hernia. No significant lymphadenopathy. Liver, spleen, pancreas, and right adrenal gland appear unremarkable. There is a stable low-density right adrenal lesion measuring up to  3.2 cm. There is a suspected 18 mm nodule in the left adrenal gland which is indeterminate.    No hydronephrosis. No  definite obstructing calculus. No acute renal abnormality is seen. No definite suspicious bladder lesion. No significant pelvic lymphadenopathy or free fluid. There is diffuse mild body wall edema. No acute osseous abnormality.  There are degenerative changes in the thoracolumbar spine.    Impression  Impression:  1.Persistent findings of small bowel obstruction. The extent of small bowel dilatation does appear decreased compared to the prior CT from December 10, 2023. There is enteric contrast within dilated bowel loops but there does not appear to be contrast in  distal nondilated loops. Recommend radiographic follow-up to assess for progression of enteric contrast.  2.Enteric tube terminates in the mid stomach.  3.Mild diffuse wall thickening of the distal colon and rectum which could be related to colitis or underdistention.  4.New trace left pleural effusion and mild dependent bilateral lower lobe consolidation which could represent atelectasis or pneumonia.  5.Right lower lobe may have increased since December 2022 although motion limits quantification. Recommend follow-up chest CT for additional evaluation.  6.Stable right adrenal lesion and indeterminate left adrenal nodule. These may represent adrenal adenomas but could be confirmed with adrenal protocol MRI.  7.Mild body wall edema.        Electronically Signed: Vasquez Gomez  12/10/2023 4:53 PM EST  Workstation ID: [id number]      Lab Review:   Hospital Outpatient Visit on 01/24/2024   Component Date Value    BH CV STRESS PROTOCOL 1 01/24/2024 Joey     Stage 1 01/24/2024 1.0     HR Stage 1 01/24/2024 98     BP Stage 1 01/24/2024 144/80     Duration Min Stage 1 01/24/2024 3     Duration Sec Stage 1 01/24/2024 0     Grade Stage 1 01/24/2024 10     Speed Stage 1 01/24/2024 1.7     BH CV STRESS METS STAGE 1 01/24/2024 5.0     Stage 2 01/24/2024 2.0     HR Stage 2 01/24/2024 133     BP Stage 2 01/24/2024 162/80     Duration Min Stage 2 01/24/2024 3     Duration  Sec Stage 2 01/24/2024 0     Grade Stage 2 01/24/2024 12     Speed Stage 2 01/24/2024 2.5     BH CV STRESS METS STAGE 2 01/24/2024 7.5     Baseline HR 01/24/2024 52     Baseline BP 01/24/2024 142/78     Peak HR 01/24/2024 133     Peak BP 01/24/2024 162/80     Recovery HR 01/24/2024 90     Recovery BP 01/24/2024 166/80     Target HR (85%) 01/24/2024 133     Max. Pred. HR (100%) 01/24/2024 157     Percent Max Pred HR 01/24/2024 84.71     Exercise duration (min) 01/24/2024 6     Estimated workload 01/24/2024 7.0     Percent Target HR 01/24/2024 100     BH CV REST NUCLEAR ISOTO* 01/24/2024 10.5     BH CV STRESS NUCLEAR ISO* 01/24/2024 33.5    Lab on 01/18/2024   Component Date Value    WBC 01/18/2024 4.46     RBC 01/18/2024 4.78     Hemoglobin 01/18/2024 14.5     Hematocrit 01/18/2024 42.8     MCV 01/18/2024 89.5     MCH 01/18/2024 30.3     MCHC 01/18/2024 33.9     RDW 01/18/2024 14.6     RDW-SD 01/18/2024 46.6     MPV 01/18/2024 10.6     Platelets 01/18/2024 167     Neutrophil % 01/18/2024 55.4     Lymphocyte % 01/18/2024 33.4     Monocyte % 01/18/2024 10.3     Eosinophil % 01/18/2024 0.2 (L)     Basophil % 01/18/2024 0.7     Neutrophils, Absolute 01/18/2024 2.47     Lymphocytes, Absolute 01/18/2024 1.49     Monocytes, Absolute 01/18/2024 0.46     Eosinophils, Absolute 01/18/2024 0.01     Basophils, Absolute 01/18/2024 0.03    Lab on 12/29/2023   Component Date Value    Glucose 12/29/2023 134 (H)     BUN 12/29/2023 11     Creatinine 12/29/2023 0.64 (L)     Sodium 12/29/2023 144     Potassium 12/29/2023 3.2 (L)     Chloride 12/29/2023 103     CO2 12/29/2023 29.0     Calcium 12/29/2023 8.9     BUN/Creatinine Ratio 12/29/2023 17.2     Anion Gap 12/29/2023 12.0     eGFR 12/29/2023 106.4    Hospital Outpatient Visit on 12/26/2023   Component Date Value    QT Interval 12/26/2023 446     QTC Interval 12/26/2023 430    No results displayed because visit has over 200 results.      Lab on 12/04/2023   Component Date Value     C-Reactive Protein 12/04/2023 6.21 (H)     Glucose 12/04/2023 146 (H)     BUN 12/04/2023 15     Creatinine 12/04/2023 0.92     Sodium 12/04/2023 142     Potassium 12/04/2023 3.8     Chloride 12/04/2023 102     CO2 12/04/2023 21.9 (L)     Calcium 12/04/2023 9.4     Total Protein 12/04/2023 6.7     Albumin 12/04/2023 3.9     ALT (SGPT) 12/04/2023 19     AST (SGOT) 12/04/2023 15     Alkaline Phosphatase 12/04/2023 51     Total Bilirubin 12/04/2023 0.6     Globulin 12/04/2023 2.8     A/G Ratio 12/04/2023 1.4     BUN/Creatinine Ratio 12/04/2023 16.3     Anion Gap 12/04/2023 18.1 (H)     eGFR 12/04/2023 93.5     Amylase 12/04/2023 62     Lipase 12/04/2023 16     GGT 12/04/2023 18     WBC 12/04/2023 5.83     RBC 12/04/2023 5.12     Hemoglobin 12/04/2023 15.8     Hematocrit 12/04/2023 47.9     MCV 12/04/2023 93.6     MCH 12/04/2023 30.9     MCHC 12/04/2023 33.0     RDW 12/04/2023 12.4     RDW-SD 12/04/2023 43.1     MPV 12/04/2023 11.0     Platelets 12/04/2023 207     nRBC 12/04/2023 0.2     Neutrophil % 12/04/2023 3.4 (L)     Lymphocyte % 12/04/2023 88.8 (H)     Monocyte % 12/04/2023 6.7     Eosinophil % 12/04/2023 1.1     Neutrophils Absolute 12/04/2023 0.20 (L)     Lymphocytes Absolute 12/04/2023 5.18 (H)     Monocytes Absolute 12/04/2023 0.39     Eosinophils Absolute 12/04/2023 0.06     Crenated RBC's 12/04/2023 Mod/2+     Poikilocytes 12/04/2023 Mod/2+     Smudge Cells 12/04/2023 Large/3+     Platelet Morphology 12/04/2023 Normal    Hospital Outpatient Visit on 10/25/2023   Component Date Value    QT Interval 10/25/2023 434     QTC Interval 10/25/2023 436    Office Visit on 10/18/2023   Component Date Value    Glucose 10/19/2023 220 (H)     BUN 10/19/2023 10     Creatinine 10/19/2023 0.76     Sodium 10/19/2023 138     Potassium 10/19/2023 3.8     Chloride 10/19/2023 105     CO2 10/19/2023 25.2     Calcium 10/19/2023 8.8     Total Protein 10/19/2023 5.8 (L)     Albumin 10/19/2023 3.6     ALT (SGPT) 10/19/2023 25     AST  (SGOT) 10/19/2023 18     Alkaline Phosphatase 10/19/2023 54     Total Bilirubin 10/19/2023 0.4     Globulin 10/19/2023 2.2     A/G Ratio 10/19/2023 1.6     BUN/Creatinine Ratio 10/19/2023 13.2     Anion Gap 10/19/2023 7.8     eGFR 10/19/2023 101.0     Hemoglobin A1C 10/19/2023 8.30 (H)     Microalbumin, Urine 10/19/2023 5.5     Total Cholesterol 10/19/2023 166     Triglycerides 10/19/2023 75     HDL Cholesterol 10/19/2023 49     LDL Cholesterol  10/19/2023 103 (H)     VLDL Cholesterol 10/19/2023 14     LDL/HDL Ratio 10/19/2023 2.08     25 Hydroxy, Vitamin D 10/19/2023 23.0 (L)     WBC 10/19/2023 11.17 (H)     RBC 10/19/2023 4.00 (L)     Hemoglobin 10/19/2023 12.3 (L)     Hematocrit 10/19/2023 37.5     MCV 10/19/2023 93.8     MCH 10/19/2023 30.8     MCHC 10/19/2023 32.8     RDW 10/19/2023 11.7 (L)     RDW-SD 10/19/2023 40.1     MPV 10/19/2023 10.9     Platelets 10/19/2023 177     Neutrophil % 10/19/2023 35.4 (L)     Lymphocyte % 10/19/2023 57.6 (H)     Monocyte % 10/19/2023 7.1     Neutrophils Absolute 10/19/2023 3.95     Lymphocytes Absolute 10/19/2023 6.43 (H)     Monocytes Absolute 10/19/2023 0.79     RBC Morphology 10/19/2023 Normal     Smudge Cells 10/19/2023 Mod/2+     Platelet Morphology 10/19/2023 Normal      Recent labs reviewed and interpreted for clinical significance and application            Level of Care:           Vasquez Figueroa MD  03/09/24  .

## 2024-03-14 NOTE — PROGRESS NOTES
HEMATOLOGY ONCOLOGY OUTPATIENT FOLLOW-UP       Patient name: Bharathi Darnell  : 1960  MRN: [id number]  Primary Care Physician: Jamar Pham MD  Referring Physician: No ref. provider found  Reason For Consult: Chronic lymphocytic leukemia.    History of Present Illness:  Patient is a 63 y.o.     2024: In the office for the first time to transfer his care. Sometime in  he felt a preauricular nodule that seemed to come and go for some time. Later he found another similar lesion in the submental area. He sough attention from his otolaryngologist and he had a biopsy of the submental lesion. The final report of pathology was of lymphocytic leukemia/small lymphocytic lymphoma with 90% of the the cells being positive for CD19/dim CD20, positive CD52 and CD23. The cells were lambda light chain restricted. He had lymphocytosis in the peripheral blood. A bone marrow biopsy and aspiration showed the same with a mixed pattern of involvement. 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 He had a PET scan that not surprisingly revealed no significant abnormalities. He was seen at the [facility], where he was not felt to have an indication for treatment. However, with a slowly increasing lymphocyte count and some enlargement of lymph nodes on CT, a decision was made to start treatment with obinutuzumab and acalabrutinib. He received the first cycle in 2023. He completed 4 cycles and shortly after the last cycle, in early 2023,  he presented to the hospital with abdominal pain. He was found to have evidence of small bowel obstruction and he was admitted. He was not felt to require surgical intervention and his symptoms eventually resolved. He had atrial fibrillation that also had resolved at the time of the discharge. Today he feels much better but has  continued to be weak and is still having physical therapy. He has been eating better and has regained a small amount of the more than 20 lbs of weight he had lost. He has been free of chest pain. No abdominal pain or diarrhea and no dysuria. On exam no palpable adenopathy or hepato-splenomegaly. The laboratory exams reported a completely normal blood count. A decision was made to continue observation and to follow closely. I asked him to continue with the physical therapy. I asked him to come see me in approximately 4 weeks.     3/15/2024: Feeling reasonably well.  Having some pain in both hands.  Fatigue.  Appetite is good and he has been eating well.  He has had no nausea.  He has been afebrile and without nocturnal diaphoresis.  Denies any changes in bowel activity and has not had any dysuria.  On exam he is alert and oriented.  He is conversant and in good spirits.  No jaundice or pallor.  Well-hydrated.  Lungs clear.  Heart regular.  Abdomen without splenomegaly.  No edema.  Laboratory exams reviewed.  Discussed with him.  No intervention at this point.  Will continue to follow.  Discussed physical activity as a way to counteract the fatigue he is experiencing.    Past Medical History:   Diagnosis Date    CLL (chronic lymphocytic leukemia)     2022    DM (diabetes mellitus), type 2     Erectile dysfunction     Hyperlipidemia     Hypertension      Past Surgical History:   Procedure Laterality Date    COLONOSCOPY      2019       Current Outpatient Medications:     amLODIPine (NORVASC) 10 MG tablet, TAKE 1 TABLET BY MOUTH EVERY DAY, Disp: 90 tablet, Rfl: 1    Budesonide (ENTOCORT EC) 3 MG 24 hr capsule, Take 1 capsule by mouth Every Morning., Disp: , Rfl:     dapagliflozin Propanediol (Farxiga) 10 MG tablet, Take 10 mg by mouth Daily., Disp: 30 tablet, Rfl: 5    famotidine (PEPCID) 40 MG tablet, Take 1 tablet by mouth Daily., Disp: , Rfl:     glucose blood (OneTouch Verio) test strip, 1 each by Other route Daily.  Dx: E11.65. Use as instructed, Disp: 50 each, Rfl: 2    Lancets (OneTouch Delica Plus [id number]) misc, Use 1 each Daily. Dx: E11.65, Disp: 100 each, Rfl: 2    lisinopril (PRINIVIL,ZESTRIL) 40 MG tablet, Take 1 tablet by mouth Daily. for blood pressure., Disp: 90 tablet, Rfl: 3    phenol (CHLORASEPTIC) 1.4 % liquid liquid, Apply 1 spray to the mouth or throat Every 2 (Two) Hours As Needed (dulce throat)., Disp: 250 mL, Rfl: 0    potassium chloride (K-DUR,KLOR-CON) 20 MEQ CR tablet, Take 1 tablet by mouth Daily., Disp: 90 tablet, Rfl: 1    pravastatin (Pravachol) 20 MG tablet, Take 1 tablet by mouth Daily., Disp: 90 tablet, Rfl: 3    sildenafil (Viagra) 100 MG tablet, Take 1 tablet by mouth Daily As Needed for Erectile Dysfunction., Disp: , Rfl:     vitamin D (ERGOCALCIFEROL) 1.25 MG (23935 UT) capsule capsule, Take 1 capsule by mouth 2 (Two) Times a Week., Disp: , Rfl:     aspirin 81 MG EC tablet, Take 1 tablet by mouth Daily. (Patient not taking: Reported on 3/15/2024), Disp: 90 tablet, Rfl: 3    Allergies   Allergen Reactions    Bactrim [Sulfamethoxazole-Trimethoprim] Rash     Family History   Problem Relation Age of Onset    Diabetes Mother     Hyperlipidemia Mother     Stroke Sister      Cancer-related family history is not on file.    Social History     Tobacco Use    Smoking status: Never     Passive exposure: Never    Smokeless tobacco: Never   Vaping Use    Vaping status: Never Used   Substance Use Topics    Alcohol use: Not Currently     Comment: Occasional    Drug use: Never     Social History     Social History Narrative    Not on file     ROS:   Review of Systems   Constitutional:  Negative for activity change, appetite change, chills, diaphoresis, fatigue, fever and unexpected weight change.   HENT:  Negative for congestion, dental problem, drooling, ear discharge, ear pain, facial swelling, hearing loss, mouth sores, nosebleeds, postnasal drip, rhinorrhea, sinus pressure, sinus pain, sneezing, sore  "throat, tinnitus, trouble swallowing and voice change.    Eyes:  Negative for photophobia, pain, discharge, redness, itching and visual disturbance.   Respiratory:  Negative for apnea, cough, choking, chest tightness, shortness of breath, wheezing and stridor.    Cardiovascular:  Negative for chest pain, palpitations and leg swelling.   Gastrointestinal:  Negative for abdominal distention, abdominal pain, anal bleeding, blood in stool, constipation, diarrhea, nausea, rectal pain and vomiting.   Endocrine: Negative for cold intolerance, heat intolerance, polydipsia and polyuria.   Genitourinary:  Negative for decreased urine volume, difficulty urinating, dysuria, flank pain, frequency, genital sores, hematuria and urgency.   Musculoskeletal:  Negative for arthralgias, back pain, gait problem, joint swelling, myalgias, neck pain and neck stiffness.   Skin:  Negative for color change, pallor and rash.   Neurological:  Negative for dizziness, tremors, seizures, syncope, facial asymmetry, speech difficulty, weakness, light-headedness, numbness and headaches.   Hematological:  Negative for adenopathy. Does not bruise/bleed easily.   Psychiatric/Behavioral:  Negative for agitation, behavioral problems, confusion, decreased concentration, hallucinations, self-injury, sleep disturbance and suicidal ideas. The patient is not nervous/anxious.      Objective:    Vital Signs:  Vitals:    03/15/24 1002   BP: 127/72   Pulse: 54   Temp: 97.9 °F (36.6 °C)   TempSrc: Oral   SpO2: 99%   Weight: 96.1 kg (211 lb 12.8 oz)   Height: 185.4 cm (73\")   PainSc:   6   PainLoc: Wrist  Comment: both wrist     Body mass index is 27.94 kg/m².    ECOG  (0) Fully active, able to carry on all predisease performance without restriction    Physical Exam:   Physical Exam  Constitutional:       General: He is not in acute distress.     Appearance: He is not ill-appearing, toxic-appearing or diaphoretic.   HENT:      Head: Normocephalic and atraumatic.    "   Right Ear: External ear normal.      Left Ear: External ear normal.      Nose: Nose normal.      Mouth/Throat:      Mouth: Mucous membranes are moist.      Pharynx: Oropharynx is clear.   Eyes:      General: No scleral icterus.        Right eye: No discharge.         Left eye: No discharge.      Conjunctiva/sclera: Conjunctivae normal.      Pupils: Pupils are equal, round, and reactive to light.   Cardiovascular:      Rate and Rhythm: Normal rate and regular rhythm.      Pulses: Normal pulses.      Heart sounds: Normal heart sounds. No murmur heard.     No friction rub. No gallop.   Pulmonary:      Effort: No respiratory distress.      Breath sounds: No stridor. No wheezing, rhonchi or rales.   Chest:      Chest wall: No tenderness.   Abdominal:      General: Abdomen is flat. Bowel sounds are normal. There is no distension.      Palpations: Abdomen is soft. There is no mass.      Tenderness: There is no abdominal tenderness. There is no right CVA tenderness, left CVA tenderness, guarding or rebound.   Musculoskeletal:         General: No swelling, tenderness, deformity or signs of injury.      Cervical back: No rigidity.      Right lower leg: No edema.      Left lower leg: No edema.   Lymphadenopathy:      Cervical: No cervical adenopathy.   Skin:     General: Skin is warm and dry.      Coloration: Skin is not jaundiced.      Findings: No bruising or rash.   Neurological:      General: No focal deficit present.      Mental Status: He is alert and oriented to person, place, and time.      Gait: Gait normal.   Psychiatric:         Mood and Affect: Mood normal.         Behavior: Behavior normal.         Thought Content: Thought content normal.         Judgment: Judgment normal.     ASHLEY Pedersen MD performed the physical exam on 3/15/2024 as documented above.    Lab Results - Last 18 Months   Lab Units 03/15/24  1000 01/18/24  1049 12/13/23  0756   WBC 10*3/mm3 2.61* 4.46 8.90   HEMOGLOBIN g/dL 13.5 14.5 11.9*    HEMATOCRIT % 40.9 42.8 34.8*   PLATELETS 10*3/mm3 188 167 160   MCV fL 93.6 89.5 88.5     Lab Results - Last 18 Months   Lab Units 12/29/23  0948 12/13/23  0756 12/12/23  2305 12/12/23  0333 12/11/23  1612 12/11/23  0554   SODIUM mmol/L 144 142  --  151*  --  155*   POTASSIUM mmol/L 3.2* 3.1* 3.1* 3.3*   < > 3.3*   CHLORIDE mmol/L 103 105  --  116*  --  118*   CO2 mmol/L 29.0 26.0  --  27.0  --  26.0   BUN mg/dL 11 8  --  11  --  15   CREATININE mg/dL 0.64* 0.45*  --  0.62*  --  0.54*   CALCIUM mg/dL 8.9 8.1*  --  8.5*  --  8.8   BILIRUBIN mg/dL  --  0.3  --  0.3  --  0.2   ALK PHOS U/L  --  55  --  51  --  56   ALT (SGPT) U/L  --  63*  --  17  --  12   AST (SGOT) U/L  --  99*  --  18  --  9   GLUCOSE mg/dL 134* 99  --  122*  --  239*    < > = values in this interval not displayed.     Lab Results   Component Value Date    GLUCOSE 134 (H) 12/29/2023    BUN 11 12/29/2023    CREATININE 0.64 (L) 12/29/2023    BCR 17.2 12/29/2023    K 3.2 (L) 12/29/2023    CO2 29.0 12/29/2023    CALCIUM 8.9 12/29/2023    ALBUMIN 2.3 (L) 12/13/2023    AST 99 (H) 12/13/2023    ALT 63 (H) 12/13/2023     Lab Results - Last 18 Months   Lab Units 12/08/23  0256   INR  1.01   APTT seconds 34.6*     Lab Results   Component Value Date    ALTNNIZZ20 546 02/14/2023     Uric Acid   Date Value Ref Range Status   06/01/2023 4.8 3.4 - 7.0 mg/dL Final     Lab Results   Component Value Date    PTT 34.6 (H) 12/08/2023    INR 1.01 12/08/2023     Lab Results   Component Value Date    PSA 4.100 (H) 05/17/2023     Assessment & Plan     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 Received 4 cycles of obinutuzumab/acalabrutinib.  The treatment was discontinued in the setting of bowel obstruction and atrial fibrillation that resolved spontaneously.  Off of treatment since December 2024.  I have asked him to see me again in approximately 2 months.  Reviewed the blood count on the chemistry and discussed with him.  He will return to see me in approximately 2  months.    Eugenio Pedersen MD on 3/15/2024 at 10:41 AM.

## 2024-03-15 ENCOUNTER — LAB (OUTPATIENT)
Dept: LAB | Facility: HOSPITAL | Age: 64
End: 2024-03-15
Payer: COMMERCIAL

## 2024-03-15 ENCOUNTER — OFFICE VISIT (OUTPATIENT)
Dept: ONCOLOGY | Facility: CLINIC | Age: 64
End: 2024-03-15
Payer: COMMERCIAL

## 2024-03-15 VITALS
WEIGHT: 211.8 LBS | BODY MASS INDEX: 28.07 KG/M2 | HEART RATE: 54 BPM | HEIGHT: 73 IN | TEMPERATURE: 97.9 F | SYSTOLIC BLOOD PRESSURE: 127 MMHG | DIASTOLIC BLOOD PRESSURE: 72 MMHG | OXYGEN SATURATION: 99 %

## 2024-03-15 DIAGNOSIS — C91.10 CLL (CHRONIC LYMPHOCYTIC LEUKEMIA): Primary | ICD-10-CM

## 2024-03-15 LAB
ALBUMIN SERPL-MCNC: 4.5 G/DL (ref 3.5–5.2)
ALBUMIN/GLOB SERPL: 2.3 G/DL
ALP SERPL-CCNC: 42 U/L (ref 39–117)
ALT SERPL W P-5'-P-CCNC: 24 U/L (ref 1–41)
ANION GAP SERPL CALCULATED.3IONS-SCNC: 11 MMOL/L (ref 5–15)
AST SERPL-CCNC: 13 U/L (ref 1–40)
BASOPHILS # BLD AUTO: 0.03 10*3/MM3 (ref 0–0.2)
BASOPHILS NFR BLD AUTO: 1.1 % (ref 0–1.5)
BILIRUB SERPL-MCNC: 0.5 MG/DL (ref 0–1.2)
BUN SERPL-MCNC: 15 MG/DL (ref 8–23)
BUN/CREAT SERPL: 18.8 (ref 7–25)
CALCIUM SPEC-SCNC: 9.3 MG/DL (ref 8.6–10.5)
CHLORIDE SERPL-SCNC: 104 MMOL/L (ref 98–107)
CO2 SERPL-SCNC: 26 MMOL/L (ref 22–29)
CREAT SERPL-MCNC: 0.8 MG/DL (ref 0.76–1.27)
DEPRECATED RDW RBC AUTO: 45.7 FL (ref 37–54)
EGFRCR SERPLBLD CKD-EPI 2021: 99.4 ML/MIN/1.73
EOSINOPHIL # BLD AUTO: 0.02 10*3/MM3 (ref 0–0.4)
EOSINOPHIL NFR BLD AUTO: 0.8 % (ref 0.3–6.2)
ERYTHROCYTE [DISTWIDTH] IN BLOOD BY AUTOMATED COUNT: 13.7 % (ref 12.3–15.4)
GLOBULIN UR ELPH-MCNC: 2 GM/DL
GLUCOSE SERPL-MCNC: 151 MG/DL (ref 65–99)
HCT VFR BLD AUTO: 40.9 % (ref 37.5–51)
HGB BLD-MCNC: 13.5 G/DL (ref 13–17.7)
HOLD SPECIMEN: NORMAL
HOLD SPECIMEN: NORMAL
LYMPHOCYTES # BLD AUTO: 1.07 10*3/MM3 (ref 0.7–3.1)
LYMPHOCYTES NFR BLD AUTO: 41 % (ref 19.6–45.3)
MCH RBC QN AUTO: 30.9 PG (ref 26.6–33)
MCHC RBC AUTO-ENTMCNC: 33 G/DL (ref 31.5–35.7)
MCV RBC AUTO: 93.6 FL (ref 79–97)
MONOCYTES # BLD AUTO: 0.43 10*3/MM3 (ref 0.1–0.9)
MONOCYTES NFR BLD AUTO: 16.5 % (ref 5–12)
NEUTROPHILS NFR BLD AUTO: 1.06 10*3/MM3 (ref 1.7–7)
NEUTROPHILS NFR BLD AUTO: 40.6 % (ref 42.7–76)
PLATELET # BLD AUTO: 188 10*3/MM3 (ref 140–450)
PMV BLD AUTO: 9.9 FL (ref 6–12)
POTASSIUM SERPL-SCNC: 4 MMOL/L (ref 3.5–5.2)
PROT SERPL-MCNC: 6.5 G/DL (ref 6–8.5)
RBC # BLD AUTO: 4.37 10*6/MM3 (ref 4.14–5.8)
SODIUM SERPL-SCNC: 141 MMOL/L (ref 136–145)
WBC NRBC COR # BLD AUTO: 2.61 10*3/MM3 (ref 3.4–10.8)

## 2024-03-15 PROCEDURE — 36415 COLL VENOUS BLD VENIPUNCTURE: CPT

## 2024-03-15 PROCEDURE — 85025 COMPLETE CBC W/AUTO DIFF WBC: CPT

## 2024-03-15 PROCEDURE — 80053 COMPREHEN METABOLIC PANEL: CPT | Performed by: INTERNAL MEDICINE

## 2024-04-19 RX ORDER — DAPAGLIFLOZIN 10 MG/1
10 TABLET, FILM COATED ORAL DAILY
Qty: 30 TABLET | Refills: 5 | Status: SHIPPED | OUTPATIENT
Start: 2024-04-19

## 2024-04-21 DIAGNOSIS — I10 ESSENTIAL (PRIMARY) HYPERTENSION: ICD-10-CM

## 2024-04-22 RX ORDER — AMLODIPINE BESYLATE 10 MG/1
TABLET ORAL
Qty: 90 TABLET | Refills: 1 | OUTPATIENT
Start: 2024-04-22

## 2024-04-24 NOTE — PROGRESS NOTES
04/29/24 0001   Pre-Procedure Phone Call   Procedure Time Verified Yes   Arrival Time 1300   Procedure Location Verified Yes   Medical History Reviewed No   NPO Status Reinforced Yes   Ride and Caregiver Arranged N/A   Bring Outside Films Requested   (Dr Patiño to read)

## 2024-04-28 DIAGNOSIS — I10 ESSENTIAL (PRIMARY) HYPERTENSION: ICD-10-CM

## 2024-04-28 RX ORDER — AMLODIPINE BESYLATE 10 MG/1
TABLET ORAL
Qty: 90 TABLET | Refills: 1 | OUTPATIENT
Start: 2024-04-28

## 2024-04-29 ENCOUNTER — HOSPITAL ENCOUNTER (OUTPATIENT)
Dept: CT IMAGING | Facility: HOSPITAL | Age: 64
Discharge: HOME | End: 2024-04-29
Payer: COMMERCIAL

## 2024-04-29 VITALS
HEART RATE: 56 BPM | DIASTOLIC BLOOD PRESSURE: 57 MMHG | OXYGEN SATURATION: 98 % | RESPIRATION RATE: 14 BRPM | SYSTOLIC BLOOD PRESSURE: 114 MMHG

## 2024-04-29 DIAGNOSIS — R94.39 ABNORMAL NUCLEAR STRESS TEST: ICD-10-CM

## 2024-04-29 DIAGNOSIS — E11.65 TYPE 2 DIABETES MELLITUS WITH HYPERGLYCEMIA, WITHOUT LONG-TERM CURRENT USE OF INSULIN: ICD-10-CM

## 2024-04-29 DIAGNOSIS — E78.5 DYSLIPIDEMIA: ICD-10-CM

## 2024-04-29 DIAGNOSIS — R00.1 BRADYCARDIA: ICD-10-CM

## 2024-04-29 DIAGNOSIS — I10 ESSENTIAL HYPERTENSION: ICD-10-CM

## 2024-04-29 DIAGNOSIS — I25.9 CHEST PAIN DUE TO MYOCARDIAL ISCHEMIA, UNSPECIFIED ISCHEMIC CHEST PAIN TYPE: ICD-10-CM

## 2024-04-29 DIAGNOSIS — R07.2 PRECORDIAL PAIN: ICD-10-CM

## 2024-04-29 LAB
CREAT BLDA-MCNC: 0.6 MG/DL (ref 0.6–1.3)
QT INTERVAL: 471 MS
QTC INTERVAL: 426 MS

## 2024-04-29 PROCEDURE — 25510000001 IOPAMIDOL PER 1 ML: Performed by: INTERNAL MEDICINE

## 2024-04-29 PROCEDURE — 75574 CT ANGIO HRT W/3D IMAGE: CPT | Performed by: STUDENT IN AN ORGANIZED HEALTH CARE EDUCATION/TRAINING PROGRAM

## 2024-04-29 PROCEDURE — 75574 CT ANGIO HRT W/3D IMAGE: CPT

## 2024-04-29 PROCEDURE — 82565 ASSAY OF CREATININE: CPT

## 2024-04-29 PROCEDURE — 93005 ELECTROCARDIOGRAM TRACING: CPT | Performed by: INTERNAL MEDICINE

## 2024-04-29 RX ORDER — NITROGLYCERIN 0.4 MG/1
0.8 TABLET SUBLINGUAL ONCE
Status: COMPLETED | OUTPATIENT
Start: 2024-04-29 | End: 2024-04-29

## 2024-04-29 RX ADMIN — NITROGLYCERIN 0.8 MG: 0.4 TABLET SUBLINGUAL at 14:01

## 2024-04-29 RX ADMIN — IOPAMIDOL 95 ML: 755 INJECTION, SOLUTION INTRAVENOUS at 14:03

## 2024-04-30 DIAGNOSIS — I10 ESSENTIAL (PRIMARY) HYPERTENSION: ICD-10-CM

## 2024-04-30 RX ORDER — AMLODIPINE BESYLATE 10 MG/1
TABLET ORAL
Qty: 90 TABLET | Refills: 1 | OUTPATIENT
Start: 2024-04-30

## 2024-05-02 ENCOUNTER — TELEPHONE (OUTPATIENT)
Dept: CARDIOLOGY | Facility: CLINIC | Age: 64
End: 2024-05-02
Payer: COMMERCIAL

## 2024-05-02 NOTE — TELEPHONE ENCOUNTER
Called patient with CT results.  Calcium score 18.  Low risk for CAD     Discussed that he does have moderate emphysema and to follow up with PCP.       He wishes to continue seeing Dr. Figueroa and not Dr. Silva.     Electronically signed by REYES Marshall, 05/02/24, 3:48 PM EDT.

## 2024-05-03 ENCOUNTER — TELEPHONE (OUTPATIENT)
Dept: FAMILY MEDICINE CLINIC | Facility: CLINIC | Age: 64
End: 2024-05-03

## 2024-05-03 DIAGNOSIS — I10 ESSENTIAL (PRIMARY) HYPERTENSION: ICD-10-CM

## 2024-05-03 RX ORDER — AMLODIPINE BESYLATE 10 MG/1
10 TABLET ORAL DAILY
Qty: 90 TABLET | Refills: 1 | Status: SHIPPED | OUTPATIENT
Start: 2024-05-03

## 2024-05-03 NOTE — TELEPHONE ENCOUNTER
"Caller: Bharathi Darnell \"Landon\"    Relationship: Self    Best call back number: [phone]     Requested Prescriptions:   Requested Prescriptions     Pending Prescriptions Disp Refills    amLODIPine (NORVASC) 10 MG tablet 90 tablet 1     Sig: Take 1 tablet by mouth Daily.        Pharmacy where request should be sent: [id number] - [place], [place] - [address] [place] [address] [phone] [facility] [phone]      Last office visit with prescribing clinician: 1/15/2024   Last telemedicine visit with prescribing clinician: Visit date not found   Next office visit with prescribing clinician: 5/22/2024       Does the patient have less than a 3 day supply:  [] Yes  [x] No    Miguelito Manuel Rep   05/03/24 09:47 EDT       "

## 2024-05-03 NOTE — TELEPHONE ENCOUNTER
"Caller: Bharathi Darnell \"Landon\"    Relationship: Self    Best call back number: [phone]     What test was performed: CT ANGIOGRAM    When was the test performed: 4/29/2024    Where was the test performed: [facility]    Additional notes: REQUESTING A CALL TO GO OVER RESULTS    SPOKE WITH CARDIOLOGY AND WAS TOLD HEART WAS OKAY BUT THERE WAS SOMETHING SEEN THE LUNGS  "

## 2024-05-09 NOTE — TELEPHONE ENCOUNTER
He called wanting to know about the lung concern they found on the CT Dr. Silva ordered.   He does have an appt on 5/22/24.

## 2024-05-10 ENCOUNTER — PATIENT MESSAGE (OUTPATIENT)
Dept: CARDIOLOGY | Facility: CLINIC | Age: 64
End: 2024-05-10

## 2024-05-14 NOTE — PROGRESS NOTES
HEMATOLOGY ONCOLOGY OUTPATIENT FOLLOW-UP       Patient name: Bharathi Darnell  : 1960  MRN: [id number]  Primary Care Physician: Jamar Pham MD  Referring Physician: No ref. provider found  Reason For Consult: Chronic lymphocytic leukemia.    History of Present Illness:  Patient is a 63 y.o.     2024: In the office for the first time to transfer his care. Sometime in  he felt a preauricular nodule that seemed to come and go for some time. Later he found another similar lesion in the submental area. He sough attention from his otolaryngologist and he had a biopsy of the submental lesion. The final report of pathology was of lymphocytic leukemia/small lymphocytic lymphoma with 90% of the the cells being positive for CD19/dim CD20, positive CD52 and CD23. The cells were lambda light chain restricted. He had lymphocytosis in the peripheral blood. A bone marrow biopsy and aspiration showed the same with a mixed pattern of involvement. 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 He had a PET scan that not surprisingly revealed no significant abnormalities. He was seen at the [facility], where he was not felt to have an indication for treatment. However, with a slowly increasing lymphocyte count and some enlargement of lymph nodes on CT, a decision was made to start treatment with obinutuzumab and acalabrutinib. He received the first cycle in 2023. He completed 4 cycles and shortly after the last cycle, in early 2023,  he presented to the hospital with abdominal pain. He was found to have evidence of small bowel obstruction and he was admitted. He was not felt to require surgical intervention and his symptoms eventually resolved. He had atrial fibrillation that also had resolved at the time of the discharge. Today he feels much better but has  continued to be weak and is still having physical therapy. He has been eating better and has regained a small amount of the more than 20 lbs of weight he had lost. He has been free of chest pain. No abdominal pain or diarrhea and no dysuria. On exam no palpable adenopathy or hepato-splenomegaly. The laboratory exams reported a completely normal blood count. A decision was made to continue observation and to follow closely. I asked him to continue with the physical therapy. I asked him to come see me in approximately 4 weeks.     3/15/2024: Feeling reasonably well.  Having some pain in both hands.  Fatigue.  Appetite is good and he has been eating well.  He has had no nausea.  He has been afebrile and without nocturnal diaphoresis.  Denies any changes in bowel activity and has not had any dysuria.  On exam he is alert and oriented.  He is conversant and in good spirits.  No jaundice or pallor.  Well-hydrated.  Lungs clear.  Heart regular.  Abdomen without splenomegaly.  No edema.  Laboratory exams reviewed.  Discussed with him.  No intervention at this point.  Will continue to follow.  Discussed physical activity as a way to counteract the fatigue he is experiencing.    5/17/2024: Entirely asymptomatic.  Underwent screening for coronary artery disease and was found to have minimal calcium deposits.  Ejection fraction was adequate and he had some evidence of emphysema.  Outside of that energetic and with good appetite.  Active and without limitations.  On exam alert, conversant and not ill-appearing.  No jaundice.  The lungs are clear.  The heart is regular.  Abdomen soft and the liver and spleen are nonenlarged.  No edema.  The laboratory reported an unremarkable blood count.  Discussed with him and with his spouse.  To see me in 4 months.  No intervention at this time.    Past Medical History:   Diagnosis Date    CLL (chronic lymphocytic leukemia)     2022    DM (diabetes mellitus), type 2     Erectile dysfunction      Hyperlipidemia     Hypertension      Past Surgical History:   Procedure Laterality Date    COLONOSCOPY      2019       Current Outpatient Medications:     amLODIPine (NORVASC) 10 MG tablet, Take 1 tablet by mouth Daily., Disp: 90 tablet, Rfl: 1    Budesonide (ENTOCORT EC) 3 MG 24 hr capsule, Take 1 capsule by mouth Every Morning., Disp: , Rfl:     famotidine (PEPCID) 40 MG tablet, Take 1 tablet by mouth Daily., Disp: , Rfl:     Farxiga 10 MG tablet, TAKE 10 MG BY MOUTH DAILY., Disp: 30 tablet, Rfl: 5    glucose blood (OneTouch Verio) test strip, 1 each by Other route Daily. Dx: E11.65. Use as instructed, Disp: 50 each, Rfl: 2    Lancets (OneTouch Delica Plus [id number]) misc, Use 1 each Daily. Dx: E11.65, Disp: 100 each, Rfl: 2    lisinopril (PRINIVIL,ZESTRIL) 40 MG tablet, Take 1 tablet by mouth Daily. for blood pressure., Disp: 90 tablet, Rfl: 3    phenol (CHLORASEPTIC) 1.4 % liquid liquid, Apply 1 spray to the mouth or throat Every 2 (Two) Hours As Needed (dulce throat)., Disp: 250 mL, Rfl: 0    potassium chloride (K-DUR,KLOR-CON) 20 MEQ CR tablet, Take 1 tablet by mouth Daily., Disp: 90 tablet, Rfl: 1    pravastatin (Pravachol) 20 MG tablet, Take 1 tablet by mouth Daily., Disp: 90 tablet, Rfl: 3    sildenafil (Viagra) 100 MG tablet, Take 1 tablet by mouth Daily As Needed for Erectile Dysfunction., Disp: , Rfl:     vitamin D (ERGOCALCIFEROL) 1.25 MG (25974 UT) capsule capsule, Take 1 capsule by mouth 2 (Two) Times a Week., Disp: , Rfl:     Allergies   Allergen Reactions    Bactrim [Sulfamethoxazole-Trimethoprim] Rash     Family History   Problem Relation Age of Onset    Diabetes Mother     Hyperlipidemia Mother     Stroke Sister      Cancer-related family history is not on file.    Social History     Tobacco Use    Smoking status: Never     Passive exposure: Never    Smokeless tobacco: Never   Vaping Use    Vaping status: Never Used   Substance Use Topics    Alcohol use: Not Currently     Comment: Occasional     Drug use: Never     Social History     Social History Narrative    Not on file     ROS:   Review of Systems   Constitutional:  Negative for activity change, appetite change, chills, diaphoresis, fatigue, fever and unexpected weight change.   HENT:  Negative for congestion, dental problem, drooling, ear discharge, ear pain, facial swelling, hearing loss, mouth sores, nosebleeds, postnasal drip, rhinorrhea, sinus pressure, sinus pain, sneezing, sore throat, tinnitus, trouble swallowing and voice change.    Eyes:  Negative for photophobia, pain, discharge, redness, itching and visual disturbance.   Respiratory:  Negative for apnea, cough, choking, chest tightness, shortness of breath, wheezing and stridor.    Cardiovascular:  Negative for chest pain, palpitations and leg swelling.   Gastrointestinal:  Negative for abdominal distention, abdominal pain, anal bleeding, blood in stool, constipation, diarrhea, nausea, rectal pain and vomiting.   Endocrine: Negative for cold intolerance, heat intolerance, polydipsia and polyuria.   Genitourinary:  Negative for decreased urine volume, difficulty urinating, dysuria, flank pain, frequency, genital sores, hematuria and urgency.   Musculoskeletal:  Negative for arthralgias, back pain, gait problem, joint swelling, myalgias, neck pain and neck stiffness.   Skin:  Negative for color change, pallor and rash.   Neurological:  Negative for dizziness, tremors, seizures, syncope, facial asymmetry, speech difficulty, weakness, light-headedness, numbness and headaches.   Hematological:  Negative for adenopathy. Does not bruise/bleed easily.   Psychiatric/Behavioral:  Negative for agitation, behavioral problems, confusion, decreased concentration, hallucinations, self-injury, sleep disturbance and suicidal ideas. The patient is not nervous/anxious.      Objective:    Vital Signs:  Vitals:    05/17/24 1004   BP: 133/74   Pulse: 52   Temp: 98 °F (36.7 °C)   TempSrc: Temporal   SpO2: 98%  "  Weight: 101 kg (221 lb 9.6 oz)   Height: 185.4 cm (73\")   PainSc: 0-No pain     Body mass index is 29.24 kg/m².    ECOG  (0) Fully active, able to carry on all predisease performance without restriction    Physical Exam:   Physical Exam  Constitutional:       General: He is not in acute distress.     Appearance: He is not ill-appearing, toxic-appearing or diaphoretic.   HENT:      Head: Normocephalic and atraumatic.      Right Ear: External ear normal.      Left Ear: External ear normal.      Nose: Nose normal.      Mouth/Throat:      Mouth: Mucous membranes are moist.      Pharynx: Oropharynx is clear.   Eyes:      General: No scleral icterus.        Right eye: No discharge.         Left eye: No discharge.      Conjunctiva/sclera: Conjunctivae normal.      Pupils: Pupils are equal, round, and reactive to light.   Cardiovascular:      Rate and Rhythm: Normal rate and regular rhythm.      Pulses: Normal pulses.      Heart sounds: Normal heart sounds. No murmur heard.     No friction rub. No gallop.   Pulmonary:      Effort: No respiratory distress.      Breath sounds: No stridor. No wheezing, rhonchi or rales.   Chest:      Chest wall: No tenderness.   Abdominal:      General: Abdomen is flat. Bowel sounds are normal. There is no distension.      Palpations: Abdomen is soft. There is no mass.      Tenderness: There is no abdominal tenderness. There is no right CVA tenderness, left CVA tenderness, guarding or rebound.   Musculoskeletal:         General: No swelling, tenderness, deformity or signs of injury.      Cervical back: No rigidity.      Right lower leg: No edema.      Left lower leg: No edema.   Lymphadenopathy:      Cervical: No cervical adenopathy.   Skin:     General: Skin is warm and dry.      Coloration: Skin is not jaundiced.      Findings: No bruising or rash.   Neurological:      General: No focal deficit present.      Mental Status: He is alert and oriented to person, place, and time.      Gait: " Gait normal.   Psychiatric:         Mood and Affect: Mood normal.         Behavior: Behavior normal.         Thought Content: Thought content normal.         Judgment: Judgment normal.     ASHLEY Pedersen MD performed the physical exam on 5/17/2024 as documented above.    Lab Results - Last 18 Months   Lab Units 05/17/24  1007 03/15/24  1000 01/18/24  1049   WBC 10*3/mm3 4.67 2.61* 4.46   HEMOGLOBIN g/dL 13.2 13.5 14.5   HEMATOCRIT % 39.9 40.9 42.8   PLATELETS 10*3/mm3 159 188 167   MCV fL 93.0 93.6 89.5     Lab Results - Last 18 Months   Lab Units 04/29/24  1325 03/15/24  1000 12/29/23  0948 12/13/23  0756 12/12/23  2305 12/12/23  0333   SODIUM mmol/L  --  141 144 142  --  151*   POTASSIUM mmol/L  --  4.0 3.2* 3.1*   < > 3.3*   CHLORIDE mmol/L  --  104 103 105  --  116*   CO2 mmol/L  --  26.0 29.0 26.0  --  27.0   BUN mg/dL  --  15 11 8  --  11   CREATININE mg/dL 0.60 0.80 0.64* 0.45*  --  0.62*   CALCIUM mg/dL  --  9.3 8.9 8.1*  --  8.5*   BILIRUBIN mg/dL  --  0.5  --  0.3  --  0.3   ALK PHOS U/L  --  42  --  55  --  51   ALT (SGPT) U/L  --  24  --  63*  --  17   AST (SGOT) U/L  --  13  --  99*  --  18   GLUCOSE mg/dL  --  151* 134* 99  --  122*    < > = values in this interval not displayed.     Lab Results   Component Value Date    GLUCOSE 151 (H) 03/15/2024    BUN 15 03/15/2024    CREATININE 0.60 04/29/2024    BCR 18.8 03/15/2024    K 4.0 03/15/2024    CO2 26.0 03/15/2024    CALCIUM 9.3 03/15/2024    ALBUMIN 4.5 03/15/2024    AST 13 03/15/2024    ALT 24 03/15/2024     Lab Results - Last 18 Months   Lab Units 12/08/23  0256   INR  1.01   APTT seconds 34.6*     Lab Results   Component Value Date    GXBVUSQC28 546 02/14/2023     Uric Acid   Date Value Ref Range Status   06/01/2023 4.8 3.4 - 7.0 mg/dL Final     Lab Results   Component Value Date    PTT 34.6 (H) 12/08/2023    INR 1.01 12/08/2023     Lab Results   Component Value Date    PSA 4.100 (H) 05/17/2023     Assessment & Plan     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 Received 4 cycles of obinutuzumab/acalabrutinib.  The treatment was discontinued in the setting of bowel obstruction and atrial fibrillation that resolved spontaneously.  Evidently he had a good response and at this time no need for intervention.  I will continue to follow him closely and will see me again in approximately 4 months.  Reviewed the blood counts and previous laboratory exams reviewed the CT scan of the chest and heart and discussed with him.  He will return to see me in approximately 4 months.    Eugenio Pedersen MD on 5/17/2024 at 10:33 AM.

## 2024-05-17 ENCOUNTER — OFFICE VISIT (OUTPATIENT)
Dept: ONCOLOGY | Facility: CLINIC | Age: 64
End: 2024-05-17
Payer: COMMERCIAL

## 2024-05-17 ENCOUNTER — LAB (OUTPATIENT)
Dept: LAB | Facility: HOSPITAL | Age: 64
End: 2024-05-17
Payer: COMMERCIAL

## 2024-05-17 VITALS
HEIGHT: 73 IN | DIASTOLIC BLOOD PRESSURE: 74 MMHG | SYSTOLIC BLOOD PRESSURE: 133 MMHG | BODY MASS INDEX: 29.37 KG/M2 | HEART RATE: 52 BPM | TEMPERATURE: 98 F | WEIGHT: 221.6 LBS | OXYGEN SATURATION: 98 %

## 2024-05-17 DIAGNOSIS — C91.10 CLL (CHRONIC LYMPHOCYTIC LEUKEMIA): Primary | ICD-10-CM

## 2024-05-17 LAB
ALBUMIN SERPL-MCNC: 4.1 G/DL (ref 3.5–5.2)
ALBUMIN/GLOB SERPL: 1.5 G/DL
ALP SERPL-CCNC: 37 U/L (ref 39–117)
ALT SERPL W P-5'-P-CCNC: 20 U/L (ref 1–41)
ANION GAP SERPL CALCULATED.3IONS-SCNC: 10 MMOL/L (ref 5–15)
AST SERPL-CCNC: 21 U/L (ref 1–40)
BASOPHILS # BLD AUTO: 0.15 10*3/MM3 (ref 0–0.2)
BASOPHILS NFR BLD AUTO: 3.2 % (ref 0–1.5)
BILIRUB SERPL-MCNC: 0.5 MG/DL (ref 0–1.2)
BUN SERPL-MCNC: 18 MG/DL (ref 8–23)
BUN/CREAT SERPL: 26.9 (ref 7–25)
CALCIUM SPEC-SCNC: 9.3 MG/DL (ref 8.6–10.5)
CHLORIDE SERPL-SCNC: 104 MMOL/L (ref 98–107)
CO2 SERPL-SCNC: 24 MMOL/L (ref 22–29)
CREAT SERPL-MCNC: 0.67 MG/DL (ref 0.76–1.27)
DEPRECATED RDW RBC AUTO: 45.4 FL (ref 37–54)
EGFRCR SERPLBLD CKD-EPI 2021: 104.9 ML/MIN/1.73
EOSINOPHIL # BLD AUTO: 0.03 10*3/MM3 (ref 0–0.4)
EOSINOPHIL NFR BLD AUTO: 0.6 % (ref 0.3–6.2)
ERYTHROCYTE [DISTWIDTH] IN BLOOD BY AUTOMATED COUNT: 13.6 % (ref 12.3–15.4)
GLOBULIN UR ELPH-MCNC: 2.7 GM/DL
GLUCOSE SERPL-MCNC: 159 MG/DL (ref 65–99)
HCT VFR BLD AUTO: 39.9 % (ref 37.5–51)
HGB BLD-MCNC: 13.2 G/DL (ref 13–17.7)
HOLD SPECIMEN: NORMAL
LYMPHOCYTES # BLD AUTO: 1.03 10*3/MM3 (ref 0.7–3.1)
LYMPHOCYTES NFR BLD AUTO: 22.1 % (ref 19.6–45.3)
MCH RBC QN AUTO: 30.8 PG (ref 26.6–33)
MCHC RBC AUTO-ENTMCNC: 33.1 G/DL (ref 31.5–35.7)
MCV RBC AUTO: 93 FL (ref 79–97)
MONOCYTES # BLD AUTO: 0.53 10*3/MM3 (ref 0.1–0.9)
MONOCYTES NFR BLD AUTO: 11.3 % (ref 5–12)
NEUTROPHILS NFR BLD AUTO: 2.93 10*3/MM3 (ref 1.7–7)
NEUTROPHILS NFR BLD AUTO: 62.8 % (ref 42.7–76)
PLATELET # BLD AUTO: 159 10*3/MM3 (ref 140–450)
PMV BLD AUTO: 10.2 FL (ref 6–12)
POTASSIUM SERPL-SCNC: 4.1 MMOL/L (ref 3.5–5.2)
PROT SERPL-MCNC: 6.8 G/DL (ref 6–8.5)
RBC # BLD AUTO: 4.29 10*6/MM3 (ref 4.14–5.8)
SODIUM SERPL-SCNC: 138 MMOL/L (ref 136–145)
WBC NRBC COR # BLD AUTO: 4.67 10*3/MM3 (ref 3.4–10.8)

## 2024-05-17 PROCEDURE — 85025 COMPLETE CBC W/AUTO DIFF WBC: CPT

## 2024-05-17 PROCEDURE — 80053 COMPREHEN METABOLIC PANEL: CPT | Performed by: INTERNAL MEDICINE

## 2024-05-17 PROCEDURE — 36415 COLL VENOUS BLD VENIPUNCTURE: CPT

## 2024-05-22 ENCOUNTER — OFFICE VISIT (OUTPATIENT)
Dept: FAMILY MEDICINE CLINIC | Facility: CLINIC | Age: 64
End: 2024-05-22
Payer: COMMERCIAL

## 2024-05-22 VITALS
HEART RATE: 59 BPM | HEIGHT: 73 IN | TEMPERATURE: 97.8 F | WEIGHT: 220.5 LBS | OXYGEN SATURATION: 98 % | BODY MASS INDEX: 29.22 KG/M2 | DIASTOLIC BLOOD PRESSURE: 78 MMHG | SYSTOLIC BLOOD PRESSURE: 134 MMHG

## 2024-05-22 DIAGNOSIS — E11.42 TYPE 2 DIABETES MELLITUS WITH DIABETIC POLYNEUROPATHY, WITHOUT LONG-TERM CURRENT USE OF INSULIN: Primary | ICD-10-CM

## 2024-05-22 PROCEDURE — 90471 IMMUNIZATION ADMIN: CPT | Performed by: FAMILY MEDICINE

## 2024-05-22 PROCEDURE — 99213 OFFICE O/P EST LOW 20 MIN: CPT | Performed by: FAMILY MEDICINE

## 2024-05-22 PROCEDURE — 90677 PCV20 VACCINE IM: CPT | Performed by: FAMILY MEDICINE

## 2024-05-22 NOTE — PROGRESS NOTES
"Subjective   Bharathi Darnell is a 63 y.o. male.     History of Present Illness  Bharathi Darnell is in for follow up on his issues with diabetes.  He has been seeing pulmonary about some concerns following a recent chest CT.. There is no history of chest pain of late. There is no history of issue with bowel or bladder function. There is no history of vision or hearing problems. There is no history of dizziness or lightheadedness. There is no history of issue with sleep or mood. As for checking blood sugar readings, he has not been checking of late and is due for labs. There is no issue with medication compliance. Diet and exercise compliance has been fair.    Diabetes  Associated symptoms include fatigue. Pertinent negatives for diabetes include no chest pain.          /78 (BP Location: Left arm, Patient Position: Sitting, Cuff Size: Large Adult)   Pulse 59   Temp 97.8 °F (36.6 °C) (Temporal)   Ht 185.4 cm (72.99\")   Wt 100 kg (220 lb 8 oz)   SpO2 98%   BMI 29.10 kg/m²       Chief Complaint   Patient presents with    Diabetes     3 month f/u & saw Dr. Mixon regarding CT and did a finger stick - takes 3-4 weeks for results and then will do a 6 month repeat of CT in 10/24           Current Outpatient Medications:     amLODIPine (NORVASC) 10 MG tablet, Take 1 tablet by mouth Daily., Disp: 90 tablet, Rfl: 1    Budesonide (ENTOCORT EC) 3 MG 24 hr capsule, Take 1 capsule by mouth Every Morning., Disp: , Rfl:     famotidine (PEPCID) 40 MG tablet, Take 1 tablet by mouth Daily., Disp: , Rfl:     Farxiga 10 MG tablet, TAKE 10 MG BY MOUTH DAILY., Disp: 30 tablet, Rfl: 5    glucose blood (OneTouch Verio) test strip, 1 each by Other route Daily. Dx: E11.65. Use as instructed, Disp: 50 each, Rfl: 2    Lancets (OneTouch Delica Plus [id number]) misc, Use 1 each Daily. Dx: E11.65, Disp: 100 each, Rfl: 2    lisinopril (PRINIVIL,ZESTRIL) 40 MG tablet, Take 1 tablet by mouth Daily. for blood pressure., Disp: 90 tablet, " Rfl: 3    phenol (CHLORASEPTIC) 1.4 % liquid liquid, Apply 1 spray to the mouth or throat Every 2 (Two) Hours As Needed (dulce throat)., Disp: 250 mL, Rfl: 0    potassium chloride (K-DUR,KLOR-CON) 20 MEQ CR tablet, Take 1 tablet by mouth Daily., Disp: 90 tablet, Rfl: 1    pravastatin (Pravachol) 20 MG tablet, Take 1 tablet by mouth Daily., Disp: 90 tablet, Rfl: 3    sildenafil (Viagra) 100 MG tablet, Take 1 tablet by mouth Daily As Needed for Erectile Dysfunction., Disp: , Rfl:     vitamin D (ERGOCALCIFEROL) 1.25 MG (18365 UT) capsule capsule, Take 1 capsule by mouth 2 (Two) Times a Week., Disp: , Rfl:     Lab Results   Component Value Date    HGBA1C 8.30 (H) 10/19/2023    HGBA1C 7.60 (H) 05/17/2023    HGBA1C 6.9 (H) 02/14/2023     Lab Results   Component Value Date    MICROALBUR 5.5 10/19/2023    CREATININE 0.67 (L) 05/17/2024         Wt Readings from Last 3 Encounters:   05/22/24 100 kg (220 lb 8 oz)   05/17/24 101 kg (221 lb 9.6 oz)   03/15/24 96.1 kg (211 lb 12.8 oz)       The following portions of the patient's history were reviewed and updated as appropriate: allergies, current medications, past family history, past medical history, past social history, past surgical history, and problem list.    Review of Systems   Constitutional:  Positive for fatigue. Negative for activity change, diaphoresis and fever.   HENT:  Negative for congestion, sinus pressure, sinus pain, sore throat and trouble swallowing.    Eyes:  Negative for visual disturbance.   Respiratory:  Negative for chest tightness, shortness of breath and wheezing.    Cardiovascular:  Negative for chest pain.   Gastrointestinal:  Negative for abdominal distention, abdominal pain, constipation, diarrhea, nausea and vomiting.   Genitourinary:  Negative for difficulty urinating and dysuria.   Musculoskeletal:  Negative for back pain and neck pain.   Psychiatric/Behavioral:  Negative for agitation, hallucinations and suicidal ideas.        Objective    Physical Exam  Vitals and nursing note reviewed.   Constitutional:       Appearance: Normal appearance.   HENT:      Right Ear: Tympanic membrane and ear canal normal.      Left Ear: Tympanic membrane and ear canal normal.   Cardiovascular:      Rate and Rhythm: Normal rate and regular rhythm.      Heart sounds: Normal heart sounds. No murmur heard.  Pulmonary:      Effort: Pulmonary effort is normal.      Breath sounds: No wheezing or rales.   Abdominal:      General: Bowel sounds are normal.      Palpations: Abdomen is soft.      Tenderness: There is no abdominal tenderness. There is no guarding.   Musculoskeletal:      Cervical back: Neck supple.      Right lower leg: No edema.      Left lower leg: No edema.   Lymphadenopathy:      Cervical: No cervical adenopathy.   Neurological:      Mental Status: He is alert and oriented to person, place, and time. Mental status is at baseline.   Psychiatric:         Mood and Affect: Mood normal.           Assessment & Plan   Problems Addressed this Visit          Endocrine and Metabolic    Type 2 diabetes mellitus - Primary    Relevant Orders    Lipid Panel    CBC & Differential    MicroAlbumin, Urine, Random - Urine, Clean Catch    Comprehensive Metabolic Panel    Hemoglobin A1c    Magnesium     Diagnoses         Codes Comments    Type 2 diabetes mellitus with diabetic polyneuropathy, without long-term current use of insulin    -  Primary ICD-10-CM: E11.42  ICD-9-CM: 250.60, 357.2           I will follow-up on results of some fasting labs he will get  Have asked him to call me with new concerns  I have asked him to see me again in 6 months    I am giving him his pneumonia vaccine today

## 2024-05-23 ENCOUNTER — LAB (OUTPATIENT)
Dept: FAMILY MEDICINE CLINIC | Facility: CLINIC | Age: 64
End: 2024-05-23
Payer: COMMERCIAL

## 2024-05-23 LAB
ALBUMIN SERPL-MCNC: 4.2 G/DL (ref 3.5–5.2)
ALBUMIN UR-MCNC: 10.7 MG/DL
ALBUMIN/GLOB SERPL: 1.7 G/DL
ALP SERPL-CCNC: 40 U/L (ref 39–117)
ALT SERPL W P-5'-P-CCNC: 19 U/L (ref 1–41)
ANION GAP SERPL CALCULATED.3IONS-SCNC: 13.7 MMOL/L (ref 5–15)
AST SERPL-CCNC: 16 U/L (ref 1–40)
BASOPHILS # BLD AUTO: 0.02 10*3/MM3 (ref 0–0.2)
BASOPHILS NFR BLD AUTO: 0.6 % (ref 0–1.5)
BILIRUB SERPL-MCNC: 0.4 MG/DL (ref 0–1.2)
BUN SERPL-MCNC: 14 MG/DL (ref 8–23)
BUN/CREAT SERPL: 19.2 (ref 7–25)
CALCIUM SPEC-SCNC: 9.3 MG/DL (ref 8.6–10.5)
CHLORIDE SERPL-SCNC: 105 MMOL/L (ref 98–107)
CHOLEST SERPL-MCNC: 184 MG/DL (ref 0–200)
CO2 SERPL-SCNC: 22.3 MMOL/L (ref 22–29)
CREAT SERPL-MCNC: 0.73 MG/DL (ref 0.76–1.27)
DEPRECATED RDW RBC AUTO: 41.8 FL (ref 37–54)
EGFRCR SERPLBLD CKD-EPI 2021: 102.2 ML/MIN/1.73
EOSINOPHIL # BLD AUTO: 0.02 10*3/MM3 (ref 0–0.4)
EOSINOPHIL NFR BLD AUTO: 0.6 % (ref 0.3–6.2)
ERYTHROCYTE [DISTWIDTH] IN BLOOD BY AUTOMATED COUNT: 12.8 % (ref 12.3–15.4)
GLOBULIN UR ELPH-MCNC: 2.5 GM/DL
GLUCOSE SERPL-MCNC: 132 MG/DL (ref 65–99)
HBA1C MFR BLD: 7.1 % (ref 4.8–5.6)
HCT VFR BLD AUTO: 38.4 % (ref 37.5–51)
HDLC SERPL-MCNC: 77 MG/DL (ref 40–60)
HGB BLD-MCNC: 13.1 G/DL (ref 13–17.7)
IMM GRANULOCYTES # BLD AUTO: 0.06 10*3/MM3 (ref 0–0.05)
IMM GRANULOCYTES NFR BLD AUTO: 1.7 % (ref 0–0.5)
LDLC SERPL CALC-MCNC: 94 MG/DL (ref 0–100)
LDLC/HDLC SERPL: 1.2 {RATIO}
LYMPHOCYTES # BLD AUTO: 0.84 10*3/MM3 (ref 0.7–3.1)
LYMPHOCYTES NFR BLD AUTO: 23.5 % (ref 19.6–45.3)
MAGNESIUM SERPL-MCNC: 2.1 MG/DL (ref 1.6–2.4)
MCH RBC QN AUTO: 30.8 PG (ref 26.6–33)
MCHC RBC AUTO-ENTMCNC: 34.1 G/DL (ref 31.5–35.7)
MCV RBC AUTO: 90.4 FL (ref 79–97)
MONOCYTES # BLD AUTO: 0.48 10*3/MM3 (ref 0.1–0.9)
MONOCYTES NFR BLD AUTO: 13.4 % (ref 5–12)
NEUTROPHILS NFR BLD AUTO: 2.15 10*3/MM3 (ref 1.7–7)
NEUTROPHILS NFR BLD AUTO: 60.2 % (ref 42.7–76)
NRBC BLD AUTO-RTO: 0 /100 WBC (ref 0–0.2)
PLATELET # BLD AUTO: 171 10*3/MM3 (ref 140–450)
PMV BLD AUTO: 10.3 FL (ref 6–12)
POTASSIUM SERPL-SCNC: 3.9 MMOL/L (ref 3.5–5.2)
PROT SERPL-MCNC: 6.7 G/DL (ref 6–8.5)
RBC # BLD AUTO: 4.25 10*6/MM3 (ref 4.14–5.8)
SODIUM SERPL-SCNC: 141 MMOL/L (ref 136–145)
TRIGL SERPL-MCNC: 72 MG/DL (ref 0–150)
VLDLC SERPL-MCNC: 13 MG/DL (ref 5–40)
WBC NRBC COR # BLD AUTO: 3.57 10*3/MM3 (ref 3.4–10.8)

## 2024-05-23 PROCEDURE — 85025 COMPLETE CBC W/AUTO DIFF WBC: CPT | Performed by: FAMILY MEDICINE

## 2024-05-23 PROCEDURE — 82043 UR ALBUMIN QUANTITATIVE: CPT | Performed by: FAMILY MEDICINE

## 2024-05-23 PROCEDURE — 80053 COMPREHEN METABOLIC PANEL: CPT | Performed by: FAMILY MEDICINE

## 2024-05-23 PROCEDURE — 83735 ASSAY OF MAGNESIUM: CPT | Performed by: FAMILY MEDICINE

## 2024-05-23 PROCEDURE — 36415 COLL VENOUS BLD VENIPUNCTURE: CPT | Performed by: FAMILY MEDICINE

## 2024-05-23 PROCEDURE — 80061 LIPID PANEL: CPT | Performed by: FAMILY MEDICINE

## 2024-05-23 PROCEDURE — 83036 HEMOGLOBIN GLYCOSYLATED A1C: CPT | Performed by: FAMILY MEDICINE

## 2024-05-30 ENCOUNTER — TELEPHONE (OUTPATIENT)
Dept: FAMILY MEDICINE CLINIC | Facility: CLINIC | Age: 64
End: 2024-05-30

## 2024-05-30 NOTE — TELEPHONE ENCOUNTER
"    Caller: Bharathi Darnell \"Landon\"    Relationship: Self    Best call back number: [phone]    Caller requesting test results:     What test was performed: LABS    When was the test performed: 5/23/24    Where was the test performed:     Additional notes: WILL NEED A PRINTED COPY, WILL , PLEASE HIGHLIGHT THE AREAS THE PATIENT NEEDS TO PAY PARTICULAR ATTENTION TO        "

## 2024-07-11 DIAGNOSIS — E87.6 HYPOKALEMIA: ICD-10-CM

## 2024-07-11 RX ORDER — POTASSIUM CHLORIDE 1500 MG/1
20 TABLET, EXTENDED RELEASE ORAL DAILY
Qty: 90 TABLET | Refills: 1 | Status: SHIPPED | OUTPATIENT
Start: 2024-07-11

## 2024-07-21 DIAGNOSIS — I10 ESSENTIAL (PRIMARY) HYPERTENSION: ICD-10-CM

## 2024-07-21 RX ORDER — AMLODIPINE BESYLATE 10 MG/1
10 TABLET ORAL DAILY
Qty: 90 TABLET | Refills: 1 | Status: SHIPPED | OUTPATIENT
Start: 2024-07-21

## 2024-07-23 ENCOUNTER — TELEPHONE (OUTPATIENT)
Dept: FAMILY MEDICINE CLINIC | Facility: CLINIC | Age: 64
End: 2024-07-23
Payer: COMMERCIAL

## 2024-07-23 RX ORDER — DAPAGLIFLOZIN 10 MG/1
10 TABLET, FILM COATED ORAL DAILY
Qty: 30 TABLET | Refills: 5 | Status: SHIPPED | OUTPATIENT
Start: 2024-07-23

## 2024-07-23 NOTE — TELEPHONE ENCOUNTER
Pt is not due for refill on his Farxiga until 7/28, however pt will be out of town after Thursday.  He is wondering if it is possible to send in early please.

## 2024-08-05 ENCOUNTER — TELEPHONE (OUTPATIENT)
Dept: FAMILY MEDICINE CLINIC | Facility: CLINIC | Age: 64
End: 2024-08-05
Payer: COMMERCIAL

## 2024-08-05 NOTE — TELEPHONE ENCOUNTER
Pharmacy Name: [facility]/PHARMACY #[id number] - [place], [place] - [address] [phone] [facility] [phone]      What medication are you calling in regards to:     dapagliflozin Propanediol (Farxiga) 10 MG tablet     What question does the pharmacy have:MEDICATION COST $100 WHEN PATIENT WENT TO FILL. REQUESTING A CALL TO DISCUSS WHAT CAN BE DONE TO GET CHEAPER

## 2024-09-19 DIAGNOSIS — I10 ESSENTIAL (PRIMARY) HYPERTENSION: ICD-10-CM

## 2024-09-19 RX ORDER — AMLODIPINE BESYLATE 10 MG/1
10 TABLET ORAL DAILY
Qty: 90 TABLET | Refills: 1 | Status: SHIPPED | OUTPATIENT
Start: 2024-09-19

## 2024-09-20 ENCOUNTER — OFFICE VISIT (OUTPATIENT)
Dept: ONCOLOGY | Facility: CLINIC | Age: 64
End: 2024-09-20
Payer: COMMERCIAL

## 2024-09-20 ENCOUNTER — LAB (OUTPATIENT)
Dept: LAB | Facility: HOSPITAL | Age: 64
End: 2024-09-20
Payer: COMMERCIAL

## 2024-09-20 VITALS
SYSTOLIC BLOOD PRESSURE: 138 MMHG | RESPIRATION RATE: 18 BRPM | HEART RATE: 52 BPM | DIASTOLIC BLOOD PRESSURE: 75 MMHG | OXYGEN SATURATION: 98 % | HEIGHT: 73 IN | WEIGHT: 232.4 LBS | BODY MASS INDEX: 30.8 KG/M2 | TEMPERATURE: 97.7 F

## 2024-09-20 DIAGNOSIS — C91.10 CLL (CHRONIC LYMPHOCYTIC LEUKEMIA): Primary | ICD-10-CM

## 2024-09-20 LAB
ALBUMIN SERPL-MCNC: 4.4 G/DL (ref 3.5–5.2)
ALBUMIN/GLOB SERPL: 1.8 G/DL
ALP SERPL-CCNC: 43 U/L (ref 39–117)
ALT SERPL W P-5'-P-CCNC: 15 U/L (ref 1–41)
ANION GAP SERPL CALCULATED.3IONS-SCNC: 9.2 MMOL/L (ref 5–15)
AST SERPL-CCNC: 17 U/L (ref 1–40)
BASOPHILS # BLD AUTO: 0.02 10*3/MM3 (ref 0–0.2)
BASOPHILS NFR BLD AUTO: 0.7 % (ref 0–1.5)
BILIRUB SERPL-MCNC: 0.4 MG/DL (ref 0–1.2)
BUN SERPL-MCNC: 15 MG/DL (ref 8–23)
BUN/CREAT SERPL: 22.4 (ref 7–25)
CALCIUM SPEC-SCNC: 9.3 MG/DL (ref 8.6–10.5)
CHLORIDE SERPL-SCNC: 104 MMOL/L (ref 98–107)
CO2 SERPL-SCNC: 25.8 MMOL/L (ref 22–29)
CREAT SERPL-MCNC: 0.67 MG/DL (ref 0.76–1.27)
DEPRECATED RDW RBC AUTO: 45.2 FL (ref 37–54)
EGFRCR SERPLBLD CKD-EPI 2021: 104.3 ML/MIN/1.73
EOSINOPHIL # BLD AUTO: 0.02 10*3/MM3 (ref 0–0.4)
EOSINOPHIL NFR BLD AUTO: 0.7 % (ref 0.3–6.2)
ERYTHROCYTE [DISTWIDTH] IN BLOOD BY AUTOMATED COUNT: 13.4 % (ref 12.3–15.4)
GLOBULIN UR ELPH-MCNC: 2.5 GM/DL
GLUCOSE SERPL-MCNC: 152 MG/DL (ref 65–99)
HCT VFR BLD AUTO: 41.4 % (ref 37.5–51)
HGB BLD-MCNC: 13.8 G/DL (ref 13–17.7)
HOLD SPECIMEN: NORMAL
LYMPHOCYTES # BLD AUTO: 1.5 10*3/MM3 (ref 0.7–3.1)
LYMPHOCYTES NFR BLD AUTO: 48.9 % (ref 19.6–45.3)
MCH RBC QN AUTO: 31.3 PG (ref 26.6–33)
MCHC RBC AUTO-ENTMCNC: 33.3 G/DL (ref 31.5–35.7)
MCV RBC AUTO: 93.9 FL (ref 79–97)
MONOCYTES # BLD AUTO: 0.82 10*3/MM3 (ref 0.1–0.9)
MONOCYTES NFR BLD AUTO: 26.7 % (ref 5–12)
NEUTROPHILS NFR BLD AUTO: 0.71 10*3/MM3 (ref 1.7–7)
NEUTROPHILS NFR BLD AUTO: 23 % (ref 42.7–76)
PLATELET # BLD AUTO: 148 10*3/MM3 (ref 140–450)
PMV BLD AUTO: 9.7 FL (ref 6–12)
POTASSIUM SERPL-SCNC: 4 MMOL/L (ref 3.5–5.2)
PROT SERPL-MCNC: 6.9 G/DL (ref 6–8.5)
RBC # BLD AUTO: 4.41 10*6/MM3 (ref 4.14–5.8)
SODIUM SERPL-SCNC: 139 MMOL/L (ref 136–145)
WBC NRBC COR # BLD AUTO: 3.07 10*3/MM3 (ref 3.4–10.8)

## 2024-09-20 PROCEDURE — 36415 COLL VENOUS BLD VENIPUNCTURE: CPT

## 2024-09-20 PROCEDURE — 80053 COMPREHEN METABOLIC PANEL: CPT | Performed by: INTERNAL MEDICINE

## 2024-09-20 PROCEDURE — 85025 COMPLETE CBC W/AUTO DIFF WBC: CPT

## 2024-09-20 RX ORDER — DAPAGLIFLOZIN 10 MG/1
10 TABLET, FILM COATED ORAL DAILY
COMMUNITY
Start: 2024-09-05

## 2024-10-02 RX ORDER — LISINOPRIL 40 MG/1
40 TABLET ORAL DAILY
Qty: 90 TABLET | Refills: 3 | Status: SHIPPED | OUTPATIENT
Start: 2024-10-02

## 2024-10-07 ENCOUNTER — TELEPHONE (OUTPATIENT)
Dept: CARDIOLOGY | Facility: CLINIC | Age: 64
End: 2024-10-07
Payer: COMMERCIAL

## 2024-10-07 ENCOUNTER — OFFICE VISIT (OUTPATIENT)
Dept: CARDIOLOGY | Facility: CLINIC | Age: 64
End: 2024-10-07
Payer: COMMERCIAL

## 2024-10-07 ENCOUNTER — LAB (OUTPATIENT)
Dept: LAB | Facility: HOSPITAL | Age: 64
End: 2024-10-07
Payer: COMMERCIAL

## 2024-10-07 VITALS
HEIGHT: 73 IN | BODY MASS INDEX: 30.22 KG/M2 | DIASTOLIC BLOOD PRESSURE: 77 MMHG | OXYGEN SATURATION: 96 % | RESPIRATION RATE: 16 BRPM | WEIGHT: 228 LBS | HEART RATE: 64 BPM | SYSTOLIC BLOOD PRESSURE: 137 MMHG

## 2024-10-07 DIAGNOSIS — R07.9 CHEST PAIN, UNSPECIFIED TYPE: Primary | ICD-10-CM

## 2024-10-07 DIAGNOSIS — E78.2 MIXED HYPERLIPIDEMIA: ICD-10-CM

## 2024-10-07 DIAGNOSIS — R07.9 CHEST PAIN, UNSPECIFIED TYPE: ICD-10-CM

## 2024-10-07 DIAGNOSIS — I10 PRIMARY HYPERTENSION: ICD-10-CM

## 2024-10-07 LAB
BASOPHILS # BLD AUTO: 0.02 10*3/MM3 (ref 0–0.2)
BASOPHILS NFR BLD AUTO: 0.3 % (ref 0–1.5)
D DIMER PPP FEU-MCNC: <0.19 MG/L (FEU) (ref 0–0.64)
DEPRECATED RDW RBC AUTO: 45.1 FL (ref 37–54)
EOSINOPHIL # BLD AUTO: 0.03 10*3/MM3 (ref 0–0.4)
EOSINOPHIL NFR BLD AUTO: 0.5 % (ref 0.3–6.2)
ERYTHROCYTE [DISTWIDTH] IN BLOOD BY AUTOMATED COUNT: 13.2 % (ref 12.3–15.4)
HCT VFR BLD AUTO: 43.8 % (ref 37.5–51)
HGB BLD-MCNC: 14.3 G/DL (ref 13–17.7)
IMM GRANULOCYTES # BLD AUTO: 0.01 10*3/MM3 (ref 0–0.05)
IMM GRANULOCYTES NFR BLD AUTO: 0.2 % (ref 0–0.5)
LYMPHOCYTES # BLD AUTO: 2.81 10*3/MM3 (ref 0.7–3.1)
LYMPHOCYTES NFR BLD AUTO: 46.8 % (ref 19.6–45.3)
MCH RBC QN AUTO: 30.5 PG (ref 26.6–33)
MCHC RBC AUTO-ENTMCNC: 32.6 G/DL (ref 31.5–35.7)
MCV RBC AUTO: 93.4 FL (ref 79–97)
MONOCYTES # BLD AUTO: 0.93 10*3/MM3 (ref 0.1–0.9)
MONOCYTES NFR BLD AUTO: 15.5 % (ref 5–12)
NEUTROPHILS NFR BLD AUTO: 2.21 10*3/MM3 (ref 1.7–7)
NEUTROPHILS NFR BLD AUTO: 36.7 % (ref 42.7–76)
NRBC BLD AUTO-RTO: 0 /100 WBC (ref 0–0.2)
PLATELET # BLD AUTO: 196 10*3/MM3 (ref 140–450)
PMV BLD AUTO: 10.3 FL (ref 6–12)
RBC # BLD AUTO: 4.69 10*6/MM3 (ref 4.14–5.8)
WBC NRBC COR # BLD AUTO: 6.01 10*3/MM3 (ref 3.4–10.8)

## 2024-10-07 PROCEDURE — 85379 FIBRIN DEGRADATION QUANT: CPT

## 2024-10-07 PROCEDURE — 36415 COLL VENOUS BLD VENIPUNCTURE: CPT

## 2024-10-07 PROCEDURE — 93000 ELECTROCARDIOGRAM COMPLETE: CPT | Performed by: NURSE PRACTITIONER

## 2024-10-07 PROCEDURE — 99214 OFFICE O/P EST MOD 30 MIN: CPT | Performed by: NURSE PRACTITIONER

## 2024-10-07 PROCEDURE — 85025 COMPLETE CBC W/AUTO DIFF WBC: CPT

## 2024-10-07 NOTE — TELEPHONE ENCOUNTER
"     Caller: Bharathi Darnell \"Landon\"    Relationship to patient: Self    Best call back number:  [phone]    Patient is needing:     PATIENT HAS BEEN HAVING CHEST DISCOMFORT  THE LAST TWO WEEKS ITS BEEN CONSISTANT. PATIENT SAID THEY WERENT PAINFUL BUT MORE OF A DISCOMFORT . PATIENT NOTICED A BRUSING ON HIS CHEST LIKE A GREENISH COLOR .      "

## 2024-10-07 NOTE — PROGRESS NOTES
Cardiology Office Follow Up Visit      Primary Care Provider:  Jamar Pham MD    Reason for f/u:     Left-sided chest pain  Hypertension  Dyslipidemia  Diabetes      Subjective     CC:    Left-sided chest pain    History of Present Illness       Bharathi Darnell is a 64 y.o. male.  Patient is a 64-year-old male who has previously been evaluated by Dr. Figueroa.    In March 2024 patient presented for evaluation of chest pain.  He is known to have hypertension, dyslipidemia, diabetes,Chronic leukemia.    The patient had a previous hospitalization with small bowel obstruction, BETTIE and septic shock.    Review of previous cardiac testing includes a stress Myoview in January 2024.  There was a reversible inferior wall defect consistent with ischemia.  In April 2024 patient underwent coronary CTA.  Coronary artery calcium distribution as follows, left main 1, LAD 0, left circumflex 17.9, RCA 0.    Coronary angiography showed normal takeoff of the left main from the left coronary cusp with calcified plaque at the ostium with minimal stenosis.  LAD patent with no evidence of plaque or stenosis.  There is 1 diagonal branch without stenosis, ramus intermedius had no plaque or stenosis, left circumflex gives off 1 large caliber OM there is calcified plaque in the mid circumflex resulting in minimal stenosis.  Calcified plaque at the origin of a large branching OM 2 with minimal stenosis.  Right coronary artery small caliber nondominant normal origin with no evidence of plaque or stenosis    Patient reports 2 weeks ago he started having some pain in his left pectoralis region.  It has been fairly constant over the last 2 weeks.  He describes it as a cramping sensation.  It is not worsened by exertion or relieved with rest.  It is not associated with any other symptoms such as shortness of breath, diaphoresis, near syncope, nausea or vomiting.    He did notice some bruising on his left chest wall but does not recall  any injury to this area.    He denies any change in symptoms related to exertion.  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    ASSESSMENT/PLAN:      Diagnoses and all orders for this visit:    1. Chest pain, unspecified type (Primary)  -     D-dimer, Quantitative; Future  -     CBC & Differential; Future  -     Adult Transthoracic Echo Complete W/ Cont if Necessary Per Protocol; Future    2. Primary hypertension    3. Mixed hyperlipidemia            MEDICAL DECISION MAKING:    Patient is having some pain in his left anterior chest wall.  It is somewhat atypical for angina.    Review of his EKG today shows sinus rhythm with no ST or T wave segment abnormalities.    We reviewed his recent CT calcium score which did not suggest significant coronary artery blockage.    I would like to repeat a CBC to check his platelet count since there is some bruising on his anterior chest wall.  In addition we will do a D-dimer to rule out PE.    The patient is not having any shortness of breath and on physical exam does not higher appear to have any symptoms of volume overload.    With his history of CLL and previous treatment would recommend repeating echocardiogram to rule out any pericardial effusion or changes in valve status or LV function.    I will review his testing with Dr. Figueroa for additional recommendations.  Patient's been advised to contact the office if his symptoms worsen or come to the emergency room if the pain becomes severe.    The patient is in agreement to this plan.  Additional recommendations to be made after labs and echocardiogram were reviewed    I have reviewed this plan with Dr. Klein who is in agreement to plan        Past Medical History:   Diagnosis Date    CLL (chronic lymphocytic leukemia)     2022    DM (diabetes mellitus), type 2     Erectile dysfunction     Hyperlipidemia     Hypertension        Past Surgical History:   Procedure  "Laterality Date    COLONOSCOPY      2019         Current Outpatient Medications:     amLODIPine (NORVASC) 10 MG tablet, Take 1 tablet by mouth Daily., Disp: 90 tablet, Rfl: 1    Budesonide (ENTOCORT EC) 3 MG 24 hr capsule, Take 1 capsule by mouth Every Morning., Disp: , Rfl:     famotidine (PEPCID) 40 MG tablet, Take 1 tablet by mouth Daily., Disp: , Rfl:     Farxiga 10 MG tablet, Take 10 mg by mouth Daily., Disp: , Rfl:     glucose blood (OneTouch Verio) test strip, 1 each by Other route Daily. Dx: E11.65. Use as instructed, Disp: 50 each, Rfl: 2    Lancets (OneTouch Delica Plus [id number]) misc, Use 1 each Daily. Dx: E11.65, Disp: 100 each, Rfl: 2    lisinopril (PRINIVIL,ZESTRIL) 40 MG tablet, TAKE 1 TABLET BY MOUTH DAILY FOR BLOOD PRESSURE, Disp: 90 tablet, Rfl: 3    sildenafil (Viagra) 100 MG tablet, Take 1 tablet by mouth Daily As Needed for Erectile Dysfunction., Disp: , Rfl:     Social History     Socioeconomic History    Marital status:    Tobacco Use    Smoking status: Never     Passive exposure: Never    Smokeless tobacco: Never   Vaping Use    Vaping status: Never Used   Substance and Sexual Activity    Alcohol use: Not Currently     Comment: Occasional    Drug use: Never    Sexual activity: Yes     Partners: Female     Birth control/protection: None       Family History   Problem Relation Age of Onset    Diabetes Mother     Hyperlipidemia Mother     Stroke Sister        The following portions of the patient's history were reviewed and updated as appropriate: allergies, current medications, past family history, past medical history, past social history, past surgical history and problem list.    Review of Systems   Cardiovascular:  Positive for chest pain.       Pertinent items are noted in HPI, all other systems reviewed and negative    /77 (BP Location: Right arm, Patient Position: Sitting, Cuff Size: Large Adult)   Pulse 64   Resp 16   Ht 185.4 cm (73\")   Wt 103 kg (228 lb)   SpO2 96%  "  BMI 30.08 kg/m² .  Objective     Vitals reviewed.   Constitutional:       General: Not in acute distress.     Appearance: Normal appearance. Well-developed.   Eyes:      Pupils: Pupils are equal, round, and reactive to light.   HENT:      Head: Normocephalic and atraumatic.   Neck:      Vascular: No JVD.   Pulmonary:      Effort: Pulmonary effort is normal.      Breath sounds: Normal breath sounds.   Cardiovascular:      Normal rate. Regular rhythm.   Edema:     Peripheral edema absent.   Abdominal:      General: There is no distension.      Palpations: Abdomen is soft.      Tenderness: There is no abdominal tenderness.   Musculoskeletal: Normal range of motion.      Cervical back: Normal range of motion and neck supple. Skin:     General: Skin is warm and dry.   Neurological:      Mental Status: Alert and oriented to person, place, and time.             ECG 12 Lead    Date/Time: 10/7/2024 8:20 AM  Performed by: Malia Tuttle APRN    Authorized by: Malia Tuttle APRN  Comparison: compared with previous ECG   Similar to previous ECG  Rhythm: sinus rhythm  BPM: 64  Conduction: conduction normal  ST Segments: ST segments normal  T Waves: T waves normal  QRS axis: left  Other: no other findings          EKG ordered by and reviewed by me in office

## 2024-10-08 PROBLEM — R07.9 CHEST PAIN: Status: ACTIVE | Noted: 2024-10-08

## 2024-10-08 NOTE — PROGRESS NOTES
Please let pt know reviewed his labs  His platelet count was stable from last check.   D-dimer, test to rule out blood clots was negative.   Pat should be calling to schedule echo today

## 2024-10-30 RX ORDER — DAPAGLIFLOZIN 10 MG/1
10 TABLET, FILM COATED ORAL DAILY
Qty: 30 TABLET | Refills: 0 | Status: SHIPPED | OUTPATIENT
Start: 2024-10-30

## 2024-10-30 NOTE — TELEPHONE ENCOUNTER
"Caller: Landon Darnellothy S \"Landon\"    Relationship: Self    Best call back number: [phone]    Requested Prescriptions:   Requested Prescriptions     Pending Prescriptions Disp Refills    Farxiga 10 MG tablet 30 tablet      Sig: Take 10 mg by mouth Daily.        Pharmacy where request should be sent: [id number] [place], [place] - [address] [place] [address] [phone] [facility] [phone]      Last office visit with prescribing clinician: 5/22/2024   Last telemedicine visit with prescribing clinician: Visit date not found   Next office visit with prescribing clinician: 11/21/2024     Additional details provided by patient: PATIENT IS NEEDING REFILL AND NEED THIS IN NAME BRAND NOT GENERIC PER PATIENT     Does the patient have less than a 3 day supply:  [] Yes  [] No    Would you like a call back once the refill request has been completed: [] Yes [x] No    If the office needs to give you a call back, can they leave a voicemail: [] Yes [x] No    Miguelito Junior Rep   10/30/24 12:37 EDT           "

## 2024-11-01 ENCOUNTER — HOSPITAL ENCOUNTER (OUTPATIENT)
Dept: CARDIOLOGY | Facility: HOSPITAL | Age: 64
Discharge: HOME | End: 2024-11-01
Admitting: NURSE PRACTITIONER
Payer: COMMERCIAL

## 2024-11-01 DIAGNOSIS — R07.9 CHEST PAIN, UNSPECIFIED TYPE: ICD-10-CM

## 2024-11-01 LAB
BH CV ECHO MEAS - AO MAX PG: 7.6 MMHG
BH CV ECHO MEAS - AO MEAN PG: 4 MMHG
BH CV ECHO MEAS - AO V2 MAX: 138 CM/SEC
BH CV ECHO MEAS - AO V2 VTI: 31.9 CM
BH CV ECHO MEAS - AVA(I,D): 3.2 CM2
BH CV ECHO MEAS - EDV(CUBED): 166.4 ML
BH CV ECHO MEAS - EDV(MOD-SP4): 161 ML
BH CV ECHO MEAS - EF(MOD-BP): 56 %
BH CV ECHO MEAS - EF(MOD-SP4): 56.2 %
BH CV ECHO MEAS - ESV(CUBED): 59.3 ML
BH CV ECHO MEAS - ESV(MOD-SP4): 70.5 ML
BH CV ECHO MEAS - FS: 29.1 %
BH CV ECHO MEAS - IVS/LVPW: 1.09 CM
BH CV ECHO MEAS - IVSD: 1.2 CM
BH CV ECHO MEAS - LA DIMENSION: 3.4 CM
BH CV ECHO MEAS - LAT PEAK E' VEL: 9.6 CM/SEC
BH CV ECHO MEAS - LV DIASTOLIC VOL/BSA (35-75): 70.8 CM2
BH CV ECHO MEAS - LV MASS(C)D: 257 GRAMS
BH CV ECHO MEAS - LV MAX PG: 4.8 MMHG
BH CV ECHO MEAS - LV MEAN PG: 3 MMHG
BH CV ECHO MEAS - LV SYSTOLIC VOL/BSA (12-30): 31 CM2
BH CV ECHO MEAS - LV V1 MAX: 109 CM/SEC
BH CV ECHO MEAS - LV V1 VTI: 29.5 CM
BH CV ECHO MEAS - LVIDD: 5.5 CM
BH CV ECHO MEAS - LVIDS: 3.9 CM
BH CV ECHO MEAS - LVOT AREA: 3.5 CM2
BH CV ECHO MEAS - LVOT DIAM: 2.1 CM
BH CV ECHO MEAS - LVPWD: 1.1 CM
BH CV ECHO MEAS - MED PEAK E' VEL: 6.2 CM/SEC
BH CV ECHO MEAS - MV A MAX VEL: 118 CM/SEC
BH CV ECHO MEAS - MV DEC SLOPE: 230 CM/SEC2
BH CV ECHO MEAS - MV DEC TIME: 0.23 SEC
BH CV ECHO MEAS - MV E MAX VEL: 96.4 CM/SEC
BH CV ECHO MEAS - MV E/A: 0.82
BH CV ECHO MEAS - MV MAX PG: 5 MMHG
BH CV ECHO MEAS - MV MEAN PG: 2 MMHG
BH CV ECHO MEAS - MV P1/2T: 127.1 MSEC
BH CV ECHO MEAS - MV V2 VTI: 34 CM
BH CV ECHO MEAS - MVA(P1/2T): 1.73 CM2
BH CV ECHO MEAS - MVA(VTI): 3 CM2
BH CV ECHO MEAS - PA ACC TIME: 0.19 SEC
BH CV ECHO MEAS - PA V2 MAX: 114 CM/SEC
BH CV ECHO MEAS - RAP SYSTOLE: 3 MMHG
BH CV ECHO MEAS - RV MAX PG: 1.8 MMHG
BH CV ECHO MEAS - RV V1 MAX: 67.1 CM/SEC
BH CV ECHO MEAS - RV V1 VTI: 18 CM
BH CV ECHO MEAS - RVDD: 4.1 CM
BH CV ECHO MEAS - RVSP: 20.6 MMHG
BH CV ECHO MEAS - SV(LVOT): 102.2 ML
BH CV ECHO MEAS - SV(MOD-SP4): 90.5 ML
BH CV ECHO MEAS - SVI(LVOT): 44.9 ML/M2
BH CV ECHO MEAS - SVI(MOD-SP4): 39.8 ML/M2
BH CV ECHO MEAS - TAPSE (>1.6): 2.24 CM
BH CV ECHO MEAS - TR MAX PG: 17.6 MMHG
BH CV ECHO MEAS - TR MAX VEL: 210 CM/SEC
BH CV ECHO MEASUREMENTS AVERAGE E/E' RATIO: 12.2
BH CV XLRA - TDI S': 13.7 CM/SEC
LEFT ATRIUM VOLUME INDEX: 30.1 ML/M2
SINUS: 3.1 CM
STJ: 2.4 CM

## 2024-11-01 PROCEDURE — 93306 TTE W/DOPPLER COMPLETE: CPT

## 2024-11-19 ENCOUNTER — LAB (OUTPATIENT)
Dept: FAMILY MEDICINE CLINIC | Facility: CLINIC | Age: 64
End: 2024-11-19
Payer: COMMERCIAL

## 2024-11-19 ENCOUNTER — OFFICE VISIT (OUTPATIENT)
Dept: FAMILY MEDICINE CLINIC | Facility: CLINIC | Age: 64
End: 2024-11-19
Payer: COMMERCIAL

## 2024-11-19 VITALS
HEART RATE: 61 BPM | BODY MASS INDEX: 31.14 KG/M2 | OXYGEN SATURATION: 97 % | SYSTOLIC BLOOD PRESSURE: 123 MMHG | TEMPERATURE: 97.3 F | HEIGHT: 73 IN | DIASTOLIC BLOOD PRESSURE: 73 MMHG | WEIGHT: 235 LBS

## 2024-11-19 DIAGNOSIS — E11.42 TYPE 2 DIABETES MELLITUS WITH DIABETIC POLYNEUROPATHY, WITHOUT LONG-TERM CURRENT USE OF INSULIN: Primary | ICD-10-CM

## 2024-11-19 DIAGNOSIS — I10 ESSENTIAL (PRIMARY) HYPERTENSION: ICD-10-CM

## 2024-11-19 LAB
ALBUMIN SERPL-MCNC: 3.8 G/DL (ref 3.5–5.2)
ALBUMIN UR-MCNC: 18.5 MG/DL
ALBUMIN/GLOB SERPL: 1.2 G/DL
ALP SERPL-CCNC: 48 U/L (ref 39–117)
ALT SERPL W P-5'-P-CCNC: 12 U/L (ref 1–41)
ANION GAP SERPL CALCULATED.3IONS-SCNC: 10.6 MMOL/L (ref 5–15)
AST SERPL-CCNC: 15 U/L (ref 1–40)
BASOPHILS # BLD AUTO: 0.01 10*3/MM3 (ref 0–0.2)
BASOPHILS NFR BLD AUTO: 0.2 % (ref 0–1.5)
BILIRUB SERPL-MCNC: 0.3 MG/DL (ref 0–1.2)
BUN SERPL-MCNC: 13 MG/DL (ref 8–23)
BUN/CREAT SERPL: 18.1 (ref 7–25)
CALCIUM SPEC-SCNC: 9 MG/DL (ref 8.6–10.5)
CHLORIDE SERPL-SCNC: 103 MMOL/L (ref 98–107)
CHOLEST SERPL-MCNC: 211 MG/DL (ref 0–200)
CO2 SERPL-SCNC: 25.4 MMOL/L (ref 22–29)
CREAT SERPL-MCNC: 0.72 MG/DL (ref 0.76–1.27)
DEPRECATED RDW RBC AUTO: 43.1 FL (ref 37–54)
EGFRCR SERPLBLD CKD-EPI 2021: 102 ML/MIN/1.73
EOSINOPHIL # BLD AUTO: 0.03 10*3/MM3 (ref 0–0.4)
EOSINOPHIL NFR BLD AUTO: 0.7 % (ref 0.3–6.2)
ERYTHROCYTE [DISTWIDTH] IN BLOOD BY AUTOMATED COUNT: 12.9 % (ref 12.3–15.4)
GLOBULIN UR ELPH-MCNC: 3.1 GM/DL
GLUCOSE SERPL-MCNC: 113 MG/DL (ref 65–99)
HBA1C MFR BLD: 6.8 % (ref 4.8–5.6)
HCT VFR BLD AUTO: 39.6 % (ref 37.5–51)
HDLC SERPL-MCNC: 70 MG/DL (ref 40–60)
HGB BLD-MCNC: 13.6 G/DL (ref 13–17.7)
IMM GRANULOCYTES # BLD AUTO: 0.05 10*3/MM3 (ref 0–0.05)
IMM GRANULOCYTES NFR BLD AUTO: 1.1 % (ref 0–0.5)
LDLC SERPL CALC-MCNC: 119 MG/DL (ref 0–100)
LDLC/HDLC SERPL: 1.65 {RATIO}
LYMPHOCYTES # BLD AUTO: 2.07 10*3/MM3 (ref 0.7–3.1)
LYMPHOCYTES NFR BLD AUTO: 45.5 % (ref 19.6–45.3)
MCH RBC QN AUTO: 31.6 PG (ref 26.6–33)
MCHC RBC AUTO-ENTMCNC: 34.3 G/DL (ref 31.5–35.7)
MCV RBC AUTO: 92.1 FL (ref 79–97)
MONOCYTES # BLD AUTO: 0.74 10*3/MM3 (ref 0.1–0.9)
MONOCYTES NFR BLD AUTO: 16.3 % (ref 5–12)
NEUTROPHILS NFR BLD AUTO: 1.65 10*3/MM3 (ref 1.7–7)
NEUTROPHILS NFR BLD AUTO: 36.2 % (ref 42.7–76)
NRBC BLD AUTO-RTO: 0 /100 WBC (ref 0–0.2)
PLATELET # BLD AUTO: 172 10*3/MM3 (ref 140–450)
PMV BLD AUTO: 10.3 FL (ref 6–12)
POTASSIUM SERPL-SCNC: 3.8 MMOL/L (ref 3.5–5.2)
PROT SERPL-MCNC: 6.9 G/DL (ref 6–8.5)
RBC # BLD AUTO: 4.3 10*6/MM3 (ref 4.14–5.8)
SODIUM SERPL-SCNC: 139 MMOL/L (ref 136–145)
TRIGL SERPL-MCNC: 129 MG/DL (ref 0–150)
VLDLC SERPL-MCNC: 22 MG/DL (ref 5–40)
WBC NRBC COR # BLD AUTO: 4.55 10*3/MM3 (ref 3.4–10.8)

## 2024-11-19 PROCEDURE — 99214 OFFICE O/P EST MOD 30 MIN: CPT | Performed by: FAMILY MEDICINE

## 2024-11-19 PROCEDURE — 36415 COLL VENOUS BLD VENIPUNCTURE: CPT | Performed by: FAMILY MEDICINE

## 2024-11-19 PROCEDURE — 82043 UR ALBUMIN QUANTITATIVE: CPT | Performed by: FAMILY MEDICINE

## 2024-11-19 PROCEDURE — 80053 COMPREHEN METABOLIC PANEL: CPT | Performed by: FAMILY MEDICINE

## 2024-11-19 PROCEDURE — 80061 LIPID PANEL: CPT | Performed by: FAMILY MEDICINE

## 2024-11-19 PROCEDURE — 85025 COMPLETE CBC W/AUTO DIFF WBC: CPT | Performed by: FAMILY MEDICINE

## 2024-11-19 PROCEDURE — 83036 HEMOGLOBIN GLYCOSYLATED A1C: CPT | Performed by: FAMILY MEDICINE

## 2024-11-19 RX ORDER — TADALAFIL 10 MG/1
10 TABLET ORAL DAILY
Qty: 30 TABLET | Refills: 3 | Status: SHIPPED | OUTPATIENT
Start: 2024-11-19

## 2024-11-19 NOTE — PATIENT INSTRUCTIONS
Look for the [employer] sushma and do 10 min of core exercise per day  Look for Voltaren gel or diclofenac gel and use 2-3 times per day on hands, as needed

## 2024-11-19 NOTE — PROGRESS NOTES
"Subjective   Bharathi Darnell is a 64 y.o. male.     History of Present Illness  Bharathi Darnell is in for follow up on his diabetes and high blood pressure. He [facility], and has continued to tolerate the treatment, so far. There is no history of chest pain or dyspnea of late. There is no history of issue with bowel or bladder function. There is no history of vision or hearing problems. There is no history of dizziness or lightheadedness. There is no history of issue with mood. Sleep can be choppy, but he wakes with good energy. As for checking blood sugar readings, he says it was 160 this morning but he ate something last night that bumped it, and that it is not normally that high.  He did not bring his log.. There is no issue with medication compliance. Diet and exercise compliance has been fair but could always be better.    Diabetes  Pertinent negatives for hypoglycemia include no dizziness or headaches. Associated symptoms include fatigue. Pertinent negatives for diabetes include no chest pain.          /73 (BP Location: Left arm, Patient Position: Sitting, Cuff Size: Large Adult)   Pulse 61   Temp 97.3 °F (36.3 °C) (Temporal)   Ht 185.4 cm (72.99\")   Wt 107 kg (235 lb)   SpO2 97%   BMI 31.01 kg/m²       Chief Complaint   Patient presents with    Diabetes     6 month f/u            Current Outpatient Medications:     amLODIPine (NORVASC) 10 MG tablet, Take 1 tablet by mouth Daily., Disp: 90 tablet, Rfl: 1    Budesonide (ENTOCORT EC) 3 MG 24 hr capsule, Take 1 capsule by mouth Every Morning., Disp: , Rfl:     famotidine (PEPCID) 40 MG tablet, Take 1 tablet by mouth Daily., Disp: , Rfl:     Farxiga 10 MG tablet, Take 10 mg by mouth Daily., Disp: 30 tablet, Rfl: 0    glucose blood (OneTouch Verio) test strip, 1 each by Other route Daily. Dx: E11.65. Use as instructed, Disp: 50 each, Rfl: 2    Lancets (OneTouch Delica Plus [id number]) misc, Use 1 each " Daily. Dx: E11.65, Disp: 100 each, Rfl: 2    lisinopril (PRINIVIL,ZESTRIL) 40 MG tablet, TAKE 1 TABLET BY MOUTH DAILY FOR BLOOD PRESSURE, Disp: 90 tablet, Rfl: 3    tadalafil (Cialis) 10 MG tablet, Take 1 tablet by mouth Daily., Disp: 30 tablet, Rfl: 3    Lab Results   Component Value Date    HGBA1C 7.10 (H) 05/23/2024    HGBA1C 8.30 (H) 10/19/2023    HGBA1C 7.60 (H) 05/17/2023     Lab Results   Component Value Date    MICROALBUR 10.7 05/23/2024    CREATININE 0.67 (L) 09/20/2024         Wt Readings from Last 3 Encounters:   11/19/24 107 kg (235 lb)   10/07/24 103 kg (228 lb)   09/20/24 105 kg (232 lb 6.4 oz)       The following portions of the patient's history were reviewed and updated as appropriate: allergies, current medications, past family history, past medical history, past social history, past surgical history, and problem list.    Review of Systems   Constitutional:  Positive for fatigue. Negative for activity change and fever.   HENT:  Negative for congestion, sinus pressure, sinus pain, sore throat and trouble swallowing.    Eyes:  Negative for visual disturbance.   Respiratory:  Negative for chest tightness, shortness of breath and wheezing.    Cardiovascular:  Negative for chest pain.   Gastrointestinal:  Negative for abdominal distention, abdominal pain, constipation, diarrhea, nausea and vomiting.   Genitourinary:  Negative for difficulty urinating and dysuria.   Musculoskeletal:  Positive for arthralgias. Negative for back pain, myalgias and neck pain.   Neurological:  Negative for dizziness and headaches.   Psychiatric/Behavioral:  Negative for agitation, hallucinations and suicidal ideas.        Objective   Physical Exam  Vitals and nursing note reviewed.   Constitutional:       Appearance: Normal appearance.   HENT:      Right Ear: Tympanic membrane and ear canal normal.      Left Ear: Tympanic membrane and ear canal normal.   Cardiovascular:      Rate and Rhythm: Normal rate and regular rhythm.       Heart sounds: Normal heart sounds. No murmur heard.  Pulmonary:      Effort: Pulmonary effort is normal.      Breath sounds: No wheezing or rales.   Abdominal:      General: Bowel sounds are normal.      Palpations: Abdomen is soft.      Tenderness: There is no abdominal tenderness. There is no guarding or rebound.      Hernia: No hernia is present.   Musculoskeletal:      Cervical back: Neck supple.      Right lower leg: No edema.      Left lower leg: No edema.   Lymphadenopathy:      Cervical: No cervical adenopathy.   Neurological:      Mental Status: He is alert and oriented to person, place, and time. Mental status is at baseline.   Psychiatric:         Mood and Affect: Mood normal.           Assessment & Plan   Problems Addressed this Visit          Endocrine and Metabolic    Type 2 diabetes mellitus - Primary    Relevant Orders    Lipid Panel    CBC & Differential    MicroAlbumin, Urine, Random - Urine, Clean Catch    Comprehensive Metabolic Panel    Hemoglobin A1c     Other Visit Diagnoses       Essential (primary) hypertension              Diagnoses         Codes Comments    Type 2 diabetes mellitus with diabetic polyneuropathy, without long-term current use of insulin    -  Primary ICD-10-CM: E11.42  ICD-9-CM: 250.60, 357.2     Essential (primary) hypertension     ICD-10-CM: I10  ICD-9-CM: 401.9           I will update some labs and adjust the treatment plan as indicated  I will change him from sildenafil to tadalafil to help with ED issues  I did ask him to work on core fitness a little harder and suggested some diet adjustments  I did ask him to see me again in 6 months  I will follow-up on cancer center visits for his chronic leukemia treatment

## 2024-12-13 ENCOUNTER — ON CAMPUS - OUTPATIENT (AMBULATORY)
Dept: URBAN - METROPOLITAN AREA HOSPITAL 2 | Facility: HOSPITAL | Age: 64
End: 2024-12-13
Payer: COMMERCIAL

## 2024-12-13 ENCOUNTER — OFFICE (AMBULATORY)
Dept: URBAN - METROPOLITAN AREA PATHOLOGY 19 | Facility: PATHOLOGY | Age: 64
End: 2024-12-13
Payer: COMMERCIAL

## 2024-12-13 VITALS
RESPIRATION RATE: 14 BRPM | RESPIRATION RATE: 16 BRPM | WEIGHT: 235 LBS | DIASTOLIC BLOOD PRESSURE: 77 MMHG | SYSTOLIC BLOOD PRESSURE: 106 MMHG | OXYGEN SATURATION: 96 % | DIASTOLIC BLOOD PRESSURE: 75 MMHG | HEIGHT: 74 IN | HEART RATE: 55 BPM | TEMPERATURE: 97.9 F | RESPIRATION RATE: 18 BRPM | OXYGEN SATURATION: 100 % | HEART RATE: 61 BPM | HEART RATE: 57 BPM | DIASTOLIC BLOOD PRESSURE: 67 MMHG | SYSTOLIC BLOOD PRESSURE: 102 MMHG | SYSTOLIC BLOOD PRESSURE: 122 MMHG | RESPIRATION RATE: 15 BRPM | HEART RATE: 54 BPM | DIASTOLIC BLOOD PRESSURE: 64 MMHG | SYSTOLIC BLOOD PRESSURE: 128 MMHG | DIASTOLIC BLOOD PRESSURE: 62 MMHG | HEART RATE: 60 BPM | OXYGEN SATURATION: 99 % | SYSTOLIC BLOOD PRESSURE: 100 MMHG

## 2024-12-13 DIAGNOSIS — K29.50 UNSPECIFIED CHRONIC GASTRITIS WITHOUT BLEEDING: ICD-10-CM

## 2024-12-13 DIAGNOSIS — K31.89 OTHER DISEASES OF STOMACH AND DUODENUM: ICD-10-CM

## 2024-12-13 DIAGNOSIS — K31.7 POLYP OF STOMACH AND DUODENUM: ICD-10-CM

## 2024-12-13 DIAGNOSIS — R10.13 EPIGASTRIC PAIN: ICD-10-CM

## 2024-12-13 PROBLEM — K29.70 GASTRITIS, UNSPECIFIED, WITHOUT BLEEDING: Status: ACTIVE | Noted: 2024-12-13

## 2024-12-13 LAB
GI HISTOLOGY: A. STOMACH BODY: (no result)
GI HISTOLOGY: PDF REPORT: (no result)
Lab: (no result)
Lab: (no result)

## 2024-12-13 PROCEDURE — 43239 EGD BIOPSY SINGLE/MULTIPLE: CPT | Performed by: INTERNAL MEDICINE

## 2024-12-13 PROCEDURE — 88305 TISSUE EXAM BY PATHOLOGIST: CPT | Performed by: PATHOLOGY

## 2024-12-13 PROCEDURE — 88342 IMHCHEM/IMCYTCHM 1ST ANTB: CPT | Performed by: PATHOLOGY

## 2025-02-18 ENCOUNTER — TELEPHONE (OUTPATIENT)
Dept: FAMILY MEDICINE CLINIC | Facility: CLINIC | Age: 65
End: 2025-02-18
Payer: COMMERCIAL

## 2025-02-18 RX ORDER — CHLORCYCLIZINE HYDROCHLORIDE AND PSEUDOEPHEDRINE HYDROCHLORIDE 25; 60 MG/1; MG/1
0.5 TABLET ORAL 3 TIMES DAILY PRN
Qty: 42 TABLET | Refills: 1 | Status: SHIPPED | OUTPATIENT
Start: 2025-02-18

## 2025-02-18 RX ORDER — BENZONATATE 200 MG/1
200 CAPSULE ORAL 3 TIMES DAILY PRN
Qty: 30 CAPSULE | Refills: 1 | Status: SHIPPED | OUTPATIENT
Start: 2025-02-18

## 2025-02-18 NOTE — TELEPHONE ENCOUNTER
"    Caller: Bharathi Darnell \"Landon\"    Relationship to patient: Self    Best call back number: [phone]    Patient is needing: PATIENT HAS HAD A COUGH, WITH NO OTHER SYMPTOMS, SINCE FRIDAY AND WOULD LIKE TO KNOW WHAT DR. QUIROZ WOULD ADVISE. PLEASE REACH OUT TO PATIENT        "

## 2025-02-25 ENCOUNTER — TELEPHONE (OUTPATIENT)
Dept: ONCOLOGY | Facility: CLINIC | Age: 65
End: 2025-02-25
Payer: COMMERCIAL

## 2025-02-25 NOTE — TELEPHONE ENCOUNTER
Left message to r/s appt to earlier time another date. S/w someone other than the Pt. Only info given was my name , our # and the date of the doctor appt.   It needs to be rescheduled Can put w/JOSE Fragoso at another day and time as well

## 2025-02-26 ENCOUNTER — TELEPHONE (OUTPATIENT)
Dept: ONCOLOGY | Facility: CLINIC | Age: 65
End: 2025-02-26

## 2025-02-26 NOTE — TELEPHONE ENCOUNTER
"    Hub staff attempted to follow warm transfer process and was unsuccessful     Caller: Bharathi Darnell \"Landon\"    Relationship to patient: Self    Best call back number: [phone]    Patient is needing: PATIENT RETURNING CALL FOR SHIMON"

## 2025-03-11 NOTE — PROGRESS NOTES
HEMATOLOGY ONCOLOGY OUTPATIENT FOLLOW-UP       Patient name: Bharathi Darnell  : 1960  MRN: [id number]  Primary Care Physician: Jamar Pham MD  Referring Physician: Jamar Pham*  Reason For Consult: Chronic lymphocytic leukemia.    History of Present Illness:  Patient is a 64 y.o.     2024: In the office for the first time to transfer his care. Sometime in  he felt a preauricular nodule that seemed to come and go for some time. Later he found another similar lesion in the submental area. He sough attention from his otolaryngologist and he had a biopsy of the submental lesion. The final report of pathology was of lymphocytic leukemia/small lymphocytic lymphoma with 90% of the the cells being positive for CD19/dim CD20, positive CD52 and CD23. The cells were lambda light chain restricted. He had lymphocytosis in the peripheral blood. A bone marrow biopsy and aspiration showed the same with a mixed pattern of involvement. FISH showed mono-allelic deletion of [id number] (13q14.3) and positive for p53 (17p13) deletion. 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 He had a PET scan that not surprisingly revealed no significant abnormalities. He was seen at the [facility], where he was not felt to have an indication for treatment. However, with a slowly increasing lymphocyte count and some enlargement of lymph nodes on CT, a decision was made to start treatment with obinutuzumab and acalabrutinib. He received the first cycle in 2023. He completed 4 cycles and shortly after the last cycle, in early 2023,  he presented to the hospital with abdominal pain. He was found to have evidence of small bowel obstruction and he was admitted. He was not felt to require surgical intervention and his symptoms eventually resolved. He had atrial fibrillation that also had resolved at the time of the discharge. Today he feels much better but has  continued to be weak and is still having physical therapy. He has been eating better and has regained a small amount of the more than 20 lbs of weight he had lost. He has been free of chest pain. No abdominal pain or diarrhea and no dysuria. On exam no palpable adenopathy or hepato-splenomegaly. The laboratory exams reported a completely normal blood count. A decision was made to continue observation and to follow closely. I asked him to continue with the physical therapy. I asked him to come see me in approximately 4 weeks.     3/15/2024: Feeling reasonably well.  Having some pain in both hands.  Fatigue.  Appetite is good and he has been eating well.  He has had no nausea.  He has been afebrile and without nocturnal diaphoresis.  Denies any changes in bowel activity and has not had any dysuria.  On exam he is alert and oriented.  He is conversant and in good spirits.  No jaundice or pallor.  Well-hydrated.  Lungs clear.  Heart regular.  Abdomen without splenomegaly.  No edema.  Laboratory exams reviewed.  Discussed with him.  No intervention at this point.  Will continue to follow.  Discussed physical activity as a way to counteract the fatigue he is experiencing.    5/17/2024: Entirely asymptomatic.  Underwent screening for coronary artery disease and was found to have minimal calcium deposits.  Ejection fraction was adequate and he had some evidence of emphysema.  Outside of that energetic and with good appetite.  Active and without limitations.  On exam alert, conversant and not ill-appearing.  No jaundice.  The lungs are clear.  The heart is regular.  Abdomen soft and the liver and spleen are nonenlarged.  No edema.  The laboratory reported an unremarkable blood count.  Discussed with him and with his spouse.  To see me in 4 months.  No intervention at this time.    9/20/2024: Remains asymptomatic.  Also continues to be active and without new limitations.  Maintains a good appetite and has not had unintended  weight loss.  As well free of nocturnal diaphoreses or fevers.  No dyspnea.  No chest pains.  No abdominal pain, diarrhea or dysuria.  No edema.  On exam alert, conversant and in no distress.  No jaundice, pallor or skin rash.  Lungs clear bilaterally.  Heart regular.  Abdomen soft and without hepatomegaly or splenomegaly.  No edema.  Laboratory exams reviewed and discussed with him.  No suggestion of progressive disease though the lymphocyte percentage seems to have increased slightly.  He is to see me in 6 months.    3/12/10180: Remains asymptomatic.  He he continues to be active and has no new limitations.  He states he maintains a good appetite and has not had any unintended weight loss.  He has been free of any fevers or nocturnal diaphoresis.  Denies chest pain, dyspnea or abdominal pain.  On exam he is alert, conversant and in no distress.  No jaundice pallor nor skin rash noted.  Heart regular lungs clear bilaterally.  Abdomen is soft and nontender.  No edema.  Reviewed laboratory exams.       Past Medical History:   Diagnosis Date    CLL (chronic lymphocytic leukemia)     2022    DM (diabetes mellitus), type 2     Erectile dysfunction     Hyperlipidemia     Hypertension      Past Surgical History:   Procedure Laterality Date    COLONOSCOPY      2019       Current Outpatient Medications:     amLODIPine (NORVASC) 10 MG tablet, Take 1 tablet by mouth Daily., Disp: 90 tablet, Rfl: 1    Farxiga 10 MG tablet, Take 10 mg by mouth Daily., Disp: 30 tablet, Rfl: 0    glucose blood (OneTouch Verio) test strip, 1 each by Other route Daily. Dx: E11.65. Use as instructed, Disp: 50 each, Rfl: 2    Lancets (OneTouch Delica Plus [id number]) misc, Use 1 each Daily. Dx: E11.65, Disp: 100 each, Rfl: 2    lisinopril (PRINIVIL,ZESTRIL) 40 MG tablet, TAKE 1 TABLET BY MOUTH DAILY FOR BLOOD PRESSURE, Disp: 90 tablet, Rfl: 3    tadalafil (Cialis) 10 MG tablet, Take 1 tablet by mouth Daily., Disp: 30 tablet, Rfl: 3    benzonatate  (TESSALON) 200 MG capsule, Take 1 capsule by mouth 3 (Three) Times a Day As Needed for Cough. (Patient not taking: Reported on 3/12/2025), Disp: 30 capsule, Rfl: 1    Budesonide (ENTOCORT EC) 3 MG 24 hr capsule, Take 1 capsule by mouth Every Morning. (Patient not taking: Reported on 3/12/2025), Disp: , Rfl:     Chlorcyclizine-Pseudoephed (Stahist AD) 25-60 MG tablet, Take 0.5 tablets by mouth 3 (Three) Times a Day As Needed (Congestion, drainage). (Patient not taking: Reported on 3/12/2025), Disp: 42 tablet, Rfl: 1    famotidine (PEPCID) 40 MG tablet, Take 1 tablet by mouth Daily. (Patient not taking: Reported on 3/12/2025), Disp: , Rfl:     Allergies   Allergen Reactions    Bactrim [Sulfamethoxazole-Trimethoprim] Rash     Family History   Problem Relation Age of Onset    Diabetes Mother     Hyperlipidemia Mother     Stroke Sister      Cancer-related family history is not on file.    Social History     Tobacco Use    Smoking status: Never     Passive exposure: Never    Smokeless tobacco: Never   Vaping Use    Vaping status: Never Used   Substance Use Topics    Alcohol use: Not Currently     Comment: Occasional    Drug use: Never     Social History     Social History Narrative    Not on file     ROS:   Review of Systems   Constitutional:  Negative for activity change, appetite change, chills, diaphoresis, fatigue, fever and unexpected weight change.   HENT:  Negative for congestion, dental problem, drooling, ear discharge, ear pain, facial swelling, hearing loss, mouth sores, nosebleeds, postnasal drip, rhinorrhea, sinus pressure, sinus pain, sneezing, sore throat, tinnitus, trouble swallowing and voice change.    Eyes:  Negative for photophobia, pain, discharge, redness, itching and visual disturbance.   Respiratory:  Negative for apnea, cough, choking, chest tightness, shortness of breath, wheezing and stridor.    Cardiovascular:  Negative for chest pain, palpitations and leg swelling.   Gastrointestinal:  Negative  "for abdominal distention, abdominal pain, anal bleeding, blood in stool, constipation, diarrhea, nausea, rectal pain and vomiting.   Endocrine: Negative for cold intolerance, heat intolerance, polydipsia and polyuria.   Genitourinary:  Negative for decreased urine volume, difficulty urinating, dysuria, flank pain, frequency, genital sores, hematuria and urgency.   Musculoskeletal:  Negative for arthralgias, back pain, gait problem, joint swelling, myalgias, neck pain and neck stiffness.   Skin:  Negative for color change, pallor and rash.   Neurological:  Negative for dizziness, tremors, seizures, syncope, facial asymmetry, speech difficulty, weakness, light-headedness, numbness and headaches.   Hematological:  Negative for adenopathy. Does not bruise/bleed easily.   Psychiatric/Behavioral:  Negative for agitation, behavioral problems, confusion, decreased concentration, hallucinations, self-injury, sleep disturbance and suicidal ideas. The patient is not nervous/anxious.      Objective:    Vital Signs:  Vitals:    03/12/25 1247   BP: 125/72   Pulse: 55   Temp: 98 °F (36.7 °C)   SpO2: 98%   Weight: 106 kg (233 lb)   Height: 185.4 cm (72.99\")   PainSc: 0-No pain       Body mass index is 30.75 kg/m².    ECOG  (0) Fully active, able to carry on all predisease performance without restriction    Physical Exam:   Physical Exam  Vitals reviewed.   Constitutional:       General: He is not in acute distress.     Appearance: Normal appearance. He is not toxic-appearing or diaphoretic.   HENT:      Head: Normocephalic and atraumatic.      Right Ear: External ear normal.      Left Ear: External ear normal.   Eyes:      General: No scleral icterus.        Right eye: No discharge.         Left eye: No discharge.      Conjunctiva/sclera: Conjunctivae normal.      Pupils: Pupils are equal, round, and reactive to light.   Neck:      Thyroid: No thyromegaly.   Cardiovascular:      Rate and Rhythm: Normal rate and regular rhythm.      " Heart sounds: Normal heart sounds.      No friction rub. No gallop.   Pulmonary:      Effort: Pulmonary effort is normal. No respiratory distress.      Breath sounds: No stridor. No wheezing.   Abdominal:      General: Bowel sounds are normal.      Palpations: Abdomen is soft. There is no mass.      Tenderness: There is no abdominal tenderness. There is no guarding or rebound.   Musculoskeletal:         General: No swelling or tenderness. Normal range of motion.      Cervical back: Normal range of motion and neck supple.   Lymphadenopathy:      Cervical: No cervical adenopathy.   Skin:     General: Skin is warm.      Coloration: Skin is not jaundiced or pale.      Findings: No bruising, erythema, lesion or rash.   Neurological:      Mental Status: He is alert and oriented to person, place, and time.      Motor: No weakness or abnormal muscle tone.   Psychiatric:         Mood and Affect: Mood normal.         Behavior: Behavior normal.         Thought Content: Thought content normal.       Lab Results - Last 18 Months   Lab Units 03/12/25  1238 11/19/24  1338 10/07/24  1547   WBC 10*3/mm3 10.53  10.26 4.55 6.01   HEMOGLOBIN g/dL 13.6  13.9 13.6 14.3   HEMATOCRIT % 41.4  41.3 39.6 43.8   PLATELETS 10*3/mm3 155  152 172 196   MCV fL 92.8  93.0 92.1 93.4     Lab Results - Last 18 Months   Lab Units 11/19/24  1338 09/20/24  1110 05/23/24  0814   SODIUM mmol/L 139 139 141   POTASSIUM mmol/L 3.8 4.0 3.9   CHLORIDE mmol/L 103 104 105   CO2 mmol/L 25.4 25.8 22.3   BUN mg/dL 13 15 14   CREATININE mg/dL 0.72* 0.67* 0.73*   CALCIUM mg/dL 9.0 9.3 9.3   BILIRUBIN mg/dL 0.3 0.4 0.4   ALK PHOS U/L 48 43 40   ALT (SGPT) U/L 12 15 19   AST (SGOT) U/L 15 17 16   GLUCOSE mg/dL 113* 152* 132*     Lab Results   Component Value Date    GLUCOSE 113 (H) 11/19/2024    BUN 13 11/19/2024    CREATININE 0.72 (L) 11/19/2024    BCR 18.1 11/19/2024    K 3.8 11/19/2024    CO2 25.4 11/19/2024    CALCIUM 9.0 11/19/2024    ALBUMIN 3.8 11/19/2024     AST 15 11/19/2024    ALT 12 11/19/2024     Lab Results - Last 18 Months   Lab Units 12/08/23  0256   INR  1.01   APTT seconds 34.6*     Lab Results   Component Value Date    XGSTERVN43 546 02/14/2023     Uric Acid   Date Value Ref Range Status   06/01/2023 4.8 3.4 - 7.0 mg/dL Final     Lab Results   Component Value Date    PTT 34.6 (H) 12/08/2023    INR 1.01 12/08/2023     Lab Results   Component Value Date    PSA 4.100 (H) 05/17/2023     Assessment & Plan     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 Received 4 cycles of obinutuzumab/acalabrutinib.  The treatment was discontinued in the setting of bowel obstruction and atrial fibrillation that resolved spontaneously.  Evidently he had a good response.  Absolute lymphocyte count elevated, 7.58. Will recheck CBC in about 3 months.  Reviewed with patient the signs that he should report immediately should he develop any before his next visit.    Reviewed laboratory exams and discussed with patient and his spouse.  Reviewed recent office records from [facility].  Follow-up with Dr. Pedersen in approximately 6 months with repeat blood counts, sooner if condition indicates    Electronically signed by Fouzia Villegas PA-C

## 2025-03-12 ENCOUNTER — LAB (OUTPATIENT)
Dept: LAB | Facility: HOSPITAL | Age: 65
End: 2025-03-12
Payer: COMMERCIAL

## 2025-03-12 ENCOUNTER — OFFICE VISIT (OUTPATIENT)
Dept: ONCOLOGY | Facility: CLINIC | Age: 65
End: 2025-03-12
Payer: COMMERCIAL

## 2025-03-12 ENCOUNTER — RESULTS FOLLOW-UP (OUTPATIENT)
Dept: LAB | Facility: HOSPITAL | Age: 65
End: 2025-03-12
Payer: COMMERCIAL

## 2025-03-12 VITALS
WEIGHT: 233 LBS | HEIGHT: 73 IN | DIASTOLIC BLOOD PRESSURE: 72 MMHG | TEMPERATURE: 98 F | SYSTOLIC BLOOD PRESSURE: 125 MMHG | HEART RATE: 55 BPM | OXYGEN SATURATION: 98 % | BODY MASS INDEX: 30.88 KG/M2

## 2025-03-12 DIAGNOSIS — C91.10 CLL (CHRONIC LYMPHOCYTIC LEUKEMIA): Primary | ICD-10-CM

## 2025-03-12 LAB
ALBUMIN SERPL-MCNC: 4.5 G/DL (ref 3.5–5.2)
ALBUMIN/GLOB SERPL: 1.8 G/DL
ALP SERPL-CCNC: 46 U/L (ref 39–117)
ALT SERPL W P-5'-P-CCNC: 15 U/L (ref 1–41)
ANION GAP SERPL CALCULATED.3IONS-SCNC: 10.8 MMOL/L (ref 5–15)
ANISOCYTOSIS BLD QL: ABNORMAL
AST SERPL-CCNC: 16 U/L (ref 1–40)
BASOPHILS # BLD AUTO: 0.04 10*3/MM3 (ref 0–0.2)
BASOPHILS NFR BLD AUTO: 0.4 % (ref 0–1.5)
BILIRUB SERPL-MCNC: 0.4 MG/DL (ref 0–1.2)
BUN SERPL-MCNC: 19 MG/DL (ref 8–23)
BUN/CREAT SERPL: 25.7 (ref 7–25)
CALCIUM SPEC-SCNC: 9.7 MG/DL (ref 8.6–10.5)
CHLORIDE SERPL-SCNC: 100 MMOL/L (ref 98–107)
CO2 SERPL-SCNC: 25.2 MMOL/L (ref 22–29)
CREAT SERPL-MCNC: 0.74 MG/DL (ref 0.76–1.27)
DEPRECATED RDW RBC AUTO: 45.6 FL (ref 37–54)
DEPRECATED RDW RBC AUTO: 46 FL (ref 37–54)
EGFRCR SERPLBLD CKD-EPI 2021: 101.2 ML/MIN/1.73
EOSINOPHIL # BLD AUTO: 0.02 10*3/MM3 (ref 0–0.4)
EOSINOPHIL # BLD MANUAL: 0.11 10*3/MM3 (ref 0–0.4)
EOSINOPHIL NFR BLD AUTO: 0.2 % (ref 0.3–6.2)
EOSINOPHIL NFR BLD MANUAL: 1 % (ref 0.3–6.2)
ERYTHROCYTE [DISTWIDTH] IN BLOOD BY AUTOMATED COUNT: 13.4 % (ref 12.3–15.4)
ERYTHROCYTE [DISTWIDTH] IN BLOOD BY AUTOMATED COUNT: 13.8 % (ref 12.3–15.4)
GLOBULIN UR ELPH-MCNC: 2.5 GM/DL
GLUCOSE SERPL-MCNC: 164 MG/DL (ref 65–99)
HCT VFR BLD AUTO: 41.3 % (ref 37.5–51)
HCT VFR BLD AUTO: 41.4 % (ref 37.5–51)
HGB BLD-MCNC: 13.6 G/DL (ref 13–17.7)
HGB BLD-MCNC: 13.9 G/DL (ref 13–17.7)
HOLD SPECIMEN: NORMAL
LYMPHOCYTES # BLD MANUAL: 7.58 10*3/MM3 (ref 0.7–3.1)
LYMPHOCYTES NFR BLD AUTO: ABNORMAL %
LYMPHOCYTES NFR BLD MANUAL: 3 % (ref 5–12)
MCH RBC QN AUTO: 30.5 PG (ref 26.6–33)
MCH RBC QN AUTO: 31.3 PG (ref 26.6–33)
MCHC RBC AUTO-ENTMCNC: 32.9 G/DL (ref 31.5–35.7)
MCHC RBC AUTO-ENTMCNC: 33.7 G/DL (ref 31.5–35.7)
MCV RBC AUTO: 92.8 FL (ref 79–97)
MCV RBC AUTO: 93 FL (ref 79–97)
MONOCYTES # BLD: 0.32 10*3/MM3 (ref 0.1–0.9)
MONOCYTES NFR BLD AUTO: ABNORMAL %
NEUTROPHILS # BLD AUTO: 2.53 10*3/MM3 (ref 1.7–7)
NEUTROPHILS NFR BLD AUTO: ABNORMAL %
NEUTROPHILS NFR BLD MANUAL: 24 % (ref 42.7–76)
PATHOLOGY REVIEW: YES
PLAT MORPH BLD: NORMAL
PLATELET # BLD AUTO: 152 10*3/MM3 (ref 140–450)
PLATELET # BLD AUTO: 155 10*3/MM3 (ref 140–450)
PMV BLD AUTO: 10.5 FL (ref 6–12)
PMV BLD AUTO: 10.6 FL (ref 6–12)
POTASSIUM SERPL-SCNC: 4.4 MMOL/L (ref 3.5–5.2)
PROT SERPL-MCNC: 7 G/DL (ref 6–8.5)
RBC # BLD AUTO: 4.44 10*6/MM3 (ref 4.14–5.8)
RBC # BLD AUTO: 4.46 10*6/MM3 (ref 4.14–5.8)
SCAN SLIDE: NORMAL
SODIUM SERPL-SCNC: 136 MMOL/L (ref 136–145)
VARIANT LYMPHS NFR BLD MANUAL: 14 % (ref 0–5)
VARIANT LYMPHS NFR BLD MANUAL: 58 % (ref 19.6–45.3)
WBC MORPH BLD: NORMAL
WBC NRBC COR # BLD AUTO: 10.26 10*3/MM3 (ref 3.4–10.8)
WBC NRBC COR # BLD AUTO: 10.53 10*3/MM3 (ref 3.4–10.8)

## 2025-03-12 PROCEDURE — 85007 BL SMEAR W/DIFF WBC COUNT: CPT | Performed by: PHYSICIAN ASSISTANT

## 2025-03-12 PROCEDURE — 99214 OFFICE O/P EST MOD 30 MIN: CPT | Performed by: PHYSICIAN ASSISTANT

## 2025-03-12 PROCEDURE — 36415 COLL VENOUS BLD VENIPUNCTURE: CPT

## 2025-03-12 PROCEDURE — 80053 COMPREHEN METABOLIC PANEL: CPT | Performed by: INTERNAL MEDICINE

## 2025-03-12 PROCEDURE — 85025 COMPLETE CBC W/AUTO DIFF WBC: CPT | Performed by: PHYSICIAN ASSISTANT

## 2025-03-13 LAB
LAB AP CASE REPORT: NORMAL
PATH REPORT.FINAL DX SPEC: NORMAL

## 2025-03-13 NOTE — TELEPHONE ENCOUNTER
Per FRANCISCO Fragoso, I contacted Mr. Darnell. I informed him per Fouzia, his lymphocyte count increased from his previous result therefore since he reports no  symptoms right now, Dr. Pedersen would like to continue monitoring and recheck a CBC in 3 months from now. He v/u. I educated regarding what lymphocytes do within the blood. He confirmed. Lab appt scheduled. Patient confirmed appt date/time. I advised for him to contact our office if he has any questions or concerns. He confirmed.

## 2025-03-27 DIAGNOSIS — I10 ESSENTIAL (PRIMARY) HYPERTENSION: ICD-10-CM

## 2025-03-27 RX ORDER — AMLODIPINE BESYLATE 10 MG/1
10 TABLET ORAL DAILY
Qty: 90 TABLET | Refills: 1 | Status: SHIPPED | OUTPATIENT
Start: 2025-03-27

## 2025-05-27 ENCOUNTER — OFFICE VISIT (OUTPATIENT)
Dept: FAMILY MEDICINE CLINIC | Facility: CLINIC | Age: 65
End: 2025-05-27
Payer: COMMERCIAL

## 2025-05-27 ENCOUNTER — LAB (OUTPATIENT)
Dept: FAMILY MEDICINE CLINIC | Facility: CLINIC | Age: 65
End: 2025-05-27
Payer: COMMERCIAL

## 2025-05-27 VITALS
TEMPERATURE: 98.2 F | OXYGEN SATURATION: 97 % | DIASTOLIC BLOOD PRESSURE: 81 MMHG | SYSTOLIC BLOOD PRESSURE: 152 MMHG | HEART RATE: 65 BPM | WEIGHT: 231 LBS | HEIGHT: 73 IN | BODY MASS INDEX: 30.62 KG/M2

## 2025-05-27 DIAGNOSIS — E78.2 MIXED HYPERLIPIDEMIA: ICD-10-CM

## 2025-05-27 DIAGNOSIS — E11.42 TYPE 2 DIABETES MELLITUS WITH DIABETIC POLYNEUROPATHY, WITHOUT LONG-TERM CURRENT USE OF INSULIN: Primary | ICD-10-CM

## 2025-05-27 DIAGNOSIS — I10 ESSENTIAL (PRIMARY) HYPERTENSION: ICD-10-CM

## 2025-05-27 LAB
ALBUMIN SERPL-MCNC: 4.4 G/DL (ref 3.5–5.2)
ALBUMIN UR-MCNC: 24.8 MG/DL
ALBUMIN/GLOB SERPL: 1.8 G/DL
ALP SERPL-CCNC: 48 U/L (ref 39–117)
ALT SERPL W P-5'-P-CCNC: 15 U/L (ref 1–41)
ANION GAP SERPL CALCULATED.3IONS-SCNC: 12.6 MMOL/L (ref 5–15)
AST SERPL-CCNC: 15 U/L (ref 1–40)
BILIRUB SERPL-MCNC: 0.4 MG/DL (ref 0–1.2)
BUN SERPL-MCNC: 16 MG/DL (ref 8–23)
BUN/CREAT SERPL: 19.5 (ref 7–25)
CALCIUM SPEC-SCNC: 9.4 MG/DL (ref 8.6–10.5)
CHLORIDE SERPL-SCNC: 105 MMOL/L (ref 98–107)
CHOLEST SERPL-MCNC: 220 MG/DL (ref 0–200)
CO2 SERPL-SCNC: 25.4 MMOL/L (ref 22–29)
CREAT SERPL-MCNC: 0.82 MG/DL (ref 0.76–1.27)
CREAT UR-MCNC: 45 MG/DL
DEPRECATED RDW RBC AUTO: 47.1 FL (ref 37–54)
EGFRCR SERPLBLD CKD-EPI 2021: 98.1 ML/MIN/1.73
ERYTHROCYTE [DISTWIDTH] IN BLOOD BY AUTOMATED COUNT: 13 % (ref 12.3–15.4)
GLOBULIN UR ELPH-MCNC: 2.5 GM/DL
GLUCOSE SERPL-MCNC: 145 MG/DL (ref 65–99)
HBA1C MFR BLD: 7.1 % (ref 4.8–5.6)
HCT VFR BLD AUTO: 42 % (ref 37.5–51)
HDLC SERPL-MCNC: 84 MG/DL (ref 40–60)
HGB BLD-MCNC: 13.8 G/DL (ref 13–17.7)
LDLC SERPL CALC-MCNC: 122 MG/DL (ref 0–100)
LDLC/HDLC SERPL: 1.43 {RATIO}
LYMPHOCYTES # BLD MANUAL: 10.4 10*3/MM3 (ref 0.7–3.1)
MCH RBC QN AUTO: 32.5 PG (ref 26.6–33)
MCHC RBC AUTO-ENTMCNC: 32.9 G/DL (ref 31.5–35.7)
MCV RBC AUTO: 98.8 FL (ref 79–97)
MICROALBUMIN/CREAT UR: 551.1 MG/G (ref 0–29)
NEUTROPHILS # BLD AUTO: 4.67 10*3/MM3 (ref 1.7–7)
NEUTROPHILS NFR BLD MANUAL: 31 % (ref 42.7–76)
NRBC BLD AUTO-RTO: 0 /100 WBC (ref 0–0.2)
PLAT MORPH BLD: NORMAL
PLATELET # BLD AUTO: 170 10*3/MM3 (ref 140–450)
PMV BLD AUTO: 10.7 FL (ref 6–12)
POTASSIUM SERPL-SCNC: 4.2 MMOL/L (ref 3.5–5.2)
PROT SERPL-MCNC: 6.9 G/DL (ref 6–8.5)
RBC # BLD AUTO: 4.25 10*6/MM3 (ref 4.14–5.8)
RBC MORPH BLD: NORMAL
SODIUM SERPL-SCNC: 143 MMOL/L (ref 136–145)
TRIGL SERPL-MCNC: 80 MG/DL (ref 0–150)
VARIANT LYMPHS NFR BLD MANUAL: 2 % (ref 0–5)
VARIANT LYMPHS NFR BLD MANUAL: 67 % (ref 19.6–45.3)
VLDLC SERPL-MCNC: 14 MG/DL (ref 5–40)
WBC MORPH BLD: NORMAL
WBC NRBC COR # BLD AUTO: 15.07 10*3/MM3 (ref 3.4–10.8)

## 2025-05-27 PROCEDURE — 99214 OFFICE O/P EST MOD 30 MIN: CPT | Performed by: FAMILY MEDICINE

## 2025-05-27 PROCEDURE — 36415 COLL VENOUS BLD VENIPUNCTURE: CPT | Performed by: FAMILY MEDICINE

## 2025-05-27 PROCEDURE — 85025 COMPLETE CBC W/AUTO DIFF WBC: CPT | Performed by: FAMILY MEDICINE

## 2025-05-27 PROCEDURE — 80061 LIPID PANEL: CPT | Performed by: FAMILY MEDICINE

## 2025-05-27 PROCEDURE — 80053 COMPREHEN METABOLIC PANEL: CPT | Performed by: FAMILY MEDICINE

## 2025-05-27 PROCEDURE — 85007 BL SMEAR W/DIFF WBC COUNT: CPT | Performed by: FAMILY MEDICINE

## 2025-05-27 PROCEDURE — 82570 ASSAY OF URINE CREATININE: CPT | Performed by: FAMILY MEDICINE

## 2025-05-27 PROCEDURE — 82043 UR ALBUMIN QUANTITATIVE: CPT | Performed by: FAMILY MEDICINE

## 2025-05-27 PROCEDURE — 83036 HEMOGLOBIN GLYCOSYLATED A1C: CPT | Performed by: FAMILY MEDICINE

## 2025-05-27 RX ORDER — AMLODIPINE BESYLATE 10 MG/1
10 TABLET ORAL DAILY
Qty: 90 TABLET | Refills: 1 | Status: SHIPPED | OUTPATIENT
Start: 2025-05-27

## 2025-05-27 NOTE — PROGRESS NOTES
"Subjective   Bharathi Darnell is a 64 y.o. male.     History of Present Illness  Bharathi Darnell is in for follow up on his diabetes and high blood pressure.  He also has high cholesterol.. There is no history of chest pain or dyspnea. There is no history of issue with bowel or bladder dysfunction. There is no history of dizziness or lightheadedness. There is no history of issue with sleep or mood. There is no history of issue with present medication.       Diabetes  Associated symptoms:     no chest pain and no fatigue    Hypoglycemia symptoms:     no dizziness and no headaches           /81 (BP Location: Left arm, Patient Position: Sitting, Cuff Size: Large Adult)   Pulse 65   Temp 98.2 °F (36.8 °C) (Temporal)   Ht 185.4 cm (72.99\")   Wt 105 kg (231 lb)   SpO2 97%   BMI 30.48 kg/m²       Chief Complaint   Patient presents with    Diabetes     6 month f/u            Current Outpatient Medications:     amLODIPine (NORVASC) 10 MG tablet, Take 1 tablet by mouth Daily., Disp: 90 tablet, Rfl: 1    Farxiga 10 MG tablet, Take 10 mg by mouth Daily., Disp: 30 tablet, Rfl: 0    glucose blood (OneTouch Verio) test strip, 1 each by Other route Daily. Dx: E11.65. Use as instructed, Disp: 50 each, Rfl: 2    Lancets (OneTouch Delica Plus [id number]) misc, Use 1 each Daily. Dx: E11.65, Disp: 100 each, Rfl: 2    lisinopril (PRINIVIL,ZESTRIL) 40 MG tablet, TAKE 1 TABLET BY MOUTH DAILY FOR BLOOD PRESSURE, Disp: 90 tablet, Rfl: 3    tadalafil (Cialis) 10 MG tablet, Take 1 tablet by mouth Daily., Disp: 30 tablet, Rfl: 3    BMI is >= 30 and <35. (Class 1 Obesity). The following options were offered after discussion;: exercise counseling/recommendations and nutrition counseling/recommendations       The following portions of the patient's history were reviewed and updated as appropriate: allergies, current medications, past family history, past medical history, past social history, past surgical history, and problem " list.    Review of Systems   Constitutional:  Negative for activity change, fatigue and fever.   HENT:  Negative for congestion, sinus pressure, sinus pain, sore throat and trouble swallowing.    Eyes:  Negative for visual disturbance.   Respiratory:  Negative for chest tightness, shortness of breath and wheezing.    Cardiovascular:  Negative for chest pain.   Gastrointestinal:  Negative for abdominal distention, abdominal pain, constipation, diarrhea, nausea and vomiting.   Genitourinary:  Negative for difficulty urinating and dysuria.   Musculoskeletal:  Negative for arthralgias, back pain, myalgias and neck pain.   Neurological:  Negative for dizziness and headaches.   Psychiatric/Behavioral:  Negative for agitation, hallucinations and suicidal ideas.        Objective   Physical Exam  Vitals and nursing note reviewed.   Constitutional:       Appearance: Normal appearance.   HENT:      Right Ear: Tympanic membrane and ear canal normal.      Left Ear: Tympanic membrane and ear canal normal.   Cardiovascular:      Rate and Rhythm: Normal rate and regular rhythm.      Heart sounds: Normal heart sounds. No murmur heard.  Pulmonary:      Effort: Pulmonary effort is normal.      Breath sounds: No wheezing or rales.   Abdominal:      General: Bowel sounds are normal.      Palpations: Abdomen is soft.      Tenderness: There is no abdominal tenderness. There is no guarding or rebound.      Hernia: No hernia is present.   Musculoskeletal:      Cervical back: Neck supple. No rigidity.      Right lower leg: No edema.      Left lower leg: No edema.   Lymphadenopathy:      Cervical: No cervical adenopathy.   Neurological:      Mental Status: He is alert and oriented to person, place, and time. Mental status is at baseline.   Psychiatric:         Mood and Affect: Mood normal.           Assessment & Plan   Problems Addressed this Visit          Cardiac and Vasculature    Mixed hyperlipidemia       Endocrine and Metabolic    Type  2 diabetes mellitus - Primary    Relevant Orders    Comprehensive Metabolic Panel    Hemoglobin A1c    Lipid Panel    CBC & Differential    Microalbumin / Creatinine Urine Ratio - Urine, Clean Catch     Other Visit Diagnoses         Essential (primary) hypertension        Relevant Medications    amLODIPine (NORVASC) 10 MG tablet          Diagnoses         Codes Comments      Type 2 diabetes mellitus with diabetic polyneuropathy, without long-term current use of insulin    -  Primary ICD-10-CM: E11.42  ICD-9-CM: 250.60, 357.2       Essential (primary) hypertension     ICD-10-CM: I10  ICD-9-CM: 401.9       Mixed hyperlipidemia     ICD-10-CM: E78.2  ICD-9-CM: 272.2             I will update some labs and adjust treatment plan as indicated  I did ask him to focus more on protein intake in the coming months  I did ask him to see me again in 6 months

## 2025-06-01 RX ORDER — DAPAGLIFLOZIN 10 MG/1
1 TABLET, FILM COATED ORAL DAILY
Qty: 30 TABLET | Refills: 5 | Status: SHIPPED | OUTPATIENT
Start: 2025-06-01

## 2025-06-20 ENCOUNTER — TELEPHONE (OUTPATIENT)
Dept: ONCOLOGY | Facility: CLINIC | Age: 65
End: 2025-06-20

## 2025-06-20 ENCOUNTER — LAB (OUTPATIENT)
Dept: LAB | Facility: HOSPITAL | Age: 65
End: 2025-06-20
Payer: COMMERCIAL

## 2025-06-20 DIAGNOSIS — C91.10 CLL (CHRONIC LYMPHOCYTIC LEUKEMIA): ICD-10-CM

## 2025-06-20 LAB
BASOPHILS # BLD AUTO: 0.14 10*3/MM3 (ref 0–0.2)
BASOPHILS NFR BLD AUTO: 0.9 % (ref 0–1.5)
DEPRECATED RDW RBC AUTO: 43.6 FL (ref 37–54)
EOSINOPHIL # BLD AUTO: 0.05 10*3/MM3 (ref 0–0.4)
EOSINOPHIL NFR BLD AUTO: 0.3 % (ref 0.3–6.2)
ERYTHROCYTE [DISTWIDTH] IN BLOOD BY AUTOMATED COUNT: 13.1 % (ref 12.3–15.4)
HCT VFR BLD AUTO: 41.7 % (ref 37.5–51)
HGB BLD-MCNC: 14.1 G/DL (ref 13–17.7)
LYMPHOCYTES NFR BLD AUTO: ABNORMAL %
MCH RBC QN AUTO: 32 PG (ref 26.6–33)
MCHC RBC AUTO-ENTMCNC: 33.8 G/DL (ref 31.5–35.7)
MCV RBC AUTO: 94.8 FL (ref 79–97)
MONOCYTES NFR BLD AUTO: ABNORMAL %
NEUTROPHILS NFR BLD AUTO: ABNORMAL %
PLATELET # BLD AUTO: 157 10*3/MM3 (ref 140–450)
PMV BLD AUTO: 10.2 FL (ref 6–12)
RBC # BLD AUTO: 4.4 10*6/MM3 (ref 4.14–5.8)
WBC NRBC COR # BLD AUTO: 15.23 10*3/MM3 (ref 3.4–10.8)

## 2025-06-20 PROCEDURE — 36415 COLL VENOUS BLD VENIPUNCTURE: CPT

## 2025-06-20 PROCEDURE — 85025 COMPLETE CBC W/AUTO DIFF WBC: CPT

## 2025-06-20 NOTE — TELEPHONE ENCOUNTER
"  Caller: Bharathi Darnell \"Landon\"    Relationship: Self    Best call back number: [phone]    Who are you requesting to speak with (clinical staff, provider,  specific staff member): scheduling    Do you know the name of the person who called: patient    What was the call regarding: pt is needing to confirm if f/u md appt should be prior to 09/2025 due to labs being on 06/20   "

## 2025-06-30 ENCOUNTER — TELEPHONE (OUTPATIENT)
Dept: FAMILY MEDICINE CLINIC | Facility: CLINIC | Age: 65
End: 2025-06-30

## 2025-06-30 RX ORDER — ROSUVASTATIN CALCIUM 10 MG/1
10 TABLET, COATED ORAL DAILY
Qty: 90 TABLET | Refills: 1 | Status: SHIPPED | OUTPATIENT
Start: 2025-06-30

## 2025-06-30 RX ORDER — TIRZEPATIDE 2.5 MG/.5ML
2.5 INJECTION, SOLUTION SUBCUTANEOUS
Qty: 2 ML | Refills: 1 | Status: SHIPPED | OUTPATIENT
Start: 2025-06-30

## 2025-06-30 NOTE — TELEPHONE ENCOUNTER
"  Caller: Bharathi Darnell \"Landon\"    Relationship: Self    Best call back number: [phone]     What is the best time to reach you: ANYTIME    Who are you requesting to speak with (clinical staff, provider,  specific staff member): CLINICAL    What was the call regarding: PATIENT IS NEEDING TO DISCUSS A RECENT MESSAGE ABOUT STARTING A NEW MEDICATION, HE IS THINKING OF CHANGING INSURANCE AND NEEDS TO KNOW THE NAME OF IT AND IF DR QUIROZ EVER SENT IT IN TO PHARMACY    Is it okay if the provider responds through [employer]: WOULD PREFER A PHONE CALL TO DISCUSS THIS ISSUE  "

## 2025-06-30 NOTE — TELEPHONE ENCOUNTER
Pt would like the mounjaro and crestor please sent to pharmacy per the fitkit message. He will not get the new insurance until August and wants to go ahead and start them.

## 2025-07-01 ENCOUNTER — TELEPHONE (OUTPATIENT)
Dept: FAMILY MEDICINE CLINIC | Facility: CLINIC | Age: 65
End: 2025-07-01
Payer: COMMERCIAL

## 2025-07-01 NOTE — TELEPHONE ENCOUNTER
PA Case: [id number], Status: Approved, Coverage Starts on: 7/1/2025 12:00:00 AM, Coverage Ends on: 7/1/2026 12:00:00 AM.. Authorization Expiration Date: July 1, 2026.

## 2025-07-01 NOTE — TELEPHONE ENCOUNTER
Mounjaro 2.5MG/0.5ML auto-injectors  (Key: [id number])  Rx #: [id number]  [employer] Form  Member ID:[id number]

## 2025-07-02 ENCOUNTER — TELEPHONE (OUTPATIENT)
Dept: FAMILY MEDICINE CLINIC | Facility: CLINIC | Age: 65
End: 2025-07-02

## 2025-07-02 NOTE — TELEPHONE ENCOUNTER
Caller: JOHN    Relationship:SELF    Callback number: [phone]    Is it ok to leave a message: [x] Yes [] No    Requested medication for samples: FARXIGA 10 MG    How much medication does the patient currently have left: 2 DAYS    Who will be picking up the samples: SELF    Do you need information about patient financial assistance for this medication: [] Yes [x] No    Additional details provided: THE PHARMACY IS OUT, ITS ON BACK ORDER

## 2025-07-02 NOTE — TELEPHONE ENCOUNTER
"    Caller: Bharathi Darnell \"Landon\"    Relationship: Self    Best call back number: [phone]    What medication are you requesting: SUBSTITUTE FOR MOUNJARO, THIS .00 A MONTH         Have you had these symptoms before:    [x] Yes  [] No    Have you been treated for these symptoms before:   [x] Yes  [] No    If a prescription is needed, what is your preferred pharmacy and phone number: [id number] [place] [facility] [address] [place] [address] [phone] [facility] [phone]      Additional notes:      "

## 2025-07-14 ENCOUNTER — TELEPHONE (OUTPATIENT)
Dept: FAMILY MEDICINE CLINIC | Facility: CLINIC | Age: 65
End: 2025-07-14
Payer: COMMERCIAL

## 2025-07-14 RX ORDER — DAPAGLIFLOZIN 10 MG/1
1 TABLET, FILM COATED ORAL DAILY
Qty: 30 TABLET | Refills: 5 | Status: SHIPPED | OUTPATIENT
Start: 2025-07-14

## 2025-07-14 NOTE — TELEPHONE ENCOUNTER
Pt stopped by the office today and said he went to [facility] on [address] [place] [address] in [place] to  his RX of Farxiga and use the discount card he was given but the pharmacy said that to use the RX discount card the RX is resent to them with TANYA code of 1. He also needs a refill of sildenafil 100 mg sent to [facility] on [facility]. Thanks!

## 2025-08-12 RX ORDER — TIRZEPATIDE 2.5 MG/.5ML
2.5 INJECTION, SOLUTION SUBCUTANEOUS
Qty: 2 ML | Refills: 1 | Status: SHIPPED | OUTPATIENT
Start: 2025-08-12

## 2025-08-12 RX ORDER — LISINOPRIL 40 MG/1
40 TABLET ORAL DAILY
Qty: 90 TABLET | Refills: 3 | Status: SHIPPED | OUTPATIENT
Start: 2025-08-12

## 2025-08-12 RX ORDER — DAPAGLIFLOZIN 10 MG/1
1 TABLET, FILM COATED ORAL DAILY
Qty: 30 TABLET | Refills: 5 | Status: SHIPPED | OUTPATIENT
Start: 2025-08-12

## 2025-08-13 ENCOUNTER — TELEPHONE (OUTPATIENT)
Dept: FAMILY MEDICINE CLINIC | Facility: CLINIC | Age: 65
End: 2025-08-13
Payer: MEDICARE